# Patient Record
Sex: MALE | Race: WHITE | NOT HISPANIC OR LATINO | Employment: OTHER | URBAN - METROPOLITAN AREA
[De-identification: names, ages, dates, MRNs, and addresses within clinical notes are randomized per-mention and may not be internally consistent; named-entity substitution may affect disease eponyms.]

---

## 2022-09-23 ENCOUNTER — TRANSCRIBE ORDERS (OUTPATIENT)
Dept: ADMINISTRATIVE | Facility: HOSPITAL | Age: 77
End: 2022-09-23

## 2022-09-23 ENCOUNTER — HOSPITAL ENCOUNTER (OUTPATIENT)
Dept: GENERAL RADIOLOGY | Facility: HOSPITAL | Age: 77
Discharge: HOME | End: 2022-09-23
Admitting: INTERNAL MEDICINE

## 2022-09-23 DIAGNOSIS — M05.79 RHEUMATOID ARTHRITIS WITH RHEUMATOID FACTOR OF MULTIPLE SITES WITHOUT ORGAN OR SYSTEMS INVOLVEMENT: ICD-10-CM

## 2022-09-23 DIAGNOSIS — M05.79 RHEUMATOID ARTHRITIS WITH RHEUMATOID FACTOR OF MULTIPLE SITES WITHOUT ORGAN OR SYSTEMS INVOLVEMENT: Primary | ICD-10-CM

## 2022-09-23 DIAGNOSIS — M54.2 CERVICALGIA: ICD-10-CM

## 2022-09-23 PROCEDURE — 72050 X-RAY EXAM NECK SPINE 4/5VWS: CPT

## 2023-05-15 ENCOUNTER — HOSPITAL ENCOUNTER (EMERGENCY)
Facility: HOSPITAL | Age: 78
Discharge: HOME | End: 2023-05-15
Attending: EMERGENCY MEDICINE | Admitting: EMERGENCY MEDICINE
Payer: MEDICARE

## 2023-05-15 ENCOUNTER — APPOINTMENT (OUTPATIENT)
Dept: GENERAL RADIOLOGY | Facility: HOSPITAL | Age: 78
End: 2023-05-15
Payer: MEDICARE

## 2023-05-15 VITALS
BODY MASS INDEX: 26.9 KG/M2 | HEIGHT: 73 IN | SYSTOLIC BLOOD PRESSURE: 159 MMHG | RESPIRATION RATE: 16 BRPM | HEART RATE: 52 BPM | WEIGHT: 203 LBS | TEMPERATURE: 97.8 F | OXYGEN SATURATION: 99 % | DIASTOLIC BLOOD PRESSURE: 64 MMHG

## 2023-05-15 DIAGNOSIS — R06.02 SHORTNESS OF BREATH: Primary | ICD-10-CM

## 2023-05-15 LAB
ALBUMIN SERPL-MCNC: 4.2 G/DL (ref 3.5–5.2)
ALBUMIN/GLOB SERPL: 1.1 G/DL
ALP SERPL-CCNC: 71 U/L (ref 39–117)
ALT SERPL W P-5'-P-CCNC: 18 U/L (ref 1–41)
ANION GAP SERPL CALCULATED.3IONS-SCNC: 9.1 MMOL/L (ref 5–15)
AST SERPL-CCNC: 17 U/L (ref 1–40)
BASOPHILS # BLD AUTO: 0.02 10*3/MM3 (ref 0–0.2)
BASOPHILS NFR BLD AUTO: 0.4 % (ref 0–1.5)
BILIRUB SERPL-MCNC: 0.3 MG/DL (ref 0–1.2)
BUN SERPL-MCNC: 20 MG/DL (ref 8–23)
BUN/CREAT SERPL: 20 (ref 7–25)
CALCIUM SPEC-SCNC: 9.3 MG/DL (ref 8.6–10.5)
CHLORIDE SERPL-SCNC: 100 MMOL/L (ref 98–107)
CO2 SERPL-SCNC: 26.9 MMOL/L (ref 22–29)
CREAT SERPL-MCNC: 1 MG/DL (ref 0.76–1.27)
D DIMER PPP FEU-MCNC: 0.67 MCGFEU/ML (ref 0–0.76)
DEPRECATED RDW RBC AUTO: 44 FL (ref 37–54)
EGFRCR SERPLBLD CKD-EPI 2021: 78 ML/MIN/1.73
EOSINOPHIL # BLD AUTO: 0.12 10*3/MM3 (ref 0–0.4)
EOSINOPHIL NFR BLD AUTO: 2.2 % (ref 0.3–6.2)
ERYTHROCYTE [DISTWIDTH] IN BLOOD BY AUTOMATED COUNT: 13.2 % (ref 12.3–15.4)
GLOBULIN UR ELPH-MCNC: 3.7 GM/DL
GLUCOSE SERPL-MCNC: 148 MG/DL (ref 65–99)
HCT VFR BLD AUTO: 44.8 % (ref 37.5–51)
HGB BLD-MCNC: 14.6 G/DL (ref 13–17.7)
IMM GRANULOCYTES # BLD AUTO: 0 10*3/MM3 (ref 0–0.05)
IMM GRANULOCYTES NFR BLD AUTO: 0 % (ref 0–0.5)
LYMPHOCYTES # BLD AUTO: 1.31 10*3/MM3 (ref 0.7–3.1)
LYMPHOCYTES NFR BLD AUTO: 23.9 % (ref 19.6–45.3)
MCH RBC QN AUTO: 29.4 PG (ref 26.6–33)
MCHC RBC AUTO-ENTMCNC: 32.6 G/DL (ref 31.5–35.7)
MCV RBC AUTO: 90.1 FL (ref 79–97)
MONOCYTES # BLD AUTO: 0.32 10*3/MM3 (ref 0.1–0.9)
MONOCYTES NFR BLD AUTO: 5.8 % (ref 5–12)
NEUTROPHILS NFR BLD AUTO: 3.71 10*3/MM3 (ref 1.7–7)
NEUTROPHILS NFR BLD AUTO: 67.7 % (ref 42.7–76)
NT-PROBNP SERPL-MCNC: 444.9 PG/ML (ref 0–1800)
PLATELET # BLD AUTO: 155 10*3/MM3 (ref 140–450)
PMV BLD AUTO: 9.9 FL (ref 6–12)
POTASSIUM SERPL-SCNC: 4.7 MMOL/L (ref 3.5–5.2)
PROT SERPL-MCNC: 7.9 G/DL (ref 6–8.5)
QT INTERVAL: 360 MS
QT INTERVAL: 429 MS
RBC # BLD AUTO: 4.97 10*6/MM3 (ref 4.14–5.8)
SODIUM SERPL-SCNC: 136 MMOL/L (ref 136–145)
TROPONIN T SERPL HS-MCNC: 7 NG/L
TROPONIN T SERPL HS-MCNC: 8 NG/L
WBC NRBC COR # BLD: 5.48 10*3/MM3 (ref 3.4–10.8)

## 2023-05-15 PROCEDURE — 71046 X-RAY EXAM CHEST 2 VIEWS: CPT

## 2023-05-15 PROCEDURE — 84484 ASSAY OF TROPONIN QUANT: CPT | Performed by: EMERGENCY MEDICINE

## 2023-05-15 PROCEDURE — 80053 COMPREHEN METABOLIC PANEL: CPT | Performed by: EMERGENCY MEDICINE

## 2023-05-15 PROCEDURE — 93005 ELECTROCARDIOGRAM TRACING: CPT | Performed by: EMERGENCY MEDICINE

## 2023-05-15 PROCEDURE — 85025 COMPLETE CBC W/AUTO DIFF WBC: CPT | Performed by: EMERGENCY MEDICINE

## 2023-05-15 PROCEDURE — 83880 ASSAY OF NATRIURETIC PEPTIDE: CPT | Performed by: EMERGENCY MEDICINE

## 2023-05-15 PROCEDURE — 36415 COLL VENOUS BLD VENIPUNCTURE: CPT

## 2023-05-15 PROCEDURE — 85379 FIBRIN DEGRADATION QUANT: CPT | Performed by: EMERGENCY MEDICINE

## 2023-05-15 PROCEDURE — 99284 EMERGENCY DEPT VISIT MOD MDM: CPT

## 2023-05-15 RX ORDER — METFORMIN HYDROCHLORIDE 500 MG/1
2 TABLET, EXTENDED RELEASE ORAL DAILY
COMMUNITY
Start: 2023-01-27

## 2023-05-15 RX ORDER — PREDNISONE 20 MG/1
TABLET ORAL
Qty: 12 TABLET | Refills: 0 | Status: SHIPPED | OUTPATIENT
Start: 2023-05-15

## 2023-05-15 RX ORDER — ALBUTEROL SULFATE 90 UG/1
2 AEROSOL, METERED RESPIRATORY (INHALATION) EVERY 4 HOURS PRN
Qty: 6.7 G | Refills: 0 | Status: SHIPPED | OUTPATIENT
Start: 2023-05-15

## 2023-05-15 RX ORDER — MELOXICAM 15 MG/1
1 TABLET ORAL DAILY
COMMUNITY
Start: 2023-05-10

## 2023-05-15 RX ORDER — TRAMADOL HYDROCHLORIDE 50 MG/1
100 TABLET ORAL EVERY 6 HOURS PRN
COMMUNITY
Start: 2023-05-09

## 2023-05-15 RX ORDER — ASPIRIN 81 MG/1
81 TABLET, CHEWABLE ORAL DAILY
COMMUNITY

## 2023-05-15 RX ORDER — SODIUM CHLORIDE 0.9 % (FLUSH) 0.9 %
10 SYRINGE (ML) INJECTION AS NEEDED
Status: DISCONTINUED | OUTPATIENT
Start: 2023-05-15 | End: 2023-05-15 | Stop reason: HOSPADM

## 2023-05-15 RX ORDER — LISINOPRIL 10 MG/1
10 TABLET ORAL DAILY
COMMUNITY

## 2023-05-15 RX ORDER — ATENOLOL 25 MG/1
1 TABLET ORAL DAILY
COMMUNITY
Start: 2023-02-18

## 2023-05-15 RX ORDER — OMEPRAZOLE 40 MG/1
1 CAPSULE, DELAYED RELEASE ORAL DAILY
COMMUNITY
Start: 2023-04-05

## 2023-05-15 RX ORDER — DOXAZOSIN 2 MG/1
2 TABLET ORAL
COMMUNITY
Start: 2023-05-10

## 2023-05-15 RX ORDER — PRAVASTATIN SODIUM 80 MG/1
1 TABLET ORAL DAILY
COMMUNITY
Start: 2023-02-16

## 2023-05-15 RX ORDER — GABAPENTIN 300 MG/1
600 CAPSULE ORAL
COMMUNITY
Start: 2023-05-01

## 2023-05-15 NOTE — FSED PROVIDER NOTE
Subjective   History of Present Illness  Patient is a 77-year-old white male.  He presents with several episodes of shortness of breath that began last night.  He states they last just for maybe a minute.  He denies any associated chest pain.  No nausea vomiting.  No fever no chills.  No productive cough with sputum.  He does have some allergies and has had a cough intermittently the last few weeks.  No personal history of DVT or PE.  No personal history of coronary artery disease.  He states he had a stress test this last fall that was normal.  No calf pain or swelling.        Review of Systems   Constitutional: No fevers, chills, sweats unless otherwise documented in HPI  Eyes: No recent visual problems, eye discharge, eye pain, redness unless otherwise documented in HPI  HEENT: No ear pain, nasal congestion, sore throat, voice changes unless otherwise documented in HPI  Respiratory: No shortness of breath, cough, pain on breathing, sputum production unless otherwise documented in HPI  Cardiovascular: No chest pain, palpitations, syncope, orthopnea unless otherwise documented in HPI  Gastrointestinal: No nausea, vomiting, diarrhea, constipation unless otherwise documented in HPI  Genitourinary: No hematuria, dysuria, incontinence unless otherwise documented in HPI  Endocrine: Negative for excessive thirst, excessive hunger, excessive urination, heat or cold intolerance unless otherwise documented in HPI  Musculoskeletal: No back pain, neck pain, joint pain, muscle pain, decreased range of motion unless otherwise documented in HPI  Integumentary: No rash, pruritus, abrasion, lesions unless otherwise documented in HPI  Neurologic: No weakness, numbness, frequent headaches, tremors unless otherwise documented in HPI  Psychiatric: No anxiety, depression, mood changes, hallucinations unless otherwise documented in HPI        Past Medical History:   Diagnosis Date   • Diabetes mellitus    • Hyperlipidemia    •  Hypertension    • RA (rheumatoid arthritis)        No Known Allergies    Past Surgical History:   Procedure Laterality Date   • ABDOMINAL SURGERY     • CHOLECYSTECTOMY     • PERICARDIUM SURGERY     • REPLACEMENT TOTAL KNEE BILATERAL         History reviewed. No pertinent family history.    Social History     Socioeconomic History   • Marital status:    Tobacco Use   • Smoking status: Every Day     Packs/day: 1.00     Types: Cigarettes   Substance and Sexual Activity   • Alcohol use: Not Currently   • Drug use: Never           Objective   Physical Exam   Vital signs: Reviewed in nurses notes    General: Awake alert no acute distress    HEENT: Pupils equal round responsive to light extraocular's are intact bilaterally nasopharynx clear pharynx clear lungs    Neck:   Supple without lymphadenopathy    Respiratory:   Decreased breath sounds but equal bilaterally.    Cardiovascular: Regular rate and rhythm no murmurs.  Equal pulses in bilateral lower extremities.  No pretibial or pedal edema    Abdomen: Soft nondistended nontender to palpation x4 quadrants    Skin:   Warm and dry.  There is a very small scab on the epigastrium where he bumped the skin at home    Musculoskeletal: Patient does have changes consistent with rheumatoid arthritis of his hands    Neurological examination: Patient is awake alert oriented x4.  Speech is normal.  No facial palsy.  No focal motor or sensory deficits.    ECG 12 Lead ED Triage Standing Order; SOA      Date/Time: 5/15/2023 10:09 AM  Performed by: Roby Medina MD  Authorized by: Roby Medina MD   Interpreted by physician  Rhythm: sinus rhythm  Comments: Normal sinus rhythm rate is 70.  Right axis deviation noted.  Nonspecific lateral and anterior lateral T wave inversions noted      ECG 12 Lead      Date/Time: 5/15/2023 12:43 PM  Performed by: Roby Medina MD  Authorized by: Roby Medina MD   Interpreted by physician  Comparison: compared with previous  ECG from 5/15/2023  Similar to previous ECG  Rhythm: sinus bradycardia  Comments: Sinus bradycardia rate of 50.  Nonspecific T wave changes inferiorly and anterior laterally.                 ED Course  ED Course as of 05/15/23 1407   Mon May 15, 2023   1149 Initial blood work has all been reviewed.  Age-adjusted D-dimer is normal.  We will obtain a 2-hour troponin T with EKG.  Currently resting comfortably [TC]      ED Course User Index  [TC] Roby Medina MD                                           [facility]   Differential Diagnosis: Pulmonary embolism, pneumonia, non-Q wave myocardial infarction, COPD    ED Course: Appropriate physical exam was performed.  Laboratory evaluation was undertaken including D-dimer, troponin with 2-hour repeat.  Chest x-ray was obtained and reviewed.  No evidence of infiltrate or CHF   results of patient's evaluation were discussed with the patient.  We also did discuss the treatment plan as well as appropriate return precautions.    Repeat Exam and discussion with patient, including plan of care,  prior to discharge: Patient feels very well to discharge.  We discussed plan of care which includes short prednisone taper as well as albuterol MDI    My EKG Interpretation: Please see body of note for EKG details    My CT Interpretation: N/A    My Xray interpreation:   The x-rays were independently reviewed as well as review of the radiologist interpretation  Decision rules/scores evaluated: N/A    Discussed with : N/A    Tests considered but not order: N/A    Shared decision making:   Shared decision making was undertaken with the patient.  The patient understands and concurs with current treatment plan.    Code Status: Full Code    Social Determinants of Health that impacts treatment: Very strong social support system    Final diagnoses:   Shortness of breath       ED Disposition  ED Disposition     ED Disposition   Discharge    Condition   Stable    Comment   --             Ladan  Tavo SAMANIEGO MD  [address] [place], [place] [id number]  [phone]    In 1 week           Medication List      New Prescriptions    albuterol sulfate  (90 Base) MCG/ACT inhaler  Commonly known as: PROVENTIL HFA;VENTOLIN HFA;PROAIR HFA  Inhale 2 puffs Every 4 (Four) Hours As Needed for Shortness of Air (and / or cough).     predniSONE 20 MG tablet  Commonly known as: DELTASONE  3 po daily x 2d, then 2 po daily x 2d, then 1 po daily x 2d.           Where to Get Your Medications      These medications were sent to [facility] [address] [place] [address] [facility] - [phone]  - [phone]   [address] [facility] [address]    Phone: [phone]   · albuterol sulfate  (90 Base) MCG/ACT inhaler  · predniSONE 20 MG tablet

## 2023-05-15 NOTE — DISCHARGE INSTRUCTIONS
Today we do not see any evidence of heart attack, pneumonia, or CHF.  I am sending in a 6-day taper of prednisone as well as an albuterol inhaler.  This will reduce any inflammation in your lungs and should make you breathe in a much easier fashion.    Return anytime for repeat assessment should you have increased shortness of breath pain or other difficulties    Please read all of the instructions in this handout.  If you receive prescriptions please fill them and take them as directed.  Please call your primary care physician for follow-up appointment in the next 5 to 7 days.  If you do not have a physician you may call the [facility] referral line at [phone].    You may return to the emergency department at any time for any concerns such as worsening symptoms.  If you received a work or school note it will be printed at the back of this packet.

## 2023-06-02 ENCOUNTER — HOSPITAL ENCOUNTER (OUTPATIENT)
Facility: HOSPITAL | Age: 78
Discharge: HOME | End: 2023-06-02
Attending: EMERGENCY MEDICINE | Admitting: EMERGENCY MEDICINE
Payer: MEDICARE

## 2023-06-02 VITALS
TEMPERATURE: 97.4 F | BODY MASS INDEX: 27.22 KG/M2 | OXYGEN SATURATION: 95 % | WEIGHT: 201 LBS | HEIGHT: 72 IN | SYSTOLIC BLOOD PRESSURE: 158 MMHG | DIASTOLIC BLOOD PRESSURE: 92 MMHG | RESPIRATION RATE: 18 BRPM | HEART RATE: 83 BPM

## 2023-06-02 DIAGNOSIS — L03.311 CELLULITIS OF ABDOMINAL WALL: Primary | ICD-10-CM

## 2023-06-02 PROCEDURE — 90715 TDAP VACCINE 7 YRS/> IM: CPT | Performed by: PHYSICIAN ASSISTANT

## 2023-06-02 PROCEDURE — G0463 HOSPITAL OUTPT CLINIC VISIT: HCPCS | Performed by: PHYSICIAN ASSISTANT

## 2023-06-02 PROCEDURE — 25010000002 TETANUS-DIPHTH-ACELL PERTUSSIS 5-2.5-18.5 LF-MCG/0.5 SUSPENSION PREFILLED SYRINGE: Performed by: PHYSICIAN ASSISTANT

## 2023-06-02 PROCEDURE — 90471 IMMUNIZATION ADMIN: CPT | Performed by: PHYSICIAN ASSISTANT

## 2023-06-02 RX ORDER — CEFUROXIME AXETIL 500 MG/1
500 TABLET ORAL 2 TIMES DAILY
Qty: 20 TABLET | Refills: 0 | Status: SHIPPED | OUTPATIENT
Start: 2023-06-02 | End: 2023-06-12

## 2023-06-02 RX ADMIN — TETANUS TOXOID, REDUCED DIPHTHERIA TOXOID AND ACELLULAR PERTUSSIS VACCINE, ADSORBED 0.5 ML: 5; 2.5; 8; 8; 2.5 SUSPENSION INTRAMUSCULAR at 10:28

## 2023-06-02 NOTE — FSED PROVIDER NOTE
Subjective   History of Present Illness  Patient [place] and had an abrasion on the epigastric region of his abdomen.  He said nothing penetrated deep and it was more of a scratch.  However it started to get red and he is concerned for cellulitis.  He says it has not been draining and he has been using antibiotic ointment.  He denies any fever.  He is not having abdominal pain.        Review of Systems   Skin: Positive for color change and wound.   All other systems reviewed and are negative.      Past Medical History:   Diagnosis Date   • Diabetes mellitus    • Hyperlipidemia    • Hypertension    • RA (rheumatoid arthritis)        No Known Allergies    Past Surgical History:   Procedure Laterality Date   • ABDOMINAL SURGERY     • CHOLECYSTECTOMY     • PERICARDIUM SURGERY     • REPLACEMENT TOTAL KNEE BILATERAL         History reviewed. No pertinent family history.    Social History     Socioeconomic History   • Marital status:    Tobacco Use   • Smoking status: Every Day     Packs/day: 1.00     Types: Cigarettes   Substance and Sexual Activity   • Alcohol use: Not Currently   • Drug use: Never           Objective   Physical Exam  Vitals reviewed.   Constitutional:       Appearance: Normal appearance.   HENT:      Head: Normocephalic.   Eyes:      Conjunctiva/sclera: Conjunctivae normal.   Cardiovascular:      Rate and Rhythm: Normal rate.   Pulmonary:      Effort: Pulmonary effort is normal.      Breath sounds: Normal breath sounds.   Abdominal:      General: There is distension.      Tenderness: There is no abdominal tenderness.   Musculoskeletal:         General: Normal range of motion.   Skin:     General: Skin is warm and dry.      Comments: Approximately 5 cm area of redness and scar tissue/surgical incision at epigastric area with a couple centrals gaps.  No drainage or smell odor.  Not tender.  No palpable abscess   Neurological:      General: No  focal deficit present.      Mental Status: He is alert.   Psychiatric:         Mood and Affect: Mood normal.         Behavior: Behavior normal.         Thought Content: Thought content normal.         Judgment: Judgment normal.         Procedures           ED Course                                           Medical Decision Making  Patient is a well-appearing 77-year-old gentleman.  The wound is right at the surgical scar on the epigastric region.  There is a scab.  There is surrounding erythema about 5 cm without any drainage, malodor.  He says it was a scratch when it began and has no suspicion for foreign body retainment.  He has been using an antibiotic ointment and I feel p.o. antibiotics are indicated at this time.  He is a diabetic and I will give him Ceftin 500 twice a day for 10 days.  He can continue the antibiotic ointment.  Tetanus will be updated today.  I gave him very strict return precautions for worsening infection, fever, drainage, pain.  He is medically cleared and can follow-up with family doctor.    Risk  Prescription drug management.          Final diagnoses:   Cellulitis of abdominal wall       ED Disposition  ED Disposition     ED Disposition   Discharge    Condition   Stable    Comment   --             Tavo Pickering MD  [address] [place] [address]  [phone]    In 1 week           Medication List      New Prescriptions    cefuroxime 500 MG tablet  Commonly known as: CEFTIN  Take 1 tablet by mouth 2 (Two) Times a Day for 10 days.           Where to Get Your Medications      These medications were sent to [facility] [address] [place] [address] [facility] - [phone]  - [phone]   [address] [facility] [address]    Phone: [phone]   · cefuroxime 500 MG tablet

## 2023-06-08 ENCOUNTER — OFFICE VISIT (OUTPATIENT)
Dept: FAMILY MEDICINE CLINIC | Facility: CLINIC | Age: 78
End: 2023-06-08
Payer: MEDICARE

## 2023-06-08 ENCOUNTER — PATIENT ROUNDING (BHMG ONLY) (OUTPATIENT)
Dept: FAMILY MEDICINE CLINIC | Facility: CLINIC | Age: 78
End: 2023-06-08

## 2023-06-08 VITALS
HEART RATE: 63 BPM | OXYGEN SATURATION: 97 % | WEIGHT: 201.9 LBS | TEMPERATURE: 96 F | SYSTOLIC BLOOD PRESSURE: 124 MMHG | BODY MASS INDEX: 27.35 KG/M2 | HEIGHT: 72 IN | DIASTOLIC BLOOD PRESSURE: 78 MMHG

## 2023-06-08 DIAGNOSIS — E78.2 MIXED HYPERLIPIDEMIA: ICD-10-CM

## 2023-06-08 DIAGNOSIS — E11.9 DIABETES MELLITUS WITHOUT COMPLICATION: ICD-10-CM

## 2023-06-08 DIAGNOSIS — E66.3 OVERWEIGHT (BMI 25.0-29.9): Primary | ICD-10-CM

## 2023-06-08 DIAGNOSIS — I10 PRIMARY HYPERTENSION: ICD-10-CM

## 2023-06-08 DIAGNOSIS — L03.311 CELLULITIS OF ABDOMINAL WALL: ICD-10-CM

## 2023-06-08 DIAGNOSIS — Z11.59 ENCOUNTER FOR HCV SCREENING TEST FOR LOW RISK PATIENT: ICD-10-CM

## 2023-06-08 PROBLEM — L03.90 CELLULITIS: Status: ACTIVE | Noted: 2023-06-08

## 2023-06-08 PROBLEM — M06.9 RHEUMATOID ARTHRITIS: Status: ACTIVE | Noted: 2023-06-08

## 2023-06-08 RX ORDER — LOSARTAN POTASSIUM 25 MG/1
TABLET ORAL
COMMUNITY
Start: 2023-03-31

## 2023-06-08 RX ORDER — CEPHALEXIN 500 MG/1
CAPSULE ORAL
COMMUNITY
Start: 2023-05-12 | End: 2023-06-08

## 2023-06-08 NOTE — ASSESSMENT & PLAN NOTE
Lipid abnormalities are newly identified.  Pharmacotherapy as ordered.  Lipids will be reassessed  today with labs .

## 2023-06-08 NOTE — ASSESSMENT & PLAN NOTE
Hypertension is newly identified.  Weight loss.  Regular aerobic exercise.  Medication changes per orders.  Blood pressure will be reassessed at the next regular appointment.    Discontinue atenolol. Follow-up in one month.

## 2023-06-08 NOTE — PROGRESS NOTES
Casey Enciso MD  Internal Medicine  [phone] (office)             06/08/2023    Patient Information  Gay Vazquez                                                                                          [address] [facility] [address]  1945        Chief Complaint   Patient presents with    Establish Care     Pt has a scar on his chest that would like for the doctor to take a look at it.   Got injured about 1 week ago on his chest by carrying wood.           History of Present Illness:    Gay Vazquez is a 77 y.o. male who presents to the office to establish care with new PCP. He has RA, HTN, HLD, DM, BPH, GERD.     Aside from pain from RA, patient is without complaint today. He was seen at urgent care about one week ago with abdominal wound that occurred while carrying wound. Patient received Tdap and was given course of cefuroxime. He denies pain or drainage from site.     He has pending diabetic eye examination. Patient reports DMII is well controlled on metformin and he denies known complications.     He is taking aspirin for primary prevention. Patient also taking meloxicam daily. He denies bleeding history.     He is not exercising regularly.       No Known Allergies        Current Outpatient Medications:     cefuroxime (CEFTIN) 500 MG tablet, Take 1 tablet by mouth 2 (Two) Times a Day for 10 days., Disp: 20 tablet, Rfl: 0    doxazosin (CARDURA) 2 MG tablet, Take 1 tablet by mouth every night at bedtime., Disp: , Rfl:     gabapentin (NEURONTIN) 300 MG capsule, Take 2 capsules by mouth every night at bedtime., Disp: , Rfl:     losartan (COZAAR) 25 MG tablet, TAKE 1 TABLET BY MOUTH ONCE DAILY; STOP TAKING LISINOPRIL 10MG, Disp: , Rfl:     meloxicam (MOBIC) 15 MG tablet, Take 1 tablet by mouth Daily., Disp: , Rfl:     metFORMIN ER (GLUCOPHAGE-XR) 500 MG 24 hr tablet, Take 2 tablets by mouth Daily., Disp: , Rfl:     omeprazole (priLOSEC) 40 MG capsule, Take 1 capsule by mouth Daily., Disp:  ", Rfl:     pravastatin (PRAVACHOL) 80 MG tablet, Take 1 tablet by mouth Daily., Disp: , Rfl:     traMADol (ULTRAM) 50 MG tablet, Take 2 tablets by mouth Every 6 (Six) Hours As Needed., Disp: , Rfl:       Social History     Socioeconomic History    Marital status:    Tobacco Use    Smoking status: Every Day     Packs/day: 1.00     Types: Cigarettes   Substance and Sexual Activity    Alcohol use: Not Currently    Drug use: Never         Vitals:    06/08/23 1356   BP: 124/78   BP Location: Right arm   Patient Position: Sitting   Pulse: 63   Temp: 96 °F (35.6 °C)   SpO2: 97%   Weight: 91.6 kg (201 lb 14.4 oz)   Height: 182.9 cm (72.01\")      Wt Readings from Last 3 Encounters:   06/08/23 91.6 kg (201 lb 14.4 oz)   06/02/23 91.2 kg (201 lb)   05/15/23 92.1 kg (203 lb)     Body mass index is 27.38 kg/m².      Physical Exam  Vitals reviewed.   Constitutional:       Appearance: Normal appearance. He is not ill-appearing.   HENT:      Head: Normocephalic and atraumatic.   Pulmonary:      Effort: Pulmonary effort is normal.   Musculoskeletal:      Comments: Hands disfigured but don't appear to be actively inflammed   Skin:     Comments: Abdominal wound (superior to umbilicus, ovoid approximately 3in x 1in) with granulation tissue and no purulence noted. Wound with surrounding erythema, no warmth, TTP, or induration.    Neurological:      Mental Status: He is alert and oriented to person, place, and time.   Psychiatric:         Mood and Affect: Mood normal.         Behavior: Behavior normal.          Lab/other results:  Common labs          5/15/2023    10:43   Common Labs   Glucose 148    BUN 20    Creatinine 1.00    Sodium 136    Potassium 4.7    Chloride 100    Calcium 9.3    Albumin 4.2    Total Bilirubin 0.3    Alkaline Phosphatase 71    AST (SGOT) 17    ALT (SGPT) 18    WBC 5.48    Hemoglobin 14.6    Hematocrit 44.8    Platelets 155        Assessment/Plan:    Diagnoses and all orders for this visit:    1. " Overweight (BMI 25.0-29.9) (Primary)  Assessment & Plan:  Patient's (Body mass index is 27.38 kg/m².) indicates that they are overweight with health conditions that include hypertension, diabetes mellitus, dyslipidemias, and osteoarthritis . Weight is newly identified. BMI is is above average; BMI management plan is completed. We discussed portion control, increasing exercise, and joining a fitness center or start home based exercise program.       2. Mixed hyperlipidemia  Assessment & Plan:  Lipid abnormalities are newly identified.  Pharmacotherapy as ordered.  Lipids will be reassessed  today with labs .    Orders:  -     Lipid Panel  -     Apolipoprotein B    3. Primary hypertension  Assessment & Plan:  Hypertension is newly identified.  Weight loss.  Regular aerobic exercise.  Medication changes per orders.  Blood pressure will be reassessed at the next regular appointment.    Discontinue atenolol. Follow-up in one month.       4. Diabetes mellitus without complication  Assessment & Plan:  Diabetes is newly identified.   Continue current treatment regimen.  Regular aerobic exercise.  Discussed foot care.  Reminded to get yearly retinal exam.  Diabetes will be reassessed  today with labs .    Orders:  -     Microalbumin / Creatinine Urine Ratio - Urine, Clean Catch  -     Hemoglobin A1c    5. Cellulitis of abdominal wall  Assessment & Plan:  Improving per patient, will complete course of cefuroxime.       6. Encounter for HCV screening test for low risk patient  -     Hepatitis C Virus Ab Cascade       Discussed aspirin for primary prevention and decided to discontinue.     Encouraged patient to get Shingrix immunization series.

## 2023-06-08 NOTE — ASSESSMENT & PLAN NOTE
Diabetes is newly identified.   Continue current treatment regimen.  Regular aerobic exercise.  Discussed foot care.  Reminded to get yearly retinal exam.  Diabetes will be reassessed  today with labs .

## 2023-06-08 NOTE — ASSESSMENT & PLAN NOTE
Patient's (Body mass index is 27.38 kg/m².) indicates that they are overweight with health conditions that include hypertension, diabetes mellitus, dyslipidemias, and osteoarthritis . Weight is newly identified. BMI is is above average; BMI management plan is completed. We discussed portion control, increasing exercise, and joining a fitness center or start home based exercise program.

## 2023-06-08 NOTE — PROGRESS NOTES
June 8, 2023      Jessica Vazquez,     I want to officially welcome you to our practice and ask about your recent visit.     Overall were you satisfied with your visit? yes      Would you recommend our office to friends and family? yes      Your experience is very important to us.  You may receive an email regarding a survey.  Please take a moment to complete.  This will help us to improve.      I appreciate you taking the time to answer these questions.       Thank you and have a great day.

## 2023-06-11 LAB
ALBUMIN/CREAT UR: 72 MG/G CREAT (ref 0–29)
APO B SERPL-MCNC: 84 MG/DL
CHOLEST SERPL-MCNC: 142 MG/DL (ref 100–199)
CREAT UR-MCNC: 195.3 MG/DL
HBA1C MFR BLD: 7.2 % (ref 4.8–5.6)
HCV AB SERPL QL IA: NORMAL
HCV IGG SERPL QL IA: NON REACTIVE
HDLC SERPL-MCNC: 29 MG/DL
LDLC SERPL CALC-MCNC: 75 MG/DL (ref 0–99)
MICROALBUMIN UR-MCNC: 140 UG/ML
TRIGL SERPL-MCNC: 230 MG/DL (ref 0–149)
VLDLC SERPL CALC-MCNC: 38 MG/DL (ref 5–40)

## 2023-06-16 ENCOUNTER — TELEPHONE (OUTPATIENT)
Dept: FAMILY MEDICINE CLINIC | Facility: CLINIC | Age: 78
End: 2023-06-16
Payer: MEDICARE

## 2023-06-16 NOTE — TELEPHONE ENCOUNTER
----- Message from Philip Enciso MD sent at 6/12/2023  9:20 AM EDT -----  Please call the patient regarding his abnormal result. His diabetes is reasonably well controlled (A1c 7.2%) on metformin and cholesterol is reasonably well controlled on pravastatin. No changes to medication regimen needed at this time. I still recommend increased physical activity as we discussed during last visit. Thanks!

## 2023-06-19 ENCOUNTER — TELEPHONE (OUTPATIENT)
Dept: FAMILY MEDICINE CLINIC | Facility: CLINIC | Age: 78
End: 2023-06-19

## 2023-06-19 NOTE — TELEPHONE ENCOUNTER
Caller: Gay Vazquez    Relationship to patient: Self    Best call back number: [phone]    Patient is needing: PLEASE CALL PATIENT BACK TO GO OVER LAB RESULTS. HUB COULD NOT WARM TRANSFER.

## 2023-07-10 PROBLEM — L03.90 CELLULITIS: Status: RESOLVED | Noted: 2023-06-08 | Resolved: 2023-07-10

## 2023-07-10 PROBLEM — F17.210 NICOTINE DEPENDENCE, CIGARETTES, UNCOMPLICATED: Status: ACTIVE | Noted: 2023-07-10

## 2023-07-24 RX ORDER — OMEPRAZOLE 40 MG/1
40 CAPSULE, DELAYED RELEASE ORAL DAILY
Qty: 30 CAPSULE | Refills: 0 | Status: SHIPPED | OUTPATIENT
Start: 2023-07-24

## 2023-08-07 ENCOUNTER — OFFICE VISIT (OUTPATIENT)
Dept: FAMILY MEDICINE CLINIC | Facility: CLINIC | Age: 78
End: 2023-08-07
Payer: MEDICARE

## 2023-08-07 VITALS
HEART RATE: 81 BPM | BODY MASS INDEX: 27.01 KG/M2 | OXYGEN SATURATION: 96 % | SYSTOLIC BLOOD PRESSURE: 118 MMHG | TEMPERATURE: 98.1 F | DIASTOLIC BLOOD PRESSURE: 74 MMHG | RESPIRATION RATE: 16 BRPM | WEIGHT: 203.8 LBS | HEIGHT: 73 IN

## 2023-08-07 DIAGNOSIS — T81.30XA ABDOMINAL WOUND DEHISCENCE, INITIAL ENCOUNTER: Primary | ICD-10-CM

## 2023-08-07 DIAGNOSIS — F17.210 NICOTINE DEPENDENCE, CIGARETTES, UNCOMPLICATED: ICD-10-CM

## 2023-08-07 PROCEDURE — 99213 OFFICE O/P EST LOW 20 MIN: CPT | Performed by: INTERNAL MEDICINE

## 2023-08-07 PROCEDURE — 3074F SYST BP LT 130 MM HG: CPT | Performed by: INTERNAL MEDICINE

## 2023-08-07 PROCEDURE — 3078F DIAST BP <80 MM HG: CPT | Performed by: INTERNAL MEDICINE

## 2023-08-07 RX ORDER — ATENOLOL 25 MG/1
25 TABLET ORAL DAILY
COMMUNITY

## 2023-08-07 RX ORDER — GOLIMUMAB 50 MG/4ML
SOLUTION INTRAVENOUS
COMMUNITY

## 2023-08-07 NOTE — ASSESSMENT & PLAN NOTE
Tobacco use is unchanged.  Smoking cessation counseling was provided.  Tobacco use will be reassessed  2 months. Patient says he will discuss treatment during next visit. Lung cancer screening CT scheduled for this week.  .

## 2023-08-07 NOTE — PROGRESS NOTES
Casey Enciso MD  Internal Medicine  [phone] (office)             08/07/2023    Patient Information  Gay Vazquez                                                                                          [address] [facility] [address]  1945        Chief Complaint   Patient presents with    Hyperlipidemia    Hypertension          History of Present Illness:    Gay Vazquez is a 78 y.o. male who presents to the office for routine follow-up.     Abdominal wound still present. Patient denies pain, fever, drainage. He continues to smoke and isn't interested in quitting at this time.       No Known Allergies        Current Outpatient Medications:     atenolol (TENORMIN) 25 MG tablet, Take 1 tablet by mouth Daily., Disp: , Rfl:     doxazosin (CARDURA) 2 MG tablet, Take 1 tablet by mouth every night at bedtime., Disp: , Rfl:     gabapentin (NEURONTIN) 300 MG capsule, Take 2 capsules by mouth every night at bedtime., Disp: , Rfl:     golimumab (Simponi Aria) 50 MG/4ML solution injection, Infuse  into a venous catheter., Disp: , Rfl:     losartan (COZAAR) 25 MG tablet, Take 1 tablet by mouth Daily., Disp: 90 tablet, Rfl: 3    meloxicam (MOBIC) 15 MG tablet, Take 1 tablet by mouth Daily., Disp: , Rfl:     metFORMIN ER (GLUCOPHAGE-XR) 500 MG 24 hr tablet, Take 2 tablets by mouth Daily., Disp: , Rfl:     omeprazole (priLOSEC) 40 MG capsule, Take 1 capsule by mouth once daily, Disp: 30 capsule, Rfl: 0    pravastatin (PRAVACHOL) 80 MG tablet, Take 1 tablet by mouth Daily., Disp: , Rfl:     traMADol (ULTRAM) 50 MG tablet, Take 2 tablets by mouth Every 6 (Six) Hours As Needed., Disp: , Rfl:       Social History     Socioeconomic History    Marital status:    Tobacco Use    Smoking status: Every Day     Packs/day: 1.00     Types: Cigarettes     Start date: 1970     Passive exposure: Current   Substance and Sexual Activity    Alcohol use: Not Currently    Drug use: Never         Vitals:    08/07/23  "1357   BP: 118/74   Pulse: 81   Resp: 16   Temp: 98.1 øF (36.7 øC)   TempSrc: Oral   SpO2: 96%   Weight: 92.4 kg (203 lb 12.8 oz)   Height: 185.4 cm (73\")      Wt Readings from Last 3 Encounters:   08/07/23 92.4 kg (203 lb 12.8 oz)   07/10/23 91.4 kg (201 lb 8 oz)   06/08/23 91.6 kg (201 lb 14.4 oz)     Body mass index is 26.89 kg/mý.      Physical Exam  Vitals reviewed.   Constitutional:       Appearance: Normal appearance. He is not ill-appearing.   Skin:     Comments: Abdominal ulceration with granulation tissue, no purulence noted, surrounding erythematous patch.    Neurological:      Mental Status: He is alert and oriented to person, place, and time.   Psychiatric:         Mood and Affect: Mood normal.         Behavior: Behavior normal.          Assessment/Plan:    Diagnoses and all orders for this visit:    1. Abdominal wound dehiscence, initial encounter (Primary)  Comments:  Wound is not healing. It doesn't appear infected, however. I suspected surrounding erythematous patches represents tinea corporis. wound clinic consult placed.  Orders:  -     Ambulatory Referral to Wound Clinic    2. Nicotine dependence, cigarettes, uncomplicated  Assessment & Plan:  Tobacco use is unchanged.  Smoking cessation counseling was provided.  Tobacco use will be reassessed  2 months. Patient says he will discuss treatment during next visit. Lung cancer screening CT scheduled for this week.  .                 "

## 2023-08-14 ENCOUNTER — HOSPITAL ENCOUNTER (OUTPATIENT)
Dept: CT IMAGING | Facility: HOSPITAL | Age: 78
Discharge: HOME | End: 2023-08-14
Admitting: INTERNAL MEDICINE
Payer: MEDICARE

## 2023-08-14 DIAGNOSIS — F17.210 NICOTINE DEPENDENCE, CIGARETTES, UNCOMPLICATED: ICD-10-CM

## 2023-08-14 PROBLEM — I71.40 ABDOMINAL AORTIC ANEURYSM (AAA): Status: ACTIVE | Noted: 2023-08-14

## 2023-08-14 PROCEDURE — 71271 CT THORAX LUNG CANCER SCR C-: CPT

## 2023-08-15 ENCOUNTER — OFFICE VISIT (OUTPATIENT)
Dept: WOUND CARE | Facility: HOSPITAL | Age: 78
End: 2023-08-15
Payer: MEDICARE

## 2023-08-15 ENCOUNTER — LAB REQUISITION (OUTPATIENT)
Dept: LAB | Facility: HOSPITAL | Age: 78
End: 2023-08-15
Payer: MEDICARE

## 2023-08-15 DIAGNOSIS — E11.622 TYPE 2 DIABETES MELLITUS WITH OTHER SKIN ULCER (CODE): ICD-10-CM

## 2023-08-15 PROCEDURE — 87075 CULTR BACTERIA EXCEPT BLOOD: CPT

## 2023-08-15 PROCEDURE — 97602 WOUND(S) CARE NON-SELECTIVE: CPT

## 2023-08-15 PROCEDURE — G0463 HOSPITAL OUTPT CLINIC VISIT: HCPCS

## 2023-08-15 PROCEDURE — 87205 SMEAR GRAM STAIN: CPT

## 2023-08-15 PROCEDURE — 87070 CULTURE OTHR SPECIMN AEROBIC: CPT

## 2023-08-16 ENCOUNTER — TELEPHONE (OUTPATIENT)
Dept: FAMILY MEDICINE CLINIC | Facility: CLINIC | Age: 78
End: 2023-08-16
Payer: MEDICARE

## 2023-08-16 NOTE — TELEPHONE ENCOUNTER
----- Message from Philip Enciso MD sent at 8/14/2023 10:19 PM EDT -----  Please call the patient regarding his abnormal result. He has no evidence of lung cancer on the chest CT. However, he has an aneurysm of the aorta. This is very dangerous and we need to discuss a plan in person. Please assist patient in scheduling an appointment to discuss this within the next month. Thanks.

## 2023-08-16 NOTE — TELEPHONE ENCOUNTER
Called pt and informed him He has no evidence of lung cancer on the chest CT. However, he has an aneurysm of the aorta. This is very dangerous and we need to discuss a plan in person.     Pt felt comfortable in coming to the office sooner and not wait. Pt will be in the office 08/23/2023 at 4pm.

## 2023-08-18 LAB
BACTERIA SPEC AEROBE CULT: NORMAL
GRAM STN SPEC: NORMAL

## 2023-08-20 LAB — BACTERIA SPEC ANAEROBE CULT: NORMAL

## 2023-08-21 RX ORDER — OMEPRAZOLE 40 MG/1
40 CAPSULE, DELAYED RELEASE ORAL DAILY
Qty: 30 CAPSULE | Refills: 0 | Status: SHIPPED | OUTPATIENT
Start: 2023-08-21

## 2023-08-23 ENCOUNTER — OFFICE VISIT (OUTPATIENT)
Dept: FAMILY MEDICINE CLINIC | Facility: CLINIC | Age: 78
End: 2023-08-23
Payer: MEDICARE

## 2023-08-23 VITALS
WEIGHT: 199.4 LBS | DIASTOLIC BLOOD PRESSURE: 68 MMHG | OXYGEN SATURATION: 98 % | SYSTOLIC BLOOD PRESSURE: 108 MMHG | HEIGHT: 73 IN | HEART RATE: 68 BPM | BODY MASS INDEX: 26.43 KG/M2

## 2023-08-23 DIAGNOSIS — I71.21 ANEURYSM OF ASCENDING AORTA WITHOUT RUPTURE: ICD-10-CM

## 2023-08-23 DIAGNOSIS — F17.210 NICOTINE DEPENDENCE, CIGARETTES, UNCOMPLICATED: Primary | ICD-10-CM

## 2023-08-23 PROBLEM — I71.40 ABDOMINAL AORTIC ANEURYSM (AAA): Status: RESOLVED | Noted: 2023-08-14 | Resolved: 2023-08-23

## 2023-08-23 PROCEDURE — 99214 OFFICE O/P EST MOD 30 MIN: CPT | Performed by: INTERNAL MEDICINE

## 2023-08-23 PROCEDURE — 3078F DIAST BP <80 MM HG: CPT | Performed by: INTERNAL MEDICINE

## 2023-08-23 PROCEDURE — 3074F SYST BP LT 130 MM HG: CPT | Performed by: INTERNAL MEDICINE

## 2023-08-23 RX ORDER — BUPROPION HYDROCHLORIDE 150 MG/1
150 TABLET ORAL DAILY
Qty: 30 TABLET | Refills: 1 | Status: SHIPPED | OUTPATIENT
Start: 2023-08-23 | End: 2023-10-22

## 2023-08-24 NOTE — ASSESSMENT & PLAN NOTE
4.4cm noted on lung cancer screening CT. Discussed smoking cessation, as above. Get AAA US in 8/2024.

## 2023-08-24 NOTE — ASSESSMENT & PLAN NOTE
Praised patient for willingness to quit. Discuss Chantix, but this is cost prohibitive. Start bupropion (patient denies history of seizures) and dual NRT. Follow-up in one month.

## 2023-08-24 NOTE — ASSESSMENT & PLAN NOTE
4.4cm noted on lung cancer screening CT. Discussed smoking cessation, as above. Repeat CTA in 2/2024. If stable, will go to annual surveillance imaging.

## 2023-08-24 NOTE — PROGRESS NOTES
Caesy Enciso MD  Internal Medicine  [phone] (office)             08/23/2023    Patient Information  Gay Vazquez                                                                                          [address] [facility] [address]  1945        Chief Complaint   Patient presents with    Hypertension    Hyperlipidemia     Reviewing CT scan           History of Present Illness:    Gay Vazquez is a 78 y.o. male who presents to the office to discuss ascending aorta aneurysm incidentally found on lung cancer screening CT. He is interested in smoking cessation. He previously quit cold turkey and has never tried medications to assist in cessation efforts.       No Known Allergies        Current Outpatient Medications:     atenolol (TENORMIN) 25 MG tablet, Take 1 tablet by mouth Daily., Disp: , Rfl:     doxazosin (CARDURA) 2 MG tablet, Take 1 tablet by mouth every night at bedtime., Disp: , Rfl:     gabapentin (NEURONTIN) 300 MG capsule, Take 2 capsules by mouth every night at bedtime., Disp: , Rfl:     golimumab (Simponi Aria) 50 MG/4ML solution injection, Infuse  into a venous catheter., Disp: , Rfl:     losartan (COZAAR) 25 MG tablet, Take 1 tablet by mouth Daily., Disp: 90 tablet, Rfl: 3    meloxicam (MOBIC) 15 MG tablet, Take 1 tablet by mouth Daily., Disp: , Rfl:     metFORMIN ER (GLUCOPHAGE-XR) 500 MG 24 hr tablet, Take 2 tablets by mouth Daily., Disp: , Rfl:     omeprazole (priLOSEC) 40 MG capsule, Take 1 capsule by mouth once daily, Disp: 30 capsule, Rfl: 0    pravastatin (PRAVACHOL) 80 MG tablet, Take 1 tablet by mouth Daily., Disp: , Rfl:     traMADol (ULTRAM) 50 MG tablet, Take 2 tablets by mouth Every 6 (Six) Hours As Needed., Disp: , Rfl:     buPROPion XL (WELLBUTRIN XL) 150 MG 24 hr tablet, Take 1 tablet by mouth Daily for 60 days., Disp: 30 tablet, Rfl: 1      Social History     Socioeconomic History    Marital status:    Tobacco Use    Smoking status: Every Day      "Packs/day: 1.00     Types: Cigarettes     Start date: 1970     Passive exposure: Current   Substance and Sexual Activity    Alcohol use: Not Currently    Drug use: Never         Vitals:    08/23/23 1558   BP: 108/68   BP Location: Right arm   Patient Position: Sitting   Cuff Size: Adult   Pulse: 68   SpO2: 98%   Weight: 90.4 kg (199 lb 6.4 oz)   Height: 185.4 cm (72.99\")      Wt Readings from Last 3 Encounters:   08/23/23 90.4 kg (199 lb 6.4 oz)   08/07/23 92.4 kg (203 lb 12.8 oz)   07/10/23 91.4 kg (201 lb 8 oz)     Body mass index is 26.31 kg/mý.      Physical Exam  Vitals reviewed.   Constitutional:       Appearance: Normal appearance. He is not ill-appearing.   Pulmonary:      Effort: Pulmonary effort is normal.   Neurological:      Mental Status: He is alert and oriented to person, place, and time.   Psychiatric:         Mood and Affect: Mood normal.         Behavior: Behavior normal.          Lab/other results:  Common labs          5/15/2023    10:43 6/8/2023    14:42   Common Labs   Glucose 148     BUN 20     Creatinine 1.00     Sodium 136     Potassium 4.7     Chloride 100     Calcium 9.3     Albumin 4.2     Total Bilirubin 0.3     Alkaline Phosphatase 71     AST (SGOT) 17     ALT (SGPT) 18     WBC 5.48     Hemoglobin 14.6     Hematocrit 44.8     Platelets 155     Total Cholesterol  142    Triglycerides  230    HDL Cholesterol  29    LDL Cholesterol   75    Hemoglobin A1C  7.2    Microalbumin, Urine  140.0      CT, chest without contrast (8/14/23) - ACR lung RADS 2S. Chronic appearing complicated small right pleural effusion with associated pleural-based calcifications and masslike consolidation in the right lower lobe similar to prior chest x-rays. Anuerysmal dilation of ascending aorta (4.4cm) and dilation of the main pulmonary artery.     Assessment/Plan:    Diagnoses and all orders for this visit:    1. Nicotine dependence, cigarettes, uncomplicated (Primary)  Assessment & Plan:  Praised patient for " willingness to quit. Discuss Chantix, but this is cost prohibitive. Start bupropion (patient denies history of seizures) and dual NRT. Follow-up in one month.     Orders:  -     buPROPion XL (WELLBUTRIN XL) 150 MG 24 hr tablet; Take 1 tablet by mouth Daily for 60 days.  Dispense: 30 tablet; Refill: 1    2. Aneurysm of ascending aorta without rupture  Assessment & Plan:  4.4cm noted on lung cancer screening CT. Discussed smoking cessation, as above. Repeat CTA in 2/2024. If stable, will go to annual surveillance imaging.    Orders:  -     CT Angiogram Chest; Future

## 2023-08-29 ENCOUNTER — OFFICE VISIT (OUTPATIENT)
Dept: WOUND CARE | Facility: HOSPITAL | Age: 78
End: 2023-08-29
Payer: MEDICARE

## 2023-08-29 PROCEDURE — G0463 HOSPITAL OUTPT CLINIC VISIT: HCPCS

## 2023-08-29 RX ORDER — ATENOLOL 25 MG/1
25 TABLET ORAL DAILY
Qty: 90 TABLET | Refills: 1 | Status: SHIPPED | OUTPATIENT
Start: 2023-08-29

## 2023-09-01 ENCOUNTER — OFFICE VISIT (OUTPATIENT)
Dept: FAMILY MEDICINE CLINIC | Facility: CLINIC | Age: 78
End: 2023-09-01
Payer: MEDICARE

## 2023-09-01 VITALS
SYSTOLIC BLOOD PRESSURE: 118 MMHG | OXYGEN SATURATION: 98 % | HEIGHT: 73 IN | BODY MASS INDEX: 26.32 KG/M2 | HEART RATE: 61 BPM | WEIGHT: 198.6 LBS | TEMPERATURE: 98.1 F | DIASTOLIC BLOOD PRESSURE: 74 MMHG

## 2023-09-01 DIAGNOSIS — R35.0 FREQUENT URINATION: ICD-10-CM

## 2023-09-01 DIAGNOSIS — N30.90 CYSTITIS: Primary | ICD-10-CM

## 2023-09-01 LAB
BILIRUB BLD-MCNC: ABNORMAL MG/DL
CLARITY, POC: CLEAR
COLOR UR: ABNORMAL
GLUCOSE BLDC GLUCOMTR-MCNC: 148 MG/DL (ref 70–130)
GLUCOSE UR STRIP-MCNC: NEGATIVE MG/DL
KETONES UR QL: NEGATIVE
LEUKOCYTE EST, POC: NEGATIVE
NITRITE UR-MCNC: NEGATIVE MG/ML
PH UR: 6 [PH] (ref 5–8)
PROT UR STRIP-MCNC: ABNORMAL MG/DL
RBC # UR STRIP: NEGATIVE /UL
SP GR UR: 1.03 (ref 1–1.03)
UROBILINOGEN UR QL: ABNORMAL

## 2023-09-01 PROCEDURE — 99213 OFFICE O/P EST LOW 20 MIN: CPT | Performed by: INTERNAL MEDICINE

## 2023-09-01 PROCEDURE — 82948 REAGENT STRIP/BLOOD GLUCOSE: CPT | Performed by: INTERNAL MEDICINE

## 2023-09-01 PROCEDURE — 81002 URINALYSIS NONAUTO W/O SCOPE: CPT | Performed by: INTERNAL MEDICINE

## 2023-09-01 PROCEDURE — 3078F DIAST BP <80 MM HG: CPT | Performed by: INTERNAL MEDICINE

## 2023-09-01 PROCEDURE — 3074F SYST BP LT 130 MM HG: CPT | Performed by: INTERNAL MEDICINE

## 2023-09-01 RX ORDER — NITROFURANTOIN 25; 75 MG/1; MG/1
100 CAPSULE ORAL 2 TIMES DAILY
Qty: 14 CAPSULE | Refills: 0 | Status: SHIPPED | OUTPATIENT
Start: 2023-09-01 | End: 2023-09-08

## 2023-09-01 NOTE — PROGRESS NOTES
Casey Enciso MD  Internal Medicine  [phone] (office)             09/01/2023    Patient Information  Gay Vazquez                                                                                          [address] [facility] [address]  1945        Chief Complaint   Patient presents with    Urinary Frequency     It been going on for this entire week.           History of Present Illness:    Gay Vazquez is a 78 y.o. male who presents to the office for increased urinary frequency. He denies fever, nausea, vomiting, flank pain, dysuria, hematuria. He thinks he has another UTI. Patient can't recall what antibiotics have worked for him previously.         No Known Allergies        Current Outpatient Medications:     atenolol (TENORMIN) 25 MG tablet, Take 1 tablet by mouth Daily., Disp: 90 tablet, Rfl: 1    buPROPion XL (WELLBUTRIN XL) 150 MG 24 hr tablet, Take 1 tablet by mouth Daily for 60 days., Disp: 30 tablet, Rfl: 1    doxazosin (CARDURA) 2 MG tablet, Take 1 tablet by mouth every night at bedtime., Disp: , Rfl:     gabapentin (NEURONTIN) 300 MG capsule, Take 2 capsules by mouth every night at bedtime., Disp: , Rfl:     golimumab (Simponi Aria) 50 MG/4ML solution injection, Infuse  into a venous catheter., Disp: , Rfl:     losartan (COZAAR) 25 MG tablet, Take 1 tablet by mouth Daily., Disp: 90 tablet, Rfl: 3    meloxicam (MOBIC) 15 MG tablet, Take 1 tablet by mouth Daily., Disp: , Rfl:     metFORMIN ER (GLUCOPHAGE-XR) 500 MG 24 hr tablet, Take 2 tablets by mouth Daily., Disp: , Rfl:     omeprazole (priLOSEC) 40 MG capsule, Take 1 capsule by mouth once daily, Disp: 30 capsule, Rfl: 0    pravastatin (PRAVACHOL) 80 MG tablet, Take 1 tablet by mouth Daily., Disp: , Rfl:     traMADol (ULTRAM) 50 MG tablet, Take 2 tablets by mouth Every 6 (Six) Hours As Needed., Disp: , Rfl:     nitrofurantoin, macrocrystal-monohydrate, (Macrobid) 100 MG capsule, Take 1 capsule by mouth 2 (Two) Times a Day for 7  "days., Disp: 14 capsule, Rfl: 0      Social History     Socioeconomic History    Marital status:    Tobacco Use    Smoking status: Every Day     Packs/day: 1.00     Types: Cigarettes     Start date: 1970     Passive exposure: Current   Substance and Sexual Activity    Alcohol use: Not Currently    Drug use: Never         Vitals:    09/01/23 0929   BP: 118/74   BP Location: Right arm   Patient Position: Sitting   Cuff Size: Adult   Pulse: 61   Temp: 98.1 øF (36.7 øC)   TempSrc: Oral   SpO2: 98%   Weight: 90.1 kg (198 lb 9.6 oz)   Height: 185.4 cm (72.99\")      Wt Readings from Last 3 Encounters:   09/01/23 90.1 kg (198 lb 9.6 oz)   08/23/23 90.4 kg (199 lb 6.4 oz)   08/07/23 92.4 kg (203 lb 12.8 oz)     Body mass index is 26.21 kg/mý.      Physical Exam  Vitals reviewed.   Constitutional:       Appearance: Normal appearance. He is normal weight. He is not ill-appearing.   Pulmonary:      Effort: Pulmonary effort is normal.   Neurological:      Mental Status: He is alert and oriented to person, place, and time.   Psychiatric:         Mood and Affect: Mood normal.         Behavior: Behavior normal.          Lab/other results:  Common labs          5/15/2023    10:43 6/8/2023    14:42   Common Labs   Glucose 148     BUN 20     Creatinine 1.00     Sodium 136     Potassium 4.7     Chloride 100     Calcium 9.3     Albumin 4.2     Total Bilirubin 0.3     Alkaline Phosphatase 71     AST (SGOT) 17     ALT (SGPT) 18     WBC 5.48     Hemoglobin 14.6     Hematocrit 44.8     Platelets 155     Total Cholesterol  142    Triglycerides  230    HDL Cholesterol  29    LDL Cholesterol   75    Hemoglobin A1C  7.2    Microalbumin, Urine  140.0      Urine dipstick - negative ketones, leuk esterase, nitrite  Fingerstick blood glucose 148    Assessment/Plan:    Diagnoses and all orders for this visit:    1. Cystitis (Primary)  -     nitrofurantoin, macrocrystal-monohydrate, (Macrobid) 100 MG capsule; Take 1 capsule by mouth 2 (Two) " Times a Day for 7 days.  Dispense: 14 capsule; Refill: 0    Unable to send sample for cultures as patient wasn't able to provide enough urine. Interestingly, leukocyte esterase and nitrite negative. Will still treat for cystitis and have patient follow-up in a few days. Unlikely to be secondary to diabetes given fingerstick blood glucose of 148 and no glucosuria or ketonuria.

## 2023-09-05 ENCOUNTER — OFFICE VISIT (OUTPATIENT)
Dept: FAMILY MEDICINE CLINIC | Facility: CLINIC | Age: 78
End: 2023-09-05
Payer: MEDICARE

## 2023-09-05 VITALS
OXYGEN SATURATION: 96 % | HEIGHT: 73 IN | WEIGHT: 198.8 LBS | HEART RATE: 62 BPM | DIASTOLIC BLOOD PRESSURE: 72 MMHG | SYSTOLIC BLOOD PRESSURE: 112 MMHG | BODY MASS INDEX: 26.35 KG/M2

## 2023-09-05 DIAGNOSIS — F17.210 NICOTINE DEPENDENCE, CIGARETTES, UNCOMPLICATED: Primary | ICD-10-CM

## 2023-09-05 PROCEDURE — 3074F SYST BP LT 130 MM HG: CPT | Performed by: INTERNAL MEDICINE

## 2023-09-05 PROCEDURE — 99213 OFFICE O/P EST LOW 20 MIN: CPT | Performed by: INTERNAL MEDICINE

## 2023-09-05 PROCEDURE — 3078F DIAST BP <80 MM HG: CPT | Performed by: INTERNAL MEDICINE

## 2023-09-06 NOTE — ASSESSMENT & PLAN NOTE
Tobacco use is improving with treatment.  Smoking cessation counseling was provided.  Tobacco use will be reassessed in 4 weeks.    Patient not tolerating bupropion so will discontinue. Chantix absurdly expensive so this isn't an option. Will continue dual NRT and follow-up in one month.

## 2023-09-06 NOTE — PROGRESS NOTES
Casey Enciso MD  Internal Medicine  [phone] (office)             09/05/2023    Patient Information  Gay Vazquez                                                                                          [address] [facility] [address]  1945        Chief Complaint   Patient presents with    Fatigue    Dizziness          History of Present Illness:    Gay Vazquez is a 78 y.o. male who presents to the office with fatigue and dizziness since starting bupropion. Patient still wishes to continue smoking cessation efforts and is now down to only 1-2 cigarettes per day. He is using dual NRT and tolerating without problem.       No Known Allergies        Current Outpatient Medications:     atenolol (TENORMIN) 25 MG tablet, Take 1 tablet by mouth Daily., Disp: 90 tablet, Rfl: 1    doxazosin (CARDURA) 2 MG tablet, Take 1 tablet by mouth every night at bedtime., Disp: , Rfl:     gabapentin (NEURONTIN) 300 MG capsule, Take 2 capsules by mouth every night at bedtime., Disp: , Rfl:     golimumab (Simponi Aria) 50 MG/4ML solution injection, Infuse  into a venous catheter., Disp: , Rfl:     losartan (COZAAR) 25 MG tablet, Take 1 tablet by mouth Daily., Disp: 90 tablet, Rfl: 3    meloxicam (MOBIC) 15 MG tablet, Take 1 tablet by mouth Daily., Disp: , Rfl:     metFORMIN ER (GLUCOPHAGE-XR) 500 MG 24 hr tablet, Take 2 tablets by mouth Daily., Disp: , Rfl:     omeprazole (priLOSEC) 40 MG capsule, Take 1 capsule by mouth once daily, Disp: 30 capsule, Rfl: 0    pravastatin (PRAVACHOL) 80 MG tablet, Take 1 tablet by mouth Daily., Disp: , Rfl:     traMADol (ULTRAM) 50 MG tablet, Take 2 tablets by mouth Every 6 (Six) Hours As Needed., Disp: , Rfl:       Social History     Socioeconomic History    Marital status:    Tobacco Use    Smoking status: Every Day     Packs/day: 1.00     Types: Cigarettes     Start date: 1970     Passive exposure: Current   Substance and Sexual Activity    Alcohol use: Not Currently     "Drug use: Never         Vitals:    09/05/23 1032   BP: 112/72   BP Location: Right arm   Patient Position: Sitting   Cuff Size: Adult   Pulse: 62   SpO2: 96%   Weight: 90.2 kg (198 lb 12.8 oz)   Height: 185.4 cm (72.99\")      Wt Readings from Last 3 Encounters:   09/05/23 90.2 kg (198 lb 12.8 oz)   09/01/23 90.1 kg (198 lb 9.6 oz)   08/23/23 90.4 kg (199 lb 6.4 oz)     Body mass index is 26.23 kg/m².      Physical Exam  Vitals reviewed.   Constitutional:       Appearance: Normal appearance. He is not ill-appearing.   Neurological:      Mental Status: He is alert and oriented to person, place, and time.   Psychiatric:         Mood and Affect: Mood normal.         Behavior: Behavior normal.          Lab/other results:  Common labs          5/15/2023    10:43 6/8/2023    14:42   Common Labs   Glucose 148     BUN 20     Creatinine 1.00     Sodium 136     Potassium 4.7     Chloride 100     Calcium 9.3     Albumin 4.2     Total Bilirubin 0.3     Alkaline Phosphatase 71     AST (SGOT) 17     ALT (SGPT) 18     WBC 5.48     Hemoglobin 14.6     Hematocrit 44.8     Platelets 155     Total Cholesterol  142    Triglycerides  230    HDL Cholesterol  29    LDL Cholesterol   75    Hemoglobin A1C  7.2    Microalbumin, Urine  140.0        Assessment/Plan:    Diagnoses and all orders for this visit:    1. Nicotine dependence, cigarettes, uncomplicated (Primary)  Assessment & Plan:  Tobacco use is improving with treatment.  Smoking cessation counseling was provided.  Tobacco use will be reassessed in 4 weeks.    Patient not tolerating bupropion so will discontinue. Chantix absurdly expensive so this isn't an option. Will continue dual NRT and follow-up in one month.                  "

## 2023-09-08 DIAGNOSIS — F17.210 NICOTINE DEPENDENCE, CIGARETTES, UNCOMPLICATED: Primary | ICD-10-CM

## 2023-09-08 RX ORDER — VARENICLINE TARTRATE 25 MG
KIT ORAL
Qty: 53 TABLET | Refills: 0 | Status: SHIPPED | OUTPATIENT
Start: 2023-09-08 | End: 2023-10-06

## 2023-09-26 ENCOUNTER — OFFICE VISIT (OUTPATIENT)
Dept: WOUND CARE | Facility: HOSPITAL | Age: 78
End: 2023-09-26
Payer: MEDICARE

## 2023-09-26 PROCEDURE — G0463 HOSPITAL OUTPT CLINIC VISIT: HCPCS

## 2023-09-29 RX ORDER — OMEPRAZOLE 40 MG/1
40 CAPSULE, DELAYED RELEASE ORAL DAILY
Qty: 30 CAPSULE | Refills: 0 | Status: SHIPPED | OUTPATIENT
Start: 2023-09-29

## 2023-10-02 ENCOUNTER — OFFICE VISIT (OUTPATIENT)
Dept: FAMILY MEDICINE CLINIC | Facility: CLINIC | Age: 78
End: 2023-10-02
Payer: MEDICARE

## 2023-10-02 VITALS
BODY MASS INDEX: 27.14 KG/M2 | SYSTOLIC BLOOD PRESSURE: 118 MMHG | HEART RATE: 61 BPM | DIASTOLIC BLOOD PRESSURE: 68 MMHG | HEIGHT: 73 IN | WEIGHT: 204.8 LBS | OXYGEN SATURATION: 96 %

## 2023-10-02 DIAGNOSIS — L98.491 CALLOUS ULCER, LIMITED TO BREAKDOWN OF SKIN: ICD-10-CM

## 2023-10-02 DIAGNOSIS — I10 PRIMARY HYPERTENSION: ICD-10-CM

## 2023-10-02 DIAGNOSIS — E78.2 MIXED HYPERLIPIDEMIA: ICD-10-CM

## 2023-10-02 DIAGNOSIS — F17.210 NICOTINE DEPENDENCE, CIGARETTES, UNCOMPLICATED: Primary | ICD-10-CM

## 2023-10-02 DIAGNOSIS — I71.21 ANEURYSM OF ASCENDING AORTA WITHOUT RUPTURE: ICD-10-CM

## 2023-10-02 DIAGNOSIS — E11.9 DIABETES MELLITUS WITHOUT COMPLICATION: ICD-10-CM

## 2023-10-02 DIAGNOSIS — Z23 IMMUNIZATION DUE: ICD-10-CM

## 2023-10-02 RX ORDER — VARENICLINE TARTRATE 1 MG/1
1 TABLET, FILM COATED ORAL 2 TIMES DAILY
Qty: 60 TABLET | Refills: 3 | Status: SHIPPED | OUTPATIENT
Start: 2023-10-02

## 2023-10-02 NOTE — PROGRESS NOTES
Casey Enciso MD  Internal Medicine  [phone] (office)             10/02/2023    Patient Information  Gay Vazquez                                                                                          [address] [facility] [address]  1945        Chief Complaint   Patient presents with    Hyperlipidemia    Hypertension     2 months follow up           History of Present Illness:    Gay Vazquez is a 78 y.o. male who presents to the office for follow-up.     He is using NRT and Chantix and hasn't smoked in four days.     Patient has R foot callous that is painful.     Due for flu and RSV immunizations.       No Known Allergies        Current Outpatient Medications:     atenolol (TENORMIN) 25 MG tablet, Take 1 tablet by mouth Daily., Disp: 90 tablet, Rfl: 1    doxazosin (CARDURA) 2 MG tablet, Take 1 tablet by mouth every night at bedtime., Disp: , Rfl:     gabapentin (NEURONTIN) 300 MG capsule, Take 2 capsules by mouth every night at bedtime., Disp: , Rfl:     golimumab (Simponi Aria) 50 MG/4ML solution injection, Infuse  into a venous catheter., Disp: , Rfl:     losartan (COZAAR) 25 MG tablet, Take 1 tablet by mouth Daily., Disp: 90 tablet, Rfl: 3    meloxicam (MOBIC) 15 MG tablet, Take 1 tablet by mouth Daily., Disp: , Rfl:     metFORMIN ER (GLUCOPHAGE-XR) 500 MG 24 hr tablet, Take 2 tablets by mouth Daily., Disp: , Rfl:     omeprazole (priLOSEC) 40 MG capsule, Take 1 capsule by mouth once daily, Disp: 30 capsule, Rfl: 0    pravastatin (PRAVACHOL) 80 MG tablet, Take 1 tablet by mouth Daily., Disp: , Rfl:     traMADol (ULTRAM) 50 MG tablet, Take 2 tablets by mouth Every 6 (Six) Hours As Needed., Disp: , Rfl:     Varenicline Tartrate 0.5 MG X 11 & 1 MG X 42 tablet, Take 0.5 mg by mouth Daily for 3 days, THEN 0.5 mg Every 12 (Twelve) Hours for 4 days, THEN 1 mg Every 12 (Twelve) Hours for 21 days., Disp: 53 tablet, Rfl: 0      Social History     Socioeconomic History    Marital status:  "   Tobacco Use    Smoking status: Former     Packs/day: 0.00     Types: Cigarettes     Start date:      Quit date: 2023     Years since quittin.0     Passive exposure: Current   Substance and Sexual Activity    Alcohol use: Not Currently    Drug use: Never         Vitals:    10/02/23 0756   BP: 118/68   BP Location: Right arm   Patient Position: Sitting   Cuff Size: Adult   Pulse: 61   SpO2: 96%   Weight: 92.9 kg (204 lb 12.8 oz)   Height: 185.4 cm (72.99\")      Wt Readings from Last 3 Encounters:   10/02/23 92.9 kg (204 lb 12.8 oz)   23 90.2 kg (198 lb 12.8 oz)   23 90.1 kg (198 lb 9.6 oz)     Body mass index is 27.03 kg/m².      Physical Exam  Vitals reviewed.   Constitutional:       Appearance: Normal appearance. He is not ill-appearing.   Musculoskeletal:        Feet:    Feet:      Comments: R foot - callous at 5th MTP  Neurological:      Mental Status: He is alert and oriented to person, place, and time.   Psychiatric:         Mood and Affect: Mood normal.         Behavior: Behavior normal.          Lab/other results:  Common labs          5/15/2023    10:43 2023    14:42   Common Labs   Glucose 148     BUN 20     Creatinine 1.00     Sodium 136     Potassium 4.7     Chloride 100     Calcium 9.3     Albumin 4.2     Total Bilirubin 0.3     Alkaline Phosphatase 71     AST (SGOT) 17     ALT (SGPT) 18     WBC 5.48     Hemoglobin 14.6     Hematocrit 44.8     Platelets 155     Total Cholesterol  142    Triglycerides  230    HDL Cholesterol  29    LDL Cholesterol   75    Hemoglobin A1C  7.2    Microalbumin, Urine  140.0        Assessment/Plan:    Diagnoses and all orders for this visit:    1. Nicotine dependence, cigarettes, uncomplicated (Primary)  Assessment & Plan:  Tobacco use is improving with treatment.  Smoking cessation counseling was provided.  Pharmacotherapy was prescribed as ordered.  Tobacco use will be reassessed at the next regular appointment.    Praised efforts to " date and encouraged patient to continue to avoid cigarettes. Continue NRT and Chantix.       2. Immunization due  -     Fluzone High-Dose 65+yrs    3. Callous ulcer, limited to breakdown of skin  -     Ambulatory Referral to Podiatry    4. Aneurysm of ascending aorta without rupture  Assessment & Plan:  Repeat CTA upon return from [place] (4/2024) to ensure stability. BP at goal (<130/80mmHg) and patient has stopped smoking.     Orders:  -     CT Angiogram Chest; Future    5. Primary hypertension  Assessment & Plan:  Hypertension is improving with treatment.  Continue current treatment regimen.  Regular aerobic exercise.  Stop smoking.  Blood pressure will be reassessed at the next regular appointment.    Orders:  -     Basic Metabolic Panel; Future    6. Mixed hyperlipidemia  -     Lipid Panel; Future  -     Apolipoprotein B; Future    7. Diabetes mellitus without complication  -     Hemoglobin A1c; Future  -     Microalbumin / Creatinine Urine Ratio - Urine, Clean Catch; Future       Labs ordered for 4/2024 and patient will follow-up afterwards. No changes to medication regimen at this time.

## 2023-10-02 NOTE — ASSESSMENT & PLAN NOTE
Tobacco use is improving with treatment.  Smoking cessation counseling was provided.  Pharmacotherapy was prescribed as ordered.  Tobacco use will be reassessed at the next regular appointment.    Praised efforts to date and encouraged patient to continue to avoid cigarettes. Continue NRT and Chantix.

## 2023-10-02 NOTE — ASSESSMENT & PLAN NOTE
Repeat CTA upon return from [place] (4/2024) to ensure stability. BP at goal (<130/80mmHg) and patient has stopped smoking.

## 2023-10-17 ENCOUNTER — OFFICE VISIT (OUTPATIENT)
Dept: WOUND CARE | Facility: HOSPITAL | Age: 78
End: 2023-10-17
Payer: MEDICARE

## 2023-10-17 PROCEDURE — G0463 HOSPITAL OUTPT CLINIC VISIT: HCPCS

## 2023-10-27 DIAGNOSIS — M06.9 RHEUMATOID ARTHRITIS INVOLVING BOTH HANDS, UNSPECIFIED WHETHER RHEUMATOID FACTOR PRESENT: Primary | ICD-10-CM

## 2023-10-30 RX ORDER — GABAPENTIN 300 MG/1
600 CAPSULE ORAL
Qty: 60 CAPSULE | Refills: 3 | Status: SHIPPED | OUTPATIENT
Start: 2023-10-30

## 2023-10-30 NOTE — TELEPHONE ENCOUNTER
Rx Refill Note  Requested Prescriptions     Pending Prescriptions Disp Refills    gabapentin (NEURONTIN) 300 MG capsule       Sig: Take 2 capsules by mouth every night at bedtime.      Last office visit with prescribing clinician: 10/2/2023   Last telemedicine visit with prescribing clinician: Visit date not found   Next office visit with prescribing clinician: 4/15/2024                         Would you like a call back once the refill request has been completed: [] Yes [] No    If the office needs to give you a call back, can they leave a voicemail: [] Yes [] No    Ester Eden MA  10/30/23, 07:54 EDT

## 2023-11-03 RX ORDER — MELOXICAM 15 MG/1
15 TABLET ORAL DAILY
Qty: 90 TABLET | Refills: 3 | Status: SHIPPED | OUTPATIENT
Start: 2023-11-03

## 2023-11-03 NOTE — TELEPHONE ENCOUNTER
Rx Refill Note  Requested Prescriptions     Pending Prescriptions Disp Refills    meloxicam (MOBIC) 15 MG tablet       Sig: Take 1 tablet by mouth Daily.      Last office visit with prescribing clinician: 10/2/2023   Last telemedicine visit with prescribing clinician: Visit date not found   Next office visit with prescribing clinician: 4/15/2024                         Would you like a call back once the refill request has been completed: [] Yes [] No    If the office needs to give you a call back, can they leave a voicemail: [] Yes [] No    Ester Eden MA  11/03/23, 15:53 EDT

## 2023-11-09 RX ORDER — OMEPRAZOLE 40 MG/1
40 CAPSULE, DELAYED RELEASE ORAL DAILY
Qty: 30 CAPSULE | Refills: 0 | Status: SHIPPED | OUTPATIENT
Start: 2023-11-09

## 2023-11-14 ENCOUNTER — OFFICE VISIT (OUTPATIENT)
Dept: WOUND CARE | Facility: HOSPITAL | Age: 78
End: 2023-11-14
Payer: MEDICARE

## 2023-11-14 PROCEDURE — G0463 HOSPITAL OUTPT CLINIC VISIT: HCPCS

## 2023-12-01 DIAGNOSIS — M06.9 RHEUMATOID ARTHRITIS INVOLVING BOTH HANDS, UNSPECIFIED WHETHER RHEUMATOID FACTOR PRESENT: ICD-10-CM

## 2023-12-01 NOTE — TELEPHONE ENCOUNTER
Caller: Gay Vazquez    Relationship: Self    Best call back number: [phone]    Requested Prescriptions:   Requested Prescriptions     Pending Prescriptions Disp Refills    gabapentin (NEURONTIN) 300 MG capsule 60 capsule 3     Sig: Take 2 capsules by mouth every night at bedtime.        Pharmacy where request should be sent: [facility] [address] [place], [place] [id number] - [phone]  - [phone] FX     Last office visit with prescribing clinician: 10/2/2023   Last telemedicine visit with prescribing clinician: Visit date not found   Next office visit with prescribing clinician: 4/15/2024     Additional details provided by patient: THE PATIENT NEEDS A NEW PRESCRIPTION FOR THIS MEDICATION SENT TO HIS PHARMACY IN [place]. THE PATIENT IS CURRENTLY OUT OF THIS MEDICATION.     Does the patient have less than a 3 day supply:  [x] Yes  [] No    Would you like a call back once the refill request has been completed: [x] Yes [] No    If the office needs to give you a call back, can they leave a voicemail: [x] Yes [] No    Deepak Shelton Rep   12/01/23 16:13 EST

## 2023-12-04 RX ORDER — GABAPENTIN 300 MG/1
600 CAPSULE ORAL
Qty: 60 CAPSULE | Refills: 3 | Status: SHIPPED | OUTPATIENT
Start: 2023-12-04

## 2023-12-04 NOTE — TELEPHONE ENCOUNTER
Rx Refill Note  Requested Prescriptions     Pending Prescriptions Disp Refills    gabapentin (NEURONTIN) 300 MG capsule 60 capsule 3     Sig: Take 2 capsules by mouth every night at bedtime.      Last office visit with prescribing clinician: 10/2/2023   Last telemedicine visit with prescribing clinician: Visit date not found   Next office visit with prescribing clinician: 12/2/2023                         Would you like a call back once the refill request has been completed: [] Yes [] No    If the office needs to give you a call back, can they leave a voicemail: [] Yes [] No    Ester Eden MA  12/04/23, 08:20 EST

## 2023-12-15 RX ORDER — OMEPRAZOLE 40 MG/1
40 CAPSULE, DELAYED RELEASE ORAL DAILY
Qty: 30 CAPSULE | Refills: 0 | Status: SHIPPED | OUTPATIENT
Start: 2023-12-15

## 2023-12-26 DIAGNOSIS — I10 PRIMARY HYPERTENSION: ICD-10-CM

## 2023-12-26 RX ORDER — LOSARTAN POTASSIUM 25 MG/1
25 TABLET ORAL DAILY
Qty: 90 TABLET | Refills: 3 | Status: SHIPPED | OUTPATIENT
Start: 2023-12-26 | End: 2024-12-25

## 2024-01-09 RX ORDER — OMEPRAZOLE 40 MG/1
40 CAPSULE, DELAYED RELEASE ORAL DAILY
Qty: 30 CAPSULE | Refills: 0 | Status: SHIPPED | OUTPATIENT
Start: 2024-01-09 | End: 2024-01-12 | Stop reason: SDUPTHER

## 2024-01-12 RX ORDER — OMEPRAZOLE 40 MG/1
40 CAPSULE, DELAYED RELEASE ORAL DAILY
Qty: 30 CAPSULE | Refills: 0 | Status: SHIPPED | OUTPATIENT
Start: 2024-01-12

## 2024-02-12 RX ORDER — OMEPRAZOLE 40 MG/1
40 CAPSULE, DELAYED RELEASE ORAL DAILY
Qty: 30 CAPSULE | Refills: 0 | Status: SHIPPED | OUTPATIENT
Start: 2024-02-12

## 2024-02-22 RX ORDER — ATENOLOL 25 MG/1
25 TABLET ORAL DAILY
Qty: 90 TABLET | Refills: 1 | Status: SHIPPED | OUTPATIENT
Start: 2024-02-22

## 2024-02-28 RX ORDER — PRAVASTATIN SODIUM 80 MG/1
80 TABLET ORAL DAILY
Qty: 90 TABLET | Refills: 1 | Status: SHIPPED | OUTPATIENT
Start: 2024-02-28

## 2024-03-04 RX ORDER — METFORMIN HYDROCHLORIDE 500 MG/1
1000 TABLET, EXTENDED RELEASE ORAL DAILY
Qty: 90 TABLET | Refills: 1 | Status: SHIPPED | OUTPATIENT
Start: 2024-03-04

## 2024-03-14 RX ORDER — OMEPRAZOLE 40 MG/1
40 CAPSULE, DELAYED RELEASE ORAL DAILY
Qty: 30 CAPSULE | Refills: 0 | OUTPATIENT
Start: 2024-03-14

## 2024-03-18 RX ORDER — OMEPRAZOLE 40 MG/1
40 CAPSULE, DELAYED RELEASE ORAL DAILY
Qty: 30 CAPSULE | Refills: 0 | Status: SHIPPED | OUTPATIENT
Start: 2024-03-18 | End: 2024-03-19 | Stop reason: SDUPTHER

## 2024-03-18 RX ORDER — OMEPRAZOLE 40 MG/1
40 CAPSULE, DELAYED RELEASE ORAL DAILY
Qty: 30 CAPSULE | Refills: 0 | OUTPATIENT
Start: 2024-03-18

## 2024-03-19 RX ORDER — OMEPRAZOLE 40 MG/1
40 CAPSULE, DELAYED RELEASE ORAL DAILY
Qty: 30 CAPSULE | Refills: 0 | Status: SHIPPED | OUTPATIENT
Start: 2024-03-19

## 2024-03-30 DIAGNOSIS — M06.9 RHEUMATOID ARTHRITIS INVOLVING BOTH HANDS, UNSPECIFIED WHETHER RHEUMATOID FACTOR PRESENT: ICD-10-CM

## 2024-04-01 ENCOUNTER — HOSPITAL ENCOUNTER (OUTPATIENT)
Facility: HOSPITAL | Age: 79
Discharge: HOME | End: 2024-04-01
Admitting: INTERNAL MEDICINE
Payer: MEDICARE

## 2024-04-01 DIAGNOSIS — M06.9 RHEUMATOID ARTHRITIS INVOLVING BOTH HANDS, UNSPECIFIED WHETHER RHEUMATOID FACTOR PRESENT: ICD-10-CM

## 2024-04-01 DIAGNOSIS — I71.21 ANEURYSM OF ASCENDING AORTA WITHOUT RUPTURE: ICD-10-CM

## 2024-04-01 PROCEDURE — 71275 CT ANGIOGRAPHY CHEST: CPT

## 2024-04-01 PROCEDURE — 25510000001 IOPAMIDOL PER 1 ML: Performed by: INTERNAL MEDICINE

## 2024-04-01 RX ORDER — GABAPENTIN 300 MG/1
600 CAPSULE ORAL
Qty: 60 CAPSULE | Refills: 3 | OUTPATIENT
Start: 2024-04-01

## 2024-04-01 RX ADMIN — IOPAMIDOL 85 ML: 755 INJECTION, SOLUTION INTRAVENOUS at 14:43

## 2024-04-01 NOTE — TELEPHONE ENCOUNTER
Caller: TUCKER PULLIAM    Relationship: WIFE, ON  [employer]    Best call back number: [phone]     Requested Prescriptions:   Requested Prescriptions     Pending Prescriptions Disp Refills    gabapentin (NEURONTIN) 300 MG capsule 60 capsule 3     Sig: Take 2 capsules by mouth every night at bedtime.        Pharmacy where request should be sent: [facility] [address] [place] [id number] [facility] [phone] [facility] [phone]      Last office visit with prescribing clinician: Visit date not found   Last telemedicine visit with prescribing clinician: Visit date not found   Next office visit with prescribing clinician: 4/16/2024     Additional details provided by patient: PATIENT ONLY HAS ENOUGH FOR 1-2 DAYS AND IS ASKING FOR THIS TO BE REFILLED ASAP    Does the patient have less than a 3 day supply:  [x] Yes  [] No    Deepak Brady Rep   04/01/24 10:11 EDT

## 2024-04-03 ENCOUNTER — TELEPHONE (OUTPATIENT)
Dept: FAMILY MEDICINE CLINIC | Facility: CLINIC | Age: 79
End: 2024-04-03

## 2024-04-03 NOTE — TELEPHONE ENCOUNTER
Hub staff attempted to follow warm transfer process and was unsuccessful     Caller: TUCKER PULLIAM    Relationship to patient: Other    Best call back number:      Patient is needing: PATIENT'S WIFE IS WANTING A CALL BACK WITH THE STATUS ON THE REFILL OF GABAPENTIN THAT SHE REQUESTED YESTERDAY.  PATIENT IS OUT.    PLEASE CONTACT PATIENT WITH STATUS.

## 2024-04-03 NOTE — TELEPHONE ENCOUNTER
I spoke with the patients wife and she said he was told not to suddenly stop taking his gabapentin & he is currently completely out. They both had appointments scheduled for the 16th, I changed them both to Monday. Is it okay for patient to be off until then? I refused the refill request because they both need appts, UDS, and controlled agreements on file.

## 2024-04-08 ENCOUNTER — OFFICE VISIT (OUTPATIENT)
Dept: FAMILY MEDICINE CLINIC | Facility: CLINIC | Age: 79
End: 2024-04-08
Payer: MEDICARE

## 2024-04-08 VITALS
BODY MASS INDEX: 27.49 KG/M2 | WEIGHT: 207.4 LBS | RESPIRATION RATE: 18 BRPM | HEART RATE: 84 BPM | HEIGHT: 73 IN | DIASTOLIC BLOOD PRESSURE: 84 MMHG | TEMPERATURE: 98.1 F | SYSTOLIC BLOOD PRESSURE: 126 MMHG | OXYGEN SATURATION: 96 %

## 2024-04-08 DIAGNOSIS — R93.2 ABNORMAL LIVER CT: ICD-10-CM

## 2024-04-08 DIAGNOSIS — E11.65 POORLY CONTROLLED TYPE 2 DIABETES MELLITUS WITH RENAL COMPLICATION: Primary | ICD-10-CM

## 2024-04-08 DIAGNOSIS — M06.9 RHEUMATOID ARTHRITIS INVOLVING BOTH HANDS, UNSPECIFIED WHETHER RHEUMATOID FACTOR PRESENT: ICD-10-CM

## 2024-04-08 DIAGNOSIS — E78.2 MIXED HYPERLIPIDEMIA: ICD-10-CM

## 2024-04-08 DIAGNOSIS — E11.29 POORLY CONTROLLED TYPE 2 DIABETES MELLITUS WITH RENAL COMPLICATION: Primary | ICD-10-CM

## 2024-04-08 DIAGNOSIS — I10 PRIMARY HYPERTENSION: ICD-10-CM

## 2024-04-08 PROCEDURE — 1159F MED LIST DOCD IN RCRD: CPT | Performed by: INTERNAL MEDICINE

## 2024-04-08 PROCEDURE — 3074F SYST BP LT 130 MM HG: CPT | Performed by: INTERNAL MEDICINE

## 2024-04-08 PROCEDURE — 3079F DIAST BP 80-89 MM HG: CPT | Performed by: INTERNAL MEDICINE

## 2024-04-08 PROCEDURE — 1160F RVW MEDS BY RX/DR IN RCRD: CPT | Performed by: INTERNAL MEDICINE

## 2024-04-08 PROCEDURE — 99214 OFFICE O/P EST MOD 30 MIN: CPT | Performed by: INTERNAL MEDICINE

## 2024-04-08 RX ORDER — GABAPENTIN 300 MG/1
600 CAPSULE ORAL
Qty: 60 CAPSULE | Refills: 3 | Status: SHIPPED | OUTPATIENT
Start: 2024-04-08

## 2024-04-08 RX ORDER — CYANOCOBALAMIN 1000 UG/ML
INJECTION, SOLUTION INTRAMUSCULAR; SUBCUTANEOUS
COMMUNITY
Start: 2024-01-19

## 2024-04-08 NOTE — PROGRESS NOTES
Chief Complaint   Patient presents with    [facility]    Hyperlipidemia    Diabetes    Hypertension   History of Present Illness:  Patient is here for follow-up of HTN, HLD, type II DM.  He reports having concern about an abnormal finding on CT angio chest that was done to evaluate aortic aneurysm which found a large ill-defined area of enhancement in the right lobe of the liver.  Requesting for gabapentin refill which he takes for his chronic back and neck pain due to RA and neuropathy.    PMH:   Outpatient Medications Prior to Visit   Medication Sig Dispense Refill    atenolol (TENORMIN) 25 MG tablet Take 1 tablet by mouth Daily. 90 tablet 1    cyanocobalamin 1000 MCG/ML injection INJECT 1 VIAL (1ML) SUBCUTANEOUSLY ONCE EVERY MONTH      doxazosin (CARDURA) 2 MG tablet Take 1 tablet by mouth every night at bedtime.      golimumab (Simponi Aria) 50 MG/4ML solution injection Infuse  into a venous catheter.      losartan (COZAAR) 25 MG tablet Take 1 tablet by mouth Daily. 90 tablet 3    meloxicam (MOBIC) 15 MG tablet Take 1 tablet by mouth Daily. 90 tablet 3    metFORMIN ER (GLUCOPHAGE-XR) 500 MG 24 hr tablet Take 2 tablets by mouth Daily. 90 tablet 1    omeprazole (priLOSEC) 40 MG capsule Take 1 capsule by mouth Daily. 30 capsule 0    pravastatin (PRAVACHOL) 80 MG tablet Take 1 tablet by mouth Daily. 90 tablet 1    traMADol (ULTRAM) 50 MG tablet Take 2 tablets by mouth Every 6 (Six) Hours As Needed.      gabapentin (NEURONTIN) 300 MG capsule Take 2 capsules by mouth every night at bedtime. 60 capsule 3    varenicline (CHANTIX) 1 MG tablet Take 1 tablet by mouth 2 (Two) Times a Day. 60 tablet 3     No facility-administered medications prior to visit.      No Known Allergies  Past Surgical History:   Procedure Laterality Date    ABDOMINAL SURGERY      CHOLECYSTECTOMY      PERICARDIUM SURGERY      REPLACEMENT TOTAL KNEE BILATERAL       family history is not on file.   reports that he quit smoking about 7 months  "ago. His smoking use included cigarettes. He started smoking about 54 years ago. He has a 53.7 pack-year smoking history. He has been exposed to tobacco smoke. He has never used smokeless tobacco. He reports that he does not currently use alcohol. He reports that he does not use drugs.     /84 (BP Location: Right arm, Patient Position: Sitting, Cuff Size: Adult)   Pulse 84   Temp 98.1 °F (36.7 °C) (Oral)   Resp 18   Ht 185.4 cm (72.99\")   Wt 94.1 kg (207 lb 6.4 oz)   SpO2 96%   BMI 27.37 kg/m²   Physical Exam  Constitutional:       Appearance: Normal appearance.   HENT:      Head: Normocephalic and atraumatic.   Cardiovascular:      Heart sounds: Normal heart sounds.   Pulmonary:      Breath sounds: Normal breath sounds.   Abdominal:      General: Bowel sounds are normal. There is no distension.      Palpations: Abdomen is soft.      Tenderness: There is no abdominal tenderness.   Neurological:      Mental Status: He is alert and oriented to person, place, and time.   Psychiatric:         Mood and Affect: Mood normal.          The following data was reviewed by: Gloria Post MD on 04/08/2024:  Common labs          6/8/2023    14:42 4/8/2024    14:37 4/10/2024    11:08   Common Labs   Glucose   252    BUN   18    Creatinine   1.27    Sodium   136    Potassium   4.6    Chloride   100    Calcium   9.3    Total Protein  8.0     Albumin  4.3     Total Bilirubin  0.4     Alkaline Phosphatase  94     AST (SGOT)  56     ALT (SGPT)  52     Total Cholesterol 142   139    Triglycerides 230   178    HDL Cholesterol 29   32    LDL Cholesterol  75   77    Hemoglobin A1C 7.2   8.20    Microalbumin, Urine 140.0   114.7           Diagnoses and all orders for this visit:    1. 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 A1c increased to 8.2. Elevated urine microalbumin  Will add farxiga 10 mg daily. To continue metformin 1000 mg " daily  Medication changes per orders.  Counseled on diabetic diet, regular exercise and weight loss  Diabetes will be reassessed in 6 months    Orders:  -     dapagliflozin Propanediol (Farxiga) 10 MG tablet; Take 10 mg by mouth Daily.  Dispense: 30 tablet; Refill: 2    2. Rheumatoid arthritis involving both hands, unspecified whether rheumatoid factor present  -     gabapentin (NEURONTIN) 300 MG capsule; Take 2 capsules by mouth every night at bedtime.  Dispense: 60 capsule; Refill: 3    3. Primary hypertension  Assessment & Plan:  Hypertension is stable and controlled  Continue current treatment regimen.  Dietary sodium restriction.  Weight loss.  Regular aerobic exercise.  Ambulatory blood pressure monitoring.  Blood pressure will be reassessed in 6 months.      4. Mixed hyperlipidemia  Assessment & Plan:   Lipid abnormalities are improving with treatment    Plan:  Continue same medication/s without change.  Pravastatin 80 mg daily  Counseled patient on lifestyle modifications to help control hyperlipidemia.   Cholesterol lowering dietary information shared with patient.  Advised patient to exercise for 150 minutes weekly. (30 minute brisk walk, 5 days a week for example)  Weight Loss encouraged    Patient Treatment Goals:   LDL goal to be less than 70    Followup in 6 months.      5. Abnormal liver CT  -     US Liver; Future  -     Hepatic Function Panel             Return in about 6 months (around 10/8/2024) for Recheck.

## 2024-04-09 LAB
ALBUMIN SERPL-MCNC: 4.3 G/DL (ref 3.8–4.8)
ALP SERPL-CCNC: 94 IU/L (ref 44–121)
ALT SERPL-CCNC: 52 IU/L (ref 0–44)
AST SERPL-CCNC: 56 IU/L (ref 0–40)
BILIRUB DIRECT SERPL-MCNC: 0.14 MG/DL (ref 0–0.4)
BILIRUB SERPL-MCNC: 0.4 MG/DL (ref 0–1.2)
PROT SERPL-MCNC: 8 G/DL (ref 6–8.5)

## 2024-04-10 DIAGNOSIS — E78.2 MIXED HYPERLIPIDEMIA: ICD-10-CM

## 2024-04-10 DIAGNOSIS — I10 PRIMARY HYPERTENSION: ICD-10-CM

## 2024-04-10 DIAGNOSIS — E11.9 DIABETES MELLITUS WITHOUT COMPLICATION: Primary | ICD-10-CM

## 2024-04-12 ENCOUNTER — PATIENT MESSAGE (OUTPATIENT)
Dept: FAMILY MEDICINE CLINIC | Facility: CLINIC | Age: 79
End: 2024-04-12
Payer: MEDICARE

## 2024-04-12 ENCOUNTER — HOSPITAL ENCOUNTER (OUTPATIENT)
Dept: ULTRASOUND IMAGING | Facility: HOSPITAL | Age: 79
Discharge: HOME | End: 2024-04-12
Payer: MEDICARE

## 2024-04-12 DIAGNOSIS — R93.2 ABNORMAL LIVER CT: ICD-10-CM

## 2024-04-12 DIAGNOSIS — E11.9 DIABETES MELLITUS WITHOUT COMPLICATION: Primary | ICD-10-CM

## 2024-04-12 LAB
ALBUMIN/CREAT UR: 91 MG/G CREAT (ref 0–29)
APO A-I SERPL-MCNC: 121 MG/DL (ref 101–178)
BUN SERPL-MCNC: 18 MG/DL (ref 8–23)
BUN/CREAT SERPL: 14.2 (ref 7–25)
CALCIUM SERPL-MCNC: 9.3 MG/DL (ref 8.6–10.5)
CHLORIDE SERPL-SCNC: 100 MMOL/L (ref 98–107)
CHOLEST SERPL-MCNC: 139 MG/DL (ref 0–200)
CO2 SERPL-SCNC: 25.7 MMOL/L (ref 22–29)
CREAT SERPL-MCNC: 1.27 MG/DL (ref 0.76–1.27)
CREAT UR-MCNC: 126.2 MG/DL
EGFRCR SERPLBLD CKD-EPI 2021: 58.2 ML/MIN/1.73
GLUCOSE SERPL-MCNC: 252 MG/DL (ref 65–99)
HBA1C MFR BLD: 8.2 % (ref 4.8–5.6)
HDLC SERPL-MCNC: 32 MG/DL (ref 40–60)
LDLC SERPL CALC-MCNC: 77 MG/DL (ref 0–100)
LDLC/HDLC SERPL: 2.23 {RATIO}
MICROALBUMIN UR-MCNC: 114.7 UG/ML
POTASSIUM SERPL-SCNC: 4.6 MMOL/L (ref 3.5–5.2)
SODIUM SERPL-SCNC: 136 MMOL/L (ref 136–145)
TRIGL SERPL-MCNC: 178 MG/DL (ref 0–150)
VLDLC SERPL CALC-MCNC: 30 MG/DL (ref 5–40)

## 2024-04-12 PROCEDURE — 76705 ECHO EXAM OF ABDOMEN: CPT

## 2024-04-12 RX ORDER — DAPAGLIFLOZIN 10 MG/1
10 TABLET, FILM COATED ORAL DAILY
Qty: 30 TABLET | Refills: 2 | Status: SHIPPED | OUTPATIENT
Start: 2024-04-12

## 2024-04-12 NOTE — ASSESSMENT & PLAN NOTE
Diabetes is worsening. A1c increased to 8.2. Elevated urine microalbumin  Will add farxiga 10 mg daily. To continue metformin 1000 mg daily  Medication changes per orders.  Counseled on diabetic diet, regular exercise and weight loss  Diabetes will be reassessed in 6 months

## 2024-04-12 NOTE — ASSESSMENT & PLAN NOTE
Lipid abnormalities are improving with treatment    Plan:  Continue same medication/s without change.  Pravastatin 80 mg daily  Counseled patient on lifestyle modifications to help control hyperlipidemia.   Cholesterol lowering dietary information shared with patient.  Advised patient to exercise for 150 minutes weekly. (30 minute brisk walk, 5 days a week for example)  Weight Loss encouraged    Patient Treatment Goals:   LDL goal to be less than 70    Followup in 6 months.

## 2024-04-16 DIAGNOSIS — R93.2 ABNORMAL LIVER ULTRASOUND: Primary | ICD-10-CM

## 2024-04-16 DIAGNOSIS — R16.0 LIVER MASS: Primary | ICD-10-CM

## 2024-04-16 RX ORDER — OMEPRAZOLE 40 MG/1
40 CAPSULE, DELAYED RELEASE ORAL DAILY
Qty: 30 CAPSULE | Refills: 0 | Status: SHIPPED | OUTPATIENT
Start: 2024-04-16

## 2024-04-19 DIAGNOSIS — E11.9 DIABETES MELLITUS WITHOUT COMPLICATION: ICD-10-CM

## 2024-05-07 DIAGNOSIS — E11.9 DIABETES MELLITUS WITHOUT COMPLICATION: ICD-10-CM

## 2024-05-10 ENCOUNTER — HOSPITAL ENCOUNTER (OUTPATIENT)
Dept: CT IMAGING | Facility: HOSPITAL | Age: 79
Discharge: HOME | End: 2024-05-10
Payer: MEDICARE

## 2024-05-10 PROCEDURE — 25510000001 IOPAMIDOL 61 % SOLUTION: Performed by: INTERNAL MEDICINE

## 2024-05-10 PROCEDURE — 74178 CT ABD&PLV WO CNTR FLWD CNTR: CPT

## 2024-05-10 RX ADMIN — IOPAMIDOL 85 ML: 612 INJECTION, SOLUTION INTRAVENOUS at 13:17

## 2024-05-16 ENCOUNTER — OFFICE VISIT (OUTPATIENT)
Dept: FAMILY MEDICINE CLINIC | Facility: CLINIC | Age: 79
End: 2024-05-16
Payer: MEDICARE

## 2024-05-16 VITALS
HEART RATE: 83 BPM | OXYGEN SATURATION: 95 % | TEMPERATURE: 97.3 F | WEIGHT: 202.9 LBS | SYSTOLIC BLOOD PRESSURE: 136 MMHG | DIASTOLIC BLOOD PRESSURE: 90 MMHG | BODY MASS INDEX: 26.89 KG/M2 | RESPIRATION RATE: 16 BRPM | HEIGHT: 73 IN

## 2024-05-16 DIAGNOSIS — K63.89 COLONIC MASS: Primary | ICD-10-CM

## 2024-05-16 PROCEDURE — 3080F DIAST BP >= 90 MM HG: CPT | Performed by: INTERNAL MEDICINE

## 2024-05-16 PROCEDURE — 99213 OFFICE O/P EST LOW 20 MIN: CPT | Performed by: INTERNAL MEDICINE

## 2024-05-16 PROCEDURE — 3075F SYST BP GE 130 - 139MM HG: CPT | Performed by: INTERNAL MEDICINE

## 2024-05-16 NOTE — PROGRESS NOTES
Chief Complaint   Patient presents with    Follow-up     Discuss CT results     Results   History of Present Illness:  Patient is here today for follow up of CT result. CT abdomen/pelvis revealed a non obstructing annual mass at the hepatic flexure of the colon with suspicious liver metastasis. Labs showed elevated liver enzymes. Patient denies any fever, fatigue, night sweat, weight loss, blood in stool, change in color of stool or urine, nausea or vomiting, diarrhea or constipation, chest pain, SOB or chronic cough.Former smoker, quit a year ago.    PMH:   Outpatient Medications Prior to Visit   Medication Sig Dispense Refill    atenolol (TENORMIN) 25 MG tablet Take 1 tablet by mouth Daily. 90 tablet 1    cyanocobalamin 1000 MCG/ML injection INJECT 1 VIAL (1ML) SUBCUTANEOUSLY ONCE EVERY MONTH      dapagliflozin Propanediol (Farxiga) 10 MG tablet Take 10 mg by mouth Daily. 30 tablet 2    doxazosin (CARDURA) 2 MG tablet Take 1 tablet by mouth every night at bedtime.      gabapentin (NEURONTIN) 300 MG capsule Take 2 capsules by mouth every night at bedtime. 60 capsule 3    glucose blood test strip Once a day 100 each 12    golimumab (Simponi Aria) 50 MG/4ML solution injection Infuse  into a venous catheter.      losartan (COZAAR) 25 MG tablet Take 1 tablet by mouth Daily. 90 tablet 3    meloxicam (MOBIC) 15 MG tablet Take 1 tablet by mouth Daily. 90 tablet 3    metFORMIN ER (GLUCOPHAGE-XR) 500 MG 24 hr tablet Take 2 tablets by mouth Daily. 90 tablet 1    omeprazole (priLOSEC) 40 MG capsule Take 1 capsule by mouth Daily. 30 capsule 0    pravastatin (PRAVACHOL) 80 MG tablet Take 1 tablet by mouth Daily. 90 tablet 1    traMADol (ULTRAM) 50 MG tablet Take 2 tablets by mouth Every 6 (Six) Hours As Needed.       No facility-administered medications prior to visit.      No Known Allergies  Past Surgical History:   Procedure Laterality Date    ABDOMINAL SURGERY      CHOLECYSTECTOMY      PERICARDIUM SURGERY      REPLACEMENT  "TOTAL KNEE BILATERAL       family history is not on file.   reports that he quit smoking about 8 months ago. His smoking use included cigarettes. He started smoking about 54 years ago. He has a 53.7 pack-year smoking history. He has been exposed to tobacco smoke. He has never used smokeless tobacco. He reports that he does not currently use alcohol. He reports that he does not use drugs.     /90 (BP Location: Right arm, Patient Position: Sitting, Cuff Size: Adult)   Pulse 83   Temp 97.3 °F (36.3 °C) (Oral)   Resp 16   Ht 185.4 cm (72.99\")   Wt 92 kg (202 lb 14.4 oz)   SpO2 95%   BMI 26.78 kg/m²   Physical Exam  Constitutional:       Appearance: Normal appearance.   HENT:      Head: Normocephalic and atraumatic.   Neurological:      Mental Status: He is alert and oriented to person, place, and time.   Psychiatric:         Mood and Affect: Mood normal.          The following data was reviewed by: Gloria Post MD on 05/16/2024:  Common labs          6/8/2023    14:42 4/8/2024    14:37 4/10/2024    11:08   Common Labs   Glucose   252    BUN   18    Creatinine   1.27    Sodium   136    Potassium   4.6    Chloride   100    Calcium   9.3    Total Protein  8.0     Albumin  4.3     Total Bilirubin  0.4     Alkaline Phosphatase  94     AST (SGOT)  56     ALT (SGPT)  52     Total Cholesterol 142   139    Triglycerides 230   178    HDL Cholesterol 29   32    LDL Cholesterol  75   77    Hemoglobin A1C 7.2   8.20    Microalbumin, Urine 140.0   114.7      Data reviewed : Radiologic studies CT Abdomen/pelvis revealed       Diagnoses and all orders for this visit:    1. Colonic mass (Primary)  -     Ambulatory Referral to General Surgery    Discussed CT findings with patient and the need for further investigation with colonoscopy and biopsy. Will refer to general surgery for further eval and treatment.         No follow-ups on file.     "

## 2024-05-20 RX ORDER — OMEPRAZOLE 40 MG/1
40 CAPSULE, DELAYED RELEASE ORAL DAILY
Qty: 30 CAPSULE | Refills: 0 | Status: SHIPPED | OUTPATIENT
Start: 2024-05-20

## 2024-05-30 ENCOUNTER — OFFICE VISIT (OUTPATIENT)
Dept: SURGERY | Facility: CLINIC | Age: 79
End: 2024-05-30
Payer: MEDICARE

## 2024-05-30 VITALS
HEIGHT: 72 IN | SYSTOLIC BLOOD PRESSURE: 134 MMHG | BODY MASS INDEX: 27.5 KG/M2 | DIASTOLIC BLOOD PRESSURE: 82 MMHG | WEIGHT: 203 LBS

## 2024-05-30 DIAGNOSIS — K63.89 COLONIC MASS: ICD-10-CM

## 2024-05-30 DIAGNOSIS — R16.0 LIVER MASS: Primary | ICD-10-CM

## 2024-05-30 NOTE — H&P (VIEW-ONLY)
Colorectal & General Surgery  Consultation    Patient: Gay Vazquez  YOB: 1945  MRN: [id number]      Assessment  Gay Vazquez is a 78 y.o. male with newly diagnosed colonic imaging abnormality at the hepatic flexure in the setting of a newly diagnosed liver mass.  He has no evidence of intestinal obstruction or bleeding.  This constellation of clinical findings is concerning for metastatic colon cancer.  In order to gain tissue biopsy and evaluate colonic mucosa, I have recommended that we proceed with diagnostic colonoscopy as well as an EGD given that he has some reflux symptoms that are sometimes poorly controlled and to ensure that there is no mucosal abnormality of his esophagus, stomach, or duodenum.  Informed consent was obtained for EGD and colonoscopy, and we will proceed with this next week.  Pending the results of these procedures, we may or may not need to proceed with percutaneous biopsy of the liver lesion.  I will also obtain labs the day of his colonoscopy, including CBC, CMP, CEA, alpha-fetoprotein.     Further workup will depend on the findings of these procedures.    Referring Provider: Gloria Post MD     Reason for Consultation: New liver mass with colon imaging abnormality    History of Present Illness   Gay Vazquez is a 78 y.o. male who presents the office to discuss any liver mass identified on CT scan as well as an abnormal imaging finding of his hepatic flexure.  He is asymptomatic.  He says he feels completely fine.  He was undergoing a CT scan for other reasons when he was found to have a liver mass and this abnormality of his colon.  He denies hematochezia, melena, tenesmus, change in his bowel habits.  Has no personal or family history of colorectal cancer.  He last had a colonoscopy more than 20 years ago.    Most recent colonoscopy: More than 20 years ago    Past Medical History   Past Medical History:   Diagnosis Date    Diabetes mellitus     Hyperlipidemia      Hypertension     RA (rheumatoid arthritis)         Past Surgical History   Past Surgical History:   Procedure Laterality Date    ABDOMINAL SURGERY      CHOLECYSTECTOMY      COLONOSCOPY  2017    [facility]    PERICARDIUM SURGERY      REPLACEMENT TOTAL KNEE BILATERAL         Social History  Social History     Socioeconomic History    Marital status:    Tobacco Use    Smoking status: Former     Current packs/day: 0.00     Average packs/day: 1 pack/day for 53.7 years (53.7 ttl pk-yrs)     Types: Cigarettes     Start date:      Quit date: 2023     Years since quittin.7     Passive exposure: Past    Smokeless tobacco: Never   Vaping Use    Vaping status: Never Used   Substance and Sexual Activity    Alcohol use: Not Currently    Drug use: Never    Sexual activity: Defer       Family History  History reviewed. No pertinent family history.    Colorectal cancer family history: None    Review of Systems  Negative except as documented in the HPI.     Allergies  No Known Allergies    Medications    Current Outpatient Medications:     atenolol (TENORMIN) 25 MG tablet, Take 1 tablet by mouth Daily., Disp: 90 tablet, Rfl: 1    cyanocobalamin 1000 MCG/ML injection, INJECT 1 VIAL (1ML) SUBCUTANEOUSLY ONCE EVERY MONTH, Disp: , Rfl:     dapagliflozin Propanediol (Farxiga) 10 MG tablet, Take 10 mg by mouth Daily., Disp: 30 tablet, Rfl: 2    doxazosin (CARDURA) 2 MG tablet, Take 1 tablet by mouth every night at bedtime., Disp: , Rfl:     gabapentin (NEURONTIN) 300 MG capsule, Take 2 capsules by mouth every night at bedtime., Disp: 60 capsule, Rfl: 3    glucose blood test strip, Once a day, Disp: 100 each, Rfl: 12    golimumab (Simponi Aria) 50 MG/4ML solution injection, Infuse  into a venous catheter., Disp: , Rfl:     losartan (COZAAR) 25 MG tablet, Take 1 tablet by mouth Daily., Disp: 90 tablet, Rfl: 3    meloxicam (MOBIC) 15 MG tablet, Take 1 tablet by mouth Daily., Disp: 90 tablet, Rfl: 3    metFORMIN ER  (GLUCOPHAGE-XR) 500 MG 24 hr tablet, Take 2 tablets by mouth Daily., Disp: 90 tablet, Rfl: 1    omeprazole (priLOSEC) 40 MG capsule, Take 1 capsule by mouth Daily., Disp: 30 capsule, Rfl: 0    pravastatin (PRAVACHOL) 80 MG tablet, Take 1 tablet by mouth Daily., Disp: 90 tablet, Rfl: 1    traMADol (ULTRAM) 50 MG tablet, Take 2 tablets by mouth Every 6 (Six) Hours As Needed., Disp: , Rfl:     Vital Signs  Vitals:    05/30/24 0858   BP: 134/82        Physical Exam  Constitutional: Resting comfortably, no acute distress  Neck: Supple, trachea midline  Respiratory: No increased work of breathing, Symmetric excursion  Cardiovascular: Well pefursed, no jugular venous distention evident   Abdominal: Soft, non-tender, non-distended  Lymphatics: No cervical or suprascapular adenopathy  Skin: Warm, dry, no rash on visualized skin surfaces  Musculoskeletal: Symmetric strength, no obvious gross abnormalities  Psychiatric: Alert and oriented ×3, normal affect      Laboratory Results  I have personally reviewed CMP with creatinine 1.27, bicarb 25, glucose 252, AST 56, ALT 52, total bilirubin 0.4.  Hemoglobin A1c 8.2.  CBC from May 15, 2023 with WC 5, hemoglobin 14, platelets 155.    Radiology  I have personally reviewed CT scan of the abdomen pelvis demonstrates large hepatic mass likely consistent with metastatic disease.  There is thickening of the colon at the hepatic flexure with no proximal dilation.    Endoscopy  None to review         Ace Ness MD  Colorectal & General Surgery  [facility]    [address], [address]  [place], [place], [address]  P: [phone]  F: [phone]

## 2024-05-30 NOTE — H&P (VIEW-ONLY)
Colorectal & General Surgery  Consultation    Patient: Gay Vazquez  YOB: 1945  MRN: [id number]      Assessment  Gay Vazquez is a 78 y.o. male with newly diagnosed colonic imaging abnormality at the hepatic flexure in the setting of a newly diagnosed liver mass.  He has no evidence of intestinal obstruction or bleeding.  This constellation of clinical findings is concerning for metastatic colon cancer.  In order to gain tissue biopsy and evaluate colonic mucosa, I have recommended that we proceed with diagnostic colonoscopy as well as an EGD given that he has some reflux symptoms that are sometimes poorly controlled and to ensure that there is no mucosal abnormality of his esophagus, stomach, or duodenum.  Informed consent was obtained for EGD and colonoscopy, and we will proceed with this next week.  Pending the results of these procedures, we may or may not need to proceed with percutaneous biopsy of the liver lesion.  I will also obtain labs the day of his colonoscopy, including CBC, CMP, CEA, alpha-fetoprotein.     Further workup will depend on the findings of these procedures.    Referring Provider: Gloria Post MD     Reason for Consultation: New liver mass with colon imaging abnormality    History of Present Illness   Gay Vazquez is a 78 y.o. male who presents the office to discuss any liver mass identified on CT scan as well as an abnormal imaging finding of his hepatic flexure.  He is asymptomatic.  He says he feels completely fine.  He was undergoing a CT scan for other reasons when he was found to have a liver mass and this abnormality of his colon.  He denies hematochezia, melena, tenesmus, change in his bowel habits.  Has no personal or family history of colorectal cancer.  He last had a colonoscopy more than 20 years ago.    Most recent colonoscopy: More than 20 years ago    Past Medical History   Past Medical History:   Diagnosis Date    Diabetes mellitus     Hyperlipidemia      Hypertension     RA (rheumatoid arthritis)         Past Surgical History   Past Surgical History:   Procedure Laterality Date    ABDOMINAL SURGERY      CHOLECYSTECTOMY      COLONOSCOPY  2017    [facility]    PERICARDIUM SURGERY      REPLACEMENT TOTAL KNEE BILATERAL         Social History  Social History     Socioeconomic History    Marital status:    Tobacco Use    Smoking status: Former     Current packs/day: 0.00     Average packs/day: 1 pack/day for 53.7 years (53.7 ttl pk-yrs)     Types: Cigarettes     Start date:      Quit date: 2023     Years since quittin.7     Passive exposure: Past    Smokeless tobacco: Never   Vaping Use    Vaping status: Never Used   Substance and Sexual Activity    Alcohol use: Not Currently    Drug use: Never    Sexual activity: Defer       Family History  History reviewed. No pertinent family history.    Colorectal cancer family history: None    Review of Systems  Negative except as documented in the HPI.     Allergies  No Known Allergies    Medications    Current Outpatient Medications:     atenolol (TENORMIN) 25 MG tablet, Take 1 tablet by mouth Daily., Disp: 90 tablet, Rfl: 1    cyanocobalamin 1000 MCG/ML injection, INJECT 1 VIAL (1ML) SUBCUTANEOUSLY ONCE EVERY MONTH, Disp: , Rfl:     dapagliflozin Propanediol (Farxiga) 10 MG tablet, Take 10 mg by mouth Daily., Disp: 30 tablet, Rfl: 2    doxazosin (CARDURA) 2 MG tablet, Take 1 tablet by mouth every night at bedtime., Disp: , Rfl:     gabapentin (NEURONTIN) 300 MG capsule, Take 2 capsules by mouth every night at bedtime., Disp: 60 capsule, Rfl: 3    glucose blood test strip, Once a day, Disp: 100 each, Rfl: 12    golimumab (Simponi Aria) 50 MG/4ML solution injection, Infuse  into a venous catheter., Disp: , Rfl:     losartan (COZAAR) 25 MG tablet, Take 1 tablet by mouth Daily., Disp: 90 tablet, Rfl: 3    meloxicam (MOBIC) 15 MG tablet, Take 1 tablet by mouth Daily., Disp: 90 tablet, Rfl: 3    metFORMIN ER  (GLUCOPHAGE-XR) 500 MG 24 hr tablet, Take 2 tablets by mouth Daily., Disp: 90 tablet, Rfl: 1    omeprazole (priLOSEC) 40 MG capsule, Take 1 capsule by mouth Daily., Disp: 30 capsule, Rfl: 0    pravastatin (PRAVACHOL) 80 MG tablet, Take 1 tablet by mouth Daily., Disp: 90 tablet, Rfl: 1    traMADol (ULTRAM) 50 MG tablet, Take 2 tablets by mouth Every 6 (Six) Hours As Needed., Disp: , Rfl:     Vital Signs  Vitals:    05/30/24 0858   BP: 134/82        Physical Exam  Constitutional: Resting comfortably, no acute distress  Neck: Supple, trachea midline  Respiratory: No increased work of breathing, Symmetric excursion  Cardiovascular: Well pefursed, no jugular venous distention evident   Abdominal: Soft, non-tender, non-distended  Lymphatics: No cervical or suprascapular adenopathy  Skin: Warm, dry, no rash on visualized skin surfaces  Musculoskeletal: Symmetric strength, no obvious gross abnormalities  Psychiatric: Alert and oriented ×3, normal affect      Laboratory Results  I have personally reviewed CMP with creatinine 1.27, bicarb 25, glucose 252, AST 56, ALT 52, total bilirubin 0.4.  Hemoglobin A1c 8.2.  CBC from May 15, 2023 with WC 5, hemoglobin 14, platelets 155.    Radiology  I have personally reviewed CT scan of the abdomen pelvis demonstrates large hepatic mass likely consistent with metastatic disease.  There is thickening of the colon at the hepatic flexure with no proximal dilation.    Endoscopy  None to review         Ace Ness MD  Colorectal & General Surgery  [facility]    [address], [address]  [place], [place], [address]  P: [phone]  F: [phone]

## 2024-05-31 ENCOUNTER — PATIENT ROUNDING (BHMG ONLY) (OUTPATIENT)
Dept: SURGERY | Facility: CLINIC | Age: 79
End: 2024-05-31
Payer: MEDICARE

## 2024-05-31 NOTE — PROGRESS NOTES
May 31, 2024    I am calling to officially welcome you to our practice and ask about your recent visit. Is this a good time to talk? No. Left voicemail to call back.

## 2024-06-04 ENCOUNTER — ANESTHESIA (OUTPATIENT)
Dept: GASTROENTEROLOGY | Facility: HOSPITAL | Age: 79
End: 2024-06-04
Payer: MEDICARE

## 2024-06-04 ENCOUNTER — ANESTHESIA EVENT (OUTPATIENT)
Dept: GASTROENTEROLOGY | Facility: HOSPITAL | Age: 79
End: 2024-06-04
Payer: MEDICARE

## 2024-06-04 ENCOUNTER — HOSPITAL ENCOUNTER (OUTPATIENT)
Facility: HOSPITAL | Age: 79
Setting detail: HOSPITAL OUTPATIENT SURGERY
Discharge: HOME | End: 2024-06-04
Attending: SURGERY | Admitting: SURGERY
Payer: MEDICARE

## 2024-06-04 VITALS
BODY MASS INDEX: 27.6 KG/M2 | HEART RATE: 61 BPM | HEIGHT: 72 IN | SYSTOLIC BLOOD PRESSURE: 146 MMHG | TEMPERATURE: 97.5 F | WEIGHT: 203.8 LBS | OXYGEN SATURATION: 95 % | RESPIRATION RATE: 20 BRPM | DIASTOLIC BLOOD PRESSURE: 106 MMHG

## 2024-06-04 DIAGNOSIS — K63.89 COLONIC MASS: ICD-10-CM

## 2024-06-04 DIAGNOSIS — R16.0 LIVER MASS: ICD-10-CM

## 2024-06-04 LAB — GLUCOSE BLDC GLUCOMTR-MCNC: 206 MG/DL (ref 70–130)

## 2024-06-04 PROCEDURE — 43235 EGD DIAGNOSTIC BRUSH WASH: CPT | Performed by: SURGERY

## 2024-06-04 PROCEDURE — 82948 REAGENT STRIP/BLOOD GLUCOSE: CPT

## 2024-06-04 PROCEDURE — 88305 TISSUE EXAM BY PATHOLOGIST: CPT | Performed by: SURGERY

## 2024-06-04 PROCEDURE — 45385 COLONOSCOPY W/LESION REMOVAL: CPT | Performed by: SURGERY

## 2024-06-04 PROCEDURE — 25010000002 PROPOFOL 200 MG/20ML EMULSION: Performed by: NURSE ANESTHETIST, CERTIFIED REGISTERED

## 2024-06-04 PROCEDURE — 25810000003 LACTATED RINGERS PER 1000 ML: Performed by: SURGERY

## 2024-06-04 RX ORDER — PROPOFOL 10 MG/ML
INJECTION, EMULSION INTRAVENOUS CONTINUOUS PRN
Status: DISCONTINUED | OUTPATIENT
Start: 2024-06-04 | End: 2024-06-04 | Stop reason: SURG

## 2024-06-04 RX ORDER — SODIUM CHLORIDE, SODIUM LACTATE, POTASSIUM CHLORIDE, CALCIUM CHLORIDE 600; 310; 30; 20 MG/100ML; MG/100ML; MG/100ML; MG/100ML
1000 INJECTION, SOLUTION INTRAVENOUS CONTINUOUS
Status: DISCONTINUED | OUTPATIENT
Start: 2024-06-04 | End: 2024-06-04 | Stop reason: HOSPADM

## 2024-06-04 RX ORDER — LIDOCAINE HYDROCHLORIDE 20 MG/ML
INJECTION, SOLUTION INFILTRATION; PERINEURAL AS NEEDED
Status: DISCONTINUED | OUTPATIENT
Start: 2024-06-04 | End: 2024-06-04 | Stop reason: SURG

## 2024-06-04 RX ADMIN — PROPOFOL 160 MCG/KG/MIN: 10 INJECTION, EMULSION INTRAVENOUS at 11:11

## 2024-06-04 RX ADMIN — SODIUM CHLORIDE, POTASSIUM CHLORIDE, SODIUM LACTATE AND CALCIUM CHLORIDE 1000 ML: 600; 310; 30; 20 INJECTION, SOLUTION INTRAVENOUS at 10:50

## 2024-06-04 RX ADMIN — PROPOFOL 60 MG: 10 INJECTION, EMULSION INTRAVENOUS at 11:10

## 2024-06-04 RX ADMIN — LIDOCAINE HYDROCHLORIDE 100 MG: 20 INJECTION, SOLUTION INFILTRATION; PERINEURAL at 11:09

## 2024-06-04 NOTE — DISCHARGE INSTRUCTIONS
For the next 24 hours patient needs to be with a responsible adult.    For 24 hours DO NOT drive, operate machinery, appliances, drink alcohol, make important decisions or sign legal documents.    Start with a light or bland diet if you are feeling sick to your stomach otherwise advance to regular diet as tolerated.    Follow recommendations on procedure report if provided by your doctor.    Call Dr Ness for problems .    Problems may include but not limited to: large amounts of bleeding, trouble breathing, repeated vomiting, severe unrelieved pain, fever or chills.

## 2024-06-04 NOTE — ANESTHESIA POSTPROCEDURE EVALUATION
Patient: Gay Vazquez    Procedure Summary       Date: 06/04/24 Room / Location:  [facility]    Anesthesia Start: 1107 Anesthesia Stop: 1206    Procedures:       COLONOSCOPY INTO CECUM WITH POLYPECTOMIES (COLD SNARE)      ESOPHAGOGASTRODUODENOSCOPY (Esophagus) Diagnosis:       Liver mass      Colonic mass      (Liver mass [R16.0])      (Colonic mass [K63.89])    Surgeons: Angel Ness MD Provider: Ciarra Barton MD    Anesthesia Type: MAC ASA Status: 3            Anesthesia Type: MAC    Vitals  Vitals Value Taken Time   /98 06/04/24 1204   Temp     Pulse 85 06/04/24 1204   Resp 20 06/04/24 1204   SpO2 94 % 06/04/24 1204           Post Anesthesia Care and Evaluation    Anesthetic complications: No anesthetic complications

## 2024-06-04 NOTE — ANESTHESIA PREPROCEDURE EVALUATION
Anesthesia Evaluation     no history of anesthetic complications:                Airway   Mallampati: III  TM distance: >3 FB  Neck ROM: limited  Small opening  Dental    (+) edentulous    Pulmonary    (+) a smoker Former,  (-) shortness of breath, recent URI, sleep apnea  Cardiovascular     (+) hypertension, hyperlipidemia  (-) dysrhythmias, angina    ROS comment: Aortic aneurysm    Neuro/Psych  (-) dizziness/light headedness, syncope  GI/Hepatic/Renal/Endo    (+) obesity, diabetes mellitus    Musculoskeletal     Abdominal    Substance History      OB/GYN          Other   autoimmune disease (invves most joints including neck and jaw) ,                 Anesthesia Plan    ASA 3     MAC     intravenous induction     Anesthetic plan, risks, benefits, and alternatives have been provided, discussed and informed consent has been obtained with: patient.    CODE STATUS:

## 2024-06-04 NOTE — OP NOTE
Colorectal & General Surgery  Operative Report    Patient: Gay Vazquez  YOB: 1945  MRN: [id number]  DATE OF PROCEDURE: 06/04/24     PREOPERATIVE DIAGNOSIS:  Colonic mass at hepatic flexure  Liver mass  Gastroesophageal reflux disease    POSTOPERATIVE DIAGNOSIS:  Normal EGD  Poor bowel prep, inadequate for true colorectal cancer screening purposes  Ascending colon polyp  Rectosigmoid colon polyp  Diverticulosis    PROCEDURE:  Esophagogastroduodenoscopy  Colonoscopy to cecum with cold snare polypectomy of ascending colon polyp and rectosigmoid colon polyp    FINDINGS:  Normal esophagogastroduodenoscopy  Poor bowel prep inadequate for true colorectal cancer screening purposes  Ascending colon polyp, pedunculated, 4 mm, somewhat erythematous, resected completely and retrieved.  Hemostatic.  Rectosigmoid colon polyp, sessile, 2 mm, resected completely and retrieved.  Hemostatic.  Diverticulosis within the sigmoid colon    RECOMMENDATIONS:   Await pathology results    SURGEON:  Ace Ness MD    ANESTHESIA:  Monitored anesthesia care    EBL:  None    SPECIMEN:  Ascending colon polyp  Rectosigmoid colon polyp    OPERATIVE DESCRIPTION:  The patient was brought to the endoscopy suite under the care of the nursing staff.  A preoperative timeout was performed.  Monitored anesthesia care was initiated.      The endoscope was inserted into the oropharynx and advanced to the esophagus and stomach into the first portion of the duodenum.  The scope was then withdrawn while carefully inspecting the entire mucosal surface of the duodenum, the stomach, and the esophagus.  Retroflexion was accomplished in the stomach. The endoscope was then carefully withdrawn.    A digital rectal examination was performed.  The endoscope was inserted into the anus and advanced carefully to the cecum.  The endoscope was then withdrawn and the entire mucosal surface of the colon and rectum were visualized.  There was poor prep  throughout the colon.  Copious irrigation was utilized but mucosal evaluation was significantly limited secondary to poor prep.  There was an ascending colon polyp that was resected completely with cold snare polypectomy.  Retrieved.  Hemostatic.  There was a rectosigmoid polyp that was resected completely with cold snare polypectomy and retrieved.  Hemostatic.  Retroflexion was performed in the rectum.  The scope was then withdrawn.    The patient tolerated the procedure well and was transferred to the postanesthesia care unit in stable condition.    Ace Ness M.D.  Colorectal & General Surgery  [facility]    [address] [place] [address], [address]  [place], [place], [address]  P: [phone]  F: [phone]

## 2024-06-05 LAB
LAB AP CASE REPORT: NORMAL
PATH REPORT.FINAL DX SPEC: NORMAL
PATH REPORT.GROSS SPEC: NORMAL

## 2024-06-07 DIAGNOSIS — R16.0 LIVER MASS: Primary | ICD-10-CM

## 2024-06-10 ENCOUNTER — DOCUMENTATION (OUTPATIENT)
Dept: FAMILY MEDICINE CLINIC | Facility: CLINIC | Age: 79
End: 2024-06-10

## 2024-06-10 ENCOUNTER — OFFICE VISIT (OUTPATIENT)
Dept: FAMILY MEDICINE CLINIC | Facility: CLINIC | Age: 79
End: 2024-06-10
Payer: MEDICARE

## 2024-06-10 VITALS
TEMPERATURE: 96.6 F | HEART RATE: 68 BPM | SYSTOLIC BLOOD PRESSURE: 120 MMHG | OXYGEN SATURATION: 95 % | HEIGHT: 72 IN | DIASTOLIC BLOOD PRESSURE: 64 MMHG | RESPIRATION RATE: 14 BRPM | WEIGHT: 204 LBS | BODY MASS INDEX: 27.63 KG/M2

## 2024-06-10 DIAGNOSIS — N48.1 BALANITIS: Primary | ICD-10-CM

## 2024-06-10 PROCEDURE — 1159F MED LIST DOCD IN RCRD: CPT | Performed by: INTERNAL MEDICINE

## 2024-06-10 PROCEDURE — 3078F DIAST BP <80 MM HG: CPT | Performed by: INTERNAL MEDICINE

## 2024-06-10 PROCEDURE — 3074F SYST BP LT 130 MM HG: CPT | Performed by: INTERNAL MEDICINE

## 2024-06-10 PROCEDURE — 1126F AMNT PAIN NOTED NONE PRSNT: CPT | Performed by: INTERNAL MEDICINE

## 2024-06-10 PROCEDURE — 1160F RVW MEDS BY RX/DR IN RCRD: CPT | Performed by: INTERNAL MEDICINE

## 2024-06-10 PROCEDURE — 99213 OFFICE O/P EST LOW 20 MIN: CPT | Performed by: INTERNAL MEDICINE

## 2024-06-10 RX ORDER — CLOTRIMAZOLE 1 %
1 CREAM (GRAM) TOPICAL 2 TIMES DAILY
Qty: 28 G | Refills: 0 | Status: SHIPPED | OUTPATIENT
Start: 2024-06-10 | End: 2024-06-24

## 2024-06-10 NOTE — PROGRESS NOTES
Chief Complaint   Patient presents with    Groin Swelling     SWELLING RED SPOT ON PENIS X10 DAYS    Abdominal Pain    Results     FUP CT SCAN   History of Present Illness:  Patient presented to the clinic today with c/o redness of the glans penis associated with swelling for the past 10 days. No pain or ulcer. No urethral discharge or pain with urination. Reports history of similar problem 2 years and was treated with antibiotic cream. No other associated symptoms.       PMH:  Outpatient Medications Prior to Visit   Medication Sig Dispense Refill    atenolol (TENORMIN) 25 MG tablet Take 1 tablet by mouth Daily. 90 tablet 1    cyanocobalamin 1000 MCG/ML injection INJECT 1 VIAL (1ML) SUBCUTANEOUSLY ONCE EVERY MONTH      dapagliflozin Propanediol (Farxiga) 10 MG tablet Take 10 mg by mouth Daily. 30 tablet 2    doxazosin (CARDURA) 2 MG tablet Take 1 tablet by mouth every night at bedtime.      gabapentin (NEURONTIN) 300 MG capsule Take 2 capsules by mouth every night at bedtime. 60 capsule 3    glucose blood test strip Once a day 100 each 12    golimumab (Simponi Aria) 50 MG/4ML solution injection Infuse  into a venous catheter.      losartan (COZAAR) 25 MG tablet Take 1 tablet by mouth Daily. 90 tablet 3    meloxicam (MOBIC) 15 MG tablet Take 1 tablet by mouth Daily. 90 tablet 3    metFORMIN ER (GLUCOPHAGE-XR) 500 MG 24 hr tablet Take 2 tablets by mouth Daily. 90 tablet 1    omeprazole (priLOSEC) 40 MG capsule Take 1 capsule by mouth Daily. 30 capsule 0    pravastatin (PRAVACHOL) 80 MG tablet Take 1 tablet by mouth Daily. 90 tablet 1    traMADol (ULTRAM) 50 MG tablet Take 2 tablets by mouth Every 6 (Six) Hours As Needed.       No facility-administered medications prior to visit.      No Known Allergies  Past Surgical History:   Procedure Laterality Date    ABDOMINAL SURGERY      CHOLECYSTECTOMY      COLONOSCOPY  2017    [facility]    COLONOSCOPY N/A 6/4/2024    Procedure: COLONOSCOPY INTO CECUM WITH  "POLYPECTOMIES (COLD SNARE);  Surgeon: Angel Ness MD;  Location: [facility] ENDOSCOPY;  Service: General;  Laterality: N/A;  PRE: COLON MASS, ABNORMAL CT  POST: DIVERTICULOSIS, POLYPS, POOR PREP    ENDOSCOPY N/A 6/4/2024    Procedure: ESOPHAGOGASTRODUODENOSCOPY;  Surgeon: Angel Ness MD;  Location: [facility] ENDOSCOPY;  Service: General;  Laterality: N/A;  PRE: LIVER MASS  POST: NORMAL EGD    PERICARDIUM SURGERY      REPLACEMENT TOTAL KNEE BILATERAL       family history is not on file.   reports that he quit smoking about 9 months ago. His smoking use included cigarettes. He started smoking about 54 years ago. He has a 53.7 pack-year smoking history. He has been exposed to tobacco smoke. He has never used smokeless tobacco. He reports that he does not currently use alcohol. He reports that he does not use drugs.     /64   Pulse 68   Temp 96.6 °F (35.9 °C) (Temporal)   Resp 14   Ht 182.9 cm (72.01\")   Wt 92.5 kg (204 lb)   SpO2 95%   BMI 27.66 kg/m²   Physical Exam  Genitourinary:     Penis: Circumcised. Erythema and swelling present. No discharge or lesions.                    Diagnoses and all orders for this visit:    1. Balanitis (Primary)  -     clotrimazole (LOTRIMIN) 1 % cream; Apply 1 Application topically to the appropriate area as directed 2 (Two) Times a Day for 14 days.  Dispense: 28 g; Refill: 0     Will treat empirically with antifungal cream for 14 days. RTC if symptoms persist after treatment        Return for as needed.     "

## 2024-06-17 ENCOUNTER — HOSPITAL ENCOUNTER (OUTPATIENT)
Dept: CT IMAGING | Facility: HOSPITAL | Age: 79
Discharge: HOME | End: 2024-06-17
Payer: MEDICARE

## 2024-06-17 VITALS
BODY MASS INDEX: 27.63 KG/M2 | WEIGHT: 204 LBS | HEART RATE: 69 BPM | RESPIRATION RATE: 16 BRPM | OXYGEN SATURATION: 97 % | HEIGHT: 72 IN | SYSTOLIC BLOOD PRESSURE: 130 MMHG | TEMPERATURE: 97.8 F | DIASTOLIC BLOOD PRESSURE: 85 MMHG

## 2024-06-17 DIAGNOSIS — R16.0 LIVER MASS: ICD-10-CM

## 2024-06-17 LAB
INR PPP: 1 (ref 0.8–1.2)
PLATELET # BLD AUTO: 126 10*3/MM3 (ref 140–450)
PROTHROMBIN TIME: 11.9 SECONDS (ref 12.8–15.2)

## 2024-06-17 PROCEDURE — 77012 CT SCAN FOR NEEDLE BIOPSY: CPT

## 2024-06-17 PROCEDURE — 25010000002 LIDOCAINE 1 % SOLUTION: Performed by: SURGERY

## 2024-06-17 PROCEDURE — 85610 PROTHROMBIN TIME: CPT

## 2024-06-17 PROCEDURE — 85049 AUTOMATED PLATELET COUNT: CPT | Performed by: RADIOLOGY

## 2024-06-17 PROCEDURE — 25010000002 FENTANYL CITRATE (PF) 50 MCG/ML SOLUTION: Performed by: RADIOLOGY

## 2024-06-17 PROCEDURE — 25010000002 MIDAZOLAM PER 1 MG: Performed by: RADIOLOGY

## 2024-06-17 RX ORDER — LIDOCAINE HYDROCHLORIDE 10 MG/ML
20 INJECTION, SOLUTION INFILTRATION; PERINEURAL ONCE
Status: COMPLETED | OUTPATIENT
Start: 2024-06-17 | End: 2024-06-17

## 2024-06-17 RX ORDER — MIDAZOLAM HYDROCHLORIDE 1 MG/ML
0.5 INJECTION INTRAMUSCULAR; INTRAVENOUS ONCE AS NEEDED
Status: DISCONTINUED | OUTPATIENT
Start: 2024-06-17 | End: 2024-06-18 | Stop reason: HOSPADM

## 2024-06-17 RX ORDER — MIDAZOLAM HYDROCHLORIDE 1 MG/ML
INJECTION INTRAMUSCULAR; INTRAVENOUS AS NEEDED
Status: COMPLETED | OUTPATIENT
Start: 2024-06-17 | End: 2024-06-17

## 2024-06-17 RX ORDER — FENTANYL CITRATE 50 UG/ML
INJECTION, SOLUTION INTRAMUSCULAR; INTRAVENOUS AS NEEDED
Status: COMPLETED | OUTPATIENT
Start: 2024-06-17 | End: 2024-06-17

## 2024-06-17 RX ORDER — SODIUM CHLORIDE 9 MG/ML
25 INJECTION, SOLUTION INTRAVENOUS ONCE
Status: COMPLETED | OUTPATIENT
Start: 2024-06-17 | End: 2024-06-17

## 2024-06-17 RX ORDER — SODIUM CHLORIDE 0.9 % (FLUSH) 0.9 %
10 SYRINGE (ML) INJECTION AS NEEDED
Status: DISCONTINUED | OUTPATIENT
Start: 2024-06-17 | End: 2024-06-18 | Stop reason: HOSPADM

## 2024-06-17 RX ADMIN — LIDOCAINE HYDROCHLORIDE 20 ML: 10 INJECTION, SOLUTION INFILTRATION; PERINEURAL at 10:50

## 2024-06-17 RX ADMIN — SODIUM CHLORIDE 25 ML/HR: 9 INJECTION, SOLUTION INTRAVENOUS at 10:43

## 2024-06-17 RX ADMIN — FENTANYL CITRATE 50 MCG: 50 INJECTION, SOLUTION INTRAMUSCULAR; INTRAVENOUS at 10:49

## 2024-06-17 RX ADMIN — MIDAZOLAM 1 MG: 1 INJECTION INTRAMUSCULAR; INTRAVENOUS at 10:49

## 2024-06-17 NOTE — DISCHARGE INSTRUCTIONS
EDUCATION /DISCHARGE INSTRUCTIONS  CT/US guided biopsy:  A biopsy is a procedure done to remove tissue for further analysis.  Before images are taken to locate the target area.  Images can be obtained using ultrasound, CT or MRI.  A physician will clean your skin with antiseptic soap, place a sterile towel around the site and administer a local anesthetic to numb the area.  The physician will then insert a special needle.  Sometimes images are taken of the needle after it is inserted to ensure the needle is in the correct area to be biopsied.   A sample is obtained and sent to the laboratory for study.  Occasionally the laboratory is unable to make a diagnosis from the sample and the procedure may need to be repeated.  Within a week the radiologist will send a report to your physician.  A pathologist will also examine the tissue and send a report.    Risks of the procedure include but are not limited to:   *  Bleeding    *  Infection   *  Puncture of surrounding organs *  Death     *  Lung collapse if the biopsy is near the chest which may require insertion of a      chest tube to re-inflate the lung if severe.    Benefits of the procedure:  Using x-ray helps to locate the area that requires a biopsy. The procedure is less invasive than a surgical procedure, there are no large incisions and it does not require anesthesia.    Alternatives to the procedure:  A biopsy can be performed surgically.  Risks of a surgical biopsy include exposure to anesthesia, infection, excessive bleeding and injury to abdominal organs.  A benefit of surgical biopsy is the ability to see the area to be biopsied and remove of a larger piece of tissue.    THIS EDUCATION INFORMATION WAS REVIEWED PRIOR TO PROCEDURE AND CONSENT. Patient initials__________________Time___________________    Post Procedure:    *  Expect the biopsy site may be tender up to one week.    *  Rest today (no pushing pulling or straining).   *  Slowly increase activity  tomorrow.    *  If you received sedation do not drive for 24 hours.   *  Keep dressing clean and dry.   *  Leave dressing on puncture site for 24 hours.    *  You may shower when dressing removed.  Call your doctor if experiencing:   *  Signs of infection such as redness, swelling, excessive pain and / or foul        smelling drainage from the puncture site.   *  Chills or fever over 101 degrees (by mouth).   *  Unrelieved pain.   *  Any new or severe symptoms.   *  If experiencing sudden / severe shortness of breath or chest pain go to the       nearest emergency room.   Following the procedure:     Follow-up with the ordering physician as directed.    Continue to take other medications as directed by your physician unless    otherwise instructed.   If applicable, resume taking your blood thinners or Aspirin on ______06/18/2024_____.    If you have any concerns please call the Radiology Nurses Desk at [phone].  You are the most important factor in your recovery.  Follow the above instructions carefully.

## 2024-06-17 NOTE — POST-PROCEDURE NOTE
POST PROCEDURE NOTE    Procedure: ct liver biopsy    Pre-Procedure Diagnosis: liver mass    Post-procedure Diagnosis: same    Findings: technically successful ct guided liver mass biopsy    Complications: no immediate    Blood loss: none    Specimen Removed: two 18 gauge cores    Disposition:   Discharge home

## 2024-06-18 ENCOUNTER — TELEPHONE (OUTPATIENT)
Dept: INTERVENTIONAL RADIOLOGY/VASCULAR | Facility: HOSPITAL | Age: 79
End: 2024-06-18
Payer: MEDICARE

## 2024-06-18 LAB
DX PRELIMINARY: NORMAL
LAB AP CASE REPORT: NORMAL
LAB AP DIAGNOSIS COMMENT: NORMAL
PATH REPORT.GROSS SPEC: NORMAL

## 2024-06-24 RX ORDER — OMEPRAZOLE 40 MG/1
40 CAPSULE, DELAYED RELEASE ORAL DAILY
Qty: 30 CAPSULE | Refills: 0 | Status: SHIPPED | OUTPATIENT
Start: 2024-06-24

## 2024-06-26 DIAGNOSIS — R16.0 LIVER MASS: Primary | ICD-10-CM

## 2024-06-26 RX ORDER — ALPRAZOLAM 1 MG/1
1 TABLET ORAL ONCE
Qty: 1 TABLET | Refills: 0 | Status: SHIPPED | OUTPATIENT
Start: 2024-06-26 | End: 2024-06-26

## 2024-06-28 LAB
DX PRELIMINARY: NORMAL
LAB AP CASE REPORT: NORMAL
LAB AP DIAGNOSIS COMMENT: NORMAL
PATH REPORT.FINAL DX SPEC: NORMAL
PATH REPORT.GROSS SPEC: NORMAL

## 2024-07-05 ENCOUNTER — HOSPITAL ENCOUNTER (OUTPATIENT)
Dept: MRI IMAGING | Facility: HOSPITAL | Age: 79
Discharge: HOME | End: 2024-07-05
Payer: MEDICARE

## 2024-07-05 DIAGNOSIS — R16.0 LIVER MASS: ICD-10-CM

## 2024-07-05 PROCEDURE — A9577 INJ MULTIHANCE: HCPCS | Performed by: SURGERY

## 2024-07-05 PROCEDURE — 0 GADOBENATE DIMEGLUMINE 529 MG/ML SOLUTION: Performed by: SURGERY

## 2024-07-05 PROCEDURE — 74183 MRI ABD W/O CNTR FLWD CNTR: CPT

## 2024-07-05 RX ADMIN — GADOBENATE DIMEGLUMINE 19 ML: 529 INJECTION, SOLUTION INTRAVENOUS at 08:28

## 2024-07-08 LAB — CREAT BLDA-MCNC: 1.2 MG/DL (ref 0.6–1.3)

## 2024-07-09 RX ORDER — METFORMIN HYDROCHLORIDE 500 MG/1
1000 TABLET, EXTENDED RELEASE ORAL DAILY
Qty: 90 TABLET | Refills: 1 | Status: SHIPPED | OUTPATIENT
Start: 2024-07-09

## 2024-07-09 NOTE — TELEPHONE ENCOUNTER
Incoming Refill Request      Medication requested (name and dose):     metFORMIN ER (GLUCOPHAGE-XR) 500 MG 24 hr tablet  1,000 mg, Daily       Pharmacy where request should be sent: [facility] [address] [place] [id number] [facility] - [phone]  - [phone]  [phone]     Additional details provided by patient: NONE    Best call back number: [phone]    Does the patient have less than a 3 day supply:  [x] Yes  [] No    Deepak Nicholson Rep  07/09/24, 10:23 EDT

## 2024-07-12 ENCOUNTER — TELEPHONE (OUTPATIENT)
Dept: SURGERY | Facility: CLINIC | Age: 79
End: 2024-07-12
Payer: MEDICARE

## 2024-07-13 DIAGNOSIS — R16.0 LIVER MASS: Primary | ICD-10-CM

## 2024-07-15 ENCOUNTER — TELEPHONE (OUTPATIENT)
Dept: SURGERY | Facility: CLINIC | Age: 79
End: 2024-07-15
Payer: MEDICARE

## 2024-07-15 NOTE — PROGRESS NOTES
07/18/24 0001   Pre-Procedure Phone Call   Procedure Time Verified Yes   Arrival Time 1100   Procedure Location Verified Yes   Medical History Reviewed No   NPO Status Reinforced Yes   Ride and Caregiver Arranged Yes   Patient Knows to Bring Current Medications No   Bring Outside Films Requested No

## 2024-07-15 NOTE — TELEPHONE ENCOUNTER
Called and left voice message to call back about the CT abdomen biopsy. It is scheduled for Thursday 07/18 arrive @ 11:00 main hosp. He needs a  due to light sedation. He needs to be NPO after midnight.

## 2024-07-18 ENCOUNTER — HOSPITAL ENCOUNTER (OUTPATIENT)
Dept: CT IMAGING | Facility: HOSPITAL | Age: 79
Discharge: HOME | End: 2024-07-18
Payer: MEDICARE

## 2024-07-18 VITALS
DIASTOLIC BLOOD PRESSURE: 82 MMHG | SYSTOLIC BLOOD PRESSURE: 121 MMHG | HEART RATE: 81 BPM | OXYGEN SATURATION: 93 % | RESPIRATION RATE: 17 BRPM | WEIGHT: 203 LBS | TEMPERATURE: 97.1 F | HEIGHT: 72 IN | BODY MASS INDEX: 27.5 KG/M2

## 2024-07-18 DIAGNOSIS — R16.0 LIVER MASS: ICD-10-CM

## 2024-07-18 LAB
INR PPP: 1 (ref 0.8–1.2)
PLATELET # BLD AUTO: 140 10*3/MM3 (ref 140–450)
PROTHROMBIN TIME: 9.9 SECONDS (ref 12.8–15.2)

## 2024-07-18 PROCEDURE — 99152 MOD SED SAME PHYS/QHP 5/>YRS: CPT

## 2024-07-18 PROCEDURE — 85049 AUTOMATED PLATELET COUNT: CPT | Performed by: RADIOLOGY

## 2024-07-18 PROCEDURE — 25010000002 MIDAZOLAM PER 1 MG: Performed by: RADIOLOGY

## 2024-07-18 PROCEDURE — 85610 PROTHROMBIN TIME: CPT

## 2024-07-18 PROCEDURE — 88305 TISSUE EXAM BY PATHOLOGIST: CPT | Performed by: SURGERY

## 2024-07-18 PROCEDURE — 88342 IMHCHEM/IMCYTCHM 1ST ANTB: CPT | Performed by: SURGERY

## 2024-07-18 PROCEDURE — 77012 CT SCAN FOR NEEDLE BIOPSY: CPT

## 2024-07-18 PROCEDURE — 25010000002 FENTANYL CITRATE (PF) 50 MCG/ML SOLUTION: Performed by: RADIOLOGY

## 2024-07-18 PROCEDURE — 88341 IMHCHEM/IMCYTCHM EA ADD ANTB: CPT | Performed by: SURGERY

## 2024-07-18 PROCEDURE — 25010000002 LIDOCAINE 1 % SOLUTION: Performed by: RADIOLOGY

## 2024-07-18 RX ORDER — SODIUM CHLORIDE 0.9 % (FLUSH) 0.9 %
10 SYRINGE (ML) INJECTION AS NEEDED
Status: DISCONTINUED | OUTPATIENT
Start: 2024-07-18 | End: 2024-07-19 | Stop reason: HOSPADM

## 2024-07-18 RX ORDER — MIDAZOLAM HYDROCHLORIDE 1 MG/ML
INJECTION INTRAMUSCULAR; INTRAVENOUS AS NEEDED
Status: COMPLETED | OUTPATIENT
Start: 2024-07-18 | End: 2024-07-18

## 2024-07-18 RX ORDER — LIDOCAINE HYDROCHLORIDE 10 MG/ML
20 INJECTION, SOLUTION INFILTRATION; PERINEURAL ONCE
Status: COMPLETED | OUTPATIENT
Start: 2024-07-18 | End: 2024-07-18

## 2024-07-18 RX ORDER — MIDAZOLAM HYDROCHLORIDE 1 MG/ML
0.5 INJECTION INTRAMUSCULAR; INTRAVENOUS ONCE AS NEEDED
Status: DISCONTINUED | OUTPATIENT
Start: 2024-07-18 | End: 2024-07-19 | Stop reason: HOSPADM

## 2024-07-18 RX ORDER — FENTANYL CITRATE 50 UG/ML
INJECTION, SOLUTION INTRAMUSCULAR; INTRAVENOUS AS NEEDED
Status: COMPLETED | OUTPATIENT
Start: 2024-07-18 | End: 2024-07-18

## 2024-07-18 RX ORDER — OMEPRAZOLE 40 MG/1
40 CAPSULE, DELAYED RELEASE ORAL DAILY
Qty: 30 CAPSULE | Refills: 0 | Status: SHIPPED | OUTPATIENT
Start: 2024-07-18

## 2024-07-18 RX ORDER — SODIUM CHLORIDE 9 MG/ML
25 INJECTION, SOLUTION INTRAVENOUS ONCE
Status: COMPLETED | OUTPATIENT
Start: 2024-07-18 | End: 2024-07-18

## 2024-07-18 RX ADMIN — FENTANYL CITRATE 50 MCG: 50 INJECTION, SOLUTION INTRAMUSCULAR; INTRAVENOUS at 12:33

## 2024-07-18 RX ADMIN — LIDOCAINE HYDROCHLORIDE 20 ML: 10 INJECTION, SOLUTION INFILTRATION; PERINEURAL at 12:35

## 2024-07-18 RX ADMIN — MIDAZOLAM 1 MG: 1 INJECTION INTRAMUSCULAR; INTRAVENOUS at 12:33

## 2024-07-18 RX ADMIN — SODIUM CHLORIDE 25 ML/HR: 9 INJECTION, SOLUTION INTRAVENOUS at 12:23

## 2024-07-18 NOTE — DISCHARGE INSTRUCTIONS
EDUCATION /DISCHARGE INSTRUCTIONS  CT/US guided biopsy:  A biopsy is a procedure done to remove tissue for further analysis.  Before images are taken to locate the target area.  Images can be obtained using ultrasound, CT or MRI.  A physician will clean your skin with antiseptic soap, place a sterile towel around the site and administer a local anesthetic to numb the area.  The physician will then insert a special needle.  Sometimes images are taken of the needle after it is inserted to ensure the needle is in the correct area to be biopsied.   A sample is obtained and sent to the laboratory for study.  Occasionally the laboratory is unable to make a diagnosis from the sample and the procedure may need to be repeated.  Within a week the radiologist will send a report to your physician.  A pathologist will also examine the tissue and send a report.    Risks of the procedure include but are not limited to:   *  Bleeding    *  Infection   *  Puncture of surrounding organs *  Death     *  Lung collapse if the biopsy is near the chest which may require insertion of a      chest tube to re-inflate the lung if severe.    Benefits of the procedure:  Using x-ray helps to locate the area that requires a biopsy. The procedure is less invasive than a surgical procedure, there are no large incisions and it does not require anesthesia.    Alternatives to the procedure:  A biopsy can be performed surgically.  Risks of a surgical biopsy include exposure to anesthesia, infection, excessive bleeding and injury to abdominal organs.  A benefit of surgical biopsy is the ability to see the area to be biopsied and remove of a larger piece of tissue.    THIS EDUCATION INFORMATION WAS REVIEWED PRIOR TO PROCEDURE AND CONSENT. Patient initials__________________Time___________________    Post Procedure:    *  Expect the biopsy site may be tender up to one week.    *  Rest today (no pushing pulling or straining).   *  Slowly increase activity  tomorrow.    *  If you received sedation do not drive for 24 hours.   *  Keep dressing clean and dry.   *  Leave dressing on puncture site for 24 hours.    *  You may shower when dressing removed.  Call your doctor if experiencing:   *  Signs of infection such as redness, swelling, excessive pain and / or foul        smelling drainage from the puncture site.   *  Chills or fever over 101 degrees (by mouth).   *  Unrelieved pain.   *  Any new or severe symptoms.   *  If experiencing sudden / severe shortness of breath or chest pain go to the       nearest emergency room.   Following the procedure:     Follow-up with the ordering physician as directed.    Continue to take other medications as directed by your physician unless    otherwise instructed.   If applicable, resume taking your blood thinners or Aspirin on ___________.    If you have any concerns please call the Radiology Nurses Desk at [phone]. 7am-10pm   You are the most important factor in your recovery.  Follow the above instructions carefully.

## 2024-07-18 NOTE — H&P
Name: Gay Vazquez ADMIT: 2024   : 1945  PCP: Gloria Post MD    MRN: [id number] LOS: 0 days   AGE/SEX: 79 y.o. male  ROOM: Room/bed info not found     Chief complaint   Patient is a 79 y.o. male presents with liver mass.     History of Present Illness: liver mass    Past Surgical History:  Past Surgical History:   Procedure Laterality Date    ABDOMINAL SURGERY      CHOLECYSTECTOMY      COLONOSCOPY  2017    [facility]    COLONOSCOPY N/A 2024    Procedure: COLONOSCOPY INTO CECUM WITH POLYPECTOMIES (COLD SNARE);  Surgeon: Angel Ness MD;  Location:  [facility];  Service: General;  Laterality: N/A;  PRE: COLON MASS, ABNORMAL CT  POST: DIVERTICULOSIS, POLYPS, POOR PREP    ENDOSCOPY N/A 2024    Procedure: ESOPHAGOGASTRODUODENOSCOPY;  Surgeon: Angel Ness MD;  Location:  [facility];  Service: General;  Laterality: N/A;  PRE: LIVER MASS  POST: NORMAL EGD    PERICARDIUM SURGERY      REPLACEMENT TOTAL KNEE BILATERAL         Past Medical History:  Past Medical History:   Diagnosis Date    Diabetes mellitus     Hyperlipidemia     Hypertension     RA (rheumatoid arthritis)        Home Medications:  (Not in a hospital admission)      Allergies:  Patient has no known allergies.    Family History:  No family history on file.    Social History:  Social History     Tobacco Use    Smoking status: Former     Current packs/day: 0.00     Average packs/day: 1 pack/day for 53.7 years (53.7 ttl pk-yrs)     Types: Cigarettes     Start date:      Quit date: 2023     Years since quittin.8     Passive exposure: Past    Smokeless tobacco: Never   Vaping Use    Vaping status: Never Used   Substance Use Topics    Alcohol use: Not Currently    Drug use: Never        Objective     Physical Exam:   Mental Status: alert/oriented   Heart: regular   Lungs: clear     Vital Signs  Temp:  [97.1 °F (36.2 °C)] 97.1 °F (36.2 °C)  Heart Rate:  [89] 89  Resp:  [16]  16  BP: (121)/(86) 121/86    Anticipated Surgical Procedure:  Liver mass biopsy    The risks, benefits and alternatives of this procedure have been discussed with the patient or responsible party: Yes      Hosea Barton MD  07/18/24  12:05 EDT

## 2024-07-18 NOTE — POST-PROCEDURE NOTE
POST PROCEDURE NOTE    Procedure: ct liver biopsy    Pre-Procedure Diagnosis: liver mass    Post-procedure Diagnosis: same    Findings: technically successful ct guided liver mass biopsy    Complications: no immediate    Blood loss: none    Specimen Removed: three 18 gauge cores    Disposition:   Discharge home

## 2024-07-20 ENCOUNTER — TELEPHONE (OUTPATIENT)
Dept: INTERVENTIONAL RADIOLOGY/VASCULAR | Facility: HOSPITAL | Age: 79
End: 2024-07-20
Payer: MEDICARE

## 2024-07-20 LAB
LAB AP CASE REPORT: NORMAL
LAB AP CLINICAL INFORMATION: NORMAL
LAB AP DIAGNOSIS COMMENT: NORMAL
LAB AP SPECIAL STAINS: NORMAL
PATH REPORT.FINAL DX SPEC: NORMAL
PATH REPORT.GROSS SPEC: NORMAL

## 2024-07-22 ENCOUNTER — TELEPHONE (OUTPATIENT)
Dept: SURGERY | Facility: CLINIC | Age: 79
End: 2024-07-22
Payer: MEDICARE

## 2024-07-22 NOTE — TELEPHONE ENCOUNTER
I called and discussed his pathology results with him.  I will see him in the office on Thursday for further discussion and workup.

## 2024-07-24 RX ORDER — DOXAZOSIN 2 MG/1
2 TABLET ORAL
Qty: 30 TABLET | Refills: 0 | Status: SHIPPED | OUTPATIENT
Start: 2024-07-24

## 2024-07-25 ENCOUNTER — OFFICE VISIT (OUTPATIENT)
Dept: SURGERY | Facility: CLINIC | Age: 79
End: 2024-07-25
Payer: MEDICARE

## 2024-07-25 VITALS
DIASTOLIC BLOOD PRESSURE: 86 MMHG | SYSTOLIC BLOOD PRESSURE: 114 MMHG | BODY MASS INDEX: 27.63 KG/M2 | WEIGHT: 204 LBS | HEIGHT: 72 IN

## 2024-07-25 DIAGNOSIS — C22.1 CHOLANGIOCARCINOMA: Primary | ICD-10-CM

## 2024-07-25 RX ORDER — PILOCARPINE HYDROCHLORIDE 5 MG/1
1 TABLET, FILM COATED ORAL 3 TIMES DAILY
COMMUNITY
Start: 2024-07-04

## 2024-07-25 NOTE — H&P (VIEW-ONLY)
Colorectal & General Surgery  Follow - Up    Patient: Gay Vazquez  YOB: 1945  MRN: [id number]      Assessment  Gay Vazquez is a 79 y.o. male who presents in follow-up regarding his liver mass.  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  I have discussed this diagnosis with him as well as an initial treatment plan including systemic chemotherapy as this is not surgically resectable.  We discussed the basic pathophysiology of cholangiocarcinoma as well as the basic treatment algorithm and briefly touched on his overall prognosis.    I have referred him urgently to medical oncology as we are somewhat behind in starting treatment given the lengthy amount of time it took to establish this diagnosis.  I will place a port on Tuesday.  We discussed the risk, benefits, alternatives the procedure.  Informed consent was obtained.  We will also obtain labs today.    Plan  Port placement on Tuesday  Referral to medical oncology  CBC, CMP, AFP, CA 19-9, CEA    Chief Complaint: Follow-up regarding liver mass    History of Present Illness   Gay Vazquez is a 79 y.o. male who presents in follow-up.  He remains asymptomatic.  He is tolerated his biopsies well.    Most recent colonoscopy: 2024    Past Medical History   Past Medical History:   Diagnosis Date    Cancer     Liver    Colon polyp     Diabetes mellitus     Hyperlipidemia     Hypertension     Liver cancer 07-    Liver disease     Postoperative wound infection     RA (rheumatoid arthritis)         Past Surgical History   Past Surgical History:   Procedure Laterality Date    ABDOMINAL SURGERY      CHOLECYSTECTOMY      COLONOSCOPY  2017    [facility]    COLONOSCOPY N/A 06/04/2024    Procedure: COLONOSCOPY INTO CECUM WITH POLYPECTOMIES (COLD SNARE);  Surgeon: Angel Ness MD;  Location: [facility] ENDOSCOPY;  Service: General;  Laterality: N/A;  PRE: COLON MASS, ABNORMAL CT  POST:  DIVERTICULOSIS, POLYPS, POOR PREP    ENDOSCOPY N/A 2024    Procedure: ESOPHAGOGASTRODUODENOSCOPY;  Surgeon: Angel Ness MD;  Location: [facility] ENDOSCOPY;  Service: General;  Laterality: N/A;  PRE: LIVER MASS  POST: NORMAL EGD    EYE SURGERY      PERICARDIUM SURGERY      REPLACEMENT TOTAL KNEE BILATERAL         Social History  Social History     Socioeconomic History    Marital status:    Tobacco Use    Smoking status: Former     Current packs/day: 0.00     Average packs/day: 1 pack/day for 53.7 years (53.7 ttl pk-yrs)     Types: Cigarettes     Start date: 1970     Quit date: 10/1/2023     Years since quittin.8     Passive exposure: Past    Smokeless tobacco: Never   Vaping Use    Vaping status: Never Used   Substance and Sexual Activity    Alcohol use: Not Currently    Drug use: Never    Sexual activity: Not Currently     Partners: Female     Birth control/protection: Vasectomy       Family History  Family History   Problem Relation Age of Onset    Arthritis Father     Asthma Brother     Diabetes Sister     Diabetes Brother        Colorectal cancer family history: None    Review of Systems  Negative except as documented in the HPI.     Allergies  No Known Allergies    Medications    Current Outpatient Medications:     atenolol (TENORMIN) 25 MG tablet, Take 1 tablet by mouth Daily., Disp: 90 tablet, Rfl: 1    doxazosin (CARDURA) 2 MG tablet, Take 1 tablet by mouth every night at bedtime., Disp: 30 tablet, Rfl: 0    gabapentin (NEURONTIN) 300 MG capsule, Take 2 capsules by mouth every night at bedtime., Disp: 60 capsule, Rfl: 3    glucose blood test strip, Once a day, Disp: 100 each, Rfl: 12    losartan (COZAAR) 25 MG tablet, Take 1 tablet by mouth Daily., Disp: 90 tablet, Rfl: 3    meloxicam (MOBIC) 15 MG tablet, Take 1 tablet by mouth Daily., Disp: 90 tablet, Rfl: 3    metFORMIN ER (GLUCOPHAGE-XR) 500 MG 24 hr tablet, Take 2 tablets by mouth Daily., Disp: 90 tablet, Rfl: 1     omeprazole (priLOSEC) 40 MG capsule, Take 1 capsule by mouth once daily, Disp: 30 capsule, Rfl: 0    pilocarpine (SALAGEN) 5 MG tablet, Take 1 tablet by mouth 3 times a day., Disp: , Rfl:     pravastatin (PRAVACHOL) 80 MG tablet, Take 1 tablet by mouth Daily., Disp: 90 tablet, Rfl: 1    traMADol (ULTRAM) 50 MG tablet, Take 2 tablets by mouth Every 6 (Six) Hours As Needed., Disp: , Rfl:     golimumab (Simponi Aria) 50 MG/4ML solution injection, Infuse  into a venous catheter. (Patient not taking: Reported on 7/25/2024), Disp: , Rfl:     Vital Signs  Vitals:    07/25/24 1311   BP: 114/86        Physical Exam  Constitutional: Resting comfortably, no acute distress  Neck: Supple, trachea midline  Respiratory: No increased work of breathing, Symmetric excursion  Cardiovascular: Well pefursed, no jugular venous distention evident   Abdominal: Soft, non-tender, non-distended  Lymphatics: No cervical or suprascapular adenopathy  Skin: Warm, dry, no rash on visualized skin surfaces  Musculoskeletal: Symmetric strength, no obvious gross abnormalities  Psychiatric: Alert and oriented ×3, normal affect      Laboratory Results  I have personally reviewed pathology from percutaneous biopsy demonstrates adenocarcinoma consistent with cholangiocarcinoma.    Radiology  I have personally reviewed percutaneous biopsy imaging which demonstrates adequate biopsy of hepatic mass.    Endoscopy  None to review         Ace Ness MD  Colorectal & General Surgery  [facility]    [address], [address]  [place], [place], [address]  P: [phone]  F: [phone]

## 2024-07-25 NOTE — PROGRESS NOTES
Colorectal & General Surgery  Follow - Up    Patient: Gay Vazquez  YOB: 1945  MRN: [id number]      Assessment  Gay Vazquez is a 79 y.o. male who presents in follow-up regarding his liver mass.  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  I have discussed this diagnosis with him as well as an initial treatment plan including systemic chemotherapy as this is not surgically resectable.  We discussed the basic pathophysiology of cholangiocarcinoma as well as the basic treatment algorithm and briefly touched on his overall prognosis.    I have referred him urgently to medical oncology as we are somewhat behind in starting treatment given the lengthy amount of time it took to establish this diagnosis.  I will place a port on Tuesday.  We discussed the risk, benefits, alternatives the procedure.  Informed consent was obtained.  We will also obtain labs today.    Plan  Port placement on Tuesday  Referral to medical oncology  CBC, CMP, AFP, CA 19-9, CEA    Chief Complaint: Follow-up regarding liver mass    History of Present Illness   Gay Vazquez is a 79 y.o. male who presents in follow-up.  He remains asymptomatic.  He is tolerated his biopsies well.    Most recent colonoscopy: 2024    Past Medical History   Past Medical History:   Diagnosis Date    Cancer     Liver    Colon polyp     Diabetes mellitus     Hyperlipidemia     Hypertension     Liver cancer 07-    Liver disease     Postoperative wound infection     RA (rheumatoid arthritis)         Past Surgical History   Past Surgical History:   Procedure Laterality Date    ABDOMINAL SURGERY      CHOLECYSTECTOMY      COLONOSCOPY  2017    [facility]    COLONOSCOPY N/A 06/04/2024    Procedure: COLONOSCOPY INTO CECUM WITH POLYPECTOMIES (COLD SNARE);  Surgeon: Angel Ness MD;  Location: [facility] ENDOSCOPY;  Service: General;  Laterality: N/A;  PRE: COLON MASS, ABNORMAL CT  POST:  DIVERTICULOSIS, POLYPS, POOR PREP    ENDOSCOPY N/A 2024    Procedure: ESOPHAGOGASTRODUODENOSCOPY;  Surgeon: Angel Ness MD;  Location: [facility] ENDOSCOPY;  Service: General;  Laterality: N/A;  PRE: LIVER MASS  POST: NORMAL EGD    EYE SURGERY      PERICARDIUM SURGERY      REPLACEMENT TOTAL KNEE BILATERAL         Social History  Social History     Socioeconomic History    Marital status:    Tobacco Use    Smoking status: Former     Current packs/day: 0.00     Average packs/day: 1 pack/day for 53.7 years (53.7 ttl pk-yrs)     Types: Cigarettes     Start date: 1970     Quit date: 10/1/2023     Years since quittin.8     Passive exposure: Past    Smokeless tobacco: Never   Vaping Use    Vaping status: Never Used   Substance and Sexual Activity    Alcohol use: Not Currently    Drug use: Never    Sexual activity: Not Currently     Partners: Female     Birth control/protection: Vasectomy       Family History  Family History   Problem Relation Age of Onset    Arthritis Father     Asthma Brother     Diabetes Sister     Diabetes Brother        Colorectal cancer family history: None    Review of Systems  Negative except as documented in the HPI.     Allergies  No Known Allergies    Medications    Current Outpatient Medications:     atenolol (TENORMIN) 25 MG tablet, Take 1 tablet by mouth Daily., Disp: 90 tablet, Rfl: 1    doxazosin (CARDURA) 2 MG tablet, Take 1 tablet by mouth every night at bedtime., Disp: 30 tablet, Rfl: 0    gabapentin (NEURONTIN) 300 MG capsule, Take 2 capsules by mouth every night at bedtime., Disp: 60 capsule, Rfl: 3    glucose blood test strip, Once a day, Disp: 100 each, Rfl: 12    losartan (COZAAR) 25 MG tablet, Take 1 tablet by mouth Daily., Disp: 90 tablet, Rfl: 3    meloxicam (MOBIC) 15 MG tablet, Take 1 tablet by mouth Daily., Disp: 90 tablet, Rfl: 3    metFORMIN ER (GLUCOPHAGE-XR) 500 MG 24 hr tablet, Take 2 tablets by mouth Daily., Disp: 90 tablet, Rfl: 1     omeprazole (priLOSEC) 40 MG capsule, Take 1 capsule by mouth once daily, Disp: 30 capsule, Rfl: 0    pilocarpine (SALAGEN) 5 MG tablet, Take 1 tablet by mouth 3 times a day., Disp: , Rfl:     pravastatin (PRAVACHOL) 80 MG tablet, Take 1 tablet by mouth Daily., Disp: 90 tablet, Rfl: 1    traMADol (ULTRAM) 50 MG tablet, Take 2 tablets by mouth Every 6 (Six) Hours As Needed., Disp: , Rfl:     golimumab (Simponi Aria) 50 MG/4ML solution injection, Infuse  into a venous catheter. (Patient not taking: Reported on 7/25/2024), Disp: , Rfl:     Vital Signs  Vitals:    07/25/24 1311   BP: 114/86        Physical Exam  Constitutional: Resting comfortably, no acute distress  Neck: Supple, trachea midline  Respiratory: No increased work of breathing, Symmetric excursion  Cardiovascular: Well pefursed, no jugular venous distention evident   Abdominal: Soft, non-tender, non-distended  Lymphatics: No cervical or suprascapular adenopathy  Skin: Warm, dry, no rash on visualized skin surfaces  Musculoskeletal: Symmetric strength, no obvious gross abnormalities  Psychiatric: Alert and oriented ×3, normal affect      Laboratory Results  I have personally reviewed pathology from percutaneous biopsy demonstrates adenocarcinoma consistent with cholangiocarcinoma.    Radiology  I have personally reviewed percutaneous biopsy imaging which demonstrates adequate biopsy of hepatic mass.    Endoscopy  None to review         Ace Ness MD  Colorectal & General Surgery  [facility]    [address], [address]  [place], [place], [address]  P: [phone]  F: [phone]

## 2024-07-26 ENCOUNTER — ANESTHESIA EVENT (OUTPATIENT)
Age: 79
End: 2024-07-26
Payer: MEDICARE

## 2024-07-26 ENCOUNTER — LAB (OUTPATIENT)
Dept: LAB | Facility: HOSPITAL | Age: 79
End: 2024-07-26
Payer: MEDICARE

## 2024-07-26 DIAGNOSIS — C22.1 CHOLANGIOCARCINOMA: ICD-10-CM

## 2024-07-26 LAB
ALBUMIN SERPL-MCNC: 3.7 G/DL (ref 3.5–5.2)
ALBUMIN/GLOB SERPL: 0.9 G/DL
ALP SERPL-CCNC: 107 U/L (ref 39–117)
ALPHA-FETOPROTEIN: 3.81 NG/ML (ref 0–8.3)
ALT SERPL W P-5'-P-CCNC: 42 U/L (ref 1–41)
ANION GAP SERPL CALCULATED.3IONS-SCNC: 8.2 MMOL/L (ref 5–15)
AST SERPL-CCNC: 57 U/L (ref 1–40)
BASOPHILS # BLD AUTO: 0.05 10*3/MM3 (ref 0–0.2)
BASOPHILS NFR BLD AUTO: 0.7 % (ref 0–1.5)
BILIRUB SERPL-MCNC: 0.5 MG/DL (ref 0–1.2)
BUN SERPL-MCNC: 16 MG/DL (ref 8–23)
BUN/CREAT SERPL: 13.7 (ref 7–25)
CALCIUM SPEC-SCNC: 9.2 MG/DL (ref 8.6–10.5)
CANCER AG19-9 SERPL-ACNC: 133 U/ML
CHLORIDE SERPL-SCNC: 98 MMOL/L (ref 98–107)
CO2 SERPL-SCNC: 27.8 MMOL/L (ref 22–29)
CREAT SERPL-MCNC: 1.17 MG/DL (ref 0.76–1.27)
DEPRECATED RDW RBC AUTO: 38.5 FL (ref 37–54)
EGFRCR SERPLBLD CKD-EPI 2021: 63.8 ML/MIN/1.73
EOSINOPHIL # BLD AUTO: 0.11 10*3/MM3 (ref 0–0.4)
EOSINOPHIL NFR BLD AUTO: 1.6 % (ref 0.3–6.2)
ERYTHROCYTE [DISTWIDTH] IN BLOOD BY AUTOMATED COUNT: 12.1 % (ref 12.3–15.4)
GLOBULIN UR ELPH-MCNC: 4.3 GM/DL
GLUCOSE SERPL-MCNC: 273 MG/DL (ref 65–99)
HCT VFR BLD AUTO: 43.5 % (ref 37.5–51)
HGB BLD-MCNC: 14 G/DL (ref 13–17.7)
IMM GRANULOCYTES # BLD AUTO: 0.02 10*3/MM3 (ref 0–0.05)
IMM GRANULOCYTES NFR BLD AUTO: 0.3 % (ref 0–0.5)
LYMPHOCYTES # BLD AUTO: 1.16 10*3/MM3 (ref 0.7–3.1)
LYMPHOCYTES NFR BLD AUTO: 17 % (ref 19.6–45.3)
MCH RBC QN AUTO: 28.2 PG (ref 26.6–33)
MCHC RBC AUTO-ENTMCNC: 32.2 G/DL (ref 31.5–35.7)
MCV RBC AUTO: 87.7 FL (ref 79–97)
MONOCYTES # BLD AUTO: 0.53 10*3/MM3 (ref 0.1–0.9)
MONOCYTES NFR BLD AUTO: 7.8 % (ref 5–12)
NEUTROPHILS NFR BLD AUTO: 4.95 10*3/MM3 (ref 1.7–7)
NEUTROPHILS NFR BLD AUTO: 72.6 % (ref 42.7–76)
PLATELET # BLD AUTO: 146 10*3/MM3 (ref 140–450)
PMV BLD AUTO: 10.8 FL (ref 6–12)
POTASSIUM SERPL-SCNC: 4.8 MMOL/L (ref 3.5–5.2)
PROT SERPL-MCNC: 8 G/DL (ref 6–8.5)
RBC # BLD AUTO: 4.96 10*6/MM3 (ref 4.14–5.8)
SODIUM SERPL-SCNC: 134 MMOL/L (ref 136–145)
WBC NRBC COR # BLD AUTO: 6.82 10*3/MM3 (ref 3.4–10.8)

## 2024-07-26 PROCEDURE — 36415 COLL VENOUS BLD VENIPUNCTURE: CPT

## 2024-07-26 PROCEDURE — 80053 COMPREHEN METABOLIC PANEL: CPT

## 2024-07-26 PROCEDURE — 82105 ALPHA-FETOPROTEIN SERUM: CPT

## 2024-07-26 PROCEDURE — 86301 IMMUNOASSAY TUMOR CA 19-9: CPT

## 2024-07-26 PROCEDURE — 85025 COMPLETE CBC W/AUTO DIFF WBC: CPT

## 2024-07-26 NOTE — DISCHARGE INSTRUCTIONS
POST OP RECOMMENDATIONS  Dr. Ace Ness  [phone]  Discharge Instructions for Port Placement    Go home, rest and take it easy today; however, you should get up and move about several times today to reduce the risk of developing a blood clot in your legs.   You may experience some dizziness or memory loss from the anesthesia. This may last for the next 24 hours. Someone should plan on staying with you for the first 24 hours for your safety.   Do not make any important legal decisions or sign any legal papers for the next 24 hours.   Eat and drink lightly today. Start off with liquids, jello, soup, crackers or other bland foods at first. You may advance your diet tomorrow as tolerated as long as you do not experience any nausea or vomiting.   If present, you may remove your outer dressings in 3 days. The white tapes called steri-strips should stay in place. They will fall off on their own in 1-2 weeks. Do not worry if they come off sooner. Otherwise, glue covering your incisions will fall off in the next 1-2 weeks.  You may notice some bleeding/drainage on your outer dressings. A little bloody drainage is normal. If the bleeding/drainage is such that the bandage cannot absorb it, remove the dressing, apply clean gauze and apply firm pressure for a full 15 minutes. If the bleeding continues, please call me.   You may shower tomorrow. No tub baths until your incisions are completely healed.   You will have some pain and discomfort after surgery.  You will not be totally pain free, but tylenol and ibuprofen should make the pain more tolerable.  Unless you have been previously instructed to not take tylenol or ibuprofen, you can alternate these medications every 4 hours and take as instructed on each bottle. Please take any ibuprofen with food to avoid nausea and GI upset. If you are having severe pain that cannot be controlled by OTC pain medicine, please contact me.   You have also received a prescription for an  anti-nausea medicine. Please take this as prescribed for any nausea or vomiting. Nausea could be a result of the anesthesia. If you experience severe nausea and vomiting that cannot be controlled by the nausea medicine, please call me.   No driving for 24 hours and until you fell comfortable making sudden movements with your neck and arms.   If the port is to be used within 10-14 days of placement, no surgical follow-up is needed. Otherwise, you should call the office at [phone] to schedule a follow-up in about 1 week.   Remember to contact me for any of the following:   Fever > 101 degrees  Severe pain that cannot be controlled by taking your pain pills  Severe nausea or vomiting that cannot be controlled by taking your nausea pills  Significant bleeding of your incisions  Drainage that has a bad smell or is yellow or green in appearance  Any other questions or concerns

## 2024-07-26 NOTE — PAT
PAT call complete. Education provided to the patient on the following:    - Nothing to eat after midnight the night before your procedure, water and black coffee okay up to 2 hours before arrival time.  - If diabetic and procedure is after noon: No food 8 hours prior to arrival time, and only then only clear liquids 2 hours before arrival time.   - You will need to have someone drive you home after your procedure and remain with you for 24 hours after. The  will need to remain on site during your visit.  - Please remove all jewelry, including body piercing's, and leave any valuables at home. Only bring your drivers license and insurance card on day of procedure.  - Please arrive with a full bladder to provide a pregnancy test.   - Do not wear contact lenses; wear glasses and bring your case.  - Do not use any tobacco products on morning of procedure.  - Wash with antibacterial soap (such as Dial) the night before and morning of procedure.  - Be prepared to provide your last dose of all home medications.  - Coffee and vending available on the 1st and 5th floors; no cafeteria on site.  - You will need to arrive at 0730 on 7/30/24 at [facility] located at [address]. We are located on the 5th floor.  -Please be aware that arrival times may be subject to change up until the day of surgery.   - Feel free to contact us at: [phone] with any additional questions/concerns.     Patient will be taking beta blocker morning of procedure  Not on any oxygen  Anesthesia approved procedure

## 2024-07-28 NOTE — PROGRESS NOTES
Subjective     REASON FOR CONSULTATION:  cholangiocarcinoma  Provide an opinion on any further workup or treatment                             REQUESTING PHYSICIAN:  Thi    RECORDS OBTAINED:  Records of the patients history including those obtained from the referring provider were reviewed and summarized in detail.    HISTORY OF PRESENT ILLNESS:  The patient is a 79 y.o. year old male who is here for an opinion about the above issue.    History of Present Illness   This is a 79-year-old man with diabetes, hyperlipidemia, rheumatoid arthritis, tobacco abuse who underwent a CT angiogram of the chest to evaluate an aortic aneurysm and incidentally noted a mass in the right lobe of the liver.  A CT of the abdomen and pelvis was performed on 5/10/2024 showed a heterogeneously peripherally enhancing right hepatic lobe mass 7 x 5.7 cm, subtle 1.5 cm low-attenuation focus adjacently and a nonobstructing annular mass at the hepatic flexure.  There were several calcifications in the pancreas consistent with chronic pancreatitis, several small renal cysts and an enlarged prostate 6.5 cm.  There were extensive abdominal aortic atherosclerotic changes without aneurysm and extensive right pleural calcifications surrounding a small loculated right pleural effusion stable since 4/1/2024.    EGD and colonoscopy were performed on 6/4/2024 by Dr. Ness-normal EGD, poor bowel prep inadequate for colorectal screening, ascending colon polyp resected, rectosigmoid polyp resected.    A CT-guided liver biopsy was performed on 6/17/2024 which showed stent hepatitis with periportal bile duct reaction and associated fibrosis/inflammation no evidence of malignancy.  A repeat liver biopsy was performed on 7/18/2024 showing adenocarcinoma consistent with cholangiocarcinoma.  CA 19-9 was elevated 133 and AFP normal 3.81.    The patient has not had significant symptoms of abdominal pain, weight loss, nausea, vomiting or jaundice.    He has  significant rheumatoid arthritis history with joint deviation, history of iritis, previous pericarditis requiring pericardial window.    Past Medical History:   Diagnosis Date    Cancer     Liver    Colon polyp     Diabetes mellitus     Hyperlipidemia     Hypertension     Liver cancer 07-    Liver disease     Postoperative wound infection     RA (rheumatoid arthritis)         Past Surgical History:   Procedure Laterality Date    ABDOMINAL SURGERY      BACK SURGERY      CHOLECYSTECTOMY      [place]    [facility]    COLONOSCOPY N/A 06/04/2024    Procedure: COLONOSCOPY INTO CECUM WITH POLYPECTOMIES (COLD SNARE);  Surgeon: Angel Ness MD;  Location: [facility] ENDOSCOPY;  Service: General;  Laterality: N/A;  PRE: COLON MASS, ABNORMAL CT  POST: DIVERTICULOSIS, POLYPS, POOR PREP    ENDOSCOPY N/A 06/04/2024    Procedure: ESOPHAGOGASTRODUODENOSCOPY;  Surgeon: Angel Ness MD;  Location: [facility] ENDOSCOPY;  Service: General;  Laterality: N/A;  PRE: LIVER MASS  POST: NORMAL EGD    EYE SURGERY      PERICARDIUM SURGERY      REPLACEMENT TOTAL KNEE BILATERAL      THORACOTOMY Bilateral 1997        Current Outpatient Medications on File Prior to Visit   Medication Sig Dispense Refill    atenolol (TENORMIN) 25 MG tablet Take 1 tablet by mouth Daily. 90 tablet 1    doxazosin (CARDURA) 2 MG tablet Take 1 tablet by mouth every night at bedtime. 30 tablet 0    gabapentin (NEURONTIN) 300 MG capsule Take 2 capsules by mouth every night at bedtime. 60 capsule 3    glucose blood test strip Once a day 100 each 12    losartan (COZAAR) 25 MG tablet Take 1 tablet by mouth Daily. 90 tablet 3    meloxicam (MOBIC) 15 MG tablet Take 1 tablet by mouth Daily. 90 tablet 3    metFORMIN ER (GLUCOPHAGE-XR) 500 MG 24 hr tablet Take 2 tablets by mouth Daily. 90 tablet 1    omeprazole (priLOSEC) 40 MG capsule Take 1 capsule by mouth once daily 30 capsule 0    pilocarpine (SALAGEN) 5 MG  tablet Take 1 tablet by mouth 3 times a day.      pravastatin (PRAVACHOL) 80 MG tablet Take 1 tablet by mouth Daily. 90 tablet 1    traMADol (ULTRAM) 50 MG tablet Take 2 tablets by mouth Every 6 (Six) Hours As Needed.      golimumab (Simponi Aria) 50 MG/4ML solution injection Infuse  into a venous catheter. (Patient not taking: Reported on 2024)       No current facility-administered medications on file prior to visit.        ALLERGIES:  No Known Allergies     Social History     Socioeconomic History    Marital status:    Tobacco Use    Smoking status: Former     Current packs/day: 0.00     Average packs/day: 1 pack/day for 53.7 years (53.7 ttl pk-yrs)     Types: Cigarettes     Start date: 1970     Quit date: 10/1/2023     Years since quittin.8     Passive exposure: Past    Smokeless tobacco: Never   Vaping Use    Vaping status: Never Used   Substance and Sexual Activity    Alcohol use: Not Currently    Drug use: Never    Sexual activity: Not Currently     Partners: Female     Birth control/protection: Vasectomy        Family History   Problem Relation Age of Onset    Arthritis Father     Diabetes Sister     Asthma Brother     Diabetes Brother     Malig Hyperthermia Neg Hx         Review of Systems   Constitutional:  Positive for fatigue. Negative for activity change, appetite change and unexpected weight change.   HENT: Negative.     Respiratory: Negative.     Cardiovascular: Negative.    Gastrointestinal: Negative.    Genitourinary: Negative.    Musculoskeletal:  Positive for arthralgias and joint swelling.   Skin: Negative.    Neurological: Negative.    Hematological: Negative.    Psychiatric/Behavioral: Negative.            Objective     Vitals:    24 0918   BP: 138/81   Pulse: 74   Temp: 97.8 °F (36.6 °C)   TempSrc: Oral   SpO2: 96%   Weight: 93.4 kg (205 lb 12.8 oz)   PainSc:   4   PainLoc: Comment: joints         2024     9:16 AM   Current Status   ECOG score 0       Physical  Exam    CONSTITUTIONAL: pleasant well-developed adult man  HEENT: no icterus, no thrush, moist membranes  LYMPH: no cervical or supraclavicular lad  CV: RRR, S1S2, no murmur, sternotomy scar present  RESP: cta bilat, no wheezing, no rales  GI: soft, non-tender, no splenomegaly, +bs, multiple surgical scars on the abdomen  MUSC: no edema, multiple deviated hand joints, slight limp from arthritis  NEURO: alert and oriented x3, normal strength  PSYCH: normal mood and affect      RECENT LABS:  Hematology WBC   Date Value Ref Range Status   07/29/2024 6.93 3.40 - 10.80 10*3/mm3 Final     RBC   Date Value Ref Range Status   07/29/2024 5.02 4.14 - 5.80 10*6/mm3 Final     Hemoglobin   Date Value Ref Range Status   07/29/2024 14.5 13.0 - 17.7 g/dL Final     Hematocrit   Date Value Ref Range Status   07/29/2024 43.1 37.5 - 51.0 % Final     Platelets   Date Value Ref Range Status   07/29/2024 131 (L) 140 - 450 10*3/mm3 Final        Lab Results   Component Value Date    GLUCOSE 273 (H) 07/26/2024    BUN 16 07/26/2024    CREATININE 1.17 07/26/2024    EGFRRESULT 58.2 (L) 04/10/2024    EGFR 63.8 07/26/2024    BCR 13.7 07/26/2024    K 4.8 07/26/2024    CO2 27.8 07/26/2024    CALCIUM 9.2 07/26/2024    PROTENTOTREF 8.0 04/08/2024    ALBUMIN 3.7 07/26/2024    BILITOT 0.5 07/26/2024    AST 57 (H) 07/26/2024    ALT 42 (H) 07/26/2024     MRI ABD 7/5/24:  FINDINGS:  Asymmetric small right pleural effusion with associated pleural  thickening is present. Of note, a small right pleural effusion was  present on chest radiograph 6/27/2015 and 5/15/2023 and pleural  thickening and calcification was seen in this area on 8/14/2023. No  intra or extrahepatic bile duct dilation is present. A 0.6 cm T2  hyperintense lesion is present in the inferior aspect of the pancreatic  head.     There is significant hepatic steatosis. There is a large heterogenous  mass within the right hepatic dome. It demonstrates restricted  diffusion; however less than  that of the spleen. It measures 7.3 x 6.1 x  7.5 cm (previously 7.3 x 5.8 x 6 cm on 5/10/2024). It demonstrates  largely peripheral enhancement there is a few scattered smaller  peripherally enhancing and solid enhancing lesions within the  surrounding right hepatic lobe measuring up to approximate 0.9 cm.     Subcentimeter renal lesions are too small to characterize. Larger T2  hyperintense lesions within the left kidney do not demonstrate  significant enhancement and favored be benign. The main portal vein is  patent.     IMPRESSION  1.  There is a heterogenous, large peripherally enhancing mass within  the posterior aspect of the right hepatic dome which is grossly  unchanged to minimally increased in size since 5/10/2024. Overall  findings are indeterminate and findings remain most concerning for  malignancy until proven otherwise. Other differential consideration  includes an atypical abscess although this considered less likely. A few  small peripheral enhancing and solid enhancing lesions are located  within the surrounding right hepatic lobe and spread of malignancy  cannot be excluded.  2.  Chronic right pleural effusion and pleural thickening, as before.  3.  Significant hepatic steatosis.  4.  T2 hyperintense lesions within the pancreas. Follow-up with  abdominal MRI in 2 years is recommended per ACR criteria.  5.  Other findings as above    Assessment & Plan     *intrahepatic cholangiocarcinoma, multifocal:  7/5/24 MRI abd-heterogenous, large peripherally enhancing mass withinthe posterior aspect of the right hepatic dome which is grossly unchanged to minimally increased in size since 5/10/2024. small peripheral enhancing and solid enhancing lesions are located within the surrounding right hepatic lobe and spread of malignancy is likely  7/18/24 CT liver biopsy-adenocarcinoma consistent with cholangiocarcinoma by IHC  CA 19-9 133    *RA  significant rheumatoid arthritis history with joint deviation,  history of iritis, previous pericarditis requiring pericardial window  Patient has been treated with Simponi currently on hold per rheumatology    *Additional comorbidities-diabetes mellitus, hyperlipidemia, hypertension, tobacco abuse, BPH    Oncology plan/recommendations:  Imaging and pathology reviewed with the patient and his family today-he has multifocal intrahepatic cholangiocarcinoma  I recommended systemic therapy with combination cisplatin/Gemzar days 1, 8 q. 21 days.  I discussed pros and cons of adding immunotherapy pros being likely better response rate in tumor and pros being significant possibility of exacerbating symptoms of rheumatoid arthritis including systemic symptoms such as iritis etc.  The patient would like to proceed with combination chemotherapy plus immunotherapy with durvalumab given day 1 q. 21 days and except risk of RA flare.  We will plan to repeat his tumor marker and imaging after 2 cycles of systemic therapy.  If he is having a good response and tolerance would plan to give 4-6 cycles of chemotherapy and reassess for resectability or local therapy to the dominant liver mass with chemoembolization etc. via IR  Chemotherapy education requested.  The patient has port placement scheduled tomorrow.  I will see the patient back C1 day 8 of treatment to assess his tolerance.    Thank you for allowing me to participate in the care of this pleasant patient.  I spent 75 minutes of time on the case today reviewing his medical records, imaging studies, pathology reports, face-to-face time discussing disease status, treatment recommendations, writing chemotherapy orders, documenting care etc.

## 2024-07-29 ENCOUNTER — LAB (OUTPATIENT)
Dept: LAB | Facility: HOSPITAL | Age: 79
End: 2024-07-29
Payer: MEDICARE

## 2024-07-29 ENCOUNTER — CONSULT (OUTPATIENT)
Dept: ONCOLOGY | Facility: CLINIC | Age: 79
End: 2024-07-29
Payer: MEDICARE

## 2024-07-29 VITALS
SYSTOLIC BLOOD PRESSURE: 138 MMHG | TEMPERATURE: 97.8 F | DIASTOLIC BLOOD PRESSURE: 81 MMHG | OXYGEN SATURATION: 96 % | WEIGHT: 205.8 LBS | HEART RATE: 74 BPM | BODY MASS INDEX: 27.91 KG/M2

## 2024-07-29 DIAGNOSIS — M06.9 RHEUMATOID ARTHRITIS INVOLVING MULTIPLE SITES, UNSPECIFIED WHETHER RHEUMATOID FACTOR PRESENT: ICD-10-CM

## 2024-07-29 DIAGNOSIS — Z79.899 ENCOUNTER FOR LONG-TERM (CURRENT) USE OF OTHER MEDICATIONS: ICD-10-CM

## 2024-07-29 DIAGNOSIS — C22.1 CHOLANGIOCARCINOMA: Primary | ICD-10-CM

## 2024-07-29 DIAGNOSIS — C22.1 CHOLANGIOCARCINOMA: ICD-10-CM

## 2024-07-29 DIAGNOSIS — C22.1 CHOLANGIOCELLULAR CARCINOMA: Primary | ICD-10-CM

## 2024-07-29 LAB
BASOPHILS # BLD AUTO: 0.05 10*3/MM3 (ref 0–0.2)
BASOPHILS NFR BLD AUTO: 0.7 % (ref 0–1.5)
DEPRECATED RDW RBC AUTO: 39.4 FL (ref 37–54)
EOSINOPHIL # BLD AUTO: 0.14 10*3/MM3 (ref 0–0.4)
EOSINOPHIL NFR BLD AUTO: 2 % (ref 0.3–6.2)
ERYTHROCYTE [DISTWIDTH] IN BLOOD BY AUTOMATED COUNT: 12.7 % (ref 12.3–15.4)
HCT VFR BLD AUTO: 43.1 % (ref 37.5–51)
HGB BLD-MCNC: 14.5 G/DL (ref 13–17.7)
IMM GRANULOCYTES # BLD AUTO: 0.03 10*3/MM3 (ref 0–0.05)
IMM GRANULOCYTES NFR BLD AUTO: 0.4 % (ref 0–0.5)
LYMPHOCYTES # BLD AUTO: 1.08 10*3/MM3 (ref 0.7–3.1)
LYMPHOCYTES NFR BLD AUTO: 15.6 % (ref 19.6–45.3)
MCH RBC QN AUTO: 28.9 PG (ref 26.6–33)
MCHC RBC AUTO-ENTMCNC: 33.6 G/DL (ref 31.5–35.7)
MCV RBC AUTO: 85.9 FL (ref 79–97)
MONOCYTES # BLD AUTO: 0.57 10*3/MM3 (ref 0.1–0.9)
MONOCYTES NFR BLD AUTO: 8.2 % (ref 5–12)
NEUTROPHILS NFR BLD AUTO: 5.06 10*3/MM3 (ref 1.7–7)
NEUTROPHILS NFR BLD AUTO: 73.1 % (ref 42.7–76)
NRBC BLD AUTO-RTO: 0 /100 WBC (ref 0–0.2)
PLATELET # BLD AUTO: 131 10*3/MM3 (ref 140–450)
PMV BLD AUTO: 10.6 FL (ref 6–12)
RBC # BLD AUTO: 5.02 10*6/MM3 (ref 4.14–5.8)
WBC NRBC COR # BLD AUTO: 6.93 10*3/MM3 (ref 3.4–10.8)

## 2024-07-29 PROCEDURE — 3079F DIAST BP 80-89 MM HG: CPT | Performed by: INTERNAL MEDICINE

## 2024-07-29 PROCEDURE — 3075F SYST BP GE 130 - 139MM HG: CPT | Performed by: INTERNAL MEDICINE

## 2024-07-29 PROCEDURE — 85025 COMPLETE CBC W/AUTO DIFF WBC: CPT

## 2024-07-29 PROCEDURE — 99205 OFFICE O/P NEW HI 60 MIN: CPT | Performed by: INTERNAL MEDICINE

## 2024-07-29 PROCEDURE — 1125F AMNT PAIN NOTED PAIN PRSNT: CPT | Performed by: INTERNAL MEDICINE

## 2024-07-29 PROCEDURE — 36415 COLL VENOUS BLD VENIPUNCTURE: CPT

## 2024-07-29 RX ORDER — PALONOSETRON 0.05 MG/ML
0.25 INJECTION, SOLUTION INTRAVENOUS ONCE
OUTPATIENT
Start: 2024-08-09

## 2024-07-29 RX ORDER — DIPHENHYDRAMINE HYDROCHLORIDE 50 MG/ML
50 INJECTION INTRAMUSCULAR; INTRAVENOUS AS NEEDED
Status: CANCELLED | OUTPATIENT
Start: 2024-08-02

## 2024-07-29 RX ORDER — DIPHENHYDRAMINE HYDROCHLORIDE 50 MG/ML
50 INJECTION INTRAMUSCULAR; INTRAVENOUS AS NEEDED
OUTPATIENT
Start: 2024-08-09

## 2024-07-29 RX ORDER — SODIUM CHLORIDE 9 MG/ML
20 INJECTION, SOLUTION INTRAVENOUS ONCE
Status: CANCELLED | OUTPATIENT
Start: 2024-08-02

## 2024-07-29 RX ORDER — SODIUM CHLORIDE 9 MG/ML
20 INJECTION, SOLUTION INTRAVENOUS ONCE
OUTPATIENT
Start: 2024-08-09

## 2024-07-29 RX ORDER — PALONOSETRON 0.05 MG/ML
0.25 INJECTION, SOLUTION INTRAVENOUS ONCE
Status: CANCELLED | OUTPATIENT
Start: 2024-08-02

## 2024-07-29 RX ORDER — FAMOTIDINE 10 MG/ML
20 INJECTION, SOLUTION INTRAVENOUS AS NEEDED
Status: CANCELLED | OUTPATIENT
Start: 2024-08-02

## 2024-07-29 RX ORDER — FAMOTIDINE 10 MG/ML
20 INJECTION, SOLUTION INTRAVENOUS AS NEEDED
OUTPATIENT
Start: 2024-08-09

## 2024-07-30 ENCOUNTER — PATIENT ROUNDING (BHMG ONLY) (OUTPATIENT)
Dept: ONCOLOGY | Facility: CLINIC | Age: 79
End: 2024-07-30
Payer: MEDICARE

## 2024-07-30 ENCOUNTER — ANESTHESIA (OUTPATIENT)
Age: 79
End: 2024-07-30
Payer: MEDICARE

## 2024-07-30 ENCOUNTER — HOSPITAL ENCOUNTER (OUTPATIENT)
Age: 79
Setting detail: HOSPITAL OUTPATIENT SURGERY
Discharge: HOME | End: 2024-07-30
Attending: SURGERY | Admitting: SURGERY
Payer: MEDICARE

## 2024-07-30 ENCOUNTER — TELEPHONE (OUTPATIENT)
Dept: SURGERY | Facility: CLINIC | Age: 79
End: 2024-07-30

## 2024-07-30 ENCOUNTER — APPOINTMENT (OUTPATIENT)
Age: 79
End: 2024-07-30
Payer: MEDICARE

## 2024-07-30 VITALS
WEIGHT: 205.4 LBS | OXYGEN SATURATION: 94 % | HEART RATE: 74 BPM | SYSTOLIC BLOOD PRESSURE: 116 MMHG | DIASTOLIC BLOOD PRESSURE: 75 MMHG | TEMPERATURE: 97.9 F | HEIGHT: 72 IN | RESPIRATION RATE: 16 BRPM | BODY MASS INDEX: 27.82 KG/M2

## 2024-07-30 DIAGNOSIS — C22.1 CHOLANGIOCARCINOMA: ICD-10-CM

## 2024-07-30 LAB — GLUCOSE BLDC GLUCOMTR-MCNC: 191 MG/DL (ref 70–130)

## 2024-07-30 PROCEDURE — 76937 US GUIDE VASCULAR ACCESS: CPT | Performed by: SURGERY

## 2024-07-30 PROCEDURE — 25810000003 LACTATED RINGERS PER 1000 ML: Performed by: STUDENT IN AN ORGANIZED HEALTH CARE EDUCATION/TRAINING PROGRAM

## 2024-07-30 PROCEDURE — 63710000001 PROMETHAZINE PER 12.5 MG: Performed by: NURSE ANESTHETIST, CERTIFIED REGISTERED

## 2024-07-30 PROCEDURE — C1788 PORT, INDWELLING, IMP: HCPCS | Performed by: SURGERY

## 2024-07-30 PROCEDURE — 25010000002 PROPOFOL 10 MG/ML EMULSION: Performed by: NURSE ANESTHETIST, CERTIFIED REGISTERED

## 2024-07-30 PROCEDURE — 76000 FLUOROSCOPY <1 HR PHYS/QHP: CPT

## 2024-07-30 PROCEDURE — 36561 INSERT TUNNELED CV CATH: CPT | Performed by: SURGERY

## 2024-07-30 PROCEDURE — 77001 FLUOROGUIDE FOR VEIN DEVICE: CPT | Performed by: SURGERY

## 2024-07-30 PROCEDURE — 25010000002 CEFOXITIN PER 1 G: Performed by: SURGERY

## 2024-07-30 PROCEDURE — 25010000002 HEPARIN (PORCINE) PER 1000 UNITS: Performed by: SURGERY

## 2024-07-30 DEVICE — PRT INTRO VASC/INTERV VORTEX FILL/HL DETACH/POLYURET/CATH 8F: Type: IMPLANTABLE DEVICE | Site: CHEST | Status: FUNCTIONAL

## 2024-07-30 RX ORDER — SODIUM CHLORIDE 0.9 % (FLUSH) 0.9 %
3-10 SYRINGE (ML) INJECTION AS NEEDED
Status: DISCONTINUED | OUTPATIENT
Start: 2024-07-30 | End: 2024-07-30 | Stop reason: HOSPADM

## 2024-07-30 RX ORDER — LIDOCAINE HYDROCHLORIDE 20 MG/ML
INJECTION, SOLUTION INFILTRATION; PERINEURAL AS NEEDED
Status: DISCONTINUED | OUTPATIENT
Start: 2024-07-30 | End: 2024-07-30 | Stop reason: SURG

## 2024-07-30 RX ORDER — DROPERIDOL 2.5 MG/ML
0.62 INJECTION, SOLUTION INTRAMUSCULAR; INTRAVENOUS
Status: DISCONTINUED | OUTPATIENT
Start: 2024-07-30 | End: 2024-07-30 | Stop reason: HOSPADM

## 2024-07-30 RX ORDER — SODIUM CHLORIDE 0.9 % (FLUSH) 0.9 %
3 SYRINGE (ML) INJECTION EVERY 12 HOURS SCHEDULED
Status: DISCONTINUED | OUTPATIENT
Start: 2024-07-30 | End: 2024-07-30 | Stop reason: HOSPADM

## 2024-07-30 RX ORDER — SODIUM CHLORIDE, SODIUM LACTATE, POTASSIUM CHLORIDE, CALCIUM CHLORIDE 600; 310; 30; 20 MG/100ML; MG/100ML; MG/100ML; MG/100ML
9 INJECTION, SOLUTION INTRAVENOUS CONTINUOUS
Status: DISCONTINUED | OUTPATIENT
Start: 2024-07-30 | End: 2024-07-30 | Stop reason: HOSPADM

## 2024-07-30 RX ORDER — PROMETHAZINE HYDROCHLORIDE 12.5 MG/1
25 TABLET ORAL ONCE AS NEEDED
Status: COMPLETED | OUTPATIENT
Start: 2024-07-30 | End: 2024-07-30

## 2024-07-30 RX ORDER — PROPOFOL 10 MG/ML
VIAL (ML) INTRAVENOUS CONTINUOUS PRN
Status: DISCONTINUED | OUTPATIENT
Start: 2024-07-30 | End: 2024-07-30 | Stop reason: SURG

## 2024-07-30 RX ORDER — LABETALOL HYDROCHLORIDE 5 MG/ML
5 INJECTION, SOLUTION INTRAVENOUS
Status: DISCONTINUED | OUTPATIENT
Start: 2024-07-30 | End: 2024-07-30 | Stop reason: HOSPADM

## 2024-07-30 RX ORDER — FENTANYL CITRATE 50 UG/ML
25 INJECTION, SOLUTION INTRAMUSCULAR; INTRAVENOUS
Status: DISCONTINUED | OUTPATIENT
Start: 2024-07-30 | End: 2024-07-30 | Stop reason: HOSPADM

## 2024-07-30 RX ORDER — HYDROCODONE BITARTRATE AND ACETAMINOPHEN 7.5; 325 MG/1; MG/1
1 TABLET ORAL EVERY 4 HOURS PRN
Status: DISCONTINUED | OUTPATIENT
Start: 2024-07-30 | End: 2024-07-30 | Stop reason: HOSPADM

## 2024-07-30 RX ORDER — NALOXONE HCL 0.4 MG/ML
0.2 VIAL (ML) INJECTION AS NEEDED
Status: DISCONTINUED | OUTPATIENT
Start: 2024-07-30 | End: 2024-07-30 | Stop reason: HOSPADM

## 2024-07-30 RX ORDER — DIPHENHYDRAMINE HYDROCHLORIDE 50 MG/ML
12.5 INJECTION INTRAMUSCULAR; INTRAVENOUS
Status: DISCONTINUED | OUTPATIENT
Start: 2024-07-30 | End: 2024-07-30 | Stop reason: HOSPADM

## 2024-07-30 RX ORDER — HYDROCODONE BITARTRATE AND ACETAMINOPHEN 5; 325 MG/1; MG/1
1 TABLET ORAL ONCE AS NEEDED
Status: DISCONTINUED | OUTPATIENT
Start: 2024-07-30 | End: 2024-07-30 | Stop reason: HOSPADM

## 2024-07-30 RX ORDER — HYDRALAZINE HYDROCHLORIDE 20 MG/ML
5 INJECTION INTRAMUSCULAR; INTRAVENOUS
Status: DISCONTINUED | OUTPATIENT
Start: 2024-07-30 | End: 2024-07-30 | Stop reason: HOSPADM

## 2024-07-30 RX ORDER — HYDROMORPHONE HYDROCHLORIDE 1 MG/ML
0.25 INJECTION, SOLUTION INTRAMUSCULAR; INTRAVENOUS; SUBCUTANEOUS
Status: DISCONTINUED | OUTPATIENT
Start: 2024-07-30 | End: 2024-07-30 | Stop reason: HOSPADM

## 2024-07-30 RX ORDER — ONDANSETRON 2 MG/ML
4 INJECTION INTRAMUSCULAR; INTRAVENOUS ONCE AS NEEDED
Status: DISCONTINUED | OUTPATIENT
Start: 2024-07-30 | End: 2024-07-30 | Stop reason: HOSPADM

## 2024-07-30 RX ORDER — FLUMAZENIL 0.1 MG/ML
0.2 INJECTION INTRAVENOUS AS NEEDED
Status: DISCONTINUED | OUTPATIENT
Start: 2024-07-30 | End: 2024-07-30 | Stop reason: HOSPADM

## 2024-07-30 RX ORDER — PROMETHAZINE HYDROCHLORIDE 25 MG/1
25 SUPPOSITORY RECTAL ONCE AS NEEDED
Status: COMPLETED | OUTPATIENT
Start: 2024-07-30 | End: 2024-07-30

## 2024-07-30 RX ORDER — ONDANSETRON 4 MG/1
4 TABLET, ORALLY DISINTEGRATING ORAL EVERY 8 HOURS PRN
Qty: 10 TABLET | Refills: 0 | Status: SHIPPED | OUTPATIENT
Start: 2024-07-30

## 2024-07-30 RX ORDER — BUPIVACAINE HYDROCHLORIDE AND EPINEPHRINE 5; 5 MG/ML; UG/ML
INJECTION, SOLUTION EPIDURAL; INTRACAUDAL; PERINEURAL AS NEEDED
Status: DISCONTINUED | OUTPATIENT
Start: 2024-07-30 | End: 2024-07-30 | Stop reason: HOSPADM

## 2024-07-30 RX ADMIN — PROMETHAZINE HYDROCHLORIDE 25 MG: 12.5 TABLET ORAL at 10:20

## 2024-07-30 RX ADMIN — Medication 20 MG: at 08:35

## 2024-07-30 RX ADMIN — PROPOFOL 100 MCG/KG/MIN: 10 INJECTION, EMULSION INTRAVENOUS at 08:33

## 2024-07-30 RX ADMIN — SODIUM CHLORIDE, POTASSIUM CHLORIDE, SODIUM LACTATE AND CALCIUM CHLORIDE 9 ML/HR: 600; 310; 30; 20 INJECTION, SOLUTION INTRAVENOUS at 07:49

## 2024-07-30 RX ADMIN — PROPOFOL 10 MG: 10 INJECTION, EMULSION INTRAVENOUS at 08:45

## 2024-07-30 RX ADMIN — LIDOCAINE HYDROCHLORIDE 80 MG: 20 INJECTION, SOLUTION INFILTRATION; PERINEURAL at 08:33

## 2024-07-30 RX ADMIN — PROPOFOL 30 MG: 10 INJECTION, EMULSION INTRAVENOUS at 08:35

## 2024-07-30 RX ADMIN — CEFOXITIN SODIUM 2 G: 2 POWDER, FOR SOLUTION INTRAVENOUS at 07:49

## 2024-07-30 NOTE — ANESTHESIA POSTPROCEDURE EVALUATION
Patient: Gay Vazquez    Procedure Summary       Date: 07/30/24 Room / Location: [facility] OR  / [facility] MAIN OR    Anesthesia Start: 0830 Anesthesia Stop: 0917    Procedure: INSERTION VENOUS ACCESS DEVICE (Right) Diagnosis:       Cholangiocarcinoma      (Cholangiocarcinoma [C22.1])    Surgeons: Angel Ness MD Provider: Anthony Baker MD    Anesthesia Type: MAC ASA Status: 3            Anesthesia Type: MAC    Vitals  Vitals Value Taken Time   /85 07/30/24 0924   Temp 36.6 °C (97.9 °F) 07/30/24 0918   Pulse 72 07/30/24 0924   Resp 18 07/30/24 0923   SpO2 99 % 07/30/24 0924   Vitals shown include unfiled device data.        Post Anesthesia Care and Evaluation    Level of consciousness: awake and alert  Pain management: adequate    Airway patency: patent  Anesthetic complications: No anesthetic complications  PONV Status: controlled  Cardiovascular status: blood pressure returned to baseline and acceptable  Respiratory status: acceptable  Hydration status: acceptable

## 2024-07-30 NOTE — OP NOTE
Colorectal & General Surgery  Operative Report    PREOPERATIVE DIAGNOSIS:  Cholangiocarcinoma     POSTOPERATIVE DIAGNOSIS:  Same    PROCEDURE:  Powerport placement with fluoroscopic guidance  Ultrasound guided access of the right internal jugular vein    DATE OF PROCEDURE:   07/30/24     SURGEON:  Ace Ness MD    ASSISTANT:  None    ANESTHESIA:  Monitored anesthesia care    EBL:  Minimal    SPECIMEN:  None    DESCRIPTION:  All risks, benefits, and alternatives were explained and informed consent was obtained.  The patient was taken to the operating room under the care of the nursing staff.  The patient was placed on the operating room table in the supine position where anesthesia was induced.  The patient was then prepped and draped in the usual sterile fashion.  Local anesthesic was administered.  The right internal jugular vein was accessed with an 18-gauge needle under ultrasound guiddance, and a guidewire was inserted under fluoroscopic guidance into the superior vena cava.  A subcutaneous pocket was then created with electrocautery on the right chest wall. The port was placed in the pocket and secured in two points with 2-0 PDS. The tubing was then tunneled from the subcutaneous pocket to the location of the wire. The vein dilator and sheath were introduced, and the dilator and guidewire were removed.  The port tubing was inserted to the cavoatrial junction, and position of tip was confirmed with fluoroscopic guidance.  Venous blood was easily aspirated from the port, and the port was flushed with heparinized saline. There was good hemostasis noted. The skin was then closed with 3-0 Vicryl deep dermal and 4-0 monocryl subcuticular sutures. Dermabond was placed over the wound. The patient tolerated the procedure well and was transferred to the recovery area in stable condition. Recovery room CXR was ordered to confirm placement.    Ace Ness M.D.  Colorectal & General Surgery  [facility], [place], operated by [facility]    [address], [address]  [place], [place], [address]  P: [phone]  F: [phone]

## 2024-07-30 NOTE — TELEPHONE ENCOUNTER
Patient called stating the pharmacy didn't receive the antibiotic and nausea medication.Could you resend the medication to the pharmacy in the chart. Please Advise, Thank You.

## 2024-07-30 NOTE — ANESTHESIA PREPROCEDURE EVALUATION
Anesthesia Evaluation     Patient summary reviewed and Nursing notes reviewed   NPO Solid Status: > 8 hours  NPO Liquid Status: > 2 hours           Airway   Mallampati: III  TM distance: >3 FB  Neck ROM: limited  Possible difficult intubation  Dental    (+) upper dentures and lower dentures    Pulmonary    (+) a smoker Former,  Cardiovascular     ECG reviewed    (+) hypertension, hyperlipidemia  (-) valvular problems/murmurs, past MI, CAD, dysrhythmias, angina, CHF, cardiac stents, CABG      Neuro/Psych- negative ROS  GI/Hepatic/Renal/Endo    (+) liver disease, diabetes mellitus type 2    Musculoskeletal     Abdominal    Substance History      OB/GYN          Other   arthritis (Rheumatoid),   history of cancer (Cholangiocarcinoma)                  Anesthesia Plan    ASA 3     MAC     intravenous induction     Anesthetic plan, risks, benefits, and alternatives have been provided, discussed and informed consent has been obtained with: patient.      CODE STATUS:

## 2024-07-30 NOTE — PROGRESS NOTES
A My-Chart message has been sent to the patient for PATIENT ROUNDING with [facility].

## 2024-07-31 ENCOUNTER — OFFICE VISIT (OUTPATIENT)
Dept: ONCOLOGY | Facility: CLINIC | Age: 79
End: 2024-07-31
Payer: MEDICARE

## 2024-07-31 VITALS
HEART RATE: 79 BPM | TEMPERATURE: 97.8 F | RESPIRATION RATE: 16 BRPM | OXYGEN SATURATION: 97 % | WEIGHT: 204.1 LBS | DIASTOLIC BLOOD PRESSURE: 87 MMHG | HEIGHT: 72 IN | BODY MASS INDEX: 27.64 KG/M2 | SYSTOLIC BLOOD PRESSURE: 144 MMHG

## 2024-07-31 DIAGNOSIS — C22.1 CHOLANGIOCARCINOMA: Primary | ICD-10-CM

## 2024-07-31 DIAGNOSIS — Z79.899 ENCOUNTER FOR LONG-TERM (CURRENT) USE OF OTHER MEDICATIONS: ICD-10-CM

## 2024-07-31 RX ORDER — ONDANSETRON HYDROCHLORIDE 8 MG/1
8 TABLET, FILM COATED ORAL 3 TIMES DAILY PRN
Qty: 30 TABLET | Refills: 5 | Status: SHIPPED | OUTPATIENT
Start: 2024-07-31

## 2024-07-31 RX ORDER — OLANZAPINE 5 MG/1
5 TABLET ORAL NIGHTLY
Qty: 8 TABLET | Refills: 7 | Status: SHIPPED | OUTPATIENT
Start: 2024-07-31

## 2024-07-31 NOTE — PROGRESS NOTES
TREATMENT  PREPARATION    Gay Vazquez  [id number]  1945    Chief Complaint: Treatment preparation and needs assessment    History of present illness:  Gay Vazquez is a 79 y.o. year old male who is here today for treatment preparation and needs assessment.  The patient has been diagnosed with   Encounter Diagnoses   Name Primary?    Cholangiocarcinoma Yes    Encounter for long-term (current) use of other medications     and is scheduled to begin treatment with:     Oncology History:    Oncology/Hematology History   Cholangiocarcinoma   7/25/2024 Initial Diagnosis    Cholangiocarcinoma     8/2/2024 -  Chemotherapy    OP HEPATOBILIARY CISplatin + Gemcitabine Days 1,8 / Durvalumab Q21D         The current medication list and allergy list were reviewed and reconciled.     Past Medical History, Past Surgical History, Social History, Family History have been reviewed and are without significant changes except as mentioned.    Physical Exam:    Vitals:    07/31/24 1508   BP: 144/87   Pulse: 79   Resp: 16   Temp: 97.8 °F (36.6 °C)   SpO2: 97%     Vitals:    07/31/24 1508   PainSc:   4   PainLoc: Generalized  Comment: RA pain        ECOG score: 0             Physical Exam  HENT:      Head: Normocephalic and atraumatic.   Eyes:      Extraocular Movements: Extraocular movements intact.      Conjunctiva/sclera: Conjunctivae normal.   Pulmonary:      Effort: Pulmonary effort is normal. No respiratory distress.   Neurological:      General: No focal deficit present.      Mental Status: He is alert and oriented to person, place, and time.   Psychiatric:         Mood and Affect: Mood normal.         Behavior: Behavior normal.           NEEDS ASSESSMENTS    Genetics  The patient's new diagnosis and family history have been reviewed for genetic counseling needs. The patient will not be referred..     Psychosocial and Barriers to care  The patient has completed a PHQ-9 Depression Screening and the Distress Thermometer (DT)  today.  PHQ-9 results show PHQ-2 Total Score: 1 PHQ-9 Total Score: PHQ-9 Total Score: 1     The patient scored their distress today as Distress Level: 9 on a scale of 0-10 with 0 being no distress and 10 being extreme distress. Problems marked by the patient as being an issue for them within the last week include Physical concerns  Fatigue: Yes  Pain: Yes  Sleep: Yes .      Results were reviewed along with psychosocial resources offered by our cancer center.  Our Supportive Oncology team will be flagged for a score of 4 or above, and a same day call will be made for a score of 9 or 10.  A mental health referral is offered at that time. Patients who score less than 4 have been educated on our support services and can be referred to our  upon request.  The patient will be referred to our .       Nutrition  The patient has completed the malnutrition screening today. They scored Malnutrition Screening Tool  Have you recently lost weight without trying?  If yes, how much weight have you lost?: 0--> No  Have you been eating poorly because of a decreased appetite?: 0--> No  MST score: 0   with a score of 0-1 meaning not at risk in a score of 2 or greater meaning at risk.  Patients with a score of 3 or higher will be referred to our oncology dietitian for support. Patients beginning at risk treatment regimens or who have dietary concerns will also be referred to our oncology dietitian. The patient will not be referred.    Functional Assessment  Persons who are age 70 or greater will be screened for qualification of a comprehensive geriatric assessment by our survivorship nurse practitioner.  Older adults with cancer face unique challenges. These may include an increased risk of drug reactions, financial burdens, and caregiver stress. The patient scored G8 Questionnaire  Has food intake declined over the past 3 months due to loss of appetite, digestive problems, chewing or swallowing difficulties?:  No decrease in food intake  Weight loss during the last 3 months: No weight loss  Mobility: Goes out  Neuropsychological Problems: No psychological problems  Body Mass Index (BMI (weight in kg) / (height in m2)): BMI 23 and > 23  Takes more than 3 medications a day: Yes, takes more than 3 prescription drugs per day  In comparison with other people of the same age, how does the patient consider his/her health status?: Does not know  Age: < 80  Total Score: 14.5 . Patients scoring 14 or lower will referred for an older adult functional assessment with the survivorship advanced practice registered nurse to ensure all needed support is provided as patients plan for their treatments. The patient will not be referred.    Intravenous Access Assessment  The patient and I discussed planned intravenous chemo/biotherapy as well as other IV treatments that are often needed throughout the course of treatment. These may include, but are not limited to blood transfusions, antibiotics, and IV hydration. Discussed that depending on selected treatment and vein assessment, patient may require venous access device (VAD) which could include but not limited to a Mediport or PICC line. Risks and benefits of VADs reviewed. The patient will be treated via Port.    Reproductive/Sexual Activity   People should avoid becoming pregnant and should not get a partner pregnant while undergoing chemo/biotherapy.  People of childbearing age should use effective contraception during active therapy. The best recommendation for all people is to use a barrier method for a minimum of 1 week after the last infusion of chemo/biotherapy to prevent your partner being exposed to byproducts from treatment medications in bodily fluids. Effective contraception should be discussed with your oncology team to make sure it is safe to take based on your diagnosis. Possible options include oral contraceptives, barrier methods. Chemo/biotherapy can change your ability  "to reproduce children in the future.  There are options for fertility preservation. NOT APPLICABLE    Advanced Care Planning  Advance Care Planning   The patient and I discussed advanced care planning, \"Conversations that Matter\".   This service is offered for development of advance directives with a certified ACP facilitator.  The patient does not have an up-to-date advanced directive. This document is not on file with our office. The patient is not interested in an appointment with one of our facilitators to create or update their advanced directives.    Have you reviewed your Advance Directive and is it valid for this stay?: Not applicable          Smoking cessation  Tobacco Use: Medium Risk (7/31/2024)    Patient History     Smoking Tobacco Use: Former     Smokeless Tobacco Use: Never     Passive Exposure: Past       Patient and I discussed their tobacco use history. Referral will not be made for smoking cessation.      Palliative Care  When appropriate, the patient and I discussed the availability palliative care services and when appropriate Hospice care. Palliative care is not the same as Hospice care which was explained to the patient.NOT APPLICABLE.    Survivorship   When appropriate, we discussed that we will refer the patient to survivorship clinic to discuss next steps following completion of planned treatment.  Reviewed this visit will include assessment of your physical, psychological, functional, and spiritual needs as a survivor and the need at attend this visit when scheduled.    TREATMENT EDUCATION    Today I met with the patient to discuss the chemo/biotherapy regimen recommended for treatment of Cholangiocarcinoma  - OLANZapine (zyPREXA) 5 MG tablet  - ondansetron (ZOFRAN) 8 MG tablet  - Ambulatory Referral to ONC Social Work    Encounter for long-term (current) use of other medications  - OLANZapine (zyPREXA) 5 MG tablet  - ondansetron (ZOFRAN) 8 MG tablet  .  The patient was given explanation " of treatment premed side effects including office policy that prohibits patients to drive if sedating medications are administered, MD explanation given regarding benefits, side effects, toxicities and goals of treatment.  The patient received a Chemotherapy/Biotherapy Plan Summary including diagnosis and explanation of specific treatment plan.    SIDE EFFECTS:  Common side effects were discussed with the patient and/or significant other.  Discussion included where applicable hair loss/discoloration, anemia/fatigue, infection/chills/fever, appetite, bleeding risk/precautions, constipation, diarrhea, mouth sores, taste alteration, loss of appetite, nausea/vomiting, peripheral neuropathy, skin/nail changes, rash, muscle aches/weakness, photosensitivity, weight gain/loss, hearing loss, dizziness, menopausal symptoms, menstrual irregularity, sterility, high blood pressure, heart damage, liver damage, lung damage, kidney damage, DVT/PE risk, fluid retention, pleural/pericardial effusion, somnolence, electrolyte/LFT imbalance, vein exercises and/or the possible need for vascular access/port placement.  The patient was advised that although uncommon, leakage of an infused medication from the vein or venous access device may lead to skin breakdown and/or other tissue damage.  The patient was advised that he/she may have pain, bleeding, and/or bruising from the insertion of a needle in their vein or venous access device (port).  The patient was further advised that, in spite of proper technique, infection with redness and irritation may rarely occur at the site where the needle was inserted.  The patient was advised that if complications occur, additional medical treatment is available.  Finally, where applicable we have reviewed rare but potential immune mediated side effects including shortness of breath, cough, chest pain (pneumonitis), abdominal pain, diarrhea (colitis), thyroiditis (hypothyroid or hyperthyroid),  hepatitis and liver dysfunction, nephritis and renal dysfunction.    Discussion also included side effects specific to drugs in the treatment plan, specifically:    Treatment Plans       Name Type Plan Dates Plan Provider         Active    OP HEPATOBILIARY CISplatin + Gemcitabine Days 1,8 / Durvalumab Q21D ONCOLOGY TREATMENT  8/1/2024 - Present Anthony Tejada MD                      Questions answered and additional information discussed on topics including:  Anemia, Thrombocytopenia, Neutropenia, Nutrition and appetite changes, Constipation, Diarrhea, Nausea & vomiting, Nervous system changes, Pain, Skin & nail changes, and Organ toxicities       Assessment and Plan:    Diagnoses and all orders for this visit:    1. Cholangiocarcinoma (Primary)  -     OLANZapine (zyPREXA) 5 MG tablet; Take 1 tablet by mouth Every Night. Take on days 1, 2, 3, and 4 and Days 8, 9, 10, 11 after chemotherapy.  Dispense: 8 tablet; Refill: 7  -     ondansetron (ZOFRAN) 8 MG tablet; Take 1 tablet by mouth 3 (Three) Times a Day As Needed for Nausea or Vomiting.  Dispense: 30 tablet; Refill: 5  -     Ambulatory Referral to ONC Social Work    2. Encounter for long-term (current) use of other medications  -     OLANZapine (zyPREXA) 5 MG tablet; Take 1 tablet by mouth Every Night. Take on days 1, 2, 3, and 4 and Days 8, 9, 10, 11 after chemotherapy.  Dispense: 8 tablet; Refill: 7  -     ondansetron (ZOFRAN) 8 MG tablet; Take 1 tablet by mouth 3 (Three) Times a Day As Needed for Nausea or Vomiting.  Dispense: 30 tablet; Refill: 5      Orders Placed This Encounter   Procedures    Ambulatory Referral to ONC Social Work     Referral Priority:   Routine     Referral Type:   Clinical Pathway     Referral Reason:   Specialty Services Required     Requested Specialty:        Number of Visits Requested:   1         The patient and I have reviewed their diagnosis and scheduled treatment plan. Needs assessment was completed where  applicable including genetics, psychosocial needs, barriers to care, VAD evaluation, advanced care planning, survivorship, and palliative care services where indicated. Referrals have been ordered as appropriate based upon evaluation today and patient desires.   Chemo/biotherapy teaching was completed today and consent obtained. See separate documentation for further details.  Adequate time was given to answer questions.  Patient made aware of their care team members and contact information if they have questions or problems throughout the treatment course.  Discussion held and written information provided describing frequency of office visits and ongoing monitoring throughout the treatment plan.     Reviewed with patient any prescribed medication sent to pharmacy.  Education provided regarding proper storage, safe handling, and proper disposal of unused medication.  Proper handling of body fluids and waste discussed and written information provided.  If appropriate, patient had pretreatment labs drawn today.    Learning assessment completed at initial patient encounter. See separate flowsheet. Chemo/biotherapy education comprehension assessed at today's visit.    I spent 60 minutes caring for Gay on this date of service. This time includes time spent by me in the following activities: preparing for the visit, reviewing tests, obtaining and/or reviewing a separately obtained history, performing a medically appropriate examination and/or evaluation, counseling and educating the patient/family/caregiver, ordering medications, tests, or procedures, referring and communicating with other health care professionals, and documenting information in the medical record.     Monserrat Perrin, APRN   07/31/24

## 2024-07-31 NOTE — PROGRESS NOTES
[facility] Hematology/Oncology Treatment Plan Summary    Name: Gay Vazquez  [facility]# [id number]  MD: Dr. Tejada    Diagnosis:     ICD-10-CM ICD-9-CM   1. Cholangiocarcinoma  C22.1 155.1     Goal of treatment: disease control    Treatment Medication(s):   Cisplatin- days 1 and 8  Gemzar- days 1 and 8  Durvalumab- day 1    Frequency: days 1 and 8 of a 21 day cycle    Number of cycles: 4-6 (to be determined)    Starting on: 8/2/2024    Repeat after 2 cycles: CT Scan    Items for home use: Thermometer    Rx written for: [x] Nausea    [] Pre-Treatment   olanzapine 5mg nightly on days as directed and ondansetron 8 mg by mouth every 8 hours as needed for nausea    Notes:     Next Steps:     Completing Provider: LAWRENCE Shipley           Date/time: 07/31/2024      Please note: You will be seen by a provider frequently with your treatment plan. This plan may change depending on many factors, if so, this will be discussed with you by your physician.  Last update 03/2022.

## 2024-08-02 ENCOUNTER — NUTRITION (OUTPATIENT)
Dept: OTHER | Facility: HOSPITAL | Age: 79
End: 2024-08-02
Payer: MEDICARE

## 2024-08-02 ENCOUNTER — INFUSION (OUTPATIENT)
Dept: ONCOLOGY | Facility: HOSPITAL | Age: 79
End: 2024-08-02
Payer: MEDICARE

## 2024-08-02 VITALS
SYSTOLIC BLOOD PRESSURE: 147 MMHG | BODY MASS INDEX: 28.84 KG/M2 | HEART RATE: 92 BPM | RESPIRATION RATE: 16 BRPM | OXYGEN SATURATION: 96 % | WEIGHT: 210.6 LBS | TEMPERATURE: 98 F | DIASTOLIC BLOOD PRESSURE: 78 MMHG

## 2024-08-02 DIAGNOSIS — Z79.899 ENCOUNTER FOR LONG-TERM (CURRENT) USE OF OTHER MEDICATIONS: ICD-10-CM

## 2024-08-02 DIAGNOSIS — C22.1 CHOLANGIOCARCINOMA: Primary | ICD-10-CM

## 2024-08-02 DIAGNOSIS — Z45.2 FITTING AND ADJUSTMENT OF VASCULAR CATHETER: ICD-10-CM

## 2024-08-02 LAB
ALBUMIN SERPL-MCNC: 3.5 G/DL (ref 3.5–5.2)
ALBUMIN/GLOB SERPL: 0.9 G/DL
ALP SERPL-CCNC: 93 U/L (ref 39–117)
ALT SERPL W P-5'-P-CCNC: 32 U/L (ref 1–41)
ANION GAP SERPL CALCULATED.3IONS-SCNC: 9.1 MMOL/L (ref 5–15)
AST SERPL-CCNC: 54 U/L (ref 1–40)
BASOPHILS # BLD AUTO: 0.03 10*3/MM3 (ref 0–0.2)
BASOPHILS NFR BLD AUTO: 0.5 % (ref 0–1.5)
BILIRUB SERPL-MCNC: 0.5 MG/DL (ref 0–1.2)
BUN SERPL-MCNC: 15 MG/DL (ref 8–23)
BUN/CREAT SERPL: 15.6 (ref 7–25)
CALCIUM SPEC-SCNC: 8.9 MG/DL (ref 8.6–10.5)
CHLORIDE SERPL-SCNC: 96 MMOL/L (ref 98–107)
CO2 SERPL-SCNC: 27.9 MMOL/L (ref 22–29)
CREAT SERPL-MCNC: 0.96 MG/DL (ref 0.76–1.27)
DEPRECATED RDW RBC AUTO: 39.8 FL (ref 37–54)
EGFRCR SERPLBLD CKD-EPI 2021: 80.9 ML/MIN/1.73
EOSINOPHIL # BLD AUTO: 0.12 10*3/MM3 (ref 0–0.4)
EOSINOPHIL NFR BLD AUTO: 2.1 % (ref 0.3–6.2)
ERYTHROCYTE [DISTWIDTH] IN BLOOD BY AUTOMATED COUNT: 12.4 % (ref 12.3–15.4)
GLOBULIN UR ELPH-MCNC: 3.9 GM/DL
GLUCOSE SERPL-MCNC: 306 MG/DL (ref 65–99)
HCT VFR BLD AUTO: 39.2 % (ref 37.5–51)
HGB BLD-MCNC: 12.9 G/DL (ref 13–17.7)
IMM GRANULOCYTES # BLD AUTO: 0.02 10*3/MM3 (ref 0–0.05)
IMM GRANULOCYTES NFR BLD AUTO: 0.4 % (ref 0–0.5)
LYMPHOCYTES # BLD AUTO: 0.92 10*3/MM3 (ref 0.7–3.1)
LYMPHOCYTES NFR BLD AUTO: 16.4 % (ref 19.6–45.3)
MAGNESIUM SERPL-MCNC: 1.5 MG/DL (ref 1.6–2.4)
MCH RBC QN AUTO: 28.7 PG (ref 26.6–33)
MCHC RBC AUTO-ENTMCNC: 32.9 G/DL (ref 31.5–35.7)
MCV RBC AUTO: 87.3 FL (ref 79–97)
MONOCYTES # BLD AUTO: 0.44 10*3/MM3 (ref 0.1–0.9)
MONOCYTES NFR BLD AUTO: 7.9 % (ref 5–12)
NEUTROPHILS NFR BLD AUTO: 4.07 10*3/MM3 (ref 1.7–7)
NEUTROPHILS NFR BLD AUTO: 72.7 % (ref 42.7–76)
NRBC BLD AUTO-RTO: 0 /100 WBC (ref 0–0.2)
PLATELET # BLD AUTO: 123 10*3/MM3 (ref 140–450)
PMV BLD AUTO: 9.9 FL (ref 6–12)
POTASSIUM SERPL-SCNC: 4.2 MMOL/L (ref 3.5–5.2)
PROT SERPL-MCNC: 7.4 G/DL (ref 6–8.5)
RBC # BLD AUTO: 4.49 10*6/MM3 (ref 4.14–5.8)
SODIUM SERPL-SCNC: 133 MMOL/L (ref 136–145)
T4 FREE SERPL-MCNC: 1.68 NG/DL (ref 0.92–1.68)
TSH SERPL DL<=0.05 MIU/L-ACNC: 3.99 UIU/ML (ref 0.27–4.2)
WBC NRBC COR # BLD AUTO: 5.6 10*3/MM3 (ref 3.4–10.8)

## 2024-08-02 PROCEDURE — 25010000002 CISPLATIN PER 50 MG: Performed by: INTERNAL MEDICINE

## 2024-08-02 PROCEDURE — A9270 NON-COVERED ITEM OR SERVICE: HCPCS | Performed by: INTERNAL MEDICINE

## 2024-08-02 PROCEDURE — 25810000003 SODIUM CHLORIDE 0.9 % SOLUTION: Performed by: INTERNAL MEDICINE

## 2024-08-02 PROCEDURE — 25010000002 HEPARIN LOCK FLUSH PER 10 UNITS: Performed by: INTERNAL MEDICINE

## 2024-08-02 PROCEDURE — 83735 ASSAY OF MAGNESIUM: CPT

## 2024-08-02 PROCEDURE — 80053 COMPREHEN METABOLIC PANEL: CPT

## 2024-08-02 PROCEDURE — 96372 THER/PROPH/DIAG INJ SC/IM: CPT

## 2024-08-02 PROCEDURE — 96413 CHEMO IV INFUSION 1 HR: CPT

## 2024-08-02 PROCEDURE — 25810000003 SODIUM CHLORIDE 0.9 % SOLUTION 250 ML FLEX CONT: Performed by: INTERNAL MEDICINE

## 2024-08-02 PROCEDURE — 63710000001 INSULIN REGULAR HUMAN PER 5 UNITS: Performed by: INTERNAL MEDICINE

## 2024-08-02 PROCEDURE — 25010000002 FOSAPREPITANT PER 1 MG: Performed by: INTERNAL MEDICINE

## 2024-08-02 PROCEDURE — 63710000001 POTASSIUM CHLORIDE 20 MEQ TABLET CONTROLLED-RELEASE: Performed by: INTERNAL MEDICINE

## 2024-08-02 PROCEDURE — 25010000002 MAGNESIUM SULFATE IN D5W 1G/100ML (PREMIX) 1-5 GM/100ML-% SOLUTION: Performed by: INTERNAL MEDICINE

## 2024-08-02 PROCEDURE — 25010000002 DURVALUMAB 50 MG/ML SOLUTION 10 ML VIAL: Performed by: INTERNAL MEDICINE

## 2024-08-02 PROCEDURE — 25010000002 MAGNESIUM SULFATE PER 500 MG OF MAGNESIUM: Performed by: INTERNAL MEDICINE

## 2024-08-02 PROCEDURE — 84443 ASSAY THYROID STIM HORMONE: CPT | Performed by: INTERNAL MEDICINE

## 2024-08-02 PROCEDURE — 96417 CHEMO IV INFUS EACH ADDL SEQ: CPT

## 2024-08-02 PROCEDURE — 84439 ASSAY OF FREE THYROXINE: CPT | Performed by: INTERNAL MEDICINE

## 2024-08-02 PROCEDURE — 25010000002 GEMCITABINE HCL 2 GM/20ML SOLUTION 20 ML VIAL: Performed by: INTERNAL MEDICINE

## 2024-08-02 PROCEDURE — 96375 TX/PRO/DX INJ NEW DRUG ADDON: CPT

## 2024-08-02 PROCEDURE — 85025 COMPLETE CBC W/AUTO DIFF WBC: CPT

## 2024-08-02 PROCEDURE — 25010000002 PALONOSETRON PER 25 MCG: Performed by: INTERNAL MEDICINE

## 2024-08-02 PROCEDURE — 25010000002 DEXAMETHASONE SODIUM PHOSPHATE 100 MG/10ML SOLUTION: Performed by: INTERNAL MEDICINE

## 2024-08-02 PROCEDURE — 25810000003 SODIUM CHLORIDE 0.9 % SOLUTION 500 ML FLEX CONT: Performed by: INTERNAL MEDICINE

## 2024-08-02 RX ORDER — SODIUM CHLORIDE 9 MG/ML
20 INJECTION, SOLUTION INTRAVENOUS ONCE
Status: COMPLETED | OUTPATIENT
Start: 2024-08-02 | End: 2024-08-02

## 2024-08-02 RX ORDER — HEPARIN SODIUM (PORCINE) LOCK FLUSH IV SOLN 100 UNIT/ML 100 UNIT/ML
500 SOLUTION INTRAVENOUS AS NEEDED
Status: DISCONTINUED | OUTPATIENT
Start: 2024-08-02 | End: 2024-08-02 | Stop reason: HOSPADM

## 2024-08-02 RX ORDER — SODIUM CHLORIDE 0.9 % (FLUSH) 0.9 %
10 SYRINGE (ML) INJECTION AS NEEDED
Status: DISCONTINUED | OUTPATIENT
Start: 2024-08-02 | End: 2024-08-02 | Stop reason: HOSPADM

## 2024-08-02 RX ORDER — PALONOSETRON 0.05 MG/ML
0.25 INJECTION, SOLUTION INTRAVENOUS ONCE
Status: COMPLETED | OUTPATIENT
Start: 2024-08-02 | End: 2024-08-02

## 2024-08-02 RX ORDER — MAGNESIUM SULFATE 1 G/100ML
1 INJECTION INTRAVENOUS ONCE
Status: COMPLETED | OUTPATIENT
Start: 2024-08-02 | End: 2024-08-02

## 2024-08-02 RX ORDER — FAMOTIDINE 10 MG/ML
20 INJECTION, SOLUTION INTRAVENOUS AS NEEDED
Status: DISCONTINUED | OUTPATIENT
Start: 2024-08-02 | End: 2024-08-02 | Stop reason: HOSPADM

## 2024-08-02 RX ORDER — POTASSIUM CHLORIDE 20 MEQ/1
20 TABLET, EXTENDED RELEASE ORAL DAILY
Status: DISCONTINUED | OUTPATIENT
Start: 2024-08-02 | End: 2024-08-02 | Stop reason: HOSPADM

## 2024-08-02 RX ORDER — SODIUM CHLORIDE 0.9 % (FLUSH) 0.9 %
10 SYRINGE (ML) INJECTION AS NEEDED
OUTPATIENT
Start: 2024-08-02

## 2024-08-02 RX ORDER — DIPHENHYDRAMINE HYDROCHLORIDE 50 MG/ML
50 INJECTION INTRAMUSCULAR; INTRAVENOUS AS NEEDED
Status: DISCONTINUED | OUTPATIENT
Start: 2024-08-02 | End: 2024-08-02 | Stop reason: HOSPADM

## 2024-08-02 RX ORDER — HEPARIN SODIUM (PORCINE) LOCK FLUSH IV SOLN 100 UNIT/ML 100 UNIT/ML
500 SOLUTION INTRAVENOUS AS NEEDED
OUTPATIENT
Start: 2024-08-02

## 2024-08-02 RX ADMIN — Medication 10 ML: at 15:12

## 2024-08-02 RX ADMIN — GEMCITABINE 2150 MG: 100 INJECTION, SOLUTION INTRAVENOUS at 12:33

## 2024-08-02 RX ADMIN — MAGNESIUM SULFATE HEPTAHYDRATE 500 ML/HR: 500 INJECTION, SOLUTION INTRAMUSCULAR; INTRAVENOUS at 09:21

## 2024-08-02 RX ADMIN — SODIUM CHLORIDE 20 ML/HR: 9 INJECTION, SOLUTION INTRAVENOUS at 09:21

## 2024-08-02 RX ADMIN — DEXAMETHASONE SODIUM PHOSPHATE 12 MG: 10 INJECTION, SOLUTION INTRAMUSCULAR; INTRAVENOUS at 12:12

## 2024-08-02 RX ADMIN — MAGNESIUM SULFATE HEPTAHYDRATE 500 ML/HR: 500 INJECTION, SOLUTION INTRAMUSCULAR; INTRAVENOUS at 14:13

## 2024-08-02 RX ADMIN — HUMAN INSULIN 10 UNITS: 100 INJECTION, SOLUTION SUBCUTANEOUS at 10:00

## 2024-08-02 RX ADMIN — POTASSIUM CHLORIDE 20 MEQ: 1500 TABLET, EXTENDED RELEASE ORAL at 12:17

## 2024-08-02 RX ADMIN — CISPLATIN 54 MG: 1 INJECTION INTRAVENOUS at 13:07

## 2024-08-02 RX ADMIN — MAGNESIUM SULFATE HEPTAHYDRATE 1 G: 1 INJECTION, SOLUTION INTRAVENOUS at 10:00

## 2024-08-02 RX ADMIN — FOSAPREPITANT 100 ML: 150 INJECTION, POWDER, LYOPHILIZED, FOR SOLUTION INTRAVENOUS at 11:41

## 2024-08-02 RX ADMIN — SODIUM CHLORIDE 1500 MG: 900 INJECTION, SOLUTION INTRAVENOUS at 10:38

## 2024-08-02 RX ADMIN — PALONOSETRON HYDROCHLORIDE 0.25 MG: 0.25 INJECTION INTRAVENOUS at 12:17

## 2024-08-02 RX ADMIN — Medication 500 UNITS: at 15:12

## 2024-08-02 NOTE — NURSING NOTE
Lab Results   Component Value Date    GLUCOSE 306 (C) 08/02/2024    BUN 15 08/02/2024    CREATININE 0.96 08/02/2024    EGFRRESULT 58.2 (L) 04/10/2024    EGFR 80.9 08/02/2024    BCR 15.6 08/02/2024    K 4.2 08/02/2024    CO2 27.9 08/02/2024    CALCIUM 8.9 08/02/2024    PROTENTOTREF 8.0 04/08/2024    ALBUMIN 3.5 08/02/2024    BILITOT 0.5 08/02/2024    AST 54 (H) 08/02/2024    ALT 32 08/02/2024   Mag 1.5.  Discussed with Dr. Tejada.  An additional gram of IV Mag ordered, for a total of 2 grams of IV Mag with hydration fluids.  Glucose 306.  10 units regular insulin given subq as ordered per Dr. Tejada.  Per Dr Tejada OK to treat.    Pt instructed to follow up with his PCP for help managing his blood sugars. Pt and wife v/u    Chemo completed along with pre and post-hydration.  Weight gain of 5.5 lbs.  Denies SOA.  Lung sounds clear bilaterally.  Reports some tightness in chest area.  Denies pain.  Discussed with ROGERIO BRAVO.  OK to discharge.  Instructed patient if he develops SOA, chest pain, or has any questions or concerns to call the office or report to ER if needed.  Pt to monitor urine output and weight and report any problems

## 2024-08-02 NOTE — PROGRESS NOTES
"OUTPATIENT ONCOLOGY NUTRITION ASSESSMENT    Patient Name: Gay Vazquez  YOB: 1945  MRN: [id number]  Assessment Date: 8/2/2024    COMMENTS: Met patient and his wife in the infusion area today. Begins Cisplatin, Gemzar and Durvalumab. Denies any weight loss.  Appetite has been slightly decreased since diagnosis. He takes salagen for dry mouth from RA meds. Blood sugars have also been a little elevated lately but he has not been as strict with diet.   Explained RD role and the importance of adequate nutrition and hydration during treatment.  Encouraged patient to focus on getting adequate calories, protein and nutrients in order to support immune function and nutrition needs. Reviewed examples of calorically dense high protein foods to include at meals and snacks. Discussed use of oral nutrition supplements if needed to supplement intake. Suggested small more frequent meals.   Reviewed nutrition management of possible side effects during treatment including nausea/vomiting, diarrhea, constipation, fatigue, poor appetite and taste changes. Stressed importance of good oral hygiene and provided recipe for baking soda and salt water mouth rinse to use several times a day.   Provided the [employer] booklet \"Nutrition during Cancer Treatment\" as a resource as well as RD contact info for any questions. Will follow throughout treatment course and be available as needed.         Reason for Assessment New assessment, Education      Diagnosis/Problem   Cholangiocarcinoma   Treatment Plan Chemotherapy Cisplatin, gemzar and Durvalumab   Frequency Days 1, 8, 21 days cycle, darvalumab day 1   Goal of cancer treatment Disease control   Comments:        Encounter Information        Nutrition/Diet History:  Slightly decreased appetite   Oral Nutrition Supplements:    Factors/Symptoms Affecting Intake: No factors at this time   Comments:      Medical/Surgical History Past Medical History:   Diagnosis Date    " "Cancer     Liver    Colon polyp     Diabetes mellitus     Hyperlipidemia     Hypertension     Liver cancer 07-    Liver disease     Postoperative wound infection     RA (rheumatoid arthritis)        Past Surgical History:   Procedure Laterality Date    ABDOMINAL SURGERY      BACK SURGERY      CHOLECYSTECTOMY      COLONOSCOPY  2017    [facility]    COLONOSCOPY N/A 06/04/2024    Procedure: COLONOSCOPY INTO CECUM WITH POLYPECTOMIES (COLD SNARE);  Surgeon: Angel Ness MD;  Location:  [facility];  Service: General;  Laterality: N/A;  PRE: COLON MASS, ABNORMAL CT  POST: DIVERTICULOSIS, POLYPS, POOR PREP    ENDOSCOPY N/A 06/04/2024    Procedure: ESOPHAGOGASTRODUODENOSCOPY;  Surgeon: Angel Ness MD;  [facility];  Service: General;  Laterality: N/A;  PRE: LIVER MASS  POST: NORMAL EGD    EYE SURGERY      PERICARDIUM SURGERY      REPLACEMENT TOTAL KNEE BILATERAL      THORACOTOMY Bilateral 1997            Anthropometrics        Current Height Ht Readings from Last 1 Encounters:   07/31/24 182 cm (71.65\")      Current Weight Wt Readings from Last 1 Encounters:   08/02/24 93 kg (205 lb)      BMI  28.1   Ideal Body Weight (IBW) 172   Usual Body Weight (UBW) 205   Weight Change/Trend Stable   Weight History Wt Readings from Last 30 Encounters:   08/02/24 0827 93 kg (205 lb)   07/31/24 1508 92.6 kg (204 lb 1.6 oz)   07/30/24 0739 93.2 kg (205 lb 6.4 oz)   07/26/24 1705 92.5 kg (204 lb)   07/29/24 0918 93.4 kg (205 lb 12.8 oz)   07/25/24 1311 92.5 kg (204 lb)   07/18/24 1120 92.1 kg (203 lb)   07/05/24 0741 92.5 kg (204 lb)   06/17/24 0947 92.5 kg (204 lb)   06/10/24 1511 92.5 kg (204 lb)   06/04/24 1026 92.4 kg (203 lb 12.8 oz)   05/30/24 0858 92.1 kg (203 lb)   05/16/24 0949 92 kg (202 lb 14.4 oz)   04/08/24 1332 94.1 kg (207 lb 6.4 oz)   10/02/23 0756 92.9 kg (204 lb 12.8 oz)   09/05/23 1032 90.2 kg (198 lb 12.8 oz)   09/01/23 0929 90.1 kg (198 " "lb 9.6 oz)   08/23/23 1558 90.4 kg (199 lb 6.4 oz)   08/07/23 1357 92.4 kg (203 lb 12.8 oz)   07/10/23 1335 91.4 kg (201 lb 8 oz)   06/08/23 1356 91.6 kg (201 lb 14.4 oz)   06/02/23 0957 91.2 kg (201 lb)   05/15/23 1011 92.1 kg (203 lb)          Medications           Current medications: OLANZapine, atenolol, doxazosin, gabapentin, glucose blood, losartan, meloxicam, metFORMIN ER, ondansetron, ondansetron ODT, pilocarpine, pravastatin, and traMADol                 Tests/Procedures        Tests/Procedures Chemotherapy, Colonoscopy, CT, MRI     Labs       Pertinent Labs    Results from last 7 days   Lab Units 08/02/24  0833 07/26/24  1348   SODIUM mmol/L 133* 134*   POTASSIUM mmol/L 4.2 4.8   CHLORIDE mmol/L 96* 98   CO2 mmol/L 27.9 27.8   BUN mg/dL 15 16   CREATININE mg/dL 0.96 1.17   CALCIUM mg/dL 8.9 9.2   BILIRUBIN mg/dL 0.5 0.5   ALK PHOS U/L 93 107   ALT (SGPT) U/L 32 42*   AST (SGOT) U/L 54* 57*   GLUCOSE mg/dL 306* 273*     Results from last 7 days   Lab Units 08/02/24  0833   MAGNESIUM mg/dL 1.5*   HEMOGLOBIN g/dL 12.9*   HEMATOCRIT % 39.2   WBC 10*3/mm3 5.60   ALBUMIN g/dL 3.5     Results from last 7 days   Lab Units 08/02/24  0833 07/29/24  0913 07/26/24  1348   PLATELETS 10*3/mm3 123* 131* 146     No results found for: \"COVID19\"  Lab Results   Component Value Date    HGBA1C 8.20 (H) 04/10/2024          Physical Findings        Physical Appearance alert     Edema  no edema   Gastrointestinal None   Tubes/Drains implantable port   Oral/Mouth Cavity WNL   Skin        Estimated/Assessed Needs        Energy Requirements    Height for Calculation      Weight for Calculation 93 kg   Method for Estimation  25 kcal/kg   EST Needs (kcal/day) 2325 kcals/d       Protein Requirements    Weight for Calculation 93 kg   EST Protein Needs (g/kg) 1.2 gm/kg   EST Daily Needs (g/day) 112 g/d       Fluid Requirements     Method for Estimation 1 mL/kcal    Estimated Needs (mL/day) 2325 ml/d           PES STATEMENT / NUTRITION " DIAGNOSIS      Nutrition Dx Problem Problem:    NutritionDiagnosisProblem: Knowledge Deficit    Etiology:  Medical diagnosis: Hepatic cancer  Factors affecting nutrition: Reported/Observed By, Appetite    Signs/Symptoms:  Information Deficit    Comment:      NUTRITION INTERVENTION / PLAN OF CARE      Intervention Goal(s) Maintain nutrition status, Provide information, Meet estimated needs, Disease management/therapy, Tolerate PO , Maintain intake, Maintain weight, and No significant weight loss         RD Intervention/Action Follow Tx progress         Recommendations:       PO Diet Continue diet as tolerated      Supplements       Snacks       Other    --      Monitor/Evaluation PO intake, Supplement intake, Pertinent labs, Weight, Symptoms   Education Education provided   --    Electronically signed by:  Tiffany Lam RD, LD  08/02/24 12:40 EDT

## 2024-08-03 ENCOUNTER — DOCUMENTATION (OUTPATIENT)
Dept: ONCOLOGY | Facility: CLINIC | Age: 79
End: 2024-08-03
Payer: MEDICARE

## 2024-08-03 RX ORDER — FUROSEMIDE 40 MG/1
40 TABLET ORAL DAILY
Qty: 2 TABLET | Refills: 0 | Status: SHIPPED | OUTPATIENT
Start: 2024-08-03 | End: 2024-08-05

## 2024-08-03 NOTE — PROGRESS NOTES
Patient called reporting he had gained about 2 pounds.  1 day; about 7 pounds since initiation of chemotherapy.  Endorses to minimal shortness of breath with exertion.  No shortness of breath at rest.  Denies any other symptoms.    Will send in Lasix 40 mg daily x 2 to his pharmacy.  Recommended going to ER should he experience worsening shortness of breath or increasing swelling.

## 2024-08-04 DIAGNOSIS — M06.9 RHEUMATOID ARTHRITIS INVOLVING BOTH HANDS, UNSPECIFIED WHETHER RHEUMATOID FACTOR PRESENT: ICD-10-CM

## 2024-08-05 RX ORDER — GABAPENTIN 300 MG/1
600 CAPSULE ORAL
Qty: 60 CAPSULE | Refills: 0 | Status: SHIPPED | OUTPATIENT
Start: 2024-08-05

## 2024-08-05 NOTE — TELEPHONE ENCOUNTER
Rx Refill Note  Requested Prescriptions     Pending Prescriptions Disp Refills    gabapentin (NEURONTIN) 300 MG capsule [Pharmacy Med Name: Gabapentin 300 MG Oral Capsule] 60 capsule 0     Sig: TAKE 2 CAPSULES BY MOUTH EVERY NIGHT AT BEDTIME      Last office visit with prescribing clinician: 6/10/2024   Last telemedicine visit with prescribing clinician: Visit date not found   Next office visit with prescribing clinician: 10/8/2024                         Would you like a call back once the refill request has been completed: [] Yes [] No    If the office needs to give you a call back, can they leave a voicemail: [] Yes [] No    Eliana Pena MA  08/05/24, 09:27 EDT

## 2024-08-05 NOTE — PROGRESS NOTES
Subjective     REASON FOR CONSULTATION:  cholangiocarcinoma  Provide an opinion on any further workup or treatment                             REQUESTING PHYSICIAN:  Thi    RECORDS OBTAINED:  Records of the patients history including those obtained from the referring provider were reviewed and summarized in detail.    HISTORY OF PRESENT ILLNESS:  The patient is a 79 y.o. year old male who is here for an opinion about the above issue.    History of Present Illness   The patient initiated palliative treatment with cisplatin/Gemzar and nivolumab on 8/2/2024.  He returns today for day 8 treatment.  The patient had pretty significant fatigue and weakness after the first week of chemotherapy.  He did not have significant nausea vomiting diarrhea.  He did not have worsening joint pain.  He is emotional and depressed.      ONCOLOGY HISTORY:  This is a 79-year-old man with diabetes, hyperlipidemia, rheumatoid arthritis, tobacco abuse who underwent a CT angiogram of the chest to evaluate an aortic aneurysm and incidentally noted a mass in the right lobe of the liver.  A CT of the abdomen and pelvis was performed on 5/10/2024 showed a heterogeneously peripherally enhancing right hepatic lobe mass 7 x 5.7 cm, subtle 1.5 cm low-attenuation focus adjacently and a nonobstructing annular mass at the hepatic flexure.  There were several calcifications in the pancreas consistent with chronic pancreatitis, several small renal cysts and an enlarged prostate 6.5 cm.  There were extensive abdominal aortic atherosclerotic changes without aneurysm and extensive right pleural calcifications surrounding a small loculated right pleural effusion stable since 4/1/2024.    EGD and colonoscopy were performed on 6/4/2024 by Dr. Ness-normal EGD, poor bowel prep inadequate for colorectal screening, ascending colon polyp resected, rectosigmoid polyp resected.    A CT-guided liver biopsy was performed on 6/17/2024 which showed stent hepatitis  with periportal bile duct reaction and associated fibrosis/inflammation no evidence of malignancy.  A repeat liver biopsy was performed on 7/18/2024 showing adenocarcinoma consistent with cholangiocarcinoma.  CA 19-9 was elevated 133 and AFP normal 3.81.    The patient has not had significant symptoms of abdominal pain, weight loss, nausea, vomiting or jaundice.    He has significant rheumatoid arthritis history with joint deviation, history of iritis, previous pericarditis requiring pericardial window.    Past Medical History:   Diagnosis Date    Cancer     Liver    Colon polyp     Diabetes mellitus     Hyperlipidemia     Hypertension     Liver cancer 07-    Liver disease     Postoperative wound infection     RA (rheumatoid arthritis)         Past Surgical History:   Procedure Laterality Date    ABDOMINAL SURGERY      BACK SURGERY      CHOLECYSTECTOMY      COLONOSCOPY  2017    [facility]    COLONOSCOPY N/A 06/04/2024    Procedure: COLONOSCOPY INTO CECUM WITH POLYPECTOMIES (COLD SNARE);  Surgeon: Angel Ness MD;  Location: [facility];  Service: General;  Laterality: N/A;  PRE: COLON MASS, ABNORMAL CT  POST: DIVERTICULOSIS, POLYPS, POOR PREP    ENDOSCOPY N/A 06/04/2024    Procedure: ESOPHAGOGASTRODUODENOSCOPY;  Surgeon: Angel Ness MD;  Location: [facility];  Service: General;  Laterality: N/A;  PRE: LIVER MASS  POST: NORMAL EGD    EYE SURGERY      PERICARDIUM SURGERY      REPLACEMENT TOTAL KNEE BILATERAL      THORACOTOMY Bilateral 1997    VENOUS ACCESS DEVICE (PORT) INSERTION Right 7/30/2024    Procedure: INSERTION VENOUS ACCESS DEVICE;  Surgeon: Angel Ness MD;  Location: [facility];  Service: General;  Laterality: Right;        Current Outpatient Medications on File Prior to Visit   Medication Sig Dispense Refill    atenolol (TENORMIN) 25 MG tablet Take 1 tablet by mouth Daily. 90 tablet 1    doxazosin (CARDURA) 2 MG  tablet Take 1 tablet by mouth every night at bedtime. 30 tablet 0    gabapentin (NEURONTIN) 300 MG capsule TAKE 2 CAPSULES BY MOUTH EVERY NIGHT AT BEDTIME 60 capsule 0    glucose blood test strip Once a day 100 each 12    losartan (COZAAR) 25 MG tablet Take 1 tablet by mouth Daily. 90 tablet 3    meloxicam (MOBIC) 15 MG tablet Take 1 tablet by mouth Daily. 90 tablet 3    metFORMIN ER (GLUCOPHAGE-XR) 500 MG 24 hr tablet Take 2 tablets by mouth Daily. 90 tablet 1    OLANZapine (zyPREXA) 5 MG tablet Take 1 tablet by mouth Every Night. Take on days 1, 2, 3, and 4 and Days 8, 9, 10, 11 after chemotherapy. 8 tablet 7    ondansetron (ZOFRAN) 8 MG tablet Take 1 tablet by mouth 3 (Three) Times a Day As Needed for Nausea or Vomiting. 30 tablet 5    ondansetron ODT (ZOFRAN-ODT) 4 MG disintegrating tablet Place 1 tablet on the tongue Every 8 (Eight) Hours As Needed for Nausea or Vomiting. 10 tablet 0    pilocarpine (SALAGEN) 5 MG tablet Take 1 tablet by mouth 3 times a day.      pravastatin (PRAVACHOL) 80 MG tablet Take 1 tablet by mouth Daily. 90 tablet 1    traMADol (ULTRAM) 50 MG tablet Take 2 tablets by mouth Every 6 (Six) Hours As Needed.      furosemide (Lasix) 40 MG tablet Take 1 tablet by mouth Daily for 2 days. 2 tablet 0     No current facility-administered medications on file prior to visit.        ALLERGIES:  No Known Allergies     Social History     Socioeconomic History    Marital status:    Tobacco Use    Smoking status: Former     Current packs/day: 0.00     Average packs/day: 1 pack/day for 53.7 years (53.7 ttl pk-yrs)     Types: Cigarettes     Start date: 1970     Quit date: 10/1/2023     Years since quittin.8     Passive exposure: Past    Smokeless tobacco: Never   Vaping Use    Vaping status: Never Used   Substance and Sexual Activity    Alcohol use: Not Currently    Drug use: Never    Sexual activity: Not Currently     Partners: Female     Birth control/protection: Vasectomy        Family  "History   Problem Relation Age of Onset    Hypertension Mother     Heart disease Mother     Arthritis Father     Diabetes Sister     Asthma Brother     Diabetes Brother     Malig Hyperthermia Neg Hx         Review of Systems   Constitutional:  Positive for fatigue. Negative for activity change, appetite change and unexpected weight change.   HENT: Negative.     Respiratory: Negative.     Cardiovascular: Negative.    Gastrointestinal: Negative.    Genitourinary: Negative.    Musculoskeletal:  Positive for arthralgias and joint swelling.   Skin: Negative.    Neurological:  Positive for weakness.   Hematological: Negative.    Psychiatric/Behavioral:  Positive for dysphoric mood. The patient is nervous/anxious.           Objective     Vitals:    08/09/24 0807   BP: 107/65   Pulse: 74   Resp: 18   Temp: 98.6 °F (37 °C)   TempSrc: Oral   SpO2: 94%   Weight: 93.6 kg (206 lb 6.4 oz)   Height: 182 cm (71.65\")   PainSc: 0-No pain           8/9/2024     8:03 AM   Current Status   ECOG score 0       Physical Exam    CONSTITUTIONAL: pleasant well-developed adult man  HEENT: no icterus, no thrush, moist membranes  LYMPH: no cervical or supraclavicular lad  CV: RRR, S1S2, no murmur, sternotomy scar present  RESP: cta bilat, no wheezing, no rales  GI: soft, non-tender, no splenomegaly, +bs, multiple surgical scars on the abdomen  MUSC: no edema, multiple deviated hand joints, slight limp from arthritis  NEURO: alert and oriented x3, normal strength  PSYCH: Tearful      RECENT LABS:  Hematology WBC   Date Value Ref Range Status   08/09/2024 3.57 3.40 - 10.80 10*3/mm3 Final     RBC   Date Value Ref Range Status   08/09/2024 4.19 4.14 - 5.80 10*6/mm3 Final     Hemoglobin   Date Value Ref Range Status   08/09/2024 11.9 (L) 13.0 - 17.7 g/dL Final     Hematocrit   Date Value Ref Range Status   08/09/2024 36.9 (L) 37.5 - 51.0 % Final     Platelets   Date Value Ref Range Status   08/09/2024 60 (L) 140 - 450 10*3/mm3 Final     Comment:     " Plts verified=55        Lab Results   Component Value Date    GLUCOSE 306 (C) 08/02/2024    BUN 15 08/02/2024    CREATININE 0.96 08/02/2024    EGFRRESULT 58.2 (L) 04/10/2024    EGFR 80.9 08/02/2024    BCR 15.6 08/02/2024    K 4.2 08/02/2024    CO2 27.9 08/02/2024    CALCIUM 8.9 08/02/2024    PROTENTOTREF 8.0 04/08/2024    ALBUMIN 3.5 08/02/2024    BILITOT 0.5 08/02/2024    AST 54 (H) 08/02/2024    ALT 32 08/02/2024     MRI ABD 7/5/24:  FINDINGS:  Asymmetric small right pleural effusion with associated pleural  thickening is present. Of note, a small right pleural effusion was  present on chest radiograph 6/27/2015 and 5/15/2023 and pleural  thickening and calcification was seen in this area on 8/14/2023. No  intra or extrahepatic bile duct dilation is present. A 0.6 cm T2  hyperintense lesion is present in the inferior aspect of the pancreatic  head.     There is significant hepatic steatosis. There is a large heterogenous  mass within the right hepatic dome. It demonstrates restricted  diffusion; however less than that of the spleen. It measures 7.3 x 6.1 x  7.5 cm (previously 7.3 x 5.8 x 6 cm on 5/10/2024). It demonstrates  largely peripheral enhancement there is a few scattered smaller  peripherally enhancing and solid enhancing lesions within the  surrounding right hepatic lobe measuring up to approximate 0.9 cm.     Subcentimeter renal lesions are too small to characterize. Larger T2  hyperintense lesions within the left kidney do not demonstrate  significant enhancement and favored be benign. The main portal vein is  patent.     IMPRESSION  1.  There is a heterogenous, large peripherally enhancing mass within  the posterior aspect of the right hepatic dome which is grossly  unchanged to minimally increased in size since 5/10/2024. Overall  findings are indeterminate and findings remain most concerning for  malignancy until proven otherwise. Other differential consideration  includes an atypical abscess  although this considered less likely. A few  small peripheral enhancing and solid enhancing lesions are located  within the surrounding right hepatic lobe and spread of malignancy  cannot be excluded.  2.  Chronic right pleural effusion and pleural thickening, as before.  3.  Significant hepatic steatosis.  4.  T2 hyperintense lesions within the pancreas. Follow-up with  abdominal MRI in 2 years is recommended per ACR criteria.  5.  Other findings as above    Assessment & Plan     *intrahepatic cholangiocarcinoma, multifocal:  7/5/24 MRI abd-heterogenous, large peripherally enhancing mass withinthe posterior aspect of the right hepatic dome which is grossly unchanged to minimally increased in size since 5/10/2024. small peripheral enhancing and solid enhancing lesions are located within the surrounding right hepatic lobe and spread of malignancy is likely  7/18/24 CT liver biopsy-adenocarcinoma consistent with cholangiocarcinoma by IHC  CA 19-9 133  8/2/2024-initiated palliative treatment with cisplatin/Gemzar plus nivolumab    *Thrombocytopenia secondary to chemotherapy-60    *Depression/adjustment disorder    *RA  significant rheumatoid arthritis history with joint deviation, history of iritis, previous pericarditis requiring pericardial window  Patient has been treated with Simponi currently on hold per rheumatology    *Additional comorbidities-diabetes mellitus, hyperlipidemia, hypertension, tobacco abuse, BPH    Oncology plan/recommendations:  Proceed with day 8 chemotherapy today without cisplatin and 50% dose reduction of Gemzar because of thrombocytopenia  CBC 1 week nurse review to check platelet count  Return to clinic 2 weeks lab MD visit and cycle 2 of treatment with dose reductions  We will plan to repeat his tumor marker and imaging after 2 cycles of systemic therapy.  If he is having a good response and tolerance would plan to give 4-6 cycles of chemotherapy and reassess for resectability or local  therapy to the dominant liver mass with chemoembolization etc. via IR  Begin olanzapine 5 mg nightly for depression/appetite stimulation/nausea etc.

## 2024-08-09 ENCOUNTER — INFUSION (OUTPATIENT)
Dept: ONCOLOGY | Facility: HOSPITAL | Age: 79
End: 2024-08-09
Payer: MEDICARE

## 2024-08-09 ENCOUNTER — TELEPHONE (OUTPATIENT)
Dept: ONCOLOGY | Facility: CLINIC | Age: 79
End: 2024-08-09

## 2024-08-09 ENCOUNTER — OFFICE VISIT (OUTPATIENT)
Dept: ONCOLOGY | Facility: CLINIC | Age: 79
End: 2024-08-09
Payer: MEDICARE

## 2024-08-09 VITALS
RESPIRATION RATE: 18 BRPM | SYSTOLIC BLOOD PRESSURE: 107 MMHG | WEIGHT: 206.4 LBS | HEIGHT: 72 IN | TEMPERATURE: 98.6 F | OXYGEN SATURATION: 94 % | BODY MASS INDEX: 27.96 KG/M2 | DIASTOLIC BLOOD PRESSURE: 65 MMHG | HEART RATE: 74 BPM

## 2024-08-09 DIAGNOSIS — Z79.899 ENCOUNTER FOR LONG-TERM (CURRENT) USE OF OTHER MEDICATIONS: ICD-10-CM

## 2024-08-09 DIAGNOSIS — C22.1 CHOLANGIOCARCINOMA: ICD-10-CM

## 2024-08-09 DIAGNOSIS — E83.42 HYPOMAGNESEMIA: ICD-10-CM

## 2024-08-09 DIAGNOSIS — D69.6 THROMBOCYTOPENIA: ICD-10-CM

## 2024-08-09 DIAGNOSIS — Z79.899 ENCOUNTER FOR LONG-TERM (CURRENT) USE OF OTHER MEDICATIONS: Primary | ICD-10-CM

## 2024-08-09 DIAGNOSIS — C22.1 CHOLANGIOCARCINOMA: Primary | ICD-10-CM

## 2024-08-09 LAB
ALBUMIN SERPL-MCNC: 3.3 G/DL (ref 3.5–5.2)
ALBUMIN/GLOB SERPL: 0.9 G/DL
ALP SERPL-CCNC: 92 U/L (ref 39–117)
ALT SERPL W P-5'-P-CCNC: 80 U/L (ref 1–41)
ANION GAP SERPL CALCULATED.3IONS-SCNC: 9 MMOL/L (ref 5–15)
AST SERPL-CCNC: 36 U/L (ref 1–40)
BASOPHILS # BLD AUTO: 0.02 10*3/MM3 (ref 0–0.2)
BASOPHILS NFR BLD AUTO: 0.6 % (ref 0–1.5)
BILIRUB SERPL-MCNC: 0.7 MG/DL (ref 0–1.2)
BUN SERPL-MCNC: 19 MG/DL (ref 8–23)
BUN/CREAT SERPL: 16.1 (ref 7–25)
CALCIUM SPEC-SCNC: 8.8 MG/DL (ref 8.6–10.5)
CHLORIDE SERPL-SCNC: 99 MMOL/L (ref 98–107)
CO2 SERPL-SCNC: 27 MMOL/L (ref 22–29)
CREAT SERPL-MCNC: 1.18 MG/DL (ref 0.76–1.27)
DEPRECATED RDW RBC AUTO: 40.1 FL (ref 37–54)
EGFRCR SERPLBLD CKD-EPI 2021: 63.2 ML/MIN/1.73
EOSINOPHIL # BLD AUTO: 0.04 10*3/MM3 (ref 0–0.4)
EOSINOPHIL NFR BLD AUTO: 1.1 % (ref 0.3–6.2)
ERYTHROCYTE [DISTWIDTH] IN BLOOD BY AUTOMATED COUNT: 12.4 % (ref 12.3–15.4)
GLOBULIN UR ELPH-MCNC: 3.7 GM/DL
GLUCOSE SERPL-MCNC: 202 MG/DL (ref 65–99)
HCT VFR BLD AUTO: 36.9 % (ref 37.5–51)
HGB BLD-MCNC: 11.9 G/DL (ref 13–17.7)
IMM GRANULOCYTES # BLD AUTO: 0.02 10*3/MM3 (ref 0–0.05)
IMM GRANULOCYTES NFR BLD AUTO: 0.6 % (ref 0–0.5)
LYMPHOCYTES # BLD AUTO: 0.84 10*3/MM3 (ref 0.7–3.1)
LYMPHOCYTES NFR BLD AUTO: 23.5 % (ref 19.6–45.3)
MAGNESIUM SERPL-MCNC: 1.5 MG/DL (ref 1.6–2.4)
MCH RBC QN AUTO: 28.4 PG (ref 26.6–33)
MCHC RBC AUTO-ENTMCNC: 32.2 G/DL (ref 31.5–35.7)
MCV RBC AUTO: 88.1 FL (ref 79–97)
MONOCYTES # BLD AUTO: 0.21 10*3/MM3 (ref 0.1–0.9)
MONOCYTES NFR BLD AUTO: 5.9 % (ref 5–12)
NEUTROPHILS NFR BLD AUTO: 2.44 10*3/MM3 (ref 1.7–7)
NEUTROPHILS NFR BLD AUTO: 68.3 % (ref 42.7–76)
NRBC BLD AUTO-RTO: 0 /100 WBC (ref 0–0.2)
PLATELET # BLD AUTO: 60 10*3/MM3 (ref 140–450)
PMV BLD AUTO: 9.9 FL (ref 6–12)
POTASSIUM SERPL-SCNC: 4.4 MMOL/L (ref 3.5–5.2)
PROT SERPL-MCNC: 7 G/DL (ref 6–8.5)
RBC # BLD AUTO: 4.19 10*6/MM3 (ref 4.14–5.8)
SODIUM SERPL-SCNC: 135 MMOL/L (ref 136–145)
WBC NRBC COR # BLD AUTO: 3.57 10*3/MM3 (ref 3.4–10.8)

## 2024-08-09 PROCEDURE — 96375 TX/PRO/DX INJ NEW DRUG ADDON: CPT

## 2024-08-09 PROCEDURE — 83735 ASSAY OF MAGNESIUM: CPT | Performed by: INTERNAL MEDICINE

## 2024-08-09 PROCEDURE — 85025 COMPLETE CBC W/AUTO DIFF WBC: CPT

## 2024-08-09 PROCEDURE — 25010000002 DEXAMETHASONE SODIUM PHOSPHATE 100 MG/10ML SOLUTION 10 ML VIAL: Performed by: INTERNAL MEDICINE

## 2024-08-09 PROCEDURE — 25010000002 GEMCITABINE HCL 2 GM/20ML SOLUTION 20 ML VIAL: Performed by: INTERNAL MEDICINE

## 2024-08-09 PROCEDURE — 96413 CHEMO IV INFUSION 1 HR: CPT

## 2024-08-09 PROCEDURE — 25810000003 SODIUM CHLORIDE 0.9 % SOLUTION 500 ML FLEX CONT: Performed by: INTERNAL MEDICINE

## 2024-08-09 PROCEDURE — 25810000003 SODIUM CHLORIDE 0.9 % SOLUTION 250 ML FLEX CONT: Performed by: INTERNAL MEDICINE

## 2024-08-09 PROCEDURE — 80053 COMPREHEN METABOLIC PANEL: CPT | Performed by: INTERNAL MEDICINE

## 2024-08-09 PROCEDURE — 25810000003 SODIUM CHLORIDE 0.9 % SOLUTION: Performed by: INTERNAL MEDICINE

## 2024-08-09 PROCEDURE — 96367 TX/PROPH/DG ADDL SEQ IV INF: CPT

## 2024-08-09 PROCEDURE — 96366 THER/PROPH/DIAG IV INF ADDON: CPT

## 2024-08-09 PROCEDURE — 25010000002 MAGNESIUM SULFATE PER 500 MG OF MAGNESIUM: Performed by: INTERNAL MEDICINE

## 2024-08-09 RX ORDER — OLANZAPINE 5 MG/1
5 TABLET ORAL NIGHTLY
Qty: 30 TABLET | Refills: 3 | Status: SHIPPED | OUTPATIENT
Start: 2024-08-09

## 2024-08-09 RX ORDER — SODIUM CHLORIDE 9 MG/ML
20 INJECTION, SOLUTION INTRAVENOUS ONCE
Status: COMPLETED | OUTPATIENT
Start: 2024-08-09 | End: 2024-08-09

## 2024-08-09 RX ADMIN — DEXAMETHASONE SODIUM PHOSPHATE 6 MG: 10 INJECTION, SOLUTION INTRAMUSCULAR; INTRAVENOUS at 09:35

## 2024-08-09 RX ADMIN — MAGNESIUM SULFATE HEPTAHYDRATE 500 ML/HR: 500 INJECTION, SOLUTION INTRAMUSCULAR; INTRAVENOUS at 08:49

## 2024-08-09 RX ADMIN — GEMCITABINE 1100 MG: 100 INJECTION, SOLUTION INTRAVENOUS at 09:56

## 2024-08-09 RX ADMIN — MAGNESIUM SULFATE HEPTAHYDRATE: 500 INJECTION, SOLUTION INTRAMUSCULAR; INTRAVENOUS at 10:37

## 2024-08-09 RX ADMIN — SODIUM CHLORIDE 20 ML/HR: 9 INJECTION, SOLUTION INTRAVENOUS at 08:49

## 2024-08-09 NOTE — TELEPHONE ENCOUNTER
Caller: Gay Vazquez    Relationship: Self    Best call back number: [phone]    What is the best time to reach you: ANYTIME    Who are you requesting to speak with (clinical staff, provider,  specific staff member): CLINICAL    What was the call regarding: PATIENT HAD INFUSION THIS MORNING AND IS BROKE OUT IN A RASH. HIS EYES ARE ITCHING AS WELL.    TRIED TO W/T TO OFFICE BUT WAS UNSUCCESSFUL.    PLEASE CALL ASAP TO ADVISE.     PHARMACY: [facility] [address] [place] [id number] [facility] [phone] [phone] [facility] [phone]

## 2024-08-09 NOTE — TELEPHONE ENCOUNTER
Called patient said he broke out in a rash on left and ride side of torso, left and right groin, and on left and right arms. No other symptoms.  Per Dr. Tejada take 25 mg of benadryl to see if this helps.  Relayed this infromation to patient and v/u. Will check on patient in 30 mins to hour to see how he is doing.    Internal Medicine Daily Progress Note    Patient: Dru Sanchez Date: 11/16/2020   YOB: 1969 51 year old male Attending: Miki Lund MD     Chief Complaint: Back pain     Subjective:   Reports that he is doing \"ok\" pain management continues to be an issue    Denies fever, chills, cough or SOB. Tolerating diet.     Medications/Infusions: Reviewed    Problem List:   Patient Active Problem List   Diagnosis   • Chronic bilateral low back pain with bilateral sciatica   • Near syncope   • JADEN (generalized anxiety disorder)   • Pure hypercholesterolemia   • Folliculitis   • DDD (degenerative disc disease), lumbar   • Neurogenic claudication   • Lumbosacral stenosis with neurogenic claudication (CMS/HCC)   • Herniation of intervertebral disc of mid-cervical region   • Radiculopathy, cervical   • Migraine without status migrainosus, not intractable   • Body mass index (BMI) 40.0-44.9, adult   • Major depressive disorder, recurrent episode, moderate (CMS/HCC)   • Foraminal stenosis of cervical region       Allergies:   ALLERGIES:   Allergen Reactions   • Mold   (Environmental) Other (See Comments)     Mold and feathers allergies//rash       Vitals :   Vitals:    11/16/20 0702   BP: 118/54   Pulse: (!) 103   Resp: 18   Temp: 95.6 °F (35.3 °C)       Physical Examination :  Constitutional:   NAD   Eyes:  Pupils equal, conjunctivae normal   HENT: MMM  Respiratory: CTA,  No respiratory distress.  On RA    Cardiovascular: Tachy,  RRR, no murmurs, no gallops, no rubs   GI:  Soft, nondistended, obese, normal bowel sounds, non-tender  Musculoskeletal:  Spinal brace in place.    Skin:  Well hydrated, no rash  Neurologic:  Alert & oriented x 3. .  No focal deficits noted but lower extremity range of motion limited due to pain.   Extremities: No clubbing, cyanosis, nor edema;NLITON stockings on  Psychiatric:  Speech and behavior appropriate    Medications :  Scheduled  • polyethylene glycol  17 g Oral BID   • oxyCODONE SR   10 mg Oral QAM   • docusate sodium-sennosides  2 tablet Oral Nightly   • acetaminophen  1,000 mg Oral 4x Daily   • cyclobenzaprine  10 mg Oral TID   • gabapentin  400 mg Oral TID   • budesonide-formoterol  2 puff Inhalation BID   • FLUoxetine  80 mg Oral Daily   • oxybutynin  5 mg Oral Q Evening   • pravastatin  40 mg Oral QHS   • tamsulosin  0.4 mg Oral Q Evening   • topiramate  50 mg Oral BID   • lidocaine 2% urethral  10 mL Transurethral Once       PRN  • labetalol (NORMODYNE) injection 10 mg  10 mg Intravenous Q6H PRN For total dose see MAR   • HYDROmorphone (DILAUDID) 2 MG tablet 4-6 mg  4-6 mg Oral Q4H PRN For total dose see MAR   • acetaminophen (TYLENOL) tablet 650 mg  650 mg Oral Q6H PRN For total dose see MAR   • albuterol inhaler 2 puff  2 puff Inhalation Q4H PRN For total dose see MAR   • clonazePAM (KlonoPIN) tablet 1.5 mg  1.5 mg Oral Daily PRN For total dose see MAR   • docusate sodium-sennosides (SENOKOT S) 50-8.6 MG 2 tablet  2 tablet Oral Nightly PRN For total dose see MAR   • ondansetron (ZOFRAN ODT) disintegrating tablet 4 mg  4 mg Oral Q8H PRN For total dose see MAR   • pseudoephedrine (SUDAFED 12 Hour) tablet 120 mg  120 mg Oral Q12H PRN For total dose see MAR   • calcium carbonate (TUMS) chewable tablet 500-1,000 mg  500-1,000 mg Oral Q4H PRN For total dose see MAR   • nalbuphine (NUBAIN) injection 2.5 mg  2.5 mg Intravenous Q8H PRN For total dose see MAR   • naLOXone (NARCAN) injection 0.1 mg  0.1 mg Intravenous PRN For total dose see MAR   • diphenhydrAMINE (BENADRYL) 12.5 MG/5ML liquid 12.5 mg  12.5 mg Oral Q6H PRN For total dose see MAR   • bisacodyl (DULCOLAX) suppository 10 mg  10 mg Rectal Daily PRN For total dose see MAR   • magnesium hydroxide (MILK OF MAGNESIA) 400 MG/5ML suspension 30 mL  30 mL Oral Daily PRN For total dose see MAR       Continuous infusion  • sodium chloride 0.9% infusion Stopped (11/12/20 0926)       Laboratory Results:  Recent Labs   Lab 11/16/20 0605  11/15/20  0553 11/14/20  0528  11/11/20  0523   WBC 11.8* 12.0* 13.3*   < > 15.2*   HCT 33.2* 32.7* 32.2*   < > 38.7*   HGB 10.4* 10.7* 10.4*   < > 12.4*    362 322   < > 326   SODIUM 137  --   --   --  138   POTASSIUM 3.9  --   --   --  3.8   CHLORIDE 100  --   --   --  106   CO2 30  --   --   --  27   CALCIUM 9.0  --   --   --  8.3*   GLUCOSE 93  --   --   --  108*   BUN 8  --   --   --  9   CREATININE 0.75  --   --   --  0.84    < > = values in this interval not displayed.     Imaging Results Reviewed Include:     Radiologic data:  XR Lumbar Spine 2 or 3 Views   Final Result   FINDINGS / IMPRESSION:  AP, lateral, and coned lateral views of the lumbar   spine.      Numbering:  The last fully formed disc space is designated as L5-S1      *  Interval L4-5 laminectomy with L4-S1 posterior instrumented fusion   including transpedicular screws and connecting rods with interbody   biomechanical devices at the intervening disc spaces.  No hardware   complication.   *  Normal vertebral body height.  Minimal dextrocurvature.   *  Moderate degenerative disc space narrowing L2-3 and L3-4 with moderate   degenerative facet arthrosis.   *  Mild degenerative sacroiliac osteoarthrosis.         //Location Code: [facility]          DVT/VTE Prophylaxis:  VTE Pharmacologic Prophylaxis: No pharmacologic Venous Thromboembolism prophylaxis due to Active Bleeding / Risk of Bleeding  VTE Mechanical Prophylaxis: Yes    Assessment and Plan:    1. Severe lumbar stenosis s/p lumbar decompression, fusion 11/10/2020  - continues to have moderate back pain  - Pain Management following.   - Currently on OxyContin daily, Tylenol q.i.d., gabapentin, Flexeril and p.r.n. IV Dilaudid for pain management  - PT/OT  - Postop management as per Orthopedic surgery     2. Expected postop blood loss anemia  - Hgb stable, continue to monitor    3. Mild uncomplicated persistent asthma   - stable   - on Symbicort and p.r.n. albuterol    4. Hypertension   - not  on BP meds pta   - BP intermittently elevated likely due to pain, monitor   - prn labetalol added     5. Hyperlipidemia   - on pravastatin    6. Leukocytosis, unspecified cause, chronic- stable   -trending down, afebrile   -Patient worked up by Hematology as an outpatient and was negative  -No evidence of infection    7. Sinus tachycardia, likely due to pain stable  max  - monitor   - prn labetalol     8. Anxiety/Depression   - on fluoxetine and p.r.n. clonazepam    9. Chronic headache   - on topiramate    10. Opioid Induced constipation  - miralax bid, senna daily; bisacodyl sup prn   - encourage ambulation, fluids, fiber rich diet   -PRN lactulose if needed   -more ambulation needed     Emily PETERSON  [facility] [place] [facility]    [phone] then call on call Hospitalist

## 2024-08-09 NOTE — NURSING NOTE
Per MD will hold cisplatin today for plt count 60. Gemzar dose will be reduced. Pt Mag 1.5 today. Per Dr. Tejada pt will receive 2g total in clinic today. Orders placed.

## 2024-08-09 NOTE — TELEPHONE ENCOUNTER
Called patient, let him know per Dr. Tejada he can take the benadryl for the itching/rash. Told patient if the reaction gets worse over the weekend he should go get checked out in the ER. Pt V/U

## 2024-08-12 ENCOUNTER — TELEPHONE (OUTPATIENT)
Dept: ONCOLOGY | Facility: CLINIC | Age: 79
End: 2024-08-12
Payer: MEDICARE

## 2024-08-12 RX ORDER — PREDNISONE 5 MG/1
1 TABLET ORAL TAKE AS DIRECTED
Qty: 21 TABLET | Refills: 0 | Status: SHIPPED | OUTPATIENT
Start: 2024-08-12

## 2024-08-12 NOTE — TELEPHONE ENCOUNTER
Provider: Terrell  Caller: Paula  Relationship to Patient: wife  Call Back Phone Number: [phone]  Reason for Call: he has an angry red rash with itching

## 2024-08-12 NOTE — TELEPHONE ENCOUNTER
Called patient, states he is still having his rash and the benadryl hasn't help. Said that it is really itchy. Per Dr. Tejada, he is going to send patient a prednisone pack to try and to call back if it doesn't help rash.  Pt V/U

## 2024-08-16 ENCOUNTER — LAB (OUTPATIENT)
Dept: LAB | Facility: HOSPITAL | Age: 79
End: 2024-08-16
Payer: MEDICARE

## 2024-08-16 ENCOUNTER — CLINICAL SUPPORT (OUTPATIENT)
Dept: ONCOLOGY | Facility: HOSPITAL | Age: 79
End: 2024-08-16
Payer: MEDICARE

## 2024-08-16 DIAGNOSIS — D69.6 THROMBOCYTOPENIA: ICD-10-CM

## 2024-08-16 LAB
BASOPHILS # BLD AUTO: 0.03 10*3/MM3 (ref 0–0.2)
BASOPHILS NFR BLD AUTO: 0.8 % (ref 0–1.5)
DEPRECATED RDW RBC AUTO: 39.4 FL (ref 37–54)
EOSINOPHIL # BLD AUTO: 0.01 10*3/MM3 (ref 0–0.4)
EOSINOPHIL NFR BLD AUTO: 0.3 % (ref 0.3–6.2)
ERYTHROCYTE [DISTWIDTH] IN BLOOD BY AUTOMATED COUNT: 12.3 % (ref 12.3–15.4)
HCT VFR BLD AUTO: 35.3 % (ref 37.5–51)
HGB BLD-MCNC: 11.6 G/DL (ref 13–17.7)
IMM GRANULOCYTES # BLD AUTO: 0.25 10*3/MM3 (ref 0–0.05)
IMM GRANULOCYTES NFR BLD AUTO: 7.1 % (ref 0–0.5)
LYMPHOCYTES # BLD AUTO: 0.9 10*3/MM3 (ref 0.7–3.1)
LYMPHOCYTES NFR BLD AUTO: 25.5 % (ref 19.6–45.3)
MCH RBC QN AUTO: 28.6 PG (ref 26.6–33)
MCHC RBC AUTO-ENTMCNC: 32.9 G/DL (ref 31.5–35.7)
MCV RBC AUTO: 86.9 FL (ref 79–97)
MONOCYTES # BLD AUTO: 0.29 10*3/MM3 (ref 0.1–0.9)
MONOCYTES NFR BLD AUTO: 8.2 % (ref 5–12)
NEUTROPHILS NFR BLD AUTO: 2.05 10*3/MM3 (ref 1.7–7)
NEUTROPHILS NFR BLD AUTO: 58.1 % (ref 42.7–76)
NRBC BLD AUTO-RTO: 2 /100 WBC (ref 0–0.2)
PLATELET # BLD AUTO: 37 10*3/MM3 (ref 140–450)
PMV BLD AUTO: 11.4 FL (ref 6–12)
RBC # BLD AUTO: 4.06 10*6/MM3 (ref 4.14–5.8)
WBC NRBC COR # BLD AUTO: 3.53 10*3/MM3 (ref 3.4–10.8)

## 2024-08-16 PROCEDURE — 85025 COMPLETE CBC W/AUTO DIFF WBC: CPT

## 2024-08-16 PROCEDURE — 36415 COLL VENOUS BLD VENIPUNCTURE: CPT

## 2024-08-16 NOTE — PROGRESS NOTES
Patient presented for lab results review. CBC results indicate decreased platelet count. Per Dr. Tejada's order, patient instructed to hold all anticoagulant and antiplatelet medications. Patient denies taking aspirin or any other such medications. Bleeding precautions reviewed and explained. Patient reports no symptoms. Lab results provided, and follow-up appointment confirmed. Patient instructed to contact office for any concerns or new symptoms prior to next visit. Patient understands and is discharged in stable condition.      Component      Latest Ref Rng 8/16/2024   WBC      3.40 - 10.80 10*3/mm3 3.53    RBC      4.14 - 5.80 10*6/mm3 4.06 (L)    Hemoglobin      13.0 - 17.7 g/dL 11.6 (L)    Hematocrit      37.5 - 51.0 % 35.3 (L)    MCV      79.0 - 97.0 fL 86.9    MCH      26.6 - 33.0 pg 28.6    MCHC      31.5 - 35.7 g/dL 32.9    RDW      12.3 - 15.4 % 12.3    RDW-SD      37.0 - 54.0 fl 39.4    MPV      6.0 - 12.0 fL 11.4    Platelets      140 - 450 10*3/mm3 37 (L)    Neutrophil Rel %      42.7 - 76.0 % 58.1    Lymphocyte Rel %      19.6 - 45.3 % 25.5    Monocyte Rel %      5.0 - 12.0 % 8.2    Eosinophil Rel %      0.3 - 6.2 % 0.3    Basophil Rel %      0.0 - 1.5 % 0.8    Immature Granulocyte Rel %      0.0 - 0.5 % 7.1 (H)    Neutrophils Absolute      1.70 - 7.00 10*3/mm3 2.05    Lymphocytes Absolute      0.70 - 3.10 10*3/mm3 0.90    Monocytes Absolute      0.10 - 0.90 10*3/mm3 0.29    Eosinophils Absolute      0.00 - 0.40 10*3/mm3 0.01    Basophils Absolute      0.00 - 0.20 10*3/mm3 0.03    Immature Grans, Absolute      0.00 - 0.05 10*3/mm3 0.25 (H)    nRBC      0.0 - 0.2 /100 WBC 2.0 (H)       Legend:  (L) Low  (H) High

## 2024-08-19 NOTE — PROGRESS NOTES
Subjective     REASON FOR CONSULTATION:  cholangiocarcinoma  Provide an opinion on any further workup or treatment                             REQUESTING PHYSICIAN:  Thi    RECORDS OBTAINED:  Records of the patients history including those obtained from the referring provider were reviewed and summarized in detail.    HISTORY OF PRESENT ILLNESS:  The patient is a 79 y.o. year old male who is here for an opinion about the above issue.    History of Present Illness   The patient initiated palliative treatment with cisplatin/Gemzar and nivolumab on 8/2/2024.  He required a dose reduction of day 8 Gemzar because of thrombocytopenia.  The patient returned today for follow-up and cycle 2 of treatment.  He is doing better.  Appetite is decreased but weight is stable.  He denies uncontrolled nausea vomiting or diarrhea.  He has joint pain which is not exacerbated with immunotherapy.  Skin rash stable.      ONCOLOGY HISTORY:  This is a 79-year-old man with diabetes, hyperlipidemia, rheumatoid arthritis, tobacco abuse who underwent a CT angiogram of the chest to evaluate an aortic aneurysm and incidentally noted a mass in the right lobe of the liver.  A CT of the abdomen and pelvis was performed on 5/10/2024 showed a heterogeneously peripherally enhancing right hepatic lobe mass 7 x 5.7 cm, subtle 1.5 cm low-attenuation focus adjacently and a nonobstructing annular mass at the hepatic flexure.  There were several calcifications in the pancreas consistent with chronic pancreatitis, several small renal cysts and an enlarged prostate 6.5 cm.  There were extensive abdominal aortic atherosclerotic changes without aneurysm and extensive right pleural calcifications surrounding a small loculated right pleural effusion stable since 4/1/2024.    EGD and colonoscopy were performed on 6/4/2024 by Dr. Ness-normal EGD, poor bowel prep inadequate for colorectal screening, ascending colon polyp resected, rectosigmoid polyp  resected.    A CT-guided liver biopsy was performed on 6/17/2024 which showed stent hepatitis with periportal bile duct reaction and associated fibrosis/inflammation no evidence of malignancy.  A repeat liver biopsy was performed on 7/18/2024 showing adenocarcinoma consistent with cholangiocarcinoma.  CA 19-9 was elevated 133 and AFP normal 3.81.    The patient has not had significant symptoms of abdominal pain, weight loss, nausea, vomiting or jaundice.    He has significant rheumatoid arthritis history with joint deviation, history of iritis, previous pericarditis requiring pericardial window.    Past Medical History:   Diagnosis Date    Cancer     Liver    Colon polyp     Diabetes mellitus     Hyperlipidemia     Hypertension     Liver cancer 07-    Liver disease     Postoperative wound infection     RA (rheumatoid arthritis)         Past Surgical History:   Procedure Laterality Date    ABDOMINAL SURGERY      BACK SURGERY      CHOLECYSTECTOMY      COLONOSCOPY  2017    [facility]    COLONOSCOPY N/A 06/04/2024    Procedure: COLONOSCOPY INTO CECUM WITH POLYPECTOMIES (COLD SNARE);  Surgeon: Angel Ness MD;  Location: [facility] ENDOSCOPY;  Service: General;  Laterality: N/A;  PRE: COLON MASS, ABNORMAL CT  POST: DIVERTICULOSIS, POLYPS, POOR PREP    ENDOSCOPY N/A 06/04/2024    Procedure: ESOPHAGOGASTRODUODENOSCOPY;  Surgeon: Angel Ness MD;  Location: [facility] ENDOSCOPY;  Service: General;  Laterality: N/A;  PRE: LIVER MASS  POST: NORMAL EGD    EYE SURGERY      PERICARDIUM SURGERY      REPLACEMENT TOTAL KNEE BILATERAL      THORACOTOMY Bilateral 1997    VENOUS ACCESS DEVICE (PORT) INSERTION Right 7/30/2024    Procedure: INSERTION VENOUS ACCESS DEVICE;  Surgeon: Angel Ness MD;  Location: [facility];  Service: General;  Laterality: Right;        Current Outpatient Medications on File Prior to Visit   Medication Sig Dispense Refill    atenolol  (TENORMIN) 25 MG tablet Take 1 tablet by mouth Daily. 90 tablet 1    doxazosin (CARDURA) 2 MG tablet Take 1 tablet by mouth every night at bedtime. 30 tablet 0    gabapentin (NEURONTIN) 300 MG capsule TAKE 2 CAPSULES BY MOUTH EVERY NIGHT AT BEDTIME 60 capsule 0    glucose blood test strip Once a day 100 each 12    losartan (COZAAR) 25 MG tablet Take 1 tablet by mouth Daily. 90 tablet 3    meloxicam (MOBIC) 15 MG tablet Take 1 tablet by mouth Daily. 90 tablet 3    metFORMIN ER (GLUCOPHAGE-XR) 500 MG 24 hr tablet Take 2 tablets by mouth Daily. 90 tablet 1    OLANZapine (zyPREXA) 5 MG tablet Take 1 tablet by mouth Every Night. 30 tablet 3    ondansetron (ZOFRAN) 8 MG tablet Take 1 tablet by mouth 3 (Three) Times a Day As Needed for Nausea or Vomiting. 30 tablet 5    ondansetron ODT (ZOFRAN-ODT) 4 MG disintegrating tablet Place 1 tablet on the tongue Every 8 (Eight) Hours As Needed for Nausea or Vomiting. 10 tablet 0    pilocarpine (SALAGEN) 5 MG tablet Take 1 tablet by mouth 3 times a day.      pravastatin (PRAVACHOL) 80 MG tablet Take 1 tablet by mouth Daily. 90 tablet 1    traMADol (ULTRAM) 50 MG tablet Take 2 tablets by mouth Every 6 (Six) Hours As Needed.      furosemide (Lasix) 40 MG tablet Take 1 tablet by mouth Daily for 2 days. 2 tablet 0    predniSONE 5 MG (21) tablet therapy pack dose pack Take 1 tablet by mouth Take As Directed. Take as directed on package instructions. (Patient not taking: Reported on 8/23/2024) 21 tablet 0     No current facility-administered medications on file prior to visit.        ALLERGIES:    Allergies   Allergen Reactions    Chlorhexidine Dermatitis     Please use alcohol to clean port site        Social History     Socioeconomic History    Marital status:    Tobacco Use    Smoking status: Former     Current packs/day: 0.00     Average packs/day: 1 pack/day for 53.7 years (53.7 ttl pk-yrs)     Types: Cigarettes     Start date: 1[birth year]1970     Quit date: 10/1/2023     Years since  "quittin.8     Passive exposure: Past    Smokeless tobacco: Never   Vaping Use    Vaping status: Never Used   Substance and Sexual Activity    Alcohol use: Not Currently    Drug use: Never    Sexual activity: Not Currently     Partners: Female     Birth control/protection: Vasectomy        Family History   Problem Relation Age of Onset    Hypertension Mother     Heart disease Mother     Arthritis Father     Diabetes Sister     Asthma Brother     Diabetes Brother     Malig Hyperthermia Neg Hx         Review of Systems   Constitutional:  Positive for appetite change and fatigue. Negative for activity change and unexpected weight change.   HENT: Negative.     Respiratory: Negative.     Cardiovascular: Negative.    Gastrointestinal: Negative.    Genitourinary: Negative.    Musculoskeletal:  Positive for arthralgias and joint swelling.   Skin:  Positive for rash.   Neurological:  Negative for weakness.   Hematological: Negative.    Psychiatric/Behavioral:  Negative for dysphoric mood. The patient is nervous/anxious.           Objective     Vitals:    24 1031   BP: 127/75   Pulse: 71   Resp: 18   Temp: 98.4 °F (36.9 °C)   TempSrc: Oral   SpO2: 97%   Weight: 93.3 kg (205 lb 11.2 oz)   Height: 182 cm (71.65\")   PainSc: 6  Comment: joint pain             2024    10:32 AM   Current Status   ECOG score 0       Physical Exam    CONSTITUTIONAL: pleasant well-developed adult man  HEENT: no icterus, no thrush, moist membranes  LYMPH: no cervical or supraclavicular lad  CV: RRR, S1S2, no murmur, sternotomy scar present  RESP: cta bilat, no wheezing, no rales  GI: soft, non-tender, no splenomegaly, +bs, multiple surgical scars on the abdomen  MUSC: no edema, multiple deviated hand joints, slight limp from arthritis  NEURO: alert and oriented x3, normal strength  PSYCH: Improved mood and affect today  Skin: Faint rash present on the arms and legs      RECENT LABS:  Hematology WBC   Date Value Ref Range Status "   08/23/2024 7.41 3.40 - 10.80 10*3/mm3 Final     RBC   Date Value Ref Range Status   08/23/2024 3.86 (L) 4.14 - 5.80 10*6/mm3 Final     Hemoglobin   Date Value Ref Range Status   08/23/2024 11.1 (L) 13.0 - 17.7 g/dL Final     Hematocrit   Date Value Ref Range Status   08/23/2024 35.0 (L) 37.5 - 51.0 % Final     Platelets   Date Value Ref Range Status   08/23/2024 150 140 - 450 10*3/mm3 Final        Lab Results   Component Value Date    GLUCOSE 153 (H) 08/23/2024    BUN 14 08/23/2024    CREATININE 1.07 08/23/2024    EGFRRESULT 58.2 (L) 04/10/2024    EGFR 71.0 08/23/2024    BCR 13.1 08/23/2024    K 4.7 08/23/2024    CO2 24.8 08/23/2024    CALCIUM 8.1 (L) 08/23/2024    PROTENTOTREF 8.0 04/08/2024    ALBUMIN 3.0 (L) 08/23/2024    BILITOT 0.8 08/23/2024    AST 71 (H) 08/23/2024    ALT 47 (H) 08/23/2024     MRI ABD 7/5/24:  FINDINGS:  Asymmetric small right pleural effusion with associated pleural  thickening is present. Of note, a small right pleural effusion was  present on chest radiograph 6/27/2015 and 5/15/2023 and pleural  thickening and calcification was seen in this area on 8/14/2023. No  intra or extrahepatic bile duct dilation is present. A 0.6 cm T2  hyperintense lesion is present in the inferior aspect of the pancreatic  head.     There is significant hepatic steatosis. There is a large heterogenous  mass within the right hepatic dome. It demonstrates restricted  diffusion; however less than that of the spleen. It measures 7.3 x 6.1 x  7.5 cm (previously 7.3 x 5.8 x 6 cm on 5/10/2024). It demonstrates  largely peripheral enhancement there is a few scattered smaller  peripherally enhancing and solid enhancing lesions within the  surrounding right hepatic lobe measuring up to approximate 0.9 cm.     Subcentimeter renal lesions are too small to characterize. Larger T2  hyperintense lesions within the left kidney do not demonstrate  significant enhancement and favored be benign. The main portal vein is  patent.      IMPRESSION  1.  There is a heterogenous, large peripherally enhancing mass within  the posterior aspect of the right hepatic dome which is grossly  unchanged to minimally increased in size since 5/10/2024. Overall  findings are indeterminate and findings remain most concerning for  malignancy until proven otherwise. Other differential consideration  includes an atypical abscess although this considered less likely. A few  small peripheral enhancing and solid enhancing lesions are located  within the surrounding right hepatic lobe and spread of malignancy  cannot be excluded.  2.  Chronic right pleural effusion and pleural thickening, as before.  3.  Significant hepatic steatosis.  4.  T2 hyperintense lesions within the pancreas. Follow-up with  abdominal MRI in 2 years is recommended per ACR criteria.  5.  Other findings as above    Assessment & Plan     *intrahepatic cholangiocarcinoma, multifocal:  7/5/24 MRI abd-heterogenous, large peripherally enhancing mass withinthe posterior aspect of the right hepatic dome which is grossly unchanged to minimally increased in size since 5/10/2024. small peripheral enhancing and solid enhancing lesions are located within the surrounding right hepatic lobe and spread of malignancy is likely  7/18/24 CT liver biopsy-adenocarcinoma consistent with cholangiocarcinoma by IHC  CA 19-9 133  8/2/2024-initiated palliative treatment with cisplatin/Gemzar plus nivolumab; day 8 Gemzar given at 50% because of thrombocytopenia (60)    *Thrombocytopenia secondary to chemotherapy  improved 150    *Depression/adjustment disorder    *RA  significant rheumatoid arthritis history with joint deviation, history of iritis, previous pericarditis requiring pericardial window  Patient has been treated with Simponi currently on hold per rheumatology    *Additional comorbidities-diabetes mellitus, hyperlipidemia, hypertension, tobacco abuse, BPH    *Skin rash/pruritus secondary to chemotherapy versus  immunotherapy  Previously required a steroid Dosepak    Oncology plan/recommendations:  Proceed with day 1 cycle 2 cisplatin/Gemzar and durvalumab; Gemzar dosed at 75% secondary to previous thrombocytopenia  1 week CBC and day 8 Gemzar-dose to be based on platelet count that day  We will plan to repeat his tumor marker and imaging after 2 cycles of systemic therapy-ordered today AFP and CT abdomen with without contrast/liver protocol.  If he is having a good response and tolerance would plan to give 4-6 cycles of chemotherapy and reassess for resectability or local therapy to the dominant liver mass with chemoembolization etc. via IR  Continue olanzapine 5 mg nightly for depression/appetite stimulation/nausea etc.

## 2024-08-23 ENCOUNTER — INFUSION (OUTPATIENT)
Dept: ONCOLOGY | Facility: HOSPITAL | Age: 79
End: 2024-08-23
Payer: MEDICARE

## 2024-08-23 ENCOUNTER — OFFICE VISIT (OUTPATIENT)
Dept: ONCOLOGY | Facility: CLINIC | Age: 79
End: 2024-08-23
Payer: MEDICARE

## 2024-08-23 VITALS
TEMPERATURE: 98.4 F | DIASTOLIC BLOOD PRESSURE: 75 MMHG | RESPIRATION RATE: 18 BRPM | WEIGHT: 205.7 LBS | HEIGHT: 72 IN | OXYGEN SATURATION: 97 % | HEART RATE: 71 BPM | BODY MASS INDEX: 27.86 KG/M2 | SYSTOLIC BLOOD PRESSURE: 127 MMHG

## 2024-08-23 VITALS — BODY MASS INDEX: 28.73 KG/M2 | WEIGHT: 209.8 LBS

## 2024-08-23 DIAGNOSIS — Z79.899 ENCOUNTER FOR LONG-TERM (CURRENT) USE OF OTHER MEDICATIONS: ICD-10-CM

## 2024-08-23 DIAGNOSIS — C22.1 CHOLANGIOCARCINOMA: Primary | ICD-10-CM

## 2024-08-23 DIAGNOSIS — C22.1 CHOLANGIOCARCINOMA: ICD-10-CM

## 2024-08-23 DIAGNOSIS — Z79.899 ENCOUNTER FOR LONG-TERM (CURRENT) USE OF OTHER MEDICATIONS: Primary | ICD-10-CM

## 2024-08-23 DIAGNOSIS — D69.6 THROMBOCYTOPENIA: ICD-10-CM

## 2024-08-23 DIAGNOSIS — Z45.2 FITTING AND ADJUSTMENT OF VASCULAR CATHETER: ICD-10-CM

## 2024-08-23 LAB
ALBUMIN SERPL-MCNC: 3 G/DL (ref 3.5–5.2)
ALBUMIN/GLOB SERPL: 0.8 G/DL
ALP SERPL-CCNC: 95 U/L (ref 39–117)
ALT SERPL W P-5'-P-CCNC: 47 U/L (ref 1–41)
ANION GAP SERPL CALCULATED.3IONS-SCNC: 10.2 MMOL/L (ref 5–15)
AST SERPL-CCNC: 71 U/L (ref 1–40)
BASOPHILS # BLD AUTO: 0.03 10*3/MM3 (ref 0–0.2)
BASOPHILS NFR BLD AUTO: 0.4 % (ref 0–1.5)
BILIRUB SERPL-MCNC: 0.8 MG/DL (ref 0–1.2)
BUN SERPL-MCNC: 14 MG/DL (ref 8–23)
BUN/CREAT SERPL: 13.1 (ref 7–25)
CALCIUM SPEC-SCNC: 8.1 MG/DL (ref 8.6–10.5)
CHLORIDE SERPL-SCNC: 99 MMOL/L (ref 98–107)
CO2 SERPL-SCNC: 24.8 MMOL/L (ref 22–29)
CREAT SERPL-MCNC: 1.07 MG/DL (ref 0.76–1.27)
DEPRECATED RDW RBC AUTO: 42 FL (ref 37–54)
EGFRCR SERPLBLD CKD-EPI 2021: 71 ML/MIN/1.73
EOSINOPHIL # BLD AUTO: 0.08 10*3/MM3 (ref 0–0.4)
EOSINOPHIL NFR BLD AUTO: 1.1 % (ref 0.3–6.2)
ERYTHROCYTE [DISTWIDTH] IN BLOOD BY AUTOMATED COUNT: 14.3 % (ref 12.3–15.4)
GLOBULIN UR ELPH-MCNC: 3.8 GM/DL
GLUCOSE SERPL-MCNC: 153 MG/DL (ref 65–99)
HCT VFR BLD AUTO: 35 % (ref 37.5–51)
HGB BLD-MCNC: 11.1 G/DL (ref 13–17.7)
IMM GRANULOCYTES # BLD AUTO: 0.09 10*3/MM3 (ref 0–0.05)
IMM GRANULOCYTES NFR BLD AUTO: 1.2 % (ref 0–0.5)
LYMPHOCYTES # BLD AUTO: 1.17 10*3/MM3 (ref 0.7–3.1)
LYMPHOCYTES NFR BLD AUTO: 15.8 % (ref 19.6–45.3)
MAGNESIUM SERPL-MCNC: 1.7 MG/DL (ref 1.6–2.4)
MCH RBC QN AUTO: 28.8 PG (ref 26.6–33)
MCHC RBC AUTO-ENTMCNC: 31.7 G/DL (ref 31.5–35.7)
MCV RBC AUTO: 90.7 FL (ref 79–97)
MONOCYTES # BLD AUTO: 0.99 10*3/MM3 (ref 0.1–0.9)
MONOCYTES NFR BLD AUTO: 13.4 % (ref 5–12)
NEUTROPHILS NFR BLD AUTO: 5.05 10*3/MM3 (ref 1.7–7)
NEUTROPHILS NFR BLD AUTO: 68.1 % (ref 42.7–76)
NRBC BLD AUTO-RTO: 0 /100 WBC (ref 0–0.2)
PLATELET # BLD AUTO: 150 10*3/MM3 (ref 140–450)
PMV BLD AUTO: 9.9 FL (ref 6–12)
POTASSIUM SERPL-SCNC: 4.7 MMOL/L (ref 3.5–5.2)
PROT SERPL-MCNC: 6.8 G/DL (ref 6–8.5)
RBC # BLD AUTO: 3.86 10*6/MM3 (ref 4.14–5.8)
SODIUM SERPL-SCNC: 134 MMOL/L (ref 136–145)
WBC NRBC COR # BLD AUTO: 7.41 10*3/MM3 (ref 3.4–10.8)

## 2024-08-23 PROCEDURE — 25010000002 MAGNESIUM SULFATE PER 500 MG OF MAGNESIUM: Performed by: INTERNAL MEDICINE

## 2024-08-23 PROCEDURE — 25010000002 GEMCITABINE HCL 2 GM/20ML SOLUTION 20 ML VIAL: Performed by: INTERNAL MEDICINE

## 2024-08-23 PROCEDURE — 96413 CHEMO IV INFUSION 1 HR: CPT

## 2024-08-23 PROCEDURE — 25010000002 DURVALUMAB 50 MG/ML SOLUTION 10 ML VIAL: Performed by: INTERNAL MEDICINE

## 2024-08-23 PROCEDURE — 25010000002 PALONOSETRON PER 25 MCG: Performed by: INTERNAL MEDICINE

## 2024-08-23 PROCEDURE — 25810000003 SODIUM CHLORIDE 0.9 % SOLUTION: Performed by: INTERNAL MEDICINE

## 2024-08-23 PROCEDURE — 96367 TX/PROPH/DG ADDL SEQ IV INF: CPT

## 2024-08-23 PROCEDURE — 83735 ASSAY OF MAGNESIUM: CPT

## 2024-08-23 PROCEDURE — 25810000003 SODIUM CHLORIDE 0.9 % SOLUTION 500 ML FLEX CONT: Performed by: INTERNAL MEDICINE

## 2024-08-23 PROCEDURE — 85025 COMPLETE CBC W/AUTO DIFF WBC: CPT

## 2024-08-23 PROCEDURE — 25010000002 DEXAMETHASONE SODIUM PHOSPHATE 100 MG/10ML SOLUTION: Performed by: INTERNAL MEDICINE

## 2024-08-23 PROCEDURE — 63710000001 POTASSIUM CHLORIDE 20 MEQ TABLET CONTROLLED-RELEASE: Performed by: INTERNAL MEDICINE

## 2024-08-23 PROCEDURE — 96375 TX/PRO/DX INJ NEW DRUG ADDON: CPT

## 2024-08-23 PROCEDURE — 96417 CHEMO IV INFUS EACH ADDL SEQ: CPT

## 2024-08-23 PROCEDURE — 25010000002 CISPLATIN PER 50 MG: Performed by: INTERNAL MEDICINE

## 2024-08-23 PROCEDURE — 25010000002 FOSAPREPITANT PER 1 MG: Performed by: INTERNAL MEDICINE

## 2024-08-23 PROCEDURE — 25010000002 HEPARIN LOCK FLUSH PER 10 UNITS: Performed by: INTERNAL MEDICINE

## 2024-08-23 PROCEDURE — A9270 NON-COVERED ITEM OR SERVICE: HCPCS | Performed by: INTERNAL MEDICINE

## 2024-08-23 PROCEDURE — 80053 COMPREHEN METABOLIC PANEL: CPT

## 2024-08-23 PROCEDURE — 25810000003 SODIUM CHLORIDE 0.9 % SOLUTION 250 ML FLEX CONT: Performed by: INTERNAL MEDICINE

## 2024-08-23 RX ORDER — HYDROXYZINE HYDROCHLORIDE 25 MG/1
25 TABLET, FILM COATED ORAL 3 TIMES DAILY PRN
Qty: 60 TABLET | Refills: 1 | Status: SHIPPED | OUTPATIENT
Start: 2024-08-23

## 2024-08-23 RX ORDER — SODIUM CHLORIDE 9 MG/ML
20 INJECTION, SOLUTION INTRAVENOUS ONCE
Status: COMPLETED | OUTPATIENT
Start: 2024-08-23 | End: 2024-08-23

## 2024-08-23 RX ORDER — HEPARIN SODIUM (PORCINE) LOCK FLUSH IV SOLN 100 UNIT/ML 100 UNIT/ML
500 SOLUTION INTRAVENOUS AS NEEDED
OUTPATIENT
Start: 2024-08-23

## 2024-08-23 RX ORDER — PALONOSETRON 0.05 MG/ML
0.25 INJECTION, SOLUTION INTRAVENOUS ONCE
OUTPATIENT
Start: 2024-08-30

## 2024-08-23 RX ORDER — SODIUM CHLORIDE 9 MG/ML
20 INJECTION, SOLUTION INTRAVENOUS ONCE
OUTPATIENT
Start: 2024-08-30

## 2024-08-23 RX ORDER — DIPHENHYDRAMINE HYDROCHLORIDE 50 MG/ML
50 INJECTION INTRAMUSCULAR; INTRAVENOUS AS NEEDED
OUTPATIENT
Start: 2024-08-30

## 2024-08-23 RX ORDER — DIPHENHYDRAMINE HYDROCHLORIDE 50 MG/ML
50 INJECTION INTRAMUSCULAR; INTRAVENOUS AS NEEDED
Status: CANCELLED | OUTPATIENT
Start: 2024-08-23

## 2024-08-23 RX ORDER — POTASSIUM CHLORIDE 20 MEQ/1
20 TABLET, EXTENDED RELEASE ORAL DAILY
Status: DISCONTINUED | OUTPATIENT
Start: 2024-08-23 | End: 2024-08-23 | Stop reason: HOSPADM

## 2024-08-23 RX ORDER — PALONOSETRON 0.05 MG/ML
0.25 INJECTION, SOLUTION INTRAVENOUS ONCE
Status: CANCELLED | OUTPATIENT
Start: 2024-08-23

## 2024-08-23 RX ORDER — SODIUM CHLORIDE 9 MG/ML
20 INJECTION, SOLUTION INTRAVENOUS ONCE
Status: CANCELLED | OUTPATIENT
Start: 2024-08-23

## 2024-08-23 RX ORDER — PALONOSETRON 0.05 MG/ML
0.25 INJECTION, SOLUTION INTRAVENOUS ONCE
Status: COMPLETED | OUTPATIENT
Start: 2024-08-23 | End: 2024-08-23

## 2024-08-23 RX ORDER — SODIUM CHLORIDE 0.9 % (FLUSH) 0.9 %
10 SYRINGE (ML) INJECTION AS NEEDED
Status: DISCONTINUED | OUTPATIENT
Start: 2024-08-23 | End: 2024-08-23 | Stop reason: HOSPADM

## 2024-08-23 RX ORDER — FAMOTIDINE 10 MG/ML
20 INJECTION, SOLUTION INTRAVENOUS AS NEEDED
Status: CANCELLED | OUTPATIENT
Start: 2024-08-23

## 2024-08-23 RX ORDER — FAMOTIDINE 10 MG/ML
20 INJECTION, SOLUTION INTRAVENOUS AS NEEDED
OUTPATIENT
Start: 2024-08-30

## 2024-08-23 RX ORDER — POTASSIUM CHLORIDE 20 MEQ/1
20 TABLET, EXTENDED RELEASE ORAL DAILY
Start: 2024-08-30

## 2024-08-23 RX ORDER — SODIUM CHLORIDE 0.9 % (FLUSH) 0.9 %
10 SYRINGE (ML) INJECTION AS NEEDED
OUTPATIENT
Start: 2024-08-23

## 2024-08-23 RX ORDER — HEPARIN SODIUM (PORCINE) LOCK FLUSH IV SOLN 100 UNIT/ML 100 UNIT/ML
500 SOLUTION INTRAVENOUS AS NEEDED
Status: DISCONTINUED | OUTPATIENT
Start: 2024-08-23 | End: 2024-08-23 | Stop reason: HOSPADM

## 2024-08-23 RX ORDER — POTASSIUM CHLORIDE 20 MEQ/1
20 TABLET, EXTENDED RELEASE ORAL DAILY
Status: CANCELLED
Start: 2024-08-23

## 2024-08-23 RX ADMIN — Medication 10 ML: at 16:02

## 2024-08-23 RX ADMIN — SODIUM CHLORIDE 1500 MG: 900 INJECTION, SOLUTION INTRAVENOUS at 12:17

## 2024-08-23 RX ADMIN — MAGNESIUM SULFATE HEPTAHYDRATE 500 ML/HR: 500 INJECTION, SOLUTION INTRAMUSCULAR; INTRAVENOUS at 15:18

## 2024-08-23 RX ADMIN — DEXAMETHASONE SODIUM PHOSPHATE 12 MG: 10 INJECTION, SOLUTION INTRAMUSCULAR; INTRAVENOUS at 13:20

## 2024-08-23 RX ADMIN — SODIUM CHLORIDE 54 MG: 900 INJECTION, SOLUTION INTRAVENOUS at 14:13

## 2024-08-23 RX ADMIN — MAGNESIUM SULFATE HEPTAHYDRATE 943 ML/HR: 500 INJECTION, SOLUTION INTRAMUSCULAR; INTRAVENOUS at 11:08

## 2024-08-23 RX ADMIN — GEMCITABINE 1600 MG: 100 INJECTION, SOLUTION INTRAVENOUS at 13:38

## 2024-08-23 RX ADMIN — FOSAPREPITANT 100 ML: 150 INJECTION, POWDER, LYOPHILIZED, FOR SOLUTION INTRAVENOUS at 11:45

## 2024-08-23 RX ADMIN — PALONOSETRON HYDROCHLORIDE 0.25 MG: 0.25 INJECTION INTRAVENOUS at 13:20

## 2024-08-23 RX ADMIN — SODIUM CHLORIDE 20 ML/HR: 9 INJECTION, SOLUTION INTRAVENOUS at 11:08

## 2024-08-23 RX ADMIN — Medication 500 UNITS: at 16:02

## 2024-08-23 RX ADMIN — POTASSIUM CHLORIDE 20 MEQ: 1500 TABLET, EXTENDED RELEASE ORAL at 15:18

## 2024-08-23 NOTE — NURSING NOTE
Pt gained 4 lbs post treatment. No soa, no swelling, No issues or concerns noted.Discharged in stable condition.

## 2024-08-27 RX ORDER — DOXAZOSIN 2 MG/1
2 TABLET ORAL
Qty: 30 TABLET | Refills: 0 | Status: SHIPPED | OUTPATIENT
Start: 2024-08-27

## 2024-08-29 ENCOUNTER — TELEPHONE (OUTPATIENT)
Dept: ONCOLOGY | Facility: CLINIC | Age: 79
End: 2024-08-29
Payer: MEDICARE

## 2024-08-29 NOTE — TELEPHONE ENCOUNTER
Called patient, let him know that Dr. Tejada wants the patient to see NP Unruly before his infusion tomorrow. Told him that his new appt time will be at 130 for labs to for him to show up 15 mins prior.  Pt v/u

## 2024-08-29 NOTE — TELEPHONE ENCOUNTER
Called patient's wife, said he is not vomiting but he is nausea and cramping. Wife states the patient is not eating much these past couple days. Patient just took Zofran to see if that will help. Wife states that the patient has been really weak this week since his last infusion on 8/23. States he is feeling worse than last cycle but the patient is getting up and moving around.

## 2024-08-29 NOTE — TELEPHONE ENCOUNTER
Provider: Terrell   Caller: patient  Relationship to Patient: self  Call Back Phone Number: [phone]  Reason for Call: Pt has nausea and vomiting.

## 2024-08-30 ENCOUNTER — OFFICE VISIT (OUTPATIENT)
Dept: ONCOLOGY | Facility: CLINIC | Age: 79
End: 2024-08-30
Payer: MEDICARE

## 2024-08-30 ENCOUNTER — INFUSION (OUTPATIENT)
Dept: ONCOLOGY | Facility: HOSPITAL | Age: 79
End: 2024-08-30
Payer: MEDICARE

## 2024-08-30 VITALS
BODY MASS INDEX: 27.1 KG/M2 | DIASTOLIC BLOOD PRESSURE: 57 MMHG | HEART RATE: 75 BPM | HEIGHT: 72 IN | WEIGHT: 200.1 LBS | OXYGEN SATURATION: 97 % | SYSTOLIC BLOOD PRESSURE: 91 MMHG | TEMPERATURE: 98.2 F | RESPIRATION RATE: 16 BRPM

## 2024-08-30 DIAGNOSIS — C22.1 CHOLANGIOCARCINOMA: Primary | ICD-10-CM

## 2024-08-30 DIAGNOSIS — Z79.899 ENCOUNTER FOR LONG-TERM (CURRENT) USE OF OTHER MEDICATIONS: ICD-10-CM

## 2024-08-30 DIAGNOSIS — D69.6 THROMBOCYTOPENIA: ICD-10-CM

## 2024-08-30 DIAGNOSIS — C22.1 CHOLANGIOCARCINOMA: ICD-10-CM

## 2024-08-30 DIAGNOSIS — N17.9 AKI (ACUTE KIDNEY INJURY): ICD-10-CM

## 2024-08-30 LAB
ALBUMIN SERPL-MCNC: 2.8 G/DL (ref 3.5–5.2)
ALBUMIN/GLOB SERPL: 0.8 G/DL
ALP SERPL-CCNC: 91 U/L (ref 39–117)
ALT SERPL W P-5'-P-CCNC: 51 U/L (ref 1–41)
ANION GAP SERPL CALCULATED.3IONS-SCNC: 12 MMOL/L (ref 5–15)
AST SERPL-CCNC: 63 U/L (ref 1–40)
BASOPHILS # BLD AUTO: 0.07 10*3/MM3 (ref 0–0.2)
BASOPHILS NFR BLD AUTO: 2.1 % (ref 0–1.5)
BILIRUB SERPL-MCNC: 1.2 MG/DL (ref 0–1.2)
BUN SERPL-MCNC: 26 MG/DL (ref 8–23)
BUN/CREAT SERPL: 19 (ref 7–25)
CALCIUM SPEC-SCNC: 8.1 MG/DL (ref 8.6–10.5)
CHLORIDE SERPL-SCNC: 97 MMOL/L (ref 98–107)
CO2 SERPL-SCNC: 24 MMOL/L (ref 22–29)
CREAT SERPL-MCNC: 1.37 MG/DL (ref 0.76–1.27)
DEPRECATED RDW RBC AUTO: 43 FL (ref 37–54)
EGFRCR SERPLBLD CKD-EPI 2021: 52.8 ML/MIN/1.73
EOSINOPHIL # BLD AUTO: 0.02 10*3/MM3 (ref 0–0.4)
EOSINOPHIL NFR BLD AUTO: 0.6 % (ref 0.3–6.2)
ERYTHROCYTE [DISTWIDTH] IN BLOOD BY AUTOMATED COUNT: 14.6 % (ref 12.3–15.4)
GLOBULIN UR ELPH-MCNC: 3.7 GM/DL
GLUCOSE SERPL-MCNC: 237 MG/DL (ref 65–99)
HCT VFR BLD AUTO: 32.1 % (ref 37.5–51)
HGB BLD-MCNC: 10.3 G/DL (ref 13–17.7)
IMM GRANULOCYTES # BLD AUTO: 0.03 10*3/MM3 (ref 0–0.05)
IMM GRANULOCYTES NFR BLD AUTO: 0.9 % (ref 0–0.5)
LYMPHOCYTES # BLD AUTO: 0.93 10*3/MM3 (ref 0.7–3.1)
LYMPHOCYTES NFR BLD AUTO: 27.4 % (ref 19.6–45.3)
MAGNESIUM SERPL-MCNC: 1.4 MG/DL (ref 1.6–2.4)
MCH RBC QN AUTO: 29.1 PG (ref 26.6–33)
MCHC RBC AUTO-ENTMCNC: 32.1 G/DL (ref 31.5–35.7)
MCV RBC AUTO: 90.7 FL (ref 79–97)
MONOCYTES # BLD AUTO: 0.12 10*3/MM3 (ref 0.1–0.9)
MONOCYTES NFR BLD AUTO: 3.5 % (ref 5–12)
NEUTROPHILS NFR BLD AUTO: 2.22 10*3/MM3 (ref 1.7–7)
NEUTROPHILS NFR BLD AUTO: 65.5 % (ref 42.7–76)
NRBC BLD AUTO-RTO: 0 /100 WBC (ref 0–0.2)
PLATELET # BLD AUTO: 94 10*3/MM3 (ref 140–450)
PMV BLD AUTO: 10.7 FL (ref 6–12)
POTASSIUM SERPL-SCNC: 5 MMOL/L (ref 3.5–5.2)
PROT SERPL-MCNC: 6.5 G/DL (ref 6–8.5)
RBC # BLD AUTO: 3.54 10*6/MM3 (ref 4.14–5.8)
SODIUM SERPL-SCNC: 133 MMOL/L (ref 136–145)
WBC NRBC COR # BLD AUTO: 3.39 10*3/MM3 (ref 3.4–10.8)

## 2024-08-30 PROCEDURE — 96365 THER/PROPH/DIAG IV INF INIT: CPT

## 2024-08-30 PROCEDURE — 96361 HYDRATE IV INFUSION ADD-ON: CPT

## 2024-08-30 PROCEDURE — 85025 COMPLETE CBC W/AUTO DIFF WBC: CPT

## 2024-08-30 PROCEDURE — 25010000002 MAGNESIUM SULFATE 2 GM/50ML SOLUTION: Performed by: NURSE PRACTITIONER

## 2024-08-30 PROCEDURE — 96375 TX/PRO/DX INJ NEW DRUG ADDON: CPT

## 2024-08-30 PROCEDURE — 25010000002 ONDANSETRON PER 1 MG: Performed by: NURSE PRACTITIONER

## 2024-08-30 PROCEDURE — 83735 ASSAY OF MAGNESIUM: CPT

## 2024-08-30 PROCEDURE — 80053 COMPREHEN METABOLIC PANEL: CPT

## 2024-08-30 PROCEDURE — 25010000002 MAGNESIUM SULFATE IN D5W 1G/100ML (PREMIX) 1-5 GM/100ML-% SOLUTION: Performed by: NURSE PRACTITIONER

## 2024-08-30 PROCEDURE — 25810000003 SODIUM CHLORIDE 0.9 % SOLUTION: Performed by: NURSE PRACTITIONER

## 2024-08-30 RX ORDER — PREDNISONE 5 MG/1
TABLET ORAL
COMMUNITY
Start: 2024-08-12

## 2024-08-30 RX ORDER — MAGNESIUM SULFATE 1 G/100ML
1 INJECTION INTRAVENOUS ONCE
Status: COMPLETED | OUTPATIENT
Start: 2024-08-30 | End: 2024-08-30

## 2024-08-30 RX ORDER — ONDANSETRON 8 MG/1
8 TABLET, FILM COATED ORAL 3 TIMES DAILY PRN
Qty: 30 TABLET | Refills: 5 | Status: SHIPPED | OUTPATIENT
Start: 2024-08-30

## 2024-08-30 RX ORDER — ONDANSETRON 2 MG/ML
8 INJECTION INTRAMUSCULAR; INTRAVENOUS ONCE
Status: COMPLETED | OUTPATIENT
Start: 2024-08-30 | End: 2024-08-30

## 2024-08-30 RX ORDER — MAGNESIUM SULFATE HEPTAHYDRATE 40 MG/ML
2 INJECTION, SOLUTION INTRAVENOUS ONCE
Status: COMPLETED | OUTPATIENT
Start: 2024-08-30 | End: 2024-08-30

## 2024-08-30 RX ADMIN — MAGNESIUM SULFATE HEPTAHYDRATE 2 G: 40 INJECTION, SOLUTION INTRAVENOUS at 15:14

## 2024-08-30 RX ADMIN — ONDANSETRON 8 MG: 2 INJECTION INTRAMUSCULAR; INTRAVENOUS at 14:42

## 2024-08-30 RX ADMIN — MAGNESIUM SULFATE HEPTAHYDRATE 1 G: 1 INJECTION, SOLUTION INTRAVENOUS at 14:42

## 2024-08-30 RX ADMIN — SODIUM CHLORIDE 1000 ML: 9 INJECTION, SOLUTION INTRAVENOUS at 14:42

## 2024-08-30 NOTE — NURSING NOTE
Per Cait BRAVO no treatment today. Patient will not makeup missed today and will return on 9/6 for labs and IVFs. Pt v/u

## 2024-08-30 NOTE — PROGRESS NOTES
Subjective     REASON FOR CONSULTATION:  cholangiocarcinoma  Provide an opinion on any further workup or treatment                             REQUESTING PHYSICIAN:  Thi    RECORDS OBTAINED:  Records of the patients history including those obtained from the referring provider were reviewed and summarized in detail.    HISTORY OF PRESENT ILLNESS:  The patient is a 79 y.o. year old male who is here for an opinion about the above issue.    History of Present Illness   The patient initiated palliative treatment with cisplatin/Gemzar and nivolumab on 8/2/2024.  He initiated cycle two 1 week ago, 8/23/2024 with Gemzar given a dose reduction due to previous thrombocytopenia.  Patient called the office yesterday with reports of intractable nausea and vomiting and therefore was added on to be seen today, due for cycle 2-day 8.  He reports he is struggling with constant nausea.  He has been taking Zofran at night to has not taken anything throughout the day.  His oral intake is decreased due to his nausea.  He denies abdominal pain.  He is struggling with some lightheadedness and dizziness and is noted to be hypotensive in the office today      ONCOLOGY HISTORY:  This is a 79-year-old man with diabetes, hyperlipidemia, rheumatoid arthritis, tobacco abuse who underwent a CT angiogram of the chest to evaluate an aortic aneurysm and incidentally noted a mass in the right lobe of the liver.  A CT of the abdomen and pelvis was performed on 5/10/2024 showed a heterogeneously peripherally enhancing right hepatic lobe mass 7 x 5.7 cm, subtle 1.5 cm low-attenuation focus adjacently and a nonobstructing annular mass at the hepatic flexure.  There were several calcifications in the pancreas consistent with chronic pancreatitis, several small renal cysts and an enlarged prostate 6.5 cm.  There were extensive abdominal aortic atherosclerotic changes without aneurysm and extensive right pleural calcifications surrounding a small  loculated right pleural effusion stable since 4/1/2024.    EGD and colonoscopy were performed on 6/4/2024 by Dr. Ness-normal EGD, poor bowel prep inadequate for colorectal screening, ascending colon polyp resected, rectosigmoid polyp resected.    A CT-guided liver biopsy was performed on 6/17/2024 which showed stent hepatitis with periportal bile duct reaction and associated fibrosis/inflammation no evidence of malignancy.  A repeat liver biopsy was performed on 7/18/2024 showing adenocarcinoma consistent with cholangiocarcinoma.  CA 19-9 was elevated 133 and AFP normal 3.81.    The patient has not had significant symptoms of abdominal pain, weight loss, nausea, vomiting or jaundice.    He has significant rheumatoid arthritis history with joint deviation, history of iritis, previous pericarditis requiring pericardial window.    Past Medical History:   Diagnosis Date    Cancer     Liver    Colon polyp     Diabetes mellitus     Hyperlipidemia     Hypertension     Liver cancer 07-    Liver disease     Postoperative wound infection     RA (rheumatoid arthritis)         Past Surgical History:   Procedure Laterality Date    ABDOMINAL SURGERY      BACK SURGERY      CHOLECYSTECTOMY      COLONOSCOPY  2017    [facility]    COLONOSCOPY N/A 06/04/2024    Procedure: COLONOSCOPY INTO CECUM WITH POLYPECTOMIES (COLD SNARE);  Surgeon: Angel Ness MD;  Location: [facility] ENDOSCOPY;  Service: General;  Laterality: N/A;  PRE: COLON MASS, ABNORMAL CT  POST: DIVERTICULOSIS, POLYPS, POOR PREP    ENDOSCOPY N/A 06/04/2024    Procedure: ESOPHAGOGASTRODUODENOSCOPY;  Surgeon: Angel Ness MD;  Location: [facility] ENDOSCOPY;  Service: General;  Laterality: N/A;  PRE: LIVER MASS  POST: NORMAL EGD    EYE SURGERY      PERICARDIUM SURGERY      REPLACEMENT TOTAL KNEE BILATERAL      THORACOTOMY Bilateral 1997    VENOUS ACCESS DEVICE (PORT) INSERTION Right 7/30/2024    Procedure: INSERTION  VENOUS ACCESS DEVICE;  Surgeon: Angel Ness MD;  Location: [facility] MAIN OR;  Service: General;  Laterality: Right;        Current Outpatient Medications on File Prior to Visit   Medication Sig Dispense Refill    atenolol (TENORMIN) 25 MG tablet Take 1 tablet by mouth Daily. 90 tablet 1    doxazosin (CARDURA) 2 MG tablet TAKE 1 TABLET BY MOUTH EVERY DAY AT BEDTIME 30 tablet 0    doxazosin (CARDURA) 2 MG tablet Take 1 tablet by mouth every night at bedtime. 30 tablet 0    gabapentin (NEURONTIN) 300 MG capsule TAKE 2 CAPSULES BY MOUTH EVERY NIGHT AT BEDTIME 60 capsule 0    glucose blood test strip Once a day 100 each 12    hydrOXYzine (ATARAX) 25 MG tablet Take 1 tablet by mouth 3 (Three) Times a Day As Needed for Itching. 60 tablet 1    losartan (COZAAR) 25 MG tablet Take 1 tablet by mouth Daily. 90 tablet 3    meloxicam (MOBIC) 15 MG tablet Take 1 tablet by mouth Daily. 90 tablet 3    metFORMIN ER (GLUCOPHAGE-XR) 500 MG 24 hr tablet Take 2 tablets by mouth Daily. 90 tablet 1    OLANZapine (zyPREXA) 5 MG tablet Take 1 tablet by mouth Every Night. 30 tablet 3    ondansetron (ZOFRAN) 8 MG tablet Take 1 tablet by mouth 3 (Three) Times a Day As Needed for Nausea or Vomiting. 30 tablet 5    ondansetron ODT (ZOFRAN-ODT) 4 MG disintegrating tablet Place 1 tablet on the tongue Every 8 (Eight) Hours As Needed for Nausea or Vomiting. 10 tablet 0    pilocarpine (SALAGEN) 5 MG tablet Take 1 tablet by mouth 3 times a day.      pravastatin (PRAVACHOL) 80 MG tablet Take 1 tablet by mouth Daily. 90 tablet 1    traMADol (ULTRAM) 50 MG tablet Take 2 tablets by mouth Every 6 (Six) Hours As Needed.      furosemide (Lasix) 40 MG tablet Take 1 tablet by mouth Daily for 2 days. 2 tablet 0    predniSONE (DELTASONE) 5 MG tablet TAKE 6 TABLETS BY MOUTH ON DAY 1, THEN 5 TABLETS ON DAY 2, THEN 4 TABLETS ON DAY 3, THEN 3 TABLETS ON DAY 4, THEN 2 TABLETS ON DAY 5, THEN 1 TABLET ON DAY 6. TAKE ALL DOSES WITH FOOD. (Patient not taking:  "Reported on 2024)       Current Facility-Administered Medications on File Prior to Visit   Medication Dose Route Frequency Provider Last Rate Last Admin    [COMPLETED] magnesium sulfate 2g/50 mL (PREMIX) infusion  2 g Intravenous Once Cait Tolliver APRN   2 g at 24 1514    [COMPLETED] magnesium sulfate in D5W 1g/100mL (PREMIX)  1 g Intravenous Once Cait Tolliver APRN   Stopped at 24 1513    [COMPLETED] ondansetron (ZOFRAN) injection 8 mg  8 mg Intravenous Once Cait Tolliver APRN   8 mg at 24 1442    [COMPLETED] sodium chloride 0.9 % bolus 1,000 mL  1,000 mL Intravenous Once Cait Tolliver APRN 1,000 mL/hr at 24 1442 1,000 mL at 24 1442        ALLERGIES:    Allergies   Allergen Reactions    Chlorhexidine Dermatitis     Please use alcohol to clean port site        Social History     Socioeconomic History    Marital status:    Tobacco Use    Smoking status: Former     Current packs/day: 0.00     Average packs/day: 1 pack/day for 53.7 years (53.7 ttl pk-yrs)     Types: Cigarettes     Start date: 1970     Quit date: 10/1/2023     Years since quittin.9     Passive exposure: Past    Smokeless tobacco: Never   Vaping Use    Vaping status: Never Used   Substance and Sexual Activity    Alcohol use: Not Currently    Drug use: Never    Sexual activity: Not Currently     Partners: Female     Birth control/protection: Vasectomy        Family History   Problem Relation Age of Onset    Hypertension Mother     Heart disease Mother     Arthritis Father     Diabetes Sister     Asthma Brother     Diabetes Brother     Malig Hyperthermia Neg Hx         Review of Systems   ROS as per HPI    Objective     Vitals:    24 1410   BP: 91/57   Pulse: 75   Resp: 16   Temp: 98.2 °F (36.8 °C)   TempSrc: Oral   SpO2: 97%   Weight: 90.8 kg (200 lb 1.6 oz)   Height: 182 cm (71.65\")   PainSc: 0-No pain             2024     2:11 PM   Current Status "   ECOG score 0       Physical Exam    CONSTITUTIONAL: pleasant well-developed adult man  HEENT: no icterus, no thrush, moist membranes  CV: RRR, S1S2, no murmur, sternotomy scar present  RESP: cta bilat, no wheezing, no rales  GI: soft, non-tender, no splenomegaly, +bs, multiple surgical scars on the abdomen  MUSC: no edema, multiple deviated hand joints, slight limp from arthritis  NEURO: alert and oriented x3, normal strength  PSYCH: Improved mood and affect today  Skin: Faint rash present on the arms and legs      RECENT LABS:  Results from last 7 days   Lab Units 08/30/24  1329   WBC 10*3/mm3 3.39*   NEUTROS ABS 10*3/mm3 2.22   HEMOGLOBIN g/dL 10.3*   HEMATOCRIT % 32.1*   PLATELETS 10*3/mm3 94*     Results from last 7 days   Lab Units 08/30/24  1329   SODIUM mmol/L 133*   POTASSIUM mmol/L 5.0   CHLORIDE mmol/L 97*   CO2 mmol/L 24.0   BUN mg/dL 26*   CREATININE mg/dL 1.37*   CALCIUM mg/dL 8.1*   ALBUMIN g/dL 2.8*   BILIRUBIN mg/dL 1.2   ALK PHOS U/L 91   ALT (SGPT) U/L 51*   AST (SGOT) U/L 63*   GLUCOSE mg/dL 237*   MAGNESIUM mg/dL 1.4*           MRI ABD 7/5/24:  FINDINGS:  Asymmetric small right pleural effusion with associated pleural  thickening is present. Of note, a small right pleural effusion was  present on chest radiograph 6/27/2015 and 5/15/2023 and pleural  thickening and calcification was seen in this area on 8/14/2023. No  intra or extrahepatic bile duct dilation is present. A 0.6 cm T2  hyperintense lesion is present in the inferior aspect of the pancreatic  head.     There is significant hepatic steatosis. There is a large heterogenous  mass within the right hepatic dome. It demonstrates restricted  diffusion; however less than that of the spleen. It measures 7.3 x 6.1 x  7.5 cm (previously 7.3 x 5.8 x 6 cm on 5/10/2024). It demonstrates  largely peripheral enhancement there is a few scattered smaller  peripherally enhancing and solid enhancing lesions within the  surrounding right hepatic lobe  measuring up to approximate 0.9 cm.     Subcentimeter renal lesions are too small to characterize. Larger T2  hyperintense lesions within the left kidney do not demonstrate  significant enhancement and favored be benign. The main portal vein is  patent.     IMPRESSION  1.  There is a heterogenous, large peripherally enhancing mass within  the posterior aspect of the right hepatic dome which is grossly  unchanged to minimally increased in size since 5/10/2024. Overall  findings are indeterminate and findings remain most concerning for  malignancy until proven otherwise. Other differential consideration  includes an atypical abscess although this considered less likely. A few  small peripheral enhancing and solid enhancing lesions are located  within the surrounding right hepatic lobe and spread of malignancy  cannot be excluded.  2.  Chronic right pleural effusion and pleural thickening, as before.  3.  Significant hepatic steatosis.  4.  T2 hyperintense lesions within the pancreas. Follow-up with  abdominal MRI in 2 years is recommended per ACR criteria.  5.  Other findings as above    Assessment & Plan     *intrahepatic cholangiocarcinoma, multifocal:  7/5/24 MRI abd-heterogenous, large peripherally enhancing mass withinthe posterior aspect of the right hepatic dome which is grossly unchanged to minimally increased in size since 5/10/2024. small peripheral enhancing and solid enhancing lesions are located within the surrounding right hepatic lobe and spread of malignancy is likely  7/18/24 CT liver biopsy-adenocarcinoma consistent with cholangiocarcinoma by IHC  CA 19-9 133  8/2/2024-initiated palliative treatment with cisplatin/Gemzar plus Durvalumab; day 8 Gemzar given at 50% because of thrombocytopenia (60)  8/23/2024 cycle 2 cisplatin, Gemzar, Durvalumab with additional 25% dose reduction to Gemzar due to thrombocytopenia  Due today, 8/30/2024 for day 8 therapy which will be held due to MALI and intractable  nausea    *Thrombocytopenia secondary to chemotherapy  currently 94,000 without signs or symptoms of bleeding    *Depression/adjustment disorder    *RA  significant rheumatoid arthritis history with joint deviation, history of iritis, previous pericarditis requiring pericardial window  Patient has been treated with Simponi currently on hold per rheumatology    *Additional comorbidities-diabetes mellitus, hyperlipidemia, hypertension, tobacco abuse, BPH    *Skin rash/pruritus secondary to chemotherapy versus immunotherapy  Previously required a steroid Dosepak    *MALI  8/30/2024 creatinine increased at 1.37 with BUN 26.  Proceed with 1 L normal saline    *Hypomagnesemia  IV replacement per protocol    *Intractable nausea  8/30/2024 significant nausea following cycle 2-day 1.  Though the patient is not utilizing Zofran throughout the day, only nightly    Oncology plan/recommendations:  Cycle 2-day 8 cisplatin Gemzar today due to acute kidney injury and nausea  Proceed with 1 L normal saline  IV magnesium replacement per protocol  The patient was instructed to utilize Zofran every 8 hours, particularly throughout the day as he has been taking this only nightly.  Zofran was refilled today  The patient will return in 1 week for CBC, stat CMP nurse practitioner follow-up and possible IV fluid support  In 1 week, we will plan to repeat his tumor marker and imaging after 2 cycles of systemic therapy. AFP and CT abdomen with without contrast/liver protocol.  If he is having a good response and tolerance would plan to give 4-6 cycles of chemotherapy and reassess for resectability or local therapy to the dominant liver mass with chemoembolization etc. via IR  Continue olanzapine 5 mg nightly for depression/appetite stimulation/nausea etc.  Follow-up with Dr. Tejada in 2 weeks with CBC, CMP, review of CT scan and determination of further plan of care    Plan of care hold chemotherapy was discussed and reviewed with Dr. Tejada  who is in agreement.  Patient is on a high risk medication    Cait Tolliver, APRN  08/30/2024

## 2024-09-06 ENCOUNTER — INFUSION (OUTPATIENT)
Dept: ONCOLOGY | Facility: HOSPITAL | Age: 79
End: 2024-09-06
Payer: MEDICARE

## 2024-09-06 ENCOUNTER — HOSPITAL ENCOUNTER (OUTPATIENT)
Dept: PET IMAGING | Facility: HOSPITAL | Age: 79
Discharge: HOME | End: 2024-09-06
Payer: MEDICARE

## 2024-09-06 ENCOUNTER — OFFICE VISIT (OUTPATIENT)
Dept: ONCOLOGY | Facility: CLINIC | Age: 79
End: 2024-09-06
Payer: MEDICARE

## 2024-09-06 VITALS
RESPIRATION RATE: 16 BRPM | HEART RATE: 99 BPM | DIASTOLIC BLOOD PRESSURE: 73 MMHG | OXYGEN SATURATION: 93 % | HEIGHT: 72 IN | WEIGHT: 205.2 LBS | SYSTOLIC BLOOD PRESSURE: 122 MMHG | BODY MASS INDEX: 27.79 KG/M2 | TEMPERATURE: 98 F

## 2024-09-06 DIAGNOSIS — C22.1 CHOLANGIOCARCINOMA: Primary | ICD-10-CM

## 2024-09-06 DIAGNOSIS — D69.6 THROMBOCYTOPENIA: ICD-10-CM

## 2024-09-06 DIAGNOSIS — Z79.899 HIGH RISK MEDICATION USE: ICD-10-CM

## 2024-09-06 DIAGNOSIS — C22.1 CHOLANGIOCARCINOMA: ICD-10-CM

## 2024-09-06 DIAGNOSIS — E83.42 HYPOMAGNESEMIA: ICD-10-CM

## 2024-09-06 DIAGNOSIS — Z45.2 FITTING AND ADJUSTMENT OF VASCULAR CATHETER: Primary | ICD-10-CM

## 2024-09-06 LAB
ALBUMIN SERPL-MCNC: 2.9 G/DL (ref 3.5–5.2)
ALBUMIN/GLOB SERPL: 0.8 G/DL
ALP SERPL-CCNC: 92 U/L (ref 39–117)
ALPHA-FETOPROTEIN: 6.27 NG/ML (ref 0–8.3)
ALT SERPL W P-5'-P-CCNC: 26 U/L (ref 1–41)
ANION GAP SERPL CALCULATED.3IONS-SCNC: 6.1 MMOL/L (ref 5–15)
AST SERPL-CCNC: 54 U/L (ref 1–40)
BASOPHILS # BLD AUTO: 0.01 10*3/MM3 (ref 0–0.2)
BASOPHILS NFR BLD AUTO: 0.2 % (ref 0–1.5)
BILIRUB SERPL-MCNC: 0.7 MG/DL (ref 0–1.2)
BUN SERPL-MCNC: 13 MG/DL (ref 8–23)
BUN/CREAT SERPL: 12.9 (ref 7–25)
CALCIUM SPEC-SCNC: 8 MG/DL (ref 8.6–10.5)
CHLORIDE SERPL-SCNC: 101 MMOL/L (ref 98–107)
CO2 SERPL-SCNC: 26.9 MMOL/L (ref 22–29)
CREAT SERPL-MCNC: 1.01 MG/DL (ref 0.76–1.27)
DEPRECATED RDW RBC AUTO: 45.2 FL (ref 37–54)
EGFRCR SERPLBLD CKD-EPI 2021: 76.1 ML/MIN/1.73
EOSINOPHIL # BLD AUTO: 0.08 10*3/MM3 (ref 0–0.4)
EOSINOPHIL NFR BLD AUTO: 1.5 % (ref 0.3–6.2)
ERYTHROCYTE [DISTWIDTH] IN BLOOD BY AUTOMATED COUNT: 16.8 % (ref 12.3–15.4)
GLOBULIN UR ELPH-MCNC: 3.7 GM/DL
GLUCOSE SERPL-MCNC: 140 MG/DL (ref 65–99)
HCT VFR BLD AUTO: 29.7 % (ref 37.5–51)
HGB BLD-MCNC: 9.5 G/DL (ref 13–17.7)
IMM GRANULOCYTES # BLD AUTO: 0.03 10*3/MM3 (ref 0–0.05)
IMM GRANULOCYTES NFR BLD AUTO: 0.5 % (ref 0–0.5)
LYMPHOCYTES # BLD AUTO: 0.66 10*3/MM3 (ref 0.7–3.1)
LYMPHOCYTES NFR BLD AUTO: 12.1 % (ref 19.6–45.3)
MAGNESIUM SERPL-MCNC: 1.2 MG/DL (ref 1.6–2.4)
MCH RBC QN AUTO: 29.7 PG (ref 26.6–33)
MCHC RBC AUTO-ENTMCNC: 32 G/DL (ref 31.5–35.7)
MCV RBC AUTO: 92.8 FL (ref 79–97)
MONOCYTES # BLD AUTO: 0.53 10*3/MM3 (ref 0.1–0.9)
MONOCYTES NFR BLD AUTO: 9.7 % (ref 5–12)
NEUTROPHILS NFR BLD AUTO: 4.16 10*3/MM3 (ref 1.7–7)
NEUTROPHILS NFR BLD AUTO: 76 % (ref 42.7–76)
NRBC BLD AUTO-RTO: 0 /100 WBC (ref 0–0.2)
PLATELET # BLD AUTO: 115 10*3/MM3 (ref 140–450)
PMV BLD AUTO: 10.1 FL (ref 6–12)
POTASSIUM SERPL-SCNC: 5 MMOL/L (ref 3.5–5.2)
PROT SERPL-MCNC: 6.6 G/DL (ref 6–8.5)
RBC # BLD AUTO: 3.2 10*6/MM3 (ref 4.14–5.8)
SODIUM SERPL-SCNC: 134 MMOL/L (ref 136–145)
WBC NRBC COR # BLD AUTO: 5.47 10*3/MM3 (ref 3.4–10.8)

## 2024-09-06 PROCEDURE — 25810000003 SODIUM CHLORIDE 0.9 % SOLUTION: Performed by: NURSE PRACTITIONER

## 2024-09-06 PROCEDURE — 85025 COMPLETE CBC W/AUTO DIFF WBC: CPT

## 2024-09-06 PROCEDURE — 96365 THER/PROPH/DIAG IV INF INIT: CPT

## 2024-09-06 PROCEDURE — 25010000002 HEPARIN LOCK FLUSH PER 10 UNITS: Performed by: INTERNAL MEDICINE

## 2024-09-06 PROCEDURE — 25510000001 IOPAMIDOL 61 % SOLUTION: Performed by: INTERNAL MEDICINE

## 2024-09-06 PROCEDURE — 74170 CT ABD WO CNTRST FLWD CNTRST: CPT

## 2024-09-06 PROCEDURE — 96361 HYDRATE IV INFUSION ADD-ON: CPT

## 2024-09-06 PROCEDURE — 83735 ASSAY OF MAGNESIUM: CPT

## 2024-09-06 PROCEDURE — 82105 ALPHA-FETOPROTEIN SERUM: CPT | Performed by: INTERNAL MEDICINE

## 2024-09-06 PROCEDURE — 96366 THER/PROPH/DIAG IV INF ADDON: CPT

## 2024-09-06 PROCEDURE — 80053 COMPREHEN METABOLIC PANEL: CPT

## 2024-09-06 PROCEDURE — 25010000002 MAGNESIUM SULFATE 4 GM/100ML SOLUTION: Performed by: NURSE PRACTITIONER

## 2024-09-06 RX ORDER — IOPAMIDOL 612 MG/ML
100 INJECTION, SOLUTION INTRAVASCULAR
Status: COMPLETED | OUTPATIENT
Start: 2024-09-06 | End: 2024-09-06

## 2024-09-06 RX ORDER — SODIUM CHLORIDE 9 MG/ML
1000 INJECTION, SOLUTION INTRAVENOUS ONCE
Status: COMPLETED | OUTPATIENT
Start: 2024-09-06 | End: 2024-09-06

## 2024-09-06 RX ORDER — SODIUM CHLORIDE 0.9 % (FLUSH) 0.9 %
10 SYRINGE (ML) INJECTION AS NEEDED
Status: DISCONTINUED | OUTPATIENT
Start: 2024-09-06 | End: 2024-09-06 | Stop reason: HOSPADM

## 2024-09-06 RX ORDER — SODIUM CHLORIDE 0.9 % (FLUSH) 0.9 %
10 SYRINGE (ML) INJECTION AS NEEDED
OUTPATIENT
Start: 2024-09-06

## 2024-09-06 RX ORDER — MAGNESIUM SULFATE HEPTAHYDRATE 40 MG/ML
4 INJECTION, SOLUTION INTRAVENOUS ONCE
Status: COMPLETED | OUTPATIENT
Start: 2024-09-06 | End: 2024-09-06

## 2024-09-06 RX ORDER — HEPARIN SODIUM (PORCINE) LOCK FLUSH IV SOLN 100 UNIT/ML 100 UNIT/ML
500 SOLUTION INTRAVENOUS AS NEEDED
Status: DISCONTINUED | OUTPATIENT
Start: 2024-09-06 | End: 2024-09-06 | Stop reason: HOSPADM

## 2024-09-06 RX ORDER — MAGNESIUM OXIDE 400 MG/1
400 TABLET ORAL
Qty: 30 TABLET | Refills: 2 | Status: SHIPPED | OUTPATIENT
Start: 2024-09-06

## 2024-09-06 RX ORDER — HEPARIN SODIUM (PORCINE) LOCK FLUSH IV SOLN 100 UNIT/ML 100 UNIT/ML
500 SOLUTION INTRAVENOUS AS NEEDED
OUTPATIENT
Start: 2024-09-06

## 2024-09-06 RX ADMIN — Medication 10 ML: at 12:31

## 2024-09-06 RX ADMIN — Medication 500 UNITS: at 12:31

## 2024-09-06 RX ADMIN — IOPAMIDOL 85 ML: 612 INJECTION, SOLUTION INTRAVENOUS at 09:47

## 2024-09-06 RX ADMIN — SODIUM CHLORIDE 1000 ML: 9 INJECTION, SOLUTION INTRAVENOUS at 11:24

## 2024-09-06 RX ADMIN — MAGNESIUM SULFATE 4 G: 4 INJECTION INTRAVENOUS at 10:27

## 2024-09-06 NOTE — PROGRESS NOTES
Subjective     REASON FOR FOLLOW-UP:  cholangiocarcinoma  Provide an opinion on any further workup or treatment                             REQUESTING PHYSICIAN:  Thi    RECORDS OBTAINED:  Records of the patients history including those obtained from the referring provider were reviewed and summarized in detail.    HISTORY OF PRESENT ILLNESS:  The patient is a 79 y.o. year old male who is here for an opinion about the above issue.    History of Present Illness   The patient initiated palliative treatment with cisplatin/Gemzar and nivolumab on 8/2/2024.  He initiated cycle two 8/23/2024 with Gemzar given a dose reduction due to previous thrombocytopenia.  He was seen on cycle 2-day 8 with constant nausea, diminished oral intake and was hypotensive.  Treatment was held and he was given IV fluids along with magnesium replacement.  He was scheduled for CT imaging which was this morning and was asked to come in following for IV fluids and reassessment.  He reports continued nausea though denies vomiting.  He is had no diarrhea.  He is using his ondansetron however does not think he is using olanzapine nightly as we previously thought.  He will check this at home.  Otherwise he is just feeling fatigued.  His weight is back up from where he was down last week.  He has had no fevers.    ONCOLOGY HISTORY:  This is a 79-year-old man with diabetes, hyperlipidemia, rheumatoid arthritis, tobacco abuse who underwent a CT angiogram of the chest to evaluate an aortic aneurysm and incidentally noted a mass in the right lobe of the liver.  A CT of the abdomen and pelvis was performed on 5/10/2024 showed a heterogeneously peripherally enhancing right hepatic lobe mass 7 x 5.7 cm, subtle 1.5 cm low-attenuation focus adjacently and a nonobstructing annular mass at the hepatic flexure.  There were several calcifications in the pancreas consistent with chronic pancreatitis, several small renal cysts and an enlarged prostate 6.5 cm.   There were extensive abdominal aortic atherosclerotic changes without aneurysm and extensive right pleural calcifications surrounding a small loculated right pleural effusion stable since 4/1/2024.    EGD and colonoscopy were performed on 6/4/2024 by Dr. Ness-normal EGD, poor bowel prep inadequate for colorectal screening, ascending colon polyp resected, rectosigmoid polyp resected.    A CT-guided liver biopsy was performed on 6/17/2024 which showed stent hepatitis with periportal bile duct reaction and associated fibrosis/inflammation no evidence of malignancy.  A repeat liver biopsy was performed on 7/18/2024 showing adenocarcinoma consistent with cholangiocarcinoma.  CA 19-9 was elevated 133 and AFP normal 3.81.    The patient has not had significant symptoms of abdominal pain, weight loss, nausea, vomiting or jaundice.    He has significant rheumatoid arthritis history with joint deviation, history of iritis, previous pericarditis requiring pericardial window.    Past Medical History:   Diagnosis Date    Cancer     Liver    Colon polyp     Diabetes mellitus     Hyperlipidemia     Hypertension     Liver cancer 07-    Liver disease     Postoperative wound infection     RA (rheumatoid arthritis)         Past Surgical History:   Procedure Laterality Date    ABDOMINAL SURGERY      BACK SURGERY      CHOLECYSTECTOMY      COLONOSCOPY  2017    [facility]    COLONOSCOPY N/A 06/04/2024    Procedure: COLONOSCOPY INTO CECUM WITH POLYPECTOMIES (COLD SNARE);  Surgeon: Angel Ness MD;  Location: [facility] ENDOSCOPY;  Service: General;  Laterality: N/A;  PRE: COLON MASS, ABNORMAL CT  POST: DIVERTICULOSIS, POLYPS, POOR PREP    ENDOSCOPY N/A 06/04/2024    Procedure: ESOPHAGOGASTRODUODENOSCOPY;  Surgeon: Angel Ness MD;  Location: [facility] ENDOSCOPY;  Service: General;  Laterality: N/A;  PRE: LIVER MASS  POST: NORMAL EGD    EYE SURGERY      PERICARDIUM SURGERY       REPLACEMENT TOTAL KNEE BILATERAL      THORACOTOMY Bilateral 1997    VENOUS ACCESS DEVICE (PORT) INSERTION Right 7/30/2024    Procedure: INSERTION VENOUS ACCESS DEVICE;  Surgeon: Angel Ness MD;  Location: [facility] MAIN OR;  Service: General;  Laterality: Right;        Current Outpatient Medications on File Prior to Visit   Medication Sig Dispense Refill    atenolol (TENORMIN) 25 MG tablet Take 1 tablet by mouth Daily. 90 tablet 1    doxazosin (CARDURA) 2 MG tablet TAKE 1 TABLET BY MOUTH EVERY DAY AT BEDTIME 30 tablet 0    doxazosin (CARDURA) 2 MG tablet Take 1 tablet by mouth every night at bedtime. 30 tablet 0    furosemide (Lasix) 40 MG tablet Take 1 tablet by mouth Daily for 2 days. 2 tablet 0    gabapentin (NEURONTIN) 300 MG capsule TAKE 2 CAPSULES BY MOUTH EVERY NIGHT AT BEDTIME 60 capsule 0    glucose blood test strip Once a day 100 each 12    hydrOXYzine (ATARAX) 25 MG tablet Take 1 tablet by mouth 3 (Three) Times a Day As Needed for Itching. 60 tablet 1    losartan (COZAAR) 25 MG tablet Take 1 tablet by mouth Daily. 90 tablet 3    meloxicam (MOBIC) 15 MG tablet Take 1 tablet by mouth Daily. 90 tablet 3    metFORMIN ER (GLUCOPHAGE-XR) 500 MG 24 hr tablet Take 2 tablets by mouth Daily. 90 tablet 1    OLANZapine (zyPREXA) 5 MG tablet Take 1 tablet by mouth Every Night. 30 tablet 3    ondansetron (ZOFRAN) 8 MG tablet Take 1 tablet by mouth 3 (Three) Times a Day As Needed for Nausea or Vomiting. 30 tablet 5    pilocarpine (SALAGEN) 5 MG tablet Take 1 tablet by mouth 3 times a day.      pravastatin (PRAVACHOL) 80 MG tablet Take 1 tablet by mouth Daily. 90 tablet 1    predniSONE (DELTASONE) 5 MG tablet TAKE 6 TABLETS BY MOUTH ON DAY 1, THEN 5 TABLETS ON DAY 2, THEN 4 TABLETS ON DAY 3, THEN 3 TABLETS ON DAY 4, THEN 2 TABLETS ON DAY 5, THEN 1 TABLET ON DAY 6. TAKE ALL DOSES WITH FOOD. (Patient not taking: Reported on 8/30/2024)      traMADol (ULTRAM) 50 MG tablet Take 2 tablets by mouth Every 6 (Six) Hours  "As Needed.       Current Facility-Administered Medications on File Prior to Visit   Medication Dose Route Frequency Provider Last Rate Last Admin    [COMPLETED] iopamidol (ISOVUE-300) 61 % injection 100 mL  100 mL Intravenous Once in imaging Anthony Tejada MD   85 mL at 24 0947    magnesium sulfate 4g/100mL (PREMIX)  4 g Intravenous Once Anastasia Fleming APRN 55 mL/hr at 24 1027 4 g at 24 1027    [COMPLETED] sodium chloride 0.9 % infusion 1,000 mL  1,000 mL Intravenous Once Anastasia Fleming APRN 952 mL/hr at 24 1124 1,000 mL at 24 1124        ALLERGIES:    Allergies   Allergen Reactions    Chlorhexidine Dermatitis     Please use alcohol to clean port site        Social History     Socioeconomic History    Marital status:    Tobacco Use    Smoking status: Former     Current packs/day: 0.00     Average packs/day: 1 pack/day for 53.7 years (53.7 ttl pk-yrs)     Types: Cigarettes     Start date: 1970     Quit date: 10/1/2023     Years since quittin.9     Passive exposure: Past    Smokeless tobacco: Never   Vaping Use    Vaping status: Never Used   Substance and Sexual Activity    Alcohol use: Not Currently    Drug use: Never    Sexual activity: Not Currently     Partners: Female     Birth control/protection: Vasectomy        Family History   Problem Relation Age of Onset    Hypertension Mother     Heart disease Mother     Arthritis Father     Diabetes Sister     Asthma Brother     Diabetes Brother     Malig Hyperthermia Neg Hx         Review of Systems   ROS as per HPI    Objective     Vitals:    24 1024   BP: 122/73   Pulse: 99   Resp: 16   Temp: 98 °F (36.7 °C)   SpO2: 93%   Weight: 93.1 kg (205 lb 3.2 oz)   Height: 182 cm (71.65\")   PainSc:   1   PainLoc: Comment: arthiritis             2024    10:26 AM   Current Status   ECOG score 0       Physical Exam    CONSTITUTIONAL: pleasant well-developed adult man  HEENT: no icterus, no thrush, moist membranes  CV: RRR, " S1S2, no murmur, sternotomy scar present  RESP: cta bilat, no wheezing, no rales  GI: soft, non-tender, no splenomegaly, +bs, multiple surgical scars on the abdomen  MUSC: no edema, multiple deviated hand joints, slight limp from arthritis  NEURO: alert and oriented x3, normal strength  PSYCH: Improved mood and affect today  Skin: Faint rash present on the arms and legs      RECENT LABS:  Results from last 7 days   Lab Units 09/06/24  0909 08/30/24  1329   WBC 10*3/mm3 5.47 3.39*   NEUTROS ABS 10*3/mm3 4.16 2.22   HEMOGLOBIN g/dL 9.5* 10.3*   HEMATOCRIT % 29.7* 32.1*   PLATELETS 10*3/mm3 115* 94*     Results from last 7 days   Lab Units 09/06/24  0909 08/30/24  1329   SODIUM mmol/L 134* 133*   POTASSIUM mmol/L 5.0 5.0   CHLORIDE mmol/L 101 97*   CO2 mmol/L 26.9 24.0   BUN mg/dL 13 26*   CREATININE mg/dL 1.01 1.37*   CALCIUM mg/dL 8.0* 8.1*   ALBUMIN g/dL 2.9* 2.8*   BILIRUBIN mg/dL 0.7 1.2   ALK PHOS U/L 92 91   ALT (SGPT) U/L 26 51*   AST (SGOT) U/L 54* 63*   GLUCOSE mg/dL 140* 237*   MAGNESIUM mg/dL 1.2* 1.4*           MRI ABD 7/5/24:  FINDINGS:  Asymmetric small right pleural effusion with associated pleural  thickening is present. Of note, a small right pleural effusion was  present on chest radiograph 6/27/2015 and 5/15/2023 and pleural  thickening and calcification was seen in this area on 8/14/2023. No  intra or extrahepatic bile duct dilation is present. A 0.6 cm T2  hyperintense lesion is present in the inferior aspect of the pancreatic  head.     There is significant hepatic steatosis. There is a large heterogenous  mass within the right hepatic dome. It demonstrates restricted  diffusion; however less than that of the spleen. It measures 7.3 x 6.1 x  7.5 cm (previously 7.3 x 5.8 x 6 cm on 5/10/2024). It demonstrates  largely peripheral enhancement there is a few scattered smaller  peripherally enhancing and solid enhancing lesions within the  surrounding right hepatic lobe measuring up to approximate 0.9  cm.     Subcentimeter renal lesions are too small to characterize. Larger T2  hyperintense lesions within the left kidney do not demonstrate  significant enhancement and favored be benign. The main portal vein is  patent.     IMPRESSION  1.  There is a heterogenous, large peripherally enhancing mass within  the posterior aspect of the right hepatic dome which is grossly  unchanged to minimally increased in size since 5/10/2024. Overall  findings are indeterminate and findings remain most concerning for  malignancy until proven otherwise. Other differential consideration  includes an atypical abscess although this considered less likely. A few  small peripheral enhancing and solid enhancing lesions are located  within the surrounding right hepatic lobe and spread of malignancy  cannot be excluded.  2.  Chronic right pleural effusion and pleural thickening, as before.  3.  Significant hepatic steatosis.  4.  T2 hyperintense lesions within the pancreas. Follow-up with  abdominal MRI in 2 years is recommended per ACR criteria.  5.  Other findings as above    Assessment & Plan     *intrahepatic cholangiocarcinoma, multifocal:  7/5/24 MRI abd-heterogenous, large peripherally enhancing mass withinthe posterior aspect of the right hepatic dome which is grossly unchanged to minimally increased in size since 5/10/2024. small peripheral enhancing and solid enhancing lesions are located within the surrounding right hepatic lobe and spread of malignancy is likely  7/18/24 CT liver biopsy-adenocarcinoma consistent with cholangiocarcinoma by IHC  CA 19-9 133  8/2/2024-initiated palliative treatment with cisplatin/Gemzar plus Durvalumab; day 8 Gemzar given at 50% because of thrombocytopenia (60)  8/23/2024 cycle 2 cisplatin, Gemzar, Durvalumab with additional 25% dose reduction to Gemzar due to thrombocytopenia  Due today, 8/30/2024 for day 8 therapy which will be held due to MALI and intractable nausea  9/6/2024 patient underwent  CT imaging as planned today.    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  Proceed with 1 L normal saline  9/6/2024 creatinine improved to 1.01 and BUN 13.  Given 1 L normal saline as this was following CT imaging    *Hypomagnesemia  IV replacement per protocol and begin magnesium oxide 400 mg nightly as he is not currently having any diarrhea.  He will discontinue if he does have new onset loose stools.    *Intractable nausea  8/30/2024 significant nausea following cycle 2-day 1.  Though the patient is not utilizing Zofran throughout the day, only nightly  9/6/2024 patient thinks he is only taking ondansetron at night did not olanzapine.  He will double check this at home and restart the olanzapine if he is stopped this.    Oncology plan/recommendations:  Proceed with 1 L normal saline and IV replacement of magnesium per protocol today  Begin oral magnesium oxide 400 mg nightly, patient will discontinue starts have any loose stools.  The patient was instructed to utilize Zofran every 8 hours, particularly throughout the day as he has been taking this only nightly.    If he is having a good response and tolerance would plan to give 4-6 cycles of chemotherapy and reassess for resectability or local therapy to the dominant liver mass with chemoembolization etc. via IR  Continue/restart olanzapine 5 mg nightly for depression/appetite stimulation/nausea  etc.  Follow-up with Dr. Tejada in 1 week with CBC, CMP, review of CT scan and determination of further plan of care    Patient is on high risk medication requiring frequent monitoring    LAWRENCE Kinney  09/06/2024

## 2024-09-08 ENCOUNTER — HOSPITAL ENCOUNTER (OUTPATIENT)
Facility: HOSPITAL | Age: 79
Discharge: HOME | End: 2024-09-08
Attending: EMERGENCY MEDICINE | Admitting: EMERGENCY MEDICINE
Payer: MEDICARE

## 2024-09-08 VITALS
OXYGEN SATURATION: 96 % | WEIGHT: 205 LBS | HEIGHT: 72 IN | DIASTOLIC BLOOD PRESSURE: 71 MMHG | SYSTOLIC BLOOD PRESSURE: 104 MMHG | HEART RATE: 98 BPM | TEMPERATURE: 97.4 F | RESPIRATION RATE: 16 BRPM | BODY MASS INDEX: 27.77 KG/M2

## 2024-09-08 DIAGNOSIS — R33.9 URINARY RETENTION WITH INCOMPLETE BLADDER EMPTYING: Primary | ICD-10-CM

## 2024-09-08 LAB
BILIRUB UR QL STRIP: NEGATIVE
CLARITY UR: CLEAR
COLOR UR: YELLOW
GLUCOSE UR STRIP-MCNC: NEGATIVE MG/DL
HGB UR QL STRIP.AUTO: NEGATIVE
KETONES UR QL STRIP: NEGATIVE
LEUKOCYTE ESTERASE UR QL STRIP.AUTO: NEGATIVE
NITRITE UR QL STRIP: NEGATIVE
PH UR STRIP.AUTO: 6 [PH] (ref 5–8)
PROT UR QL STRIP: NEGATIVE
SP GR UR STRIP: 1.01 (ref 1–1.03)
UROBILINOGEN UR QL STRIP: NORMAL

## 2024-09-08 PROCEDURE — G0463 HOSPITAL OUTPT CLINIC VISIT: HCPCS | Performed by: EMERGENCY MEDICINE

## 2024-09-08 PROCEDURE — 99213 OFFICE O/P EST LOW 20 MIN: CPT | Performed by: EMERGENCY MEDICINE

## 2024-09-08 PROCEDURE — 81003 URINALYSIS AUTO W/O SCOPE: CPT | Performed by: EMERGENCY MEDICINE

## 2024-09-08 RX ORDER — TAMSULOSIN HYDROCHLORIDE 0.4 MG/1
0.4 CAPSULE ORAL ONCE
Status: COMPLETED | OUTPATIENT
Start: 2024-09-08 | End: 2024-09-08

## 2024-09-08 RX ADMIN — TAMSULOSIN HYDROCHLORIDE 0.4 MG: 0.4 CAPSULE ORAL at 09:56

## 2024-09-08 NOTE — FSED PROVIDER NOTE
Subjective   History of Present Illness  78 yo male with symptoms of dysuria, difficulty urinating and bladder pressure onset this am. PMH UTIs and admits does not drink enough fluid.  He said he was able to urinate during the night and then this morning he could not urinate when he woke up and he was concerned.  He was able to urinate here without difficulty.  He said he did not have dysuria here today in the ER.  No known fever or crampy pain or back pain.  No blood in his urine overnight or evidence of infection in his urine overnight.      Review of Systems    Past Medical History:   Diagnosis Date    Cancer     Liver    Colon polyp     Diabetes mellitus     Hyperlipidemia     Hypertension     Liver cancer 07-    Liver disease     Postoperative wound infection     RA (rheumatoid arthritis)        Allergies   Allergen Reactions    Chlorhexidine Dermatitis     Please use alcohol to clean port site       Past Surgical History:   Procedure Laterality Date    ABDOMINAL SURGERY      BACK SURGERY      CHOLECYSTECTOMY      COLONOSCOPY  2017    [facility]    COLONOSCOPY N/A 06/04/2024    Procedure: COLONOSCOPY INTO CECUM WITH POLYPECTOMIES (COLD SNARE);  Surgeon: Angel Ness MD;  Location: [facility] ENDOSCOPY;  Service: General;  Laterality: N/A;  PRE: COLON MASS, ABNORMAL CT  POST: DIVERTICULOSIS, POLYPS, POOR PREP    ENDOSCOPY N/A 06/04/2024    Procedure: ESOPHAGOGASTRODUODENOSCOPY;  Surgeon: Angel Ness MD;  Location: [facility] ENDOSCOPY;  Service: General;  Laterality: N/A;  PRE: LIVER MASS  POST: NORMAL EGD    EYE SURGERY      PERICARDIUM SURGERY      REPLACEMENT TOTAL KNEE BILATERAL      THORACOTOMY Bilateral 1997    VENOUS ACCESS DEVICE (PORT) INSERTION Right 7/30/2024    Procedure: INSERTION VENOUS ACCESS DEVICE;  Surgeon: Angel Ness MD;  Location: [facility] OR;  Service: General;  Laterality: Right;       Family History   Problem Relation  Age of Onset    Hypertension Mother     Heart disease Mother     Arthritis Father     Diabetes Sister     Asthma Brother     Diabetes Brother     Malig Hyperthermia Neg Hx        Social History     Socioeconomic History    Marital status:    Tobacco Use    Smoking status: Former     Current packs/day: 0.00     Average packs/day: 1 pack/day for 53.7 years (53.7 ttl pk-yrs)     Types: Cigarettes     Start date: 1970     Quit date: 10/1/2023     Years since quittin.9     Passive exposure: Past    Smokeless tobacco: Never   Vaping Use    Vaping status: Never Used   Substance and Sexual Activity    Alcohol use: Not Currently    Drug use: Never    Sexual activity: Not Currently     Partners: Female     Birth control/protection: Vasectomy           Objective   Physical Exam  Vitals and nursing note reviewed.   Constitutional:       General: He is not in acute distress.     Appearance: Normal appearance. He is normal weight. He is not ill-appearing or toxic-appearing.   Eyes:      Extraocular Movements: Extraocular movements intact.      Conjunctiva/sclera: Conjunctivae normal.   Cardiovascular:      Rate and Rhythm: Normal rate and regular rhythm.      Pulses: Normal pulses.      Heart sounds: Normal heart sounds. No murmur heard.  Pulmonary:      Breath sounds: Normal breath sounds.   Abdominal:      General: There is no distension.      Palpations: Abdomen is soft.      Tenderness: There is no abdominal tenderness. There is no guarding.   Musculoskeletal:         General: Normal range of motion.      Cervical back: Normal range of motion and neck supple.   Skin:     General: Skin is warm and dry.      Capillary Refill: Capillary refill takes less than 2 seconds.   Neurological:      General: No focal deficit present.      Mental Status: He is alert and oriented to person, place, and time. Mental status is at baseline.   Psychiatric:         Mood and Affect: Mood normal.         Behavior: Behavior normal.          Thought Content: Thought content normal.         Judgment: Judgment normal.         Procedures           ED Course                                           Medical Decision Making  Differential diagnosis includes but not limited to UTI, urethra impeded by prostate, bladder spasms.    Patient's urine is normal.  He told me he was not having pain but he just was not able to go.  Do a bladder scan even though he is already ready urinated for us to see if there is a significant residual.  I will put him on Flomax and if I have any here we will give him his first dose here and sent a prescription to his pharmacy.  He and I already talked about this.    Bladder scan showed 240 cc of urine.    Your urine did not show an infection.  You urinated here and had reasonable output.  The bladder scan afterwards showed 240 cc of urine in your bladder.  This suggests that your bladder is not emptying completely.  Review of your medication shows that you are already on medication for this problem. The medication for this that I spoke of affects blood pressure and I suspect that is why they chose the medication that you are taking now.  If you go greater than 5 to 6 hours unable to urinate and start to have fullness and pain please return to the ER we will have to put a catheter and to help you drain.  Otherwise follow-up with your primary care doctor this week or your urologist this week.    He understood and agreed            Final diagnoses:   Urinary retention with incomplete bladder emptying       ED Disposition  ED Disposition       ED Disposition   Discharge    Condition   Stable    Comment   --               Gloria [place]  [id number] [address] [address] [phone]      This week         Medication List      No changes were made to your prescriptions during this visit.

## 2024-09-08 NOTE — DISCHARGE INSTRUCTIONS
Your urine did not show an infection.  You urinated here and had reasonable output.  The bladder scan afterwards showed 240 cc of urine in your bladder.  This suggests that your bladder is not emptying completely.  Review of your medication shows that you are already on medication for this problem. The medication for this that I spoke of affects blood pressure and I suspect that is why they chose the medication that you are taking now.  If you go greater than 5 to 6 hours unable to urinate and start to have fullness and pain please return to the ER we will have to put a catheter and to help you drain.  Otherwise follow-up with your primary care doctor this week or your urologist this week.

## 2024-09-09 DIAGNOSIS — M06.9 RHEUMATOID ARTHRITIS INVOLVING BOTH HANDS, UNSPECIFIED WHETHER RHEUMATOID FACTOR PRESENT: ICD-10-CM

## 2024-09-09 RX ORDER — GABAPENTIN 300 MG/1
CAPSULE ORAL
Qty: 60 CAPSULE | Refills: 0 | OUTPATIENT
Start: 2024-09-09

## 2024-09-09 RX ORDER — GABAPENTIN 300 MG/1
600 CAPSULE ORAL
Qty: 60 CAPSULE | Refills: 0 | Status: SHIPPED | OUTPATIENT
Start: 2024-09-09

## 2024-09-09 NOTE — PROGRESS NOTES
Subjective     REASON FOR CONSULTATION:  cholangiocarcinoma  Provide an opinion on any further workup or treatment                             REQUESTING PHYSICIAN:  Thi    RECORDS OBTAINED:  Records of the patients history including those obtained from the referring provider were reviewed and summarized in detail.    HISTORY OF PRESENT ILLNESS:  The patient is a 79 y.o. year old male who is here for an opinion about the above issue.    History of Present Illness   The patient initiated palliative treatment with cisplatin/Gemzar and nivolumab on 8/2/2024.  He required a dose reduction of day 8 Gemzar because of thrombocytopenia.  Cycle 2 of treatment was given with a dose reduction of gemcitabine 750 mg/m² and cisplatin same dose 25 mg/m² along with durvalumab.  Despite dose reduction he had intractable nausea vomiting acute kidney injury requiring IV fluids.  He did not receive day 8 cycle 2 of treatment because of side effects.    Patient returned today for follow-up and scan review.  He is doing reasonably well but still having some occasional nausea and decreased appetite and fatigue.    ONCOLOGY HISTORY:  This is a 79-year-old man with diabetes, hyperlipidemia, rheumatoid arthritis, tobacco abuse who underwent a CT angiogram of the chest to evaluate an aortic aneurysm and incidentally noted a mass in the right lobe of the liver.  A CT of the abdomen and pelvis was performed on 5/10/2024 showed a heterogeneously peripherally enhancing right hepatic lobe mass 7 x 5.7 cm, subtle 1.5 cm low-attenuation focus adjacently and a nonobstructing annular mass at the hepatic flexure.  There were several calcifications in the pancreas consistent with chronic pancreatitis, several small renal cysts and an enlarged prostate 6.5 cm.  There were extensive abdominal aortic atherosclerotic changes without aneurysm and extensive right pleural calcifications surrounding a small loculated right pleural effusion stable since  4/1/2024.    EGD and colonoscopy were performed on 6/4/2024 by Dr. Ness-normal EGD, poor bowel prep inadequate for colorectal screening, ascending colon polyp resected, rectosigmoid polyp resected.    A CT-guided liver biopsy was performed on 6/17/2024 which showed stent hepatitis with periportal bile duct reaction and associated fibrosis/inflammation no evidence of malignancy.  A repeat liver biopsy was performed on 7/18/2024 showing adenocarcinoma consistent with cholangiocarcinoma.  CA 19-9 was elevated 133 and AFP normal 3.81.    The patient has not had significant symptoms of abdominal pain, weight loss, nausea, vomiting or jaundice.    He has significant rheumatoid arthritis history with joint deviation, history of iritis, previous pericarditis requiring pericardial window.    The patient received 2 cycles of cisplatin/gemcitabine/nivolumab with need of dose reductions and significant side effects.  Follow-up CT abdomen on 9/6/2024 showed a stable mass in the right lobe of the liver 7.6 x 6 cm no new liver lesions seen.    Past Medical History:   Diagnosis Date    Cancer     Liver    Colon polyp     Diabetes mellitus     Hyperlipidemia     Hypertension     Liver cancer 07-    Liver disease     Postoperative wound infection     RA (rheumatoid arthritis)         Past Surgical History:   Procedure Laterality Date    ABDOMINAL SURGERY      BACK SURGERY      CHOLECYSTECTOMY      COLONOSCOPY  2017    [facility]    COLONOSCOPY N/A 06/04/2024    Procedure: COLONOSCOPY INTO CECUM WITH POLYPECTOMIES (COLD SNARE);  Surgeon: Angel Ness MD;  Location: [facility] ENDOSCOPY;  Service: General;  Laterality: N/A;  PRE: COLON MASS, ABNORMAL CT  POST: DIVERTICULOSIS, POLYPS, POOR PREP    ENDOSCOPY N/A 06/04/2024    Procedure: ESOPHAGOGASTRODUODENOSCOPY;  Surgeon: Angel Ness MD;  Location: [facility] ENDOSCOPY;  Service: General;  Laterality: N/A;  PRE: LIVER  MASS  POST: NORMAL EGD    EYE SURGERY      PERICARDIUM SURGERY      REPLACEMENT TOTAL KNEE BILATERAL      THORACOTOMY Bilateral 1997    VENOUS ACCESS DEVICE (PORT) INSERTION Right 7/30/2024    Procedure: INSERTION VENOUS ACCESS DEVICE;  Surgeon: Angel Ness MD;  Location: [facility] MAIN OR;  Service: General;  Laterality: Right;        Current Outpatient Medications on File Prior to Visit   Medication Sig Dispense Refill    atenolol (TENORMIN) 25 MG tablet Take 1 tablet by mouth Daily. 90 tablet 0    doxazosin (CARDURA) 2 MG tablet TAKE 1 TABLET BY MOUTH EVERY DAY AT BEDTIME 30 tablet 0    gabapentin (NEURONTIN) 300 MG capsule Take 2 capsules by mouth every night at bedtime. 60 capsule 0    glucose blood test strip Once a day 100 each 12    hydrOXYzine (ATARAX) 25 MG tablet Take 1 tablet by mouth 3 (Three) Times a Day As Needed for Itching. 60 tablet 1    losartan (COZAAR) 25 MG tablet Take 1 tablet by mouth Daily. 90 tablet 3    magnesium oxide (MAG-OX) 400 MG tablet Take 1 tablet by mouth every night at bedtime. 30 tablet 2    meloxicam (MOBIC) 15 MG tablet Take 1 tablet by mouth Daily. 90 tablet 3    metFORMIN ER (GLUCOPHAGE-XR) 500 MG 24 hr tablet Take 2 tablets by mouth Daily. 90 tablet 1    OLANZapine (zyPREXA) 5 MG tablet Take 1 tablet by mouth Every Night. 30 tablet 3    pilocarpine (SALAGEN) 5 MG tablet Take 1 tablet by mouth 3 times a day.      pravastatin (PRAVACHOL) 80 MG tablet Take 1 tablet by mouth Daily. 90 tablet 1    traMADol (ULTRAM) 50 MG tablet Take 2 tablets by mouth Every 6 (Six) Hours As Needed.      [DISCONTINUED] ondansetron (ZOFRAN) 8 MG tablet Take 1 tablet by mouth 3 (Three) Times a Day As Needed for Nausea or Vomiting. 30 tablet 5    [DISCONTINUED] predniSONE (DELTASONE) 5 MG tablet       [DISCONTINUED] doxazosin (CARDURA) 2 MG tablet Take 1 tablet by mouth every night at bedtime. 30 tablet 0    [DISCONTINUED] furosemide (Lasix) 40 MG tablet Take 1 tablet by mouth Daily for 2  days. 2 tablet 0     No current facility-administered medications on file prior to visit.        ALLERGIES:    Allergies   Allergen Reactions    Chlorhexidine Dermatitis     Please use alcohol to clean port site        Social History     Socioeconomic History    Marital status:    Tobacco Use    Smoking status: Former     Current packs/day: 0.00     Average packs/day: 1 pack/day for 53.7 years (53.7 ttl pk-yrs)     Types: Cigarettes     Start date: 1970     Quit date: 10/1/2023     Years since quittin.9     Passive exposure: Past    Smokeless tobacco: Never   Vaping Use    Vaping status: Never Used   Substance and Sexual Activity    Alcohol use: Not Currently    Drug use: Never    Sexual activity: Not Currently     Partners: Female     Birth control/protection: Vasectomy        Family History   Problem Relation Age of Onset    Hypertension Mother     Heart disease Mother     Arthritis Father     Diabetes Sister     Asthma Brother     Diabetes Brother     Malig Hyperthermia Neg Hx         Review of Systems   Constitutional:  Positive for appetite change and fatigue. Negative for activity change and unexpected weight change.   HENT: Negative.     Respiratory: Negative.     Cardiovascular: Negative.    Gastrointestinal:  Positive for nausea.   Genitourinary: Negative.    Musculoskeletal:  Positive for arthralgias and joint swelling.   Skin:  Negative for rash.   Neurological:  Negative for weakness.   Hematological: Negative.    Psychiatric/Behavioral:  Negative for dysphoric mood. The patient is nervous/anxious.           Objective     Vitals:    24 1011   BP: 112/72   Pulse: 76   Temp: 98.1 °F (36.7 °C)   TempSrc: Oral   SpO2: 96%   Weight: 91.9 kg (202 lb 8 oz)   PainSc: 0-No pain               2024    10:13 AM   Current Status   ECOG score 0       Physical Exam    CONSTITUTIONAL: pleasant well-developed adult man  HEENT: no icterus, no thrush, moist membranes  LYMPH: no cervical or  supraclavicular lad  CV: RRR, S1S2, no murmur, sternotomy scar present  RESP: cta bilat, no wheezing, no rales  GI: soft, non-tender, no splenomegaly, +bs, multiple surgical scars on the abdomen  MUSC: no edema, multiple deviated hand joints, slight limp from arthritis  NEURO: alert and oriented x3, normal strength  PSYCH: Improved mood and affect today  Skin: No current rash      RECENT LABS:  Hematology WBC   Date Value Ref Range Status   09/13/2024 4.26 3.40 - 10.80 10*3/mm3 Final     RBC   Date Value Ref Range Status   09/13/2024 3.48 (L) 4.14 - 5.80 10*6/mm3 Final     Hemoglobin   Date Value Ref Range Status   09/13/2024 10.3 (L) 13.0 - 17.7 g/dL Final     Hematocrit   Date Value Ref Range Status   09/13/2024 33.4 (L) 37.5 - 51.0 % Final     Platelets   Date Value Ref Range Status   09/13/2024 172 140 - 450 10*3/mm3 Final        Lab Results   Component Value Date    GLUCOSE 200 (H) 09/13/2024    BUN 14 09/13/2024    CREATININE 0.96 09/13/2024    EGFRRESULT 58.2 (L) 04/10/2024    EGFR 80.9 09/13/2024    BCR 14.6 09/13/2024    K 4.2 09/13/2024    CO2 25.2 09/13/2024    CALCIUM 8.1 (L) 09/13/2024    PROTENTOTREF 8.0 04/08/2024    ALBUMIN 2.8 (L) 09/13/2024    BILITOT 0.5 09/13/2024    AST 59 (H) 09/13/2024    ALT 21 09/13/2024     MRI ABD 7/5/24:  FINDINGS:  Asymmetric small right pleural effusion with associated pleural  thickening is present. Of note, a small right pleural effusion was  present on chest radiograph 6/27/2015 and 5/15/2023 and pleural  thickening and calcification was seen in this area on 8/14/2023. No  intra or extrahepatic bile duct dilation is present. A 0.6 cm T2  hyperintense lesion is present in the inferior aspect of the pancreatic  head.     There is significant hepatic steatosis. There is a large heterogenous  mass within the right hepatic dome. It demonstrates restricted  diffusion; however less than that of the spleen. It measures 7.3 x 6.1 x  7.5 cm (previously 7.3 x 5.8 x 6 cm on  5/10/2024). It demonstrates  largely peripheral enhancement there is a few scattered smaller  peripherally enhancing and solid enhancing lesions within the  surrounding right hepatic lobe measuring up to approximate 0.9 cm.     Subcentimeter renal lesions are too small to characterize. Larger T2  hyperintense lesions within the left kidney do not demonstrate  significant enhancement and favored be benign. The main portal vein is  patent.     IMPRESSION  1.  There is a heterogenous, large peripherally enhancing mass within  the posterior aspect of the right hepatic dome which is grossly  unchanged to minimally increased in size since 5/10/2024. Overall  findings are indeterminate and findings remain most concerning for  malignancy until proven otherwise. Other differential consideration  includes an atypical abscess although this considered less likely. A few  small peripheral enhancing and solid enhancing lesions are located  within the surrounding right hepatic lobe and spread of malignancy  cannot be excluded.  2.  Chronic right pleural effusion and pleural thickening, as before.  3.  Significant hepatic steatosis.  4.  T2 hyperintense lesions within the pancreas. Follow-up with  abdominal MRI in 2 years is recommended per ACR criteria.  5.  Other findings as above    CT Abdomen With & Without Contrast (09/06/2024 09:55)       Assessment & Plan     *intrahepatic cholangiocarcinoma, multifocal:  7/5/24 MRI abd-heterogenous, large peripherally enhancing mass withinthe posterior aspect of the right hepatic dome which is grossly unchanged to minimally increased in size since 5/10/2024. small peripheral enhancing and solid enhancing lesions are located within the surrounding right hepatic lobe and spread of malignancy is likely  7/18/24 CT liver biopsy-adenocarcinoma consistent with cholangiocarcinoma by IHC  CA 19-9 133  8/2/2024-initiated palliative treatment with cisplatin/Gemzar plus nivolumab; day 8 Gemzar given  at 50% because of thrombocytopenia (60)  Cycle 2 of treatment was given with a dose reduction of gemcitabine 750 mg/m² and cisplatin same dose 25 mg/m² along with durvalumab.  Despite dose reduction he had intractable nausea vomiting acute kidney injury requiring IV fluids.  He did not receive day 8 cycle 2 of treatment because of side effects.  9/6/2024 CT abdomen liver protocol-stable 7.6 x 6 cm mass in the right lobe of the liver    *Thrombocytopenia/anemia secondary to chemotherapy  improved platelets 172/hemoglobin 10.3    *Depression/adjustment disorder    *RA  significant rheumatoid arthritis history with joint deviation, history of iritis, previous pericarditis requiring pericardial window  Patient has been treated with Simponi currently on hold per rheumatology    *Additional comorbidities-diabetes mellitus, hyperlipidemia, hypertension, tobacco abuse, BPH    *Skin rash/pruritus secondary to chemotherapy versus immunotherapy  Previously required a steroid Dosepak-resolved    Oncology plan/recommendations:  Check CA 19-9 today  The patient has not had much change in the size of the intrahepatic cholangiocarcinoma and poor tolerance to systemic therapy.  I am going to get the opinion of Dr. Hopper surgical oncology regarding resection and if not resectable possible local directed therapy to the liver.  I will see him back after consultation with Dr. Hopper.

## 2024-09-10 ENCOUNTER — TELEPHONE (OUTPATIENT)
Dept: FAMILY MEDICINE CLINIC | Facility: CLINIC | Age: 79
End: 2024-09-10

## 2024-09-10 RX ORDER — ATENOLOL 25 MG/1
25 TABLET ORAL DAILY
Qty: 90 TABLET | Refills: 0 | Status: SHIPPED | OUTPATIENT
Start: 2024-09-10

## 2024-09-10 NOTE — TELEPHONE ENCOUNTER
Name: TUCKER PULLIAM    Relationship: Emergency Contact    Best Callback Number: [phone]     HUB PROVIDED THE RELAY MESSAGE FROM THE OFFICE   PATIENT VOICED UNDERSTANDING AND HAS NO FURTHER QUESTIONS AT THIS TIME    ADDITIONAL INFORMATION: RELAYED MESSAGE AND PATIENT WIFE CANCELLED TODAY'S APPT DUE TO THAT WAS ALL THE MEDICATION HE NEEDED

## 2024-09-10 NOTE — TELEPHONE ENCOUNTER
"Relay     \"Lvm for pt to return call. If he is coming in today for gabapentin refill, rx was sent yesterday. He can cancel appt. If he needs a different medication refilled, he will need to keep appt.\"                 "

## 2024-09-13 ENCOUNTER — OFFICE VISIT (OUTPATIENT)
Dept: ONCOLOGY | Facility: CLINIC | Age: 79
End: 2024-09-13
Payer: MEDICARE

## 2024-09-13 ENCOUNTER — INFUSION (OUTPATIENT)
Dept: ONCOLOGY | Facility: HOSPITAL | Age: 79
End: 2024-09-13
Payer: MEDICARE

## 2024-09-13 ENCOUNTER — APPOINTMENT (OUTPATIENT)
Dept: ONCOLOGY | Facility: HOSPITAL | Age: 79
End: 2024-09-13
Payer: MEDICARE

## 2024-09-13 VITALS
OXYGEN SATURATION: 96 % | SYSTOLIC BLOOD PRESSURE: 112 MMHG | HEART RATE: 76 BPM | WEIGHT: 202.5 LBS | TEMPERATURE: 98.1 F | DIASTOLIC BLOOD PRESSURE: 72 MMHG | BODY MASS INDEX: 27.46 KG/M2

## 2024-09-13 DIAGNOSIS — C22.1 CHOLANGIOCARCINOMA: Primary | ICD-10-CM

## 2024-09-13 DIAGNOSIS — C22.1 CHOLANGIOCARCINOMA: ICD-10-CM

## 2024-09-13 DIAGNOSIS — Z45.2 FITTING AND ADJUSTMENT OF VASCULAR CATHETER: Primary | ICD-10-CM

## 2024-09-13 LAB
ALBUMIN SERPL-MCNC: 2.8 G/DL (ref 3.5–5.2)
ALBUMIN/GLOB SERPL: 0.7 G/DL
ALP SERPL-CCNC: 87 U/L (ref 39–117)
ALT SERPL W P-5'-P-CCNC: 21 U/L (ref 1–41)
ANION GAP SERPL CALCULATED.3IONS-SCNC: 10.8 MMOL/L (ref 5–15)
AST SERPL-CCNC: 59 U/L (ref 1–40)
BASOPHILS # BLD AUTO: 0.05 10*3/MM3 (ref 0–0.2)
BASOPHILS NFR BLD AUTO: 1.2 % (ref 0–1.5)
BILIRUB SERPL-MCNC: 0.5 MG/DL (ref 0–1.2)
BUN SERPL-MCNC: 14 MG/DL (ref 8–23)
BUN/CREAT SERPL: 14.6 (ref 7–25)
CALCIUM SPEC-SCNC: 8.1 MG/DL (ref 8.6–10.5)
CANCER AG19-9 SERPL-ACNC: 124 U/ML
CHLORIDE SERPL-SCNC: 99 MMOL/L (ref 98–107)
CO2 SERPL-SCNC: 25.2 MMOL/L (ref 22–29)
CREAT SERPL-MCNC: 0.96 MG/DL (ref 0.76–1.27)
DEPRECATED RDW RBC AUTO: 64.4 FL (ref 37–54)
EGFRCR SERPLBLD CKD-EPI 2021: 80.9 ML/MIN/1.73
EOSINOPHIL # BLD AUTO: 0.13 10*3/MM3 (ref 0–0.4)
EOSINOPHIL NFR BLD AUTO: 3.1 % (ref 0.3–6.2)
ERYTHROCYTE [DISTWIDTH] IN BLOOD BY AUTOMATED COUNT: 19 % (ref 12.3–15.4)
GLOBULIN UR ELPH-MCNC: 4 GM/DL
GLUCOSE SERPL-MCNC: 200 MG/DL (ref 65–99)
HCT VFR BLD AUTO: 33.4 % (ref 37.5–51)
HGB BLD-MCNC: 10.3 G/DL (ref 13–17.7)
IMM GRANULOCYTES # BLD AUTO: 0.03 10*3/MM3 (ref 0–0.05)
IMM GRANULOCYTES NFR BLD AUTO: 0.7 % (ref 0–0.5)
LYMPHOCYTES # BLD AUTO: 0.91 10*3/MM3 (ref 0.7–3.1)
LYMPHOCYTES NFR BLD AUTO: 21.4 % (ref 19.6–45.3)
MCH RBC QN AUTO: 29.6 PG (ref 26.6–33)
MCHC RBC AUTO-ENTMCNC: 30.8 G/DL (ref 31.5–35.7)
MCV RBC AUTO: 96 FL (ref 79–97)
MONOCYTES # BLD AUTO: 0.77 10*3/MM3 (ref 0.1–0.9)
MONOCYTES NFR BLD AUTO: 18.1 % (ref 5–12)
NEUTROPHILS NFR BLD AUTO: 2.37 10*3/MM3 (ref 1.7–7)
NEUTROPHILS NFR BLD AUTO: 55.5 % (ref 42.7–76)
NRBC BLD AUTO-RTO: 0 /100 WBC (ref 0–0.2)
PLATELET # BLD AUTO: 172 10*3/MM3 (ref 140–450)
PMV BLD AUTO: 10 FL (ref 6–12)
POTASSIUM SERPL-SCNC: 4.2 MMOL/L (ref 3.5–5.2)
PROT SERPL-MCNC: 6.8 G/DL (ref 6–8.5)
RBC # BLD AUTO: 3.48 10*6/MM3 (ref 4.14–5.8)
SODIUM SERPL-SCNC: 135 MMOL/L (ref 136–145)
WBC NRBC COR # BLD AUTO: 4.26 10*3/MM3 (ref 3.4–10.8)

## 2024-09-13 PROCEDURE — 85025 COMPLETE CBC W/AUTO DIFF WBC: CPT

## 2024-09-13 PROCEDURE — 25010000002 HEPARIN LOCK FLUSH PER 10 UNITS: Performed by: INTERNAL MEDICINE

## 2024-09-13 PROCEDURE — 36591 DRAW BLOOD OFF VENOUS DEVICE: CPT

## 2024-09-13 PROCEDURE — 80053 COMPREHEN METABOLIC PANEL: CPT

## 2024-09-13 PROCEDURE — 86301 IMMUNOASSAY TUMOR CA 19-9: CPT | Performed by: INTERNAL MEDICINE

## 2024-09-13 RX ORDER — HEPARIN SODIUM (PORCINE) LOCK FLUSH IV SOLN 100 UNIT/ML 100 UNIT/ML
500 SOLUTION INTRAVENOUS AS NEEDED
OUTPATIENT
Start: 2024-09-13

## 2024-09-13 RX ORDER — SODIUM CHLORIDE 0.9 % (FLUSH) 0.9 %
10 SYRINGE (ML) INJECTION AS NEEDED
Status: ACTIVE | OUTPATIENT
Start: 2024-09-13

## 2024-09-13 RX ORDER — HEPARIN SODIUM (PORCINE) LOCK FLUSH IV SOLN 100 UNIT/ML 100 UNIT/ML
500 SOLUTION INTRAVENOUS AS NEEDED
Status: ACTIVE | OUTPATIENT
Start: 2024-09-13

## 2024-09-13 RX ORDER — SODIUM CHLORIDE 0.9 % (FLUSH) 0.9 %
10 SYRINGE (ML) INJECTION AS NEEDED
OUTPATIENT
Start: 2024-09-13

## 2024-09-13 RX ADMIN — Medication 10 ML: at 10:37

## 2024-09-13 RX ADMIN — Medication 500 UNITS: at 10:37

## 2024-09-18 DIAGNOSIS — E78.2 MIXED HYPERLIPIDEMIA: Primary | ICD-10-CM

## 2024-09-18 RX ORDER — PRAVASTATIN SODIUM 80 MG/1
80 TABLET ORAL DAILY
Qty: 90 TABLET | Refills: 1 | Status: SHIPPED | OUTPATIENT
Start: 2024-09-18

## 2024-09-20 ENCOUNTER — APPOINTMENT (OUTPATIENT)
Dept: CT IMAGING | Facility: HOSPITAL | Age: 79
End: 2024-09-20
Payer: MEDICARE

## 2024-09-20 ENCOUNTER — HOSPITAL ENCOUNTER (EMERGENCY)
Facility: HOSPITAL | Age: 79
Discharge: HOME | End: 2024-09-20
Attending: EMERGENCY MEDICINE
Payer: MEDICARE

## 2024-09-20 VITALS
BODY MASS INDEX: 27.17 KG/M2 | RESPIRATION RATE: 16 BRPM | HEIGHT: 73 IN | SYSTOLIC BLOOD PRESSURE: 148 MMHG | HEART RATE: 83 BPM | DIASTOLIC BLOOD PRESSURE: 98 MMHG | OXYGEN SATURATION: 100 % | TEMPERATURE: 97.8 F | WEIGHT: 205 LBS

## 2024-09-20 DIAGNOSIS — C22.1 CHOLANGIOCARCINOMA: ICD-10-CM

## 2024-09-20 DIAGNOSIS — R11.2 NAUSEA AND VOMITING, UNSPECIFIED VOMITING TYPE: Primary | ICD-10-CM

## 2024-09-20 DIAGNOSIS — R10.9 ABDOMINAL PAIN, UNSPECIFIED ABDOMINAL LOCATION: ICD-10-CM

## 2024-09-20 LAB
ALBUMIN SERPL-MCNC: 3 G/DL (ref 3.5–5.2)
ALBUMIN/GLOB SERPL: 0.7 G/DL
ALP SERPL-CCNC: 92 U/L (ref 39–117)
ALT SERPL W P-5'-P-CCNC: 20 U/L (ref 1–41)
AMMONIA BLD-SCNC: 46 UMOL/L (ref 16–60)
ANION GAP SERPL CALCULATED.3IONS-SCNC: 6.8 MMOL/L (ref 5–15)
AST SERPL-CCNC: 49 U/L (ref 1–40)
BASOPHILS # BLD AUTO: 0.02 10*3/MM3 (ref 0–0.2)
BASOPHILS NFR BLD AUTO: 0.3 % (ref 0–1.5)
BILIRUB SERPL-MCNC: 0.8 MG/DL (ref 0–1.2)
BILIRUB UR QL STRIP: NEGATIVE
BUN SERPL-MCNC: 13 MG/DL (ref 8–23)
BUN/CREAT SERPL: 14.3 (ref 7–25)
CALCIUM SPEC-SCNC: 8.9 MG/DL (ref 8.6–10.5)
CHLORIDE SERPL-SCNC: 99 MMOL/L (ref 98–107)
CLARITY UR: CLEAR
CO2 SERPL-SCNC: 28.2 MMOL/L (ref 22–29)
COLOR UR: YELLOW
CREAT SERPL-MCNC: 0.91 MG/DL (ref 0.76–1.27)
D-LACTATE SERPL-SCNC: 2.5 MMOL/L (ref 0.5–2)
DEPRECATED RDW RBC AUTO: 62.3 FL (ref 37–54)
EGFRCR SERPLBLD CKD-EPI 2021: 86.3 ML/MIN/1.73
EOSINOPHIL # BLD AUTO: 0.04 10*3/MM3 (ref 0–0.4)
EOSINOPHIL NFR BLD AUTO: 0.6 % (ref 0.3–6.2)
ERYTHROCYTE [DISTWIDTH] IN BLOOD BY AUTOMATED COUNT: 18.3 % (ref 12.3–15.4)
GLOBULIN UR ELPH-MCNC: 4.2 GM/DL
GLUCOSE SERPL-MCNC: 149 MG/DL (ref 65–99)
GLUCOSE UR STRIP-MCNC: NEGATIVE MG/DL
HCT VFR BLD AUTO: 34.9 % (ref 37.5–51)
HGB BLD-MCNC: 11 G/DL (ref 13–17.7)
HGB UR QL STRIP.AUTO: NEGATIVE
IMM GRANULOCYTES # BLD AUTO: 0.02 10*3/MM3 (ref 0–0.05)
IMM GRANULOCYTES NFR BLD AUTO: 0.3 % (ref 0–0.5)
KETONES UR QL STRIP: NEGATIVE
LEUKOCYTE ESTERASE UR QL STRIP.AUTO: NEGATIVE
LIPASE SERPL-CCNC: 28 U/L (ref 13–60)
LYMPHOCYTES # BLD AUTO: 0.78 10*3/MM3 (ref 0.7–3.1)
LYMPHOCYTES NFR BLD AUTO: 12 % (ref 19.6–45.3)
MCH RBC QN AUTO: 29.8 PG (ref 26.6–33)
MCHC RBC AUTO-ENTMCNC: 31.5 G/DL (ref 31.5–35.7)
MCV RBC AUTO: 94.6 FL (ref 79–97)
MONOCYTES # BLD AUTO: 0.64 10*3/MM3 (ref 0.1–0.9)
MONOCYTES NFR BLD AUTO: 9.8 % (ref 5–12)
NEUTROPHILS NFR BLD AUTO: 5.02 10*3/MM3 (ref 1.7–7)
NEUTROPHILS NFR BLD AUTO: 77 % (ref 42.7–76)
NITRITE UR QL STRIP: NEGATIVE
PH UR STRIP.AUTO: 7 [PH] (ref 5–8)
PLATELET # BLD AUTO: 116 10*3/MM3 (ref 140–450)
PMV BLD AUTO: 10.7 FL (ref 6–12)
POTASSIUM SERPL-SCNC: 4 MMOL/L (ref 3.5–5.2)
PROT SERPL-MCNC: 7.2 G/DL (ref 6–8.5)
PROT UR QL STRIP: NEGATIVE
QT INTERVAL: 376 MS
QTC INTERVAL: 471 MS
RBC # BLD AUTO: 3.69 10*6/MM3 (ref 4.14–5.8)
SODIUM SERPL-SCNC: 134 MMOL/L (ref 136–145)
SP GR UR STRIP: 1.01 (ref 1–1.03)
UROBILINOGEN UR QL STRIP: NORMAL
WBC NRBC COR # BLD AUTO: 6.52 10*3/MM3 (ref 3.4–10.8)

## 2024-09-20 PROCEDURE — 93005 ELECTROCARDIOGRAM TRACING: CPT | Performed by: EMERGENCY MEDICINE

## 2024-09-20 PROCEDURE — 99285 EMERGENCY DEPT VISIT HI MDM: CPT

## 2024-09-20 PROCEDURE — 96376 TX/PRO/DX INJ SAME DRUG ADON: CPT

## 2024-09-20 PROCEDURE — 83690 ASSAY OF LIPASE: CPT | Performed by: EMERGENCY MEDICINE

## 2024-09-20 PROCEDURE — 85025 COMPLETE CBC W/AUTO DIFF WBC: CPT | Performed by: EMERGENCY MEDICINE

## 2024-09-20 PROCEDURE — 93010 ELECTROCARDIOGRAM REPORT: CPT | Performed by: INTERNAL MEDICINE

## 2024-09-20 PROCEDURE — 25810000003 SODIUM CHLORIDE 0.9 % SOLUTION: Performed by: EMERGENCY MEDICINE

## 2024-09-20 PROCEDURE — 25010000002 HEPARIN LOCK FLUSH PER 10 UNITS: Performed by: EMERGENCY MEDICINE

## 2024-09-20 PROCEDURE — 80053 COMPREHEN METABOLIC PANEL: CPT | Performed by: EMERGENCY MEDICINE

## 2024-09-20 PROCEDURE — 96361 HYDRATE IV INFUSION ADD-ON: CPT

## 2024-09-20 PROCEDURE — 83605 ASSAY OF LACTIC ACID: CPT | Performed by: EMERGENCY MEDICINE

## 2024-09-20 PROCEDURE — 36415 COLL VENOUS BLD VENIPUNCTURE: CPT

## 2024-09-20 PROCEDURE — 25510000001 IOPAMIDOL PER 1 ML: Performed by: EMERGENCY MEDICINE

## 2024-09-20 PROCEDURE — 74177 CT ABD & PELVIS W/CONTRAST: CPT

## 2024-09-20 PROCEDURE — 99284 EMERGENCY DEPT VISIT MOD MDM: CPT | Performed by: EMERGENCY MEDICINE

## 2024-09-20 PROCEDURE — 82140 ASSAY OF AMMONIA: CPT | Performed by: EMERGENCY MEDICINE

## 2024-09-20 PROCEDURE — 25010000002 ONDANSETRON PER 1 MG: Performed by: EMERGENCY MEDICINE

## 2024-09-20 PROCEDURE — 96374 THER/PROPH/DIAG INJ IV PUSH: CPT

## 2024-09-20 PROCEDURE — 81003 URINALYSIS AUTO W/O SCOPE: CPT | Performed by: EMERGENCY MEDICINE

## 2024-09-20 RX ORDER — ONDANSETRON 2 MG/ML
4 INJECTION INTRAMUSCULAR; INTRAVENOUS ONCE
Status: COMPLETED | OUTPATIENT
Start: 2024-09-20 | End: 2024-09-20

## 2024-09-20 RX ORDER — IOPAMIDOL 755 MG/ML
100 INJECTION, SOLUTION INTRAVASCULAR
Status: COMPLETED | OUTPATIENT
Start: 2024-09-20 | End: 2024-09-20

## 2024-09-20 RX ORDER — HEPARIN SODIUM (PORCINE) LOCK FLUSH IV SOLN 100 UNIT/ML 100 UNIT/ML
500 SOLUTION INTRAVENOUS ONCE
Status: COMPLETED | OUTPATIENT
Start: 2024-09-20 | End: 2024-09-20

## 2024-09-20 RX ORDER — PROMETHAZINE HYDROCHLORIDE 25 MG/1
25 TABLET ORAL EVERY 6 HOURS PRN
Qty: 12 TABLET | Refills: 0 | Status: SHIPPED | OUTPATIENT
Start: 2024-09-20

## 2024-09-20 RX ORDER — ONDANSETRON 4 MG/1
4 TABLET, ORALLY DISINTEGRATING ORAL EVERY 6 HOURS PRN
Qty: 12 TABLET | Refills: 0 | Status: SHIPPED | OUTPATIENT
Start: 2024-09-20

## 2024-09-20 RX ADMIN — IOPAMIDOL 85 ML: 755 INJECTION, SOLUTION INTRAVENOUS at 10:08

## 2024-09-20 RX ADMIN — SODIUM CHLORIDE 1000 ML: 9 INJECTION, SOLUTION INTRAVENOUS at 09:08

## 2024-09-20 RX ADMIN — ONDANSETRON 4 MG: 2 INJECTION, SOLUTION INTRAMUSCULAR; INTRAVENOUS at 11:16

## 2024-09-20 RX ADMIN — HEPARIN 500 UNITS: 100 SYRINGE at 11:36

## 2024-09-20 RX ADMIN — ONDANSETRON 4 MG: 2 INJECTION, SOLUTION INTRAMUSCULAR; INTRAVENOUS at 09:08

## 2024-09-23 ENCOUNTER — TELEPHONE (OUTPATIENT)
Dept: ONCOLOGY | Facility: CLINIC | Age: 79
End: 2024-09-23
Payer: MEDICARE

## 2024-09-23 RX ORDER — PANTOPRAZOLE SODIUM 40 MG/1
40 TABLET, DELAYED RELEASE ORAL DAILY
Qty: 30 TABLET | Refills: 0 | Status: SHIPPED | OUTPATIENT
Start: 2024-09-23

## 2024-09-23 RX ORDER — PROCHLORPERAZINE MALEATE 10 MG
10 TABLET ORAL EVERY 6 HOURS PRN
Qty: 30 TABLET | Refills: 0 | Status: SHIPPED | OUTPATIENT
Start: 2024-09-23

## 2024-09-26 ENCOUNTER — TELEPHONE (OUTPATIENT)
Dept: ONCOLOGY | Facility: CLINIC | Age: 79
End: 2024-09-26
Payer: MEDICARE

## 2024-09-26 DIAGNOSIS — C22.1 CHOLANGIOCARCINOMA: Primary | ICD-10-CM

## 2024-10-07 DIAGNOSIS — M06.9 RHEUMATOID ARTHRITIS INVOLVING BOTH HANDS, UNSPECIFIED WHETHER RHEUMATOID FACTOR PRESENT: ICD-10-CM

## 2024-10-07 RX ORDER — GABAPENTIN 300 MG/1
600 CAPSULE ORAL
Qty: 60 CAPSULE | Refills: 0 | OUTPATIENT
Start: 2024-10-07

## 2024-10-08 ENCOUNTER — OFFICE VISIT (OUTPATIENT)
Dept: FAMILY MEDICINE CLINIC | Facility: CLINIC | Age: 79
End: 2024-10-08
Payer: MEDICARE

## 2024-10-08 VITALS
HEART RATE: 52 BPM | DIASTOLIC BLOOD PRESSURE: 58 MMHG | WEIGHT: 192.4 LBS | HEIGHT: 73 IN | TEMPERATURE: 98.1 F | BODY MASS INDEX: 25.5 KG/M2 | OXYGEN SATURATION: 97 % | SYSTOLIC BLOOD PRESSURE: 80 MMHG

## 2024-10-08 DIAGNOSIS — I10 PRIMARY HYPERTENSION: Primary | ICD-10-CM

## 2024-10-08 DIAGNOSIS — E11.9 DIABETES MELLITUS WITHOUT COMPLICATION: ICD-10-CM

## 2024-10-08 DIAGNOSIS — M06.9 RHEUMATOID ARTHRITIS INVOLVING BOTH HANDS, UNSPECIFIED WHETHER RHEUMATOID FACTOR PRESENT: ICD-10-CM

## 2024-10-08 DIAGNOSIS — E78.2 MIXED HYPERLIPIDEMIA: ICD-10-CM

## 2024-10-08 PROCEDURE — 99214 OFFICE O/P EST MOD 30 MIN: CPT | Performed by: INTERNAL MEDICINE

## 2024-10-08 PROCEDURE — 1125F AMNT PAIN NOTED PAIN PRSNT: CPT | Performed by: INTERNAL MEDICINE

## 2024-10-08 PROCEDURE — 1159F MED LIST DOCD IN RCRD: CPT | Performed by: INTERNAL MEDICINE

## 2024-10-08 PROCEDURE — 3074F SYST BP LT 130 MM HG: CPT | Performed by: INTERNAL MEDICINE

## 2024-10-08 PROCEDURE — 1160F RVW MEDS BY RX/DR IN RCRD: CPT | Performed by: INTERNAL MEDICINE

## 2024-10-08 PROCEDURE — 3078F DIAST BP <80 MM HG: CPT | Performed by: INTERNAL MEDICINE

## 2024-10-08 RX ORDER — GABAPENTIN 300 MG/1
600 CAPSULE ORAL
Qty: 60 CAPSULE | Refills: 0 | Status: ON HOLD | OUTPATIENT
Start: 2024-10-08

## 2024-10-08 NOTE — PROGRESS NOTES
Chief Complaint   Patient presents with    Follow-up   History of Present Illness:  Patient is here for follow-up of HTN, HLD, type II DM.  He was recently diagnosed with cholangiocarcinoma & follows with liver oncology. He reports not feeling too well in general, have been having intermittent nausea but no vomiting and reduced appetite.  Reported some weight loss as well.   Was started on chemotherapy but discontinued due to side effects.  Surgical option not available given the size of the mass as evaluated by the liver surgeon.  Being scheduled for radiotherapy for next step.     Reports blood pressure well-controlled medication.  Averaging in the 120s over 70s to 80s diastolic.    Requesting gabapentin refill for his chronic back and neck pain due to RA and neuropathy.    ROS: Occasional orthostatic lightheadedness  PMH:   Outpatient Medications Prior to Visit   Medication Sig Dispense Refill    atenolol (TENORMIN) 25 MG tablet Take 1 tablet by mouth Daily. 90 tablet 0    doxazosin (CARDURA) 2 MG tablet TAKE 1 TABLET BY MOUTH EVERY DAY AT BEDTIME 30 tablet 0    glucose blood test strip Once a day 100 each 12    hydrOXYzine (ATARAX) 25 MG tablet Take 1 tablet by mouth 3 (Three) Times a Day As Needed for Itching. 60 tablet 1    losartan (COZAAR) 25 MG tablet Take 1 tablet by mouth Daily. 90 tablet 3    magnesium oxide (MAG-OX) 400 MG tablet Take 1 tablet by mouth every night at bedtime. 30 tablet 2    meloxicam (MOBIC) 15 MG tablet Take 1 tablet by mouth Daily. 90 tablet 3    metFORMIN ER (GLUCOPHAGE-XR) 500 MG 24 hr tablet Take 2 tablets by mouth Daily. 90 tablet 1    OLANZapine (zyPREXA) 5 MG tablet Take 1 tablet by mouth Every Night. 30 tablet 3    ondansetron ODT (ZOFRAN-ODT) 4 MG disintegrating tablet Place 1 tablet on the tongue Every 6 (Six) Hours As Needed for Nausea or Vomiting. 12 tablet 0    pantoprazole (Protonix) 40 MG EC tablet Take 1 tablet by mouth Daily. 30 tablet 0    pilocarpine (SALAGEN) 5 MG  tablet Take 1 tablet by mouth 3 times a day.      pravastatin (PRAVACHOL) 80 MG tablet Take 1 tablet by mouth Daily. 90 tablet 1    traMADol (ULTRAM) 50 MG tablet Take 2 tablets by mouth Every 6 (Six) Hours As Needed.      gabapentin (NEURONTIN) 300 MG capsule Take 2 capsules by mouth every night at bedtime. 60 capsule 0    prochlorperazine (COMPAZINE) 10 MG tablet Take 1 tablet by mouth Every 6 (Six) Hours As Needed for Nausea or Vomiting. (Patient not taking: Reported on 10/8/2024) 30 tablet 0    promethazine (PHENERGAN) 25 MG tablet Take 1 tablet by mouth Every 6 (Six) Hours As Needed for Nausea or Vomiting. (Patient not taking: Reported on 10/8/2024) 12 tablet 0     Facility-Administered Medications Prior to Visit   Medication Dose Route Frequency Provider Last Rate Last Admin    heparin injection 500 Units  500 Units Intravenous PRN Anthony Tejada MD   500 Units at 09/13/24 1037    sodium chloride 0.9 % flush 10 mL  10 mL Intravenous PRN Anthony Tejada MD   10 mL at 09/13/24 1037      Allergies   Allergen Reactions    Chlorhexidine Dermatitis     Please use alcohol to clean port site     Past Surgical History:   Procedure Laterality Date    ABDOMINAL SURGERY      BACK SURGERY      CHOLECYSTECTOMY      COLONOSCOPY  2017    [facility]    COLONOSCOPY N/A 06/04/2024    Procedure: COLONOSCOPY INTO CECUM WITH POLYPECTOMIES (COLD SNARE);  Surgeon: Angel Ness MD;  Location: [facility] ENDOSCOPY;  Service: General;  Laterality: N/A;  PRE: COLON MASS, ABNORMAL CT  POST: DIVERTICULOSIS, POLYPS, POOR PREP    ENDOSCOPY N/A 06/04/2024    Procedure: ESOPHAGOGASTRODUODENOSCOPY;  Surgeon: Angel Ness MD;  Location: [facility] ENDOSCOPY;  Service: General;  Laterality: N/A;  PRE: LIVER MASS  POST: NORMAL EGD    EYE SURGERY      PERICARDIUM SURGERY      REPLACEMENT TOTAL KNEE BILATERAL      THORACOTOMY Bilateral 1997    VENOUS ACCESS DEVICE (PORT) INSERTION Right 7/30/2024    Procedure:  "INSERTION VENOUS ACCESS DEVICE;  Surgeon: Angel Ness MD;  Location: [facility] OR;  Service: General;  Laterality: Right;     family history includes Arthritis in his father; Asthma in his brother; Diabetes in his brother and sister; Heart disease in his mother; Hypertension in his mother.   reports that he has been smoking cigarettes. He started smoking about 54 years ago. He has a 53.7 pack-year smoking history. He has been exposed to tobacco smoke. He has never used smokeless tobacco. He reports that he does not currently use alcohol. He reports that he does not use drugs.     BP (!) 80/58 (BP Location: Right arm, Patient Position: Sitting, Cuff Size: Adult)   Pulse 52   Temp 98.1 °F (36.7 °C) (Oral)   Ht 185.4 cm (72.99\")   Wt 87.3 kg (192 lb 6.4 oz)   SpO2 97%   BMI 25.39 kg/m²   Physical Exam  Constitutional:       Appearance: Normal appearance.   HENT:      Head: Normocephalic and atraumatic.   Cardiovascular:      Heart sounds: Normal heart sounds.   Pulmonary:      Breath sounds: Normal breath sounds.   Neurological:      Mental Status: He is alert and oriented to person, place, and time.   Psychiatric:         Mood and Affect: Mood normal.          The following data was reviewed by: Gloria Post MD on 04/08/2024:  Common labs          9/6/2024    09:09 9/13/2024    09:34 9/20/2024    09:08   Common Labs   Glucose 140  200  149    BUN 13  14  13    Creatinine 1.01  0.96  0.91    Sodium 134  135  134    Potassium 5.0  4.2  4.0    Chloride 101  99  99    Calcium 8.0  8.1  8.9    Albumin 2.9  2.8  3.0    Total Bilirubin 0.7  0.5  0.8    Alkaline Phosphatase 92  87  92    AST (SGOT) 54  59  49    ALT (SGPT) 26  21  20    WBC 5.47  4.26  6.52    Hemoglobin 9.5  10.3  11.0    Hematocrit 29.7  33.4  34.9    Platelets 115  172  116           Diagnoses and all orders for this visit:    1. Primary hypertension (Primary)  Assessment & Plan:  Hypertension is stable and controlled. Repeat BP " today 124/72 mmHg  Continue current treatment regimen.  Continue BP monitoring at home   Advised patient to stay hydrated with increased intake of water daily  Blood pressure will be reassessed  in 4 months .      2. Diabetes mellitus without complication  -     Hemoglobin A1c  -     Microalbumin / Creatinine Urine Ratio - Urine, Clean Catch    3. Mixed hyperlipidemia  -     Lipid Panel With / Chol / HDL Ratio    4. Rheumatoid arthritis involving both hands, unspecified whether rheumatoid factor present  -     gabapentin (NEURONTIN) 300 MG capsule; Take 2 capsules by mouth every night at bedtime.  Dispense: 60 capsule; Refill: 0             Return in about 4 months (around 2/8/2025) for Recheck.

## 2024-10-08 NOTE — ASSESSMENT & PLAN NOTE
Hypertension is stable and controlled. Repeat BP today 124/72 mmHg  Continue current treatment regimen.  Continue BP monitoring at home   Advised patient to stay hydrated with increased intake of water daily  Blood pressure will be reassessed  in 4 months .

## 2024-10-09 LAB
ALBUMIN/CREAT UR: 57 MG/G CREAT (ref 0–29)
CHOLEST SERPL-MCNC: 97 MG/DL (ref 0–200)
CHOLEST/HDLC SERPL: 3.88 {RATIO}
CREAT UR-MCNC: 254.8 MG/DL
HBA1C MFR BLD: 6.8 % (ref 4.8–5.6)
HDLC SERPL-MCNC: 25 MG/DL (ref 40–60)
LDLC SERPL CALC-MCNC: 51 MG/DL (ref 0–100)
MICROALBUMIN UR-MCNC: 144.4 UG/ML
TRIGL SERPL-MCNC: 114 MG/DL (ref 0–150)
VLDLC SERPL CALC-MCNC: 21 MG/DL (ref 5–40)

## 2024-10-10 ENCOUNTER — TELEPHONE (OUTPATIENT)
Dept: FAMILY MEDICINE CLINIC | Facility: CLINIC | Age: 79
End: 2024-10-10

## 2024-10-10 ENCOUNTER — HOSPITAL ENCOUNTER (EMERGENCY)
Facility: HOSPITAL | Age: 79
Discharge: HOME | End: 2024-10-10
Attending: EMERGENCY MEDICINE
Payer: MEDICARE

## 2024-10-10 ENCOUNTER — APPOINTMENT (OUTPATIENT)
Dept: CT IMAGING | Facility: HOSPITAL | Age: 79
End: 2024-10-10
Payer: MEDICARE

## 2024-10-10 VITALS
SYSTOLIC BLOOD PRESSURE: 119 MMHG | TEMPERATURE: 97.7 F | DIASTOLIC BLOOD PRESSURE: 78 MMHG | OXYGEN SATURATION: 99 % | HEIGHT: 72 IN | HEART RATE: 78 BPM | WEIGHT: 201 LBS | BODY MASS INDEX: 27.22 KG/M2 | RESPIRATION RATE: 15 BRPM

## 2024-10-10 DIAGNOSIS — Z85.09 HISTORY OF CHOLANGIOCARCINOMA: ICD-10-CM

## 2024-10-10 DIAGNOSIS — E87.1 HYPONATREMIA: ICD-10-CM

## 2024-10-10 DIAGNOSIS — K59.00 CONSTIPATION, UNSPECIFIED CONSTIPATION TYPE: ICD-10-CM

## 2024-10-10 DIAGNOSIS — R10.13 EPIGASTRIC ABDOMINAL PAIN: Primary | ICD-10-CM

## 2024-10-10 LAB
ALBUMIN SERPL-MCNC: 3.1 G/DL (ref 3.5–5.2)
ALBUMIN/GLOB SERPL: 0.6 G/DL
ALP SERPL-CCNC: 102 U/L (ref 39–117)
ALT SERPL W P-5'-P-CCNC: 32 U/L (ref 1–41)
ANION GAP SERPL CALCULATED.3IONS-SCNC: 9.4 MMOL/L (ref 5–15)
AST SERPL-CCNC: 80 U/L (ref 1–40)
BACTERIA UR QL AUTO: NORMAL /HPF
BASOPHILS # BLD AUTO: 0.02 10*3/MM3 (ref 0–0.2)
BASOPHILS NFR BLD AUTO: 0.3 % (ref 0–1.5)
BILIRUB SERPL-MCNC: 0.7 MG/DL (ref 0–1.2)
BILIRUB UR QL STRIP: ABNORMAL
BUN SERPL-MCNC: 15 MG/DL (ref 8–23)
BUN/CREAT SERPL: 15.8 (ref 7–25)
CALCIUM SPEC-SCNC: 9 MG/DL (ref 8.6–10.5)
CHLORIDE SERPL-SCNC: 95 MMOL/L (ref 98–107)
CLARITY UR: CLEAR
CO2 SERPL-SCNC: 26.6 MMOL/L (ref 22–29)
COLOR UR: ABNORMAL
CREAT SERPL-MCNC: 0.95 MG/DL (ref 0.76–1.27)
DEPRECATED RDW RBC AUTO: 58 FL (ref 37–54)
EGFRCR SERPLBLD CKD-EPI 2021: 81.9 ML/MIN/1.73
EOSINOPHIL # BLD AUTO: 0.12 10*3/MM3 (ref 0–0.4)
EOSINOPHIL NFR BLD AUTO: 1.8 % (ref 0.3–6.2)
ERYTHROCYTE [DISTWIDTH] IN BLOOD BY AUTOMATED COUNT: 15.9 % (ref 12.3–15.4)
GLOBULIN UR ELPH-MCNC: 4.8 GM/DL
GLUCOSE SERPL-MCNC: 99 MG/DL (ref 65–99)
GLUCOSE UR STRIP-MCNC: NEGATIVE MG/DL
HCT VFR BLD AUTO: 42.1 % (ref 37.5–51)
HGB BLD-MCNC: 13.4 G/DL (ref 13–17.7)
HGB UR QL STRIP.AUTO: NEGATIVE
HOLD SPECIMEN: NORMAL
HYALINE CASTS UR QL AUTO: NORMAL /LPF
IMM GRANULOCYTES # BLD AUTO: 0.01 10*3/MM3 (ref 0–0.05)
IMM GRANULOCYTES NFR BLD AUTO: 0.2 % (ref 0–0.5)
KETONES UR QL STRIP: NEGATIVE
LEUKOCYTE ESTERASE UR QL STRIP.AUTO: NEGATIVE
LIPASE SERPL-CCNC: 26 U/L (ref 13–60)
LYMPHOCYTES # BLD AUTO: 1.21 10*3/MM3 (ref 0.7–3.1)
LYMPHOCYTES NFR BLD AUTO: 18.5 % (ref 19.6–45.3)
MCH RBC QN AUTO: 30.7 PG (ref 26.6–33)
MCHC RBC AUTO-ENTMCNC: 31.8 G/DL (ref 31.5–35.7)
MCV RBC AUTO: 96.3 FL (ref 79–97)
MONOCYTES # BLD AUTO: 0.36 10*3/MM3 (ref 0.1–0.9)
MONOCYTES NFR BLD AUTO: 5.5 % (ref 5–12)
NEUTROPHILS NFR BLD AUTO: 4.81 10*3/MM3 (ref 1.7–7)
NEUTROPHILS NFR BLD AUTO: 73.7 % (ref 42.7–76)
NITRITE UR QL STRIP: NEGATIVE
PH UR STRIP.AUTO: 8.5 [PH] (ref 5–8)
PLATELET # BLD AUTO: 103 10*3/MM3 (ref 140–450)
PMV BLD AUTO: 10.8 FL (ref 6–12)
POTASSIUM SERPL-SCNC: 4.8 MMOL/L (ref 3.5–5.2)
PROT SERPL-MCNC: 7.9 G/DL (ref 6–8.5)
PROT UR QL STRIP: ABNORMAL
RBC # BLD AUTO: 4.37 10*6/MM3 (ref 4.14–5.8)
RBC # UR STRIP: NORMAL /HPF
REF LAB TEST METHOD: NORMAL
SODIUM SERPL-SCNC: 131 MMOL/L (ref 136–145)
SP GR UR STRIP: 1.01 (ref 1–1.03)
SQUAMOUS #/AREA URNS HPF: NORMAL /HPF
UROBILINOGEN UR QL STRIP: ABNORMAL
WBC # UR STRIP: NORMAL /HPF
WBC NRBC COR # BLD AUTO: 6.53 10*3/MM3 (ref 3.4–10.8)

## 2024-10-10 PROCEDURE — 96375 TX/PRO/DX INJ NEW DRUG ADDON: CPT

## 2024-10-10 PROCEDURE — 81001 URINALYSIS AUTO W/SCOPE: CPT | Performed by: EMERGENCY MEDICINE

## 2024-10-10 PROCEDURE — 25010000002 ONDANSETRON PER 1 MG: Performed by: EMERGENCY MEDICINE

## 2024-10-10 PROCEDURE — 83690 ASSAY OF LIPASE: CPT | Performed by: EMERGENCY MEDICINE

## 2024-10-10 PROCEDURE — 99285 EMERGENCY DEPT VISIT HI MDM: CPT

## 2024-10-10 PROCEDURE — 80053 COMPREHEN METABOLIC PANEL: CPT | Performed by: EMERGENCY MEDICINE

## 2024-10-10 PROCEDURE — 25510000001 IOPAMIDOL PER 1 ML: Performed by: EMERGENCY MEDICINE

## 2024-10-10 PROCEDURE — 85025 COMPLETE CBC W/AUTO DIFF WBC: CPT | Performed by: EMERGENCY MEDICINE

## 2024-10-10 PROCEDURE — 74177 CT ABD & PELVIS W/CONTRAST: CPT

## 2024-10-10 PROCEDURE — 96374 THER/PROPH/DIAG INJ IV PUSH: CPT

## 2024-10-10 PROCEDURE — 25010000002 MORPHINE PER 10 MG: Performed by: EMERGENCY MEDICINE

## 2024-10-10 PROCEDURE — 99284 EMERGENCY DEPT VISIT MOD MDM: CPT

## 2024-10-10 PROCEDURE — 25010000002 KETOROLAC TROMETHAMINE PER 15 MG

## 2024-10-10 RX ORDER — LACTULOSE 10 G/15ML
20 SOLUTION ORAL ONCE
Status: COMPLETED | OUTPATIENT
Start: 2024-10-10 | End: 2024-10-10

## 2024-10-10 RX ORDER — ONDANSETRON 2 MG/ML
4 INJECTION INTRAMUSCULAR; INTRAVENOUS ONCE
Status: COMPLETED | OUTPATIENT
Start: 2024-10-10 | End: 2024-10-10

## 2024-10-10 RX ORDER — MORPHINE SULFATE 4 MG/ML
4 INJECTION, SOLUTION INTRAMUSCULAR; INTRAVENOUS ONCE
Status: COMPLETED | OUTPATIENT
Start: 2024-10-10 | End: 2024-10-10

## 2024-10-10 RX ORDER — DOCUSATE SODIUM 100 MG/1
100 CAPSULE, LIQUID FILLED ORAL 2 TIMES DAILY PRN
Qty: 30 CAPSULE | Refills: 0 | Status: SHIPPED | OUTPATIENT
Start: 2024-10-10 | End: 2024-10-17

## 2024-10-10 RX ORDER — IOPAMIDOL 755 MG/ML
100 INJECTION, SOLUTION INTRAVASCULAR
Status: COMPLETED | OUTPATIENT
Start: 2024-10-10 | End: 2024-10-10

## 2024-10-10 RX ORDER — KETOROLAC TROMETHAMINE 15 MG/ML
15 INJECTION, SOLUTION INTRAMUSCULAR; INTRAVENOUS ONCE
Status: COMPLETED | OUTPATIENT
Start: 2024-10-10 | End: 2024-10-10

## 2024-10-10 RX ADMIN — LACTULOSE 20 G: 20 SOLUTION ORAL at 20:48

## 2024-10-10 RX ADMIN — MORPHINE SULFATE 4 MG: 4 INJECTION, SOLUTION INTRAMUSCULAR; INTRAVENOUS at 18:02

## 2024-10-10 RX ADMIN — ONDANSETRON 4 MG: 2 INJECTION, SOLUTION INTRAMUSCULAR; INTRAVENOUS at 18:02

## 2024-10-10 RX ADMIN — IOPAMIDOL 100 ML: 755 INJECTION, SOLUTION INTRAVENOUS at 19:07

## 2024-10-10 RX ADMIN — KETOROLAC TROMETHAMINE 15 MG: 15 INJECTION, SOLUTION INTRAMUSCULAR; INTRAVENOUS at 20:47

## 2024-10-10 NOTE — TELEPHONE ENCOUNTER
Caller: TUCKER PULLIAM    Relationship: Emergency Contact    Best call back number: [phone]     What is the best time to reach you: ANY    Who are you requesting to speak with (clinical staff, provider,  specific staff member): CLINICAL STAFF    Do you know the name of the person who called: FREDY CEBALLOS    What was the call regarding: PATIENT FREDY CEBALLOS CALLED STATED THE PATIENT HAS NOT HAD A BOWEL MOVEMENT IN OVER 5 DAYS.  HE HAS BEEN TAKING POWDER MEDICATION MIX WITH WATER AND IT IS NOT HELPING.  CAN DR SIMMONS PLEASE CALL THEM BACK AND LET THE KNOW WHAT THEY NEED TO DO.  CAN THEY GET AN APPT TO SEE DR SIMMONS.          Is it okay if the provider responds through MyChart:

## 2024-10-10 NOTE — FSED PROVIDER NOTE
Subjective   History of Present Illness    Patient, history of cholangiocarcinoma, reports last normal bowel movement was about 6 days ago.  He developed generalized abdominal bloating and left upper abdominal pain 2 days ago.  Rates his pain a 9 out of 10 pain scale.  Associated symptoms include nausea.    Patient stopped chemotherapy about 1 month ago and starting radiation therapy for his cancer next week.  He is followed by Dr. Tejada, [facility] group.    Review of Systems   Constitutional:  Negative for activity change and appetite change.   Eyes:  Negative for pain.   Respiratory:  Negative for shortness of breath.    Gastrointestinal:  Positive for abdominal pain, constipation and nausea. Negative for vomiting.   Musculoskeletal:  Negative for arthralgias.   Skin:  Negative for color change.   Neurological:  Negative for dizziness.   All other systems reviewed and are negative.      Past Medical History:   Diagnosis Date    Cancer     Liver    Colon polyp     Diabetes mellitus     Hyperlipidemia     Hypertension     Liver cancer 07-    Liver disease     Postoperative wound infection     RA (rheumatoid arthritis)        Allergies   Allergen Reactions    Chlorhexidine Dermatitis     Please use alcohol to clean port site       Past Surgical History:   Procedure Laterality Date    ABDOMINAL SURGERY      BACK SURGERY      CHOLECYSTECTOMY      COLONOSCOPY  2017    [facility]    COLONOSCOPY N/A 06/04/2024    Procedure: COLONOSCOPY INTO CECUM WITH POLYPECTOMIES (COLD SNARE);  Surgeon: Angel Ness MD;  Location: [facility] ENDOSCOPY;  Service: General;  Laterality: N/A;  PRE: COLON MASS, ABNORMAL CT  POST: DIVERTICULOSIS, POLYPS, POOR PREP    ENDOSCOPY N/A 06/04/2024    Procedure: ESOPHAGOGASTRODUODENOSCOPY;  Surgeon: Angel Ness MD;  Location: [facility] ENDOSCOPY;  Service: General;  Laterality: N/A;  PRE: LIVER MASS  POST: NORMAL EGD    EYE SURGERY      PERICARDIUM  SURGERY      REPLACEMENT TOTAL KNEE BILATERAL      THORACOTOMY Bilateral 1997    VENOUS ACCESS DEVICE (PORT) INSERTION Right 2024    Procedure: INSERTION VENOUS ACCESS DEVICE;  Surgeon: Angel Ness MD;  Location: [facility] MAIN OR;  Service: General;  Laterality: Right;       Family History   Problem Relation Age of Onset    Hypertension Mother     Heart disease Mother     Arthritis Father     Diabetes Sister     Asthma Brother     Diabetes Brother     Malig Hyperthermia Neg Hx        Social History     Socioeconomic History    Marital status:    Tobacco Use    Smoking status: Some Days     Current packs/day: 0.00     Average packs/day: 1 pack/day for 53.7 years (53.7 ttl pk-yrs)     Types: Cigarettes     Start date: 1970     Last attempt to quit: 10/1/2023     Years since quittin.0     Passive exposure: Past    Smokeless tobacco: Never   Vaping Use    Vaping status: Never Used   Substance and Sexual Activity    Alcohol use: Not Currently    Drug use: Never    Sexual activity: Not Currently     Partners: Female     Birth control/protection: Vasectomy           Objective   Physical Exam  Vitals and nursing note reviewed.   Constitutional:       Appearance: Normal appearance. He is normal weight.   HENT:      Head: Normocephalic and atraumatic.      Nose: Nose normal.      Mouth/Throat:      Mouth: Mucous membranes are moist.   Eyes:      Pupils: Pupils are equal, round, and reactive to light.   Cardiovascular:      Rate and Rhythm: Normal rate and regular rhythm.      Pulses: Normal pulses.      Heart sounds: Normal heart sounds.   Pulmonary:      Effort: Pulmonary effort is normal.      Breath sounds: Normal breath sounds.   Abdominal:      Tenderness:  in the epigastric area and left upper quadrant   Musculoskeletal:         General: Normal range of motion.      Cervical back: Normal range of motion.      Right lower leg: No edema.      Left lower leg: No edema.   Skin:     General:  Skin is warm.   Neurological:      General: No focal deficit present.      Mental Status: He is alert.   Psychiatric:         Behavior: Behavior is cooperative.         Procedures           ED Course  ED Course as of 10/10/24 2043   Thu Oct 10, 2024   2034 CBC without acute findings.  At baseline per chart review.  He does have low platelets.  This has been present on previous labs as well. [AS]   2035 CMP with a mild hyponatremia and hypochloremia.  Patient always typically a little low.  There is a fluid shortage at this time.  Patient okay to tolerate oral intake.  Encouraged salt. [AS]   2036 Normal lipase. [AS]   2037 CT abdomen and pelvis with contrast:  IMPRESSION:  1. Loculated right pleural effusion with some pleural thickening and  pleural calcification, relatively unchanged from the 09/20/2024 study.  2. Moderately large right hepatic lobe lesion appears stable since the  09/20/2024 study and is probably neoplastic.  3. Status post cholecystectomy.  4. Large scrotal hydrocele.  5. Overall the CT abdomen/pelvis appears relatively stable since the  09/20/2024 study.   [AS]   2040 I personally spoke to and evaluated the patient as well.  Abdomen is soft, nonsurgical.  There is mild tenderness.  He has no rectal pain or pressure.  AKASH exam with chaperone reveals very enlarged prostate but no stool burden palpable.  I do not feel the patient would benefit from an enema at this time.  He overall appears well, no acute distress, nontoxic.  Vital signs are WNL.  He has tried MiraLAX over the past 2 days.  I recommend a greater and more consistent anticonstipation medication regimen.  However, patient also admits to very poor oral intake.  He has had almost nothing to eat today.  Very likely patient has very minimal stool anyway.  Will give lactulose and Toradol prior to discharge.  Patient to continue MiraLAX.  Prescribed Colace.  Encourage salt intake. [AS]      ED Course User Index  [AS] Fatuma Barton, DIOGO                                            Medical Decision Making  Problems Addressed:  Constipation, unspecified constipation type: complicated acute illness or injury  Epigastric abdominal pain: complicated acute illness or injury  History of cholangiocarcinoma: complicated acute illness or injury  Hyponatremia: complicated acute illness or injury    Amount and/or Complexity of Data Reviewed  Labs: ordered.  Radiology: ordered.    Risk  OTC drugs.  Prescription drug management.        Final diagnoses:   History of cholangiocarcinoma   Epigastric abdominal pain   Constipation, unspecified constipation type   Hyponatremia       ED Disposition  ED Disposition       ED Disposition   Discharge    Condition   Stable    Comment   --               Gloria Post MD  [address] [facility] [address]  [facility] [address]  [phone]    Schedule an appointment as soon as possible for a visit       Anthony Tejada MD  [address] [facility] [address]  [place] [id number] [phone]               Medication List        New Prescriptions      docusate sodium 100 MG capsule  Commonly known as: COLACE  Take 1 capsule by mouth 2 (Two) Times a Day As Needed for Constipation.               Where to Get Your Medications        These medications were sent to [facility] [address] [place] [address] [facility] - [phone]  - [phone]   [id number] [place] [id number]      Phone: [phone]   docusate sodium 100 MG capsule

## 2024-10-11 ENCOUNTER — PATIENT OUTREACH (OUTPATIENT)
Dept: CASE MANAGEMENT | Facility: OTHER | Age: 79
End: 2024-10-11
Payer: MEDICARE

## 2024-10-11 ENCOUNTER — OFFICE VISIT (OUTPATIENT)
Dept: FAMILY MEDICINE CLINIC | Facility: CLINIC | Age: 79
End: 2024-10-11
Payer: MEDICARE

## 2024-10-11 VITALS
HEIGHT: 72 IN | HEART RATE: 77 BPM | WEIGHT: 191 LBS | BODY MASS INDEX: 25.87 KG/M2 | SYSTOLIC BLOOD PRESSURE: 110 MMHG | OXYGEN SATURATION: 97 % | DIASTOLIC BLOOD PRESSURE: 74 MMHG

## 2024-10-11 DIAGNOSIS — G89.3 CANCER RELATED PAIN: Primary | ICD-10-CM

## 2024-10-11 PROCEDURE — 99213 OFFICE O/P EST LOW 20 MIN: CPT | Performed by: INTERNAL MEDICINE

## 2024-10-11 PROCEDURE — 3074F SYST BP LT 130 MM HG: CPT | Performed by: INTERNAL MEDICINE

## 2024-10-11 PROCEDURE — 1125F AMNT PAIN NOTED PAIN PRSNT: CPT | Performed by: INTERNAL MEDICINE

## 2024-10-11 PROCEDURE — 3078F DIAST BP <80 MM HG: CPT | Performed by: INTERNAL MEDICINE

## 2024-10-11 RX ORDER — HYDROCODONE BITARTRATE AND ACETAMINOPHEN 7.5; 325 MG/1; MG/1
1 TABLET ORAL EVERY 6 HOURS PRN
Qty: 120 TABLET | Refills: 0 | Status: ON HOLD | OUTPATIENT
Start: 2024-10-11

## 2024-10-11 NOTE — OUTREACH NOTE
AMBULATORY CASE MANAGEMENT NOTE    Names and Relationships of Patient/Support Persons: Contact: TUCKER PULLIAM; Relationship: Emergency Contact -     Patient Outreach  RN-ACM outreach with patient's spouse, Tucker.Discussed 10/10/24 ED visit regarding epigastric abdominal pain; constipation. Patient treated and discharged. Spouse states patient compliant with ED recommendations ; Colace and states to have had good results but continues with abdominal pain. Spouse states patient had 10/11/24 PCP visit and received recommendations. Spouse states patient compliant with medications. Reviewed with spouse ED AVS recommendations; 24/7 Nurse Line Telephone number; role of RN-ACM. Spouse verbalized understanding. Spouse states to appreciate outreach. No further questions voiced at this time.   Adult Patient Profile  Questions/Answers      Flowsheet Row Most Recent Value   Symptoms/Conditions Managed at Home gastrointestinal, other (see comments)  [cholangiocarcinoma]   Gastrointestinal Symptoms/Conditions constipation   Gastrointestinal Management Strategies medication therapy, other (see comments)  [Physician follow up]   Barriers to Taking Medication as Prescribed none        Send Education  Questions/Answers      Flowsheet Row Most Recent Value   Other Patient Education/Resources  24/7 [facility] Nurse Call Line                Lana SAUCEDA  Ambulatory Case Management    10/11/2024, 13:22 EDT

## 2024-10-11 NOTE — PROGRESS NOTES
Chief Complaint   Patient presents with    Hospital Follow Up Visit    History of Present Illness:  Patient is here today for hospital follow-up visit. He was seen yesterday at the ER due to abdominal pain and constipation.  CT abdomen/pelvis revealed hepatic lesion consistent with previous cholangiocarcinoma, loculated right pleural effusion and scrotal hydrocele, no evidence of large stool burden consistent with constipation or colitis.  He was discharged home on laxatives.   Today, he still having severe abdominal pain that disrupts his sleep. He has had bowel movement early this morning. Reports occasional nausea but no vomiting. No having much appetite.     PMH:   Outpatient Medications Prior to Visit   Medication Sig Dispense Refill    atenolol (TENORMIN) 25 MG tablet Take 1 tablet by mouth Daily. 90 tablet 0    docusate sodium (COLACE) 100 MG capsule Take 1 capsule by mouth 2 (Two) Times a Day As Needed for Constipation. 30 capsule 0    doxazosin (CARDURA) 2 MG tablet TAKE 1 TABLET BY MOUTH EVERY DAY AT BEDTIME 30 tablet 0    gabapentin (NEURONTIN) 300 MG capsule Take 2 capsules by mouth every night at bedtime. 60 capsule 0    glucose blood test strip Once a day 100 each 12    hydrOXYzine (ATARAX) 25 MG tablet Take 1 tablet by mouth 3 (Three) Times a Day As Needed for Itching. 60 tablet 1    losartan (COZAAR) 25 MG tablet Take 1 tablet by mouth Daily. 90 tablet 3    magnesium oxide (MAG-OX) 400 MG tablet Take 1 tablet by mouth every night at bedtime. 30 tablet 2    meloxicam (MOBIC) 15 MG tablet Take 1 tablet by mouth Daily. 90 tablet 3    metFORMIN ER (GLUCOPHAGE-XR) 500 MG 24 hr tablet Take 2 tablets by mouth Daily. 90 tablet 1    OLANZapine (zyPREXA) 5 MG tablet Take 1 tablet by mouth Every Night. 30 tablet 3    ondansetron ODT (ZOFRAN-ODT) 4 MG disintegrating tablet Place 1 tablet on the tongue Every 6 (Six) Hours As Needed for Nausea or Vomiting. 12 tablet 0    pantoprazole (Protonix) 40 MG EC tablet  Take 1 tablet by mouth Daily. 30 tablet 0    pilocarpine (SALAGEN) 5 MG tablet Take 1 tablet by mouth 3 times a day.      pravastatin (PRAVACHOL) 80 MG tablet Take 1 tablet by mouth Daily. 90 tablet 1    prochlorperazine (COMPAZINE) 10 MG tablet Take 1 tablet by mouth Every 6 (Six) Hours As Needed for Nausea or Vomiting. (Patient not taking: Reported on 10/8/2024) 30 tablet 0    promethazine (PHENERGAN) 25 MG tablet Take 1 tablet by mouth Every 6 (Six) Hours As Needed for Nausea or Vomiting. (Patient not taking: Reported on 10/8/2024) 12 tablet 0    traMADol (ULTRAM) 50 MG tablet Take 2 tablets by mouth Every 6 (Six) Hours As Needed.       Facility-Administered Medications Prior to Visit   Medication Dose Route Frequency Provider Last Rate Last Admin    heparin injection 500 Units  500 Units Intravenous PRN Anthony Tejada MD   500 Units at 09/13/24 1037    sodium chloride 0.9 % flush 10 mL  10 mL Intravenous PRN Anthony Tejada MD   10 mL at 09/13/24 1037      Allergies   Allergen Reactions    Chlorhexidine Dermatitis     Please use alcohol to clean port site     Past Surgical History:   Procedure Laterality Date    ABDOMINAL SURGERY      BACK SURGERY      CHOLECYSTECTOMY      COLONOSCOPY  2017    [facility]    COLONOSCOPY N/A 06/04/2024    Procedure: COLONOSCOPY INTO CECUM WITH POLYPECTOMIES (COLD SNARE);  Surgeon: Angel Ness MD;  Location: [facility] ENDOSCOPY;  Service: General;  Laterality: N/A;  PRE: COLON MASS, ABNORMAL CT  POST: DIVERTICULOSIS, POLYPS, POOR PREP    ENDOSCOPY N/A 06/04/2024    Procedure: ESOPHAGOGASTRODUODENOSCOPY;  Surgeon: Angel Ness MD;  Location: [facility] ENDOSCOPY;  Service: General;  Laterality: N/A;  PRE: LIVER MASS  POST: NORMAL EGD    EYE SURGERY      PERICARDIUM SURGERY      REPLACEMENT TOTAL KNEE BILATERAL      THORACOTOMY Bilateral 1997    VENOUS ACCESS DEVICE (PORT) INSERTION Right 7/30/2024    Procedure: INSERTION VENOUS ACCESS DEVICE;   "Surgeon: Angel Ness MD;  Location: [facility] OR;  Service: General;  Laterality: Right;     family history includes Arthritis in his father; Asthma in his brother; Diabetes in his brother and sister; Heart disease in his mother; Hypertension in his mother.   reports that he has been smoking cigarettes. He started smoking about 54 years ago. He has a 53.7 pack-year smoking history. He has been exposed to tobacco smoke. He has never used smokeless tobacco. He reports that he does not currently use alcohol. He reports that he does not use drugs.     /74   Pulse 77   Ht 182.9 cm (72.01\")   Wt 86.6 kg (191 lb)   SpO2 97%   BMI 25.90 kg/m²   Physical Exam  Constitutional:       Appearance: Normal appearance.   HENT:      Head: Normocephalic and atraumatic.   Cardiovascular:      Heart sounds: Normal heart sounds.   Pulmonary:      Breath sounds: Normal breath sounds.   Abdominal:      General: Bowel sounds are normal.      Palpations: Abdomen is soft. There is hepatomegaly. There is no splenomegaly.   Neurological:      Mental Status: He is alert and oriented to person, place, and time.          The following data was reviewed by: Gloria Post MD on 10/11/2024:    Data reviewed : Radiologic studies Loculated right pleural effusion with some pleural thickening and pleural calcification, moderately large right hepatic lobe lesion appears stable since the prior study.      Diagnoses and all orders for this visit:    1. Cancer related pain (Primary)  Comments:  Will switch Tramadol to Shannon PRN for severe pain relief  Orders:  -     HYDROcodone-acetaminophen (Norco) 7.5-325 MG per tablet; Take 1 tablet by mouth Every 6 (Six) Hours As Needed for Moderate Pain.  Dispense: 120 tablet; Refill: 0            Return for Next scheduled follow up.     "

## 2024-10-11 NOTE — DISCHARGE INSTRUCTIONS
There were no acute concerns on your abdominal CT today.  Alternate acetaminophen and ibuprofen every 4 hours at home.  Increase electrolyte fluids and small bland meals.  Increase your salt intake over the next couple of days, as it was mildly low today on your blood work and may need to be rechecked within the next several days by your primary care or Dr. Tejada.  Continue MiraLAX daily.  I have also prescribed Colace twice daily. Recommend juice.  You may try other over-the-counter anticonstipation medications as well, including fleets enemas and suppositories.  Continue to monitor symptoms closely.  Recommend reevaluation if you do not have a bowel movement in 24 to 48 hours.  Return to emergency department for worsening symptoms or other medical emergencies.  Recommended follow-up with PCP.  Refer to the attached instructions for further information.

## 2024-10-13 ENCOUNTER — HOSPITAL ENCOUNTER (EMERGENCY)
Facility: HOSPITAL | Age: 79
Discharge: HOME | DRG: 947 | End: 2024-10-13
Attending: EMERGENCY MEDICINE | Admitting: EMERGENCY MEDICINE
Payer: MEDICARE

## 2024-10-13 VITALS
BODY MASS INDEX: 27.09 KG/M2 | DIASTOLIC BLOOD PRESSURE: 69 MMHG | TEMPERATURE: 98.1 F | OXYGEN SATURATION: 97 % | WEIGHT: 200 LBS | SYSTOLIC BLOOD PRESSURE: 109 MMHG | HEIGHT: 72 IN | HEART RATE: 74 BPM | RESPIRATION RATE: 18 BRPM

## 2024-10-13 DIAGNOSIS — C22.1 CHOLANGIOCARCINOMA: ICD-10-CM

## 2024-10-13 DIAGNOSIS — R10.84 GENERALIZED ABDOMINAL PAIN: Primary | ICD-10-CM

## 2024-10-13 LAB
ALBUMIN SERPL-MCNC: 3.2 G/DL (ref 3.5–5.2)
ALBUMIN/GLOB SERPL: 0.7 G/DL
ALP SERPL-CCNC: 101 U/L (ref 39–117)
ALT SERPL W P-5'-P-CCNC: 25 U/L (ref 1–41)
ANION GAP SERPL CALCULATED.3IONS-SCNC: 10.8 MMOL/L (ref 5–15)
AST SERPL-CCNC: 48 U/L (ref 1–40)
BASOPHILS # BLD AUTO: 0.01 10*3/MM3 (ref 0–0.2)
BASOPHILS NFR BLD AUTO: 0.1 % (ref 0–1.5)
BILIRUB SERPL-MCNC: 1.1 MG/DL (ref 0–1.2)
BILIRUB UR QL STRIP: ABNORMAL
BUN SERPL-MCNC: 20 MG/DL (ref 8–23)
BUN/CREAT SERPL: 22.2 (ref 7–25)
CALCIUM SPEC-SCNC: 9 MG/DL (ref 8.6–10.5)
CHLORIDE SERPL-SCNC: 92 MMOL/L (ref 98–107)
CLARITY UR: CLEAR
CO2 SERPL-SCNC: 26.2 MMOL/L (ref 22–29)
COLOR UR: ABNORMAL
CREAT SERPL-MCNC: 0.9 MG/DL (ref 0.76–1.27)
DEPRECATED RDW RBC AUTO: 52.7 FL (ref 37–54)
EGFRCR SERPLBLD CKD-EPI 2021: 87.4 ML/MIN/1.73
EOSINOPHIL # BLD AUTO: 0.01 10*3/MM3 (ref 0–0.4)
EOSINOPHIL NFR BLD AUTO: 0.1 % (ref 0.3–6.2)
ERYTHROCYTE [DISTWIDTH] IN BLOOD BY AUTOMATED COUNT: 15.3 % (ref 12.3–15.4)
FLUAV SUBTYP SPEC NAA+PROBE: NOT DETECTED
FLUBV RNA ISLT QL NAA+PROBE: NOT DETECTED
GLOBULIN UR ELPH-MCNC: 4.8 GM/DL
GLUCOSE SERPL-MCNC: 171 MG/DL (ref 65–99)
GLUCOSE UR STRIP-MCNC: NEGATIVE MG/DL
HCT VFR BLD AUTO: 42.9 % (ref 37.5–51)
HGB BLD-MCNC: 14.4 G/DL (ref 13–17.7)
HGB UR QL STRIP.AUTO: NEGATIVE
IMM GRANULOCYTES # BLD AUTO: 0.02 10*3/MM3 (ref 0–0.05)
IMM GRANULOCYTES NFR BLD AUTO: 0.2 % (ref 0–0.5)
KETONES UR QL STRIP: ABNORMAL
LEUKOCYTE ESTERASE UR QL STRIP.AUTO: NEGATIVE
LIPASE SERPL-CCNC: 67 U/L (ref 13–60)
LYMPHOCYTES # BLD AUTO: 0.89 10*3/MM3 (ref 0.7–3.1)
LYMPHOCYTES NFR BLD AUTO: 8 % (ref 19.6–45.3)
MCH RBC QN AUTO: 30.7 PG (ref 26.6–33)
MCHC RBC AUTO-ENTMCNC: 33.6 G/DL (ref 31.5–35.7)
MCV RBC AUTO: 91.5 FL (ref 79–97)
MONOCYTES # BLD AUTO: 0.78 10*3/MM3 (ref 0.1–0.9)
MONOCYTES NFR BLD AUTO: 7 % (ref 5–12)
NEUTROPHILS NFR BLD AUTO: 84.6 % (ref 42.7–76)
NEUTROPHILS NFR BLD AUTO: 9.45 10*3/MM3 (ref 1.7–7)
NITRITE UR QL STRIP: NEGATIVE
PH UR STRIP.AUTO: 7 [PH] (ref 5–8)
PLATELET # BLD AUTO: 130 10*3/MM3 (ref 140–450)
PMV BLD AUTO: 9.6 FL (ref 6–12)
POTASSIUM SERPL-SCNC: 3.4 MMOL/L (ref 3.5–5.2)
PROT SERPL-MCNC: 8 G/DL (ref 6–8.5)
PROT UR QL STRIP: ABNORMAL
RBC # BLD AUTO: 4.69 10*6/MM3 (ref 4.14–5.8)
SARS-COV-2 RNA RESP QL NAA+PROBE: NOT DETECTED
SODIUM SERPL-SCNC: 129 MMOL/L (ref 136–145)
SP GR UR STRIP: 1.02 (ref 1–1.03)
UROBILINOGEN UR QL STRIP: ABNORMAL
WBC NRBC COR # BLD AUTO: 11.16 10*3/MM3 (ref 3.4–10.8)

## 2024-10-13 PROCEDURE — 25010000002 HYDROMORPHONE 1 MG/ML SOLUTION: Performed by: NURSE PRACTITIONER

## 2024-10-13 PROCEDURE — 80053 COMPREHEN METABOLIC PANEL: CPT | Performed by: NURSE PRACTITIONER

## 2024-10-13 PROCEDURE — 96375 TX/PRO/DX INJ NEW DRUG ADDON: CPT

## 2024-10-13 PROCEDURE — 25010000002 KETOROLAC TROMETHAMINE PER 15 MG: Performed by: NURSE PRACTITIONER

## 2024-10-13 PROCEDURE — 81003 URINALYSIS AUTO W/O SCOPE: CPT | Performed by: NURSE PRACTITIONER

## 2024-10-13 PROCEDURE — 25010000002 FENTANYL CITRATE (PF) 50 MCG/ML SOLUTION: Performed by: NURSE PRACTITIONER

## 2024-10-13 PROCEDURE — 96376 TX/PRO/DX INJ SAME DRUG ADON: CPT

## 2024-10-13 PROCEDURE — 99283 EMERGENCY DEPT VISIT LOW MDM: CPT

## 2024-10-13 PROCEDURE — 87636 SARSCOV2 & INF A&B AMP PRB: CPT | Performed by: NURSE PRACTITIONER

## 2024-10-13 PROCEDURE — 99283 EMERGENCY DEPT VISIT LOW MDM: CPT | Performed by: NURSE PRACTITIONER

## 2024-10-13 PROCEDURE — 96374 THER/PROPH/DIAG INJ IV PUSH: CPT

## 2024-10-13 PROCEDURE — 25010000002 HYDROMORPHONE PER 4 MG: Performed by: NURSE PRACTITIONER

## 2024-10-13 PROCEDURE — 83690 ASSAY OF LIPASE: CPT | Performed by: NURSE PRACTITIONER

## 2024-10-13 PROCEDURE — 36415 COLL VENOUS BLD VENIPUNCTURE: CPT

## 2024-10-13 PROCEDURE — 85025 COMPLETE CBC W/AUTO DIFF WBC: CPT | Performed by: NURSE PRACTITIONER

## 2024-10-13 PROCEDURE — 25010000002 ONDANSETRON PER 1 MG: Performed by: NURSE PRACTITIONER

## 2024-10-13 RX ORDER — KETOROLAC TROMETHAMINE 15 MG/ML
15 INJECTION, SOLUTION INTRAMUSCULAR; INTRAVENOUS ONCE
Status: COMPLETED | OUTPATIENT
Start: 2024-10-13 | End: 2024-10-13

## 2024-10-13 RX ORDER — SODIUM CHLORIDE 0.9 % (FLUSH) 0.9 %
10 SYRINGE (ML) INJECTION AS NEEDED
Status: DISCONTINUED | OUTPATIENT
Start: 2024-10-13 | End: 2024-10-13 | Stop reason: HOSPADM

## 2024-10-13 RX ORDER — FENTANYL CITRATE 50 UG/ML
100 INJECTION, SOLUTION INTRAMUSCULAR; INTRAVENOUS ONCE
Status: COMPLETED | OUTPATIENT
Start: 2024-10-13 | End: 2024-10-13

## 2024-10-13 RX ORDER — DICYCLOMINE HYDROCHLORIDE 10 MG/1
20 CAPSULE ORAL ONCE
Status: COMPLETED | OUTPATIENT
Start: 2024-10-13 | End: 2024-10-13

## 2024-10-13 RX ORDER — HYDROMORPHONE HYDROCHLORIDE 1 MG/ML
0.5 INJECTION, SOLUTION INTRAMUSCULAR; INTRAVENOUS; SUBCUTANEOUS ONCE
Status: COMPLETED | OUTPATIENT
Start: 2024-10-13 | End: 2024-10-13

## 2024-10-13 RX ORDER — ONDANSETRON 2 MG/ML
4 INJECTION INTRAMUSCULAR; INTRAVENOUS ONCE
Status: COMPLETED | OUTPATIENT
Start: 2024-10-13 | End: 2024-10-13

## 2024-10-13 RX ADMIN — DICYCLOMINE HYDROCHLORIDE 20 MG: 10 CAPSULE ORAL at 11:55

## 2024-10-13 RX ADMIN — FENTANYL CITRATE 100 MCG: 50 INJECTION INTRAMUSCULAR; INTRAVENOUS at 11:56

## 2024-10-13 RX ADMIN — HYDROMORPHONE HYDROCHLORIDE 1 MG: 1 INJECTION, SOLUTION INTRAMUSCULAR; INTRAVENOUS; SUBCUTANEOUS at 10:57

## 2024-10-13 RX ADMIN — HYDROMORPHONE HYDROCHLORIDE 0.5 MG: 1 INJECTION, SOLUTION INTRAMUSCULAR; INTRAVENOUS; SUBCUTANEOUS at 10:11

## 2024-10-13 RX ADMIN — ONDANSETRON 4 MG: 2 INJECTION, SOLUTION INTRAMUSCULAR; INTRAVENOUS at 10:11

## 2024-10-13 RX ADMIN — KETOROLAC TROMETHAMINE 15 MG: 15 INJECTION, SOLUTION INTRAMUSCULAR; INTRAVENOUS at 12:43

## 2024-10-13 NOTE — [facility] PROVIDER NOTE
EMERGENCY DEPARTMENT ENCOUNTER    Room Number:  05/05  Date seen:  10/13/2024  Time seen: 09:54 EDT  PCP: Gloria Post MD  Historian: patient, wife and son    Discussed/obtained information from independent historians: Not applicable    HPI:  Chief complaint: Abdominal pain, nausea  A complete HPI/ROS/PMH/PSH/SH/FH are unobtainable due to: Not applicable  Context:Gay Vazquez is a 79 y.o. male with history of diabetes, rheumatoid arthritis, hypertension, cholangiocarcinoma status postcholecystectomy, liver cancer who presents to the ED with c/o greater than 10 days of abdominal pain and nausea feeling bloated.  His symptoms are not made better by home pain medication which was recently changed to hydrocodone or nausea medication.  He is able to drink but not eating much.  He states he just feels terrible.  He has an appointment tomorrow with Dr. Tejada his oncologist and he recently had a visit with Dr. Hopper, surgical oncology.  Patient and his family both state that they are worried there really is not much they can do for the liver cancer.  His last chemo was about a month ago.  He denies any fever but has felt cold, urinary symptoms, constipation or diarrhea.    External (non-ED) record review:  pt seen here 10/10/24, CT scan stable.  Pt also had post ER follow up visit with PCP 10/11/24.  PCP changed his Tramadol to [place] for severe pain.     I reviewed recent surgical oncology office visit 9/26/2024.  He was seen that day for initial surgical consultation for cholangiocarcinoma.  The patient's tumor is very large and advanced intrahepatic cholangiocarcinoma.  In order for the tumor to be considered resectable he would need a portal vein embolization but this is not guaranteed it would work.  It is also concerning that he has extensive history of staph infections in the past.    Chronic or social conditions impacting care: Cholangiocarcinoma of the liver,  diabetes    ALLERGIES  Chlorhexidine    PAST MEDICAL HISTORY  Active Ambulatory Problems     Diagnosis Date Noted    Mixed hyperlipidemia 06/08/2023    Diabetes mellitus without complication 06/08/2023    Rheumatoid arthritis 06/08/2023    Overweight (BMI 25.0-29.9) 06/08/2023    Primary hypertension 06/08/2023    Nicotine dependence, cigarettes, uncomplicated 07/10/2023    Aneurysm of ascending aorta without rupture 08/23/2023    Liver mass 05/30/2024    Colonic mass 05/30/2024    Cholangiocarcinoma 07/25/2024    Encounter for long-term (current) use of other medications 07/29/2024    Fitting and adjustment of vascular catheter 08/02/2024    Thrombocytopenia 08/09/2024     Resolved Ambulatory Problems     Diagnosis Date Noted    Cellulitis 06/08/2023    Abdominal aortic aneurysm (AAA) 08/14/2023     Past Medical History:   Diagnosis Date    Cancer     Colon polyp     Diabetes mellitus     Hyperlipidemia     Hypertension     Liver cancer 07-    Liver disease     Postoperative wound infection     RA (rheumatoid arthritis)        PAST SURGICAL HISTORY  Past Surgical History:   Procedure Laterality Date    ABDOMINAL SURGERY      BACK SURGERY      CHOLECYSTECTOMY      COLONOSCOPY  2017    [facility]    COLONOSCOPY N/A 06/04/2024    Procedure: COLONOSCOPY INTO CECUM WITH POLYPECTOMIES (COLD SNARE);  Surgeon: Angel Ness MD;  Location: [facility] ENDOSCOPY;  Service: General;  Laterality: N/A;  PRE: COLON MASS, ABNORMAL CT  POST: DIVERTICULOSIS, POLYPS, POOR PREP    ENDOSCOPY N/A 06/04/2024    Procedure: ESOPHAGOGASTRODUODENOSCOPY;  Surgeon: Angel Ness MD;  [facility];  Service: General;  Laterality: N/A;  PRE: LIVER MASS  POST: NORMAL EGD    EYE SURGERY      PERICARDIUM SURGERY      REPLACEMENT TOTAL KNEE BILATERAL      THORACOTOMY Bilateral 1997    VENOUS ACCESS DEVICE (PORT) INSERTION Right 7/30/2024    Procedure: INSERTION VENOUS ACCESS  DEVICE;  Surgeon: Angel Ness MD;  Location: [facility] MAIN OR;  Service: General;  Laterality: Right;       FAMILY HISTORY  Family History   Problem Relation Age of Onset    Hypertension Mother     Heart disease Mother     Arthritis Father     Diabetes Sister     Asthma Brother     Diabetes Brother     Malig Hyperthermia Neg Hx        SOCIAL HISTORY  Social History     Socioeconomic History    Marital status:    Tobacco Use    Smoking status: Some Days     Current packs/day: 0.00     Average packs/day: 1 pack/day for 53.7 years (53.7 ttl pk-yrs)     Types: Cigarettes     Start date: 1970     Last attempt to quit: 10/1/2023     Years since quittin.0     Passive exposure: Past    Smokeless tobacco: Never   Vaping Use    Vaping status: Never Used   Substance and Sexual Activity    Alcohol use: Not Currently    Drug use: Never    Sexual activity: Not Currently     Partners: Female     Birth control/protection: Vasectomy       REVIEW OF SYSTEMS  Review of Systems    All systems reviewed and negative except for those discussed in HPI.     PHYSICAL EXAM    I have reviewed the triage vital signs and nursing notes.  Vitals:    10/13/24 1323   BP:    Pulse: 74   Resp:    Temp:    SpO2: 97%     Physical Exam    GENERAL: not distressed, chronically ill-appearing  HENT: nares patent  EYES: no scleral icterus  NECK: no ROM limitations  CV: regular rhythm, regular rate, no murmur  RESPIRATORY: normal effort, clear to auscultate bilaterally  ABDOMEN: soft, rounded, no distention.  There is mild tenderness.  There is a well-healed midline surgical wound with a small reducible possible hernia to the right of the incision.  No lower abdominal pain, epigastric pain or right upper quadrant pain.  Bowel sounds are normal  : deferred  MUSCULOSKELETAL: no deformity  NEURO: alert, moves all extremities, follows commands  SKIN: warm, dry    LAB RESULTS  Recent Results (from the past 24 hours)   Comprehensive  Metabolic Panel    Collection Time: 10/13/24 10:05 AM    Specimen: Blood   Result Value Ref Range    Glucose 171 (H) 65 - 99 mg/dL    BUN 20 8 - 23 mg/dL    Creatinine 0.90 0.76 - 1.27 mg/dL    Sodium 129 (L) 136 - 145 mmol/L    Potassium 3.4 (L) 3.5 - 5.2 mmol/L    Chloride 92 (L) 98 - 107 mmol/L    CO2 26.2 22.0 - 29.0 mmol/L    Calcium 9.0 8.6 - 10.5 mg/dL    Total Protein 8.0 6.0 - 8.5 g/dL    Albumin 3.2 (L) 3.5 - 5.2 g/dL    ALT (SGPT) 25 1 - 41 U/L    AST (SGOT) 48 (H) 1 - 40 U/L    Alkaline Phosphatase 101 39 - 117 U/L    Total Bilirubin 1.1 0.0 - 1.2 mg/dL    Globulin 4.8 gm/dL    A/G Ratio 0.7 g/dL    BUN/Creatinine Ratio 22.2 7.0 - 25.0    Anion Gap 10.8 5.0 - 15.0 mmol/L    eGFR 87.4 >60.0 mL/min/1.73   Lipase    Collection Time: 10/13/24 10:05 AM    Specimen: Blood   Result Value Ref Range    Lipase 67 (H) 13 - 60 U/L   COVID-19 and FLU A/B PCR, 1 HR TAT - Swab, Nasopharynx    Collection Time: 10/13/24 10:05 AM    Specimen: Nasopharynx; Swab   Result Value Ref Range    COVID19 Not Detected Not Detected - Ref. Range    Influenza A PCR Not Detected Not Detected    Influenza B PCR Not Detected Not Detected   CBC Auto Differential    Collection Time: 10/13/24 10:05 AM    Specimen: Blood   Result Value Ref Range    WBC 11.16 (H) 3.40 - 10.80 10*3/mm3    RBC 4.69 4.14 - 5.80 10*6/mm3    Hemoglobin 14.4 13.0 - 17.7 g/dL    Hematocrit 42.9 37.5 - 51.0 %    MCV 91.5 79.0 - 97.0 fL    MCH 30.7 26.6 - 33.0 pg    MCHC 33.6 31.5 - 35.7 g/dL    RDW 15.3 12.3 - 15.4 %    RDW-SD 52.7 37.0 - 54.0 fl    MPV 9.6 6.0 - 12.0 fL    Platelets 130 (L) 140 - 450 10*3/mm3    Neutrophil % 84.6 (H) 42.7 - 76.0 %    Lymphocyte % 8.0 (L) 19.6 - 45.3 %    Monocyte % 7.0 5.0 - 12.0 %    Eosinophil % 0.1 (L) 0.3 - 6.2 %    Basophil % 0.1 0.0 - 1.5 %    Immature Grans % 0.2 0.0 - 0.5 %    Neutrophils, Absolute 9.45 (H) 1.70 - 7.00 10*3/mm3    Lymphocytes, Absolute 0.89 0.70 - 3.10 10*3/mm3    Monocytes, Absolute 0.78 0.10 - 0.90  10*3/mm3    Eosinophils, Absolute 0.01 0.00 - 0.40 10*3/mm3    Basophils, Absolute 0.01 0.00 - 0.20 10*3/mm3    Immature Grans, Absolute 0.02 0.00 - 0.05 10*3/mm3   Urinalysis without microscopic (no culture) - Urine, Clean Catch    Collection Time: 10/13/24  1:05 PM    Specimen: Urine, Clean Catch   Result Value Ref Range    Color, UA Dark Yellow (A) Yellow, Straw    Appearance, UA Clear Clear    pH, UA 7.0 5.0 - 8.0    Specific Gravity, UA 1.025 1.005 - 1.030    Glucose, UA Negative Negative    Ketones, UA 40 mg/dL (2+) (A) Negative    Bilirubin, UA Moderate (2+) (A) Negative    Blood, UA Negative Negative    Protein,  mg/dL (2+) (A) Negative    Leuk Esterase, UA Negative Negative    Nitrite, UA Negative Negative    Urobilinogen, UA 2.0 E.U./dL (A) 0.2 - 1.0 E.U./dL       Ordered the above labs and independently interpreted results.  My findings will be discussed in the ED course or medical decision making section below    PROGRESS, DATA ANALYSIS, CONSULTS AND MEDICAL DECISION MAKING    Please note that this section constitutes my independent interpretation of clinical data including lab results, radiology, EKG's.  This constitutes my independent professional opinion regarding differential diagnosis and management of this patient.  It may include any factors such as history from outside sources, review of external records, social determinants of health, management of medications, response to those treatments, and discussions with other providers.    ED Course as of 10/13/24 1358   Sun Oct 13, 2024   1043 Pt states no improvement in pain after 0.5 mg Dilaudid.  Larger dose ordered.  [EW]   1108 Discussed plan with family to recheck labs and treat pain.  If pain can be improved then can consider d/c home since he has follow up scheduled with his Oncologist, Dr. Tejada, tomorrow.  If pain not improved, labs look worse then will consider admission.   [EW]   1138 No change in pain.  No improvement after larger  dose of Dilaudid.  Giving dose of Fentanyl now.  Then will re-evaluate.  [EW]   1229 Pain finally a bit better.  [EW]      ED Course User Index  [EW] Ginny Castro APRN     Orders placed during this visit:  Orders Placed This Encounter   Procedures    COVID-19 and FLU A/B PCR, 1 HR TAT - Swab, Nasopharynx    Comprehensive Metabolic Panel    Lipase    Urinalysis without microscopic (no culture) - Urine, Clean Catch    CBC Auto Differential    Blood Draw With IV Start    Insert peripheral IV    CBC & Differential    ED Acknowledgement Form Needed;            Medical Decision Making    Pt presents with abdominal pain and nausea.  Known liver cancer.  Seen here 3 days ago and had stable CT abd/pelvis.  Pain controlled here after several attempts.  Labs stable for patient.  He is not vomiting or severely dehydrated.  He is established with Dr. Tejada for Oncology and Dr. Hopper - surgical oncology.  He has appointment tomorrow with Dr. Tejada.  Wishes to be d/c and will keep this appointment.  We did consider admission but patient finally feeling a bit better.  He has pain meds and antiemetics at home.  RTER precautions advised.  On exam, he has non-focal, non-peritonitic abdomen, no rebound/guarding.          DIAGNOSIS  Final diagnoses:   Generalized abdominal pain   Cholangiocarcinoma          Medication List      No changes were made to your prescriptions during this visit.         FOLLOW-UP  Anthony Tejada MD  [facility] [id number] [phone]      Keep appointment as scheduled for tomorrow        Latest Documented Vital Signs:  As of 13:58 EDT  BP- 109/69 HR- 74 Temp- 98.1 °F (36.7 °C) (Oral) O2 sat- 97%    Appropriate PPE utilized throughout this patient encounter to include mask, if indicated, per current protocol. Hand hygiene was performed before donning PPE and after removal when leaving the room.    Please note that portions of this were completed with a voice recognition  program.     Note Disclaimer: At [facility], we believe that sharing information builds trust and better relationships. You are receiving this note because you are receiving care at [facility] or recently visited. It is possible you will see health information before a provider has talked with you about it. This kind of information can be easy to misunderstand. To help you fully understand what it means for your health, we urge you to discuss this note with your provider.

## 2024-10-13 NOTE — DISCHARGE INSTRUCTIONS
Resume home pain medication.  Try to break the Hydrocodone in 1/2 and take every 4 hours vs one tablet every 6 hours.    Keep appointment tomorrow with Dr. Tejada    Try to eat small bites of food vs larger meals.  Stay hydrated.  Consider fluids with electrolytes such as gatorade.    If you get worse, please consider going to [facility] as you will likely need admission    Return Precautions    Although you are being discharged from the ED today, I encourage you to return for worsening symptoms.  Things can, and do, change such that treatment at home with medication may not be adequate.      Specifically, return for any of the following:    Chest pain, shortness of breath, pain or nausea and vomiting not controlled by medications provided.    Please make a follow up with your Primary Care Provider for a blood pressure recheck.

## 2024-10-14 ENCOUNTER — TELEPHONE (OUTPATIENT)
Dept: ONCOLOGY | Facility: CLINIC | Age: 79
End: 2024-10-14

## 2024-10-14 ENCOUNTER — HOSPITAL ENCOUNTER (INPATIENT)
Facility: HOSPITAL | Age: 79
LOS: 3 days | Discharge: HOME | DRG: 947 | End: 2024-10-17
Attending: STUDENT IN AN ORGANIZED HEALTH CARE EDUCATION/TRAINING PROGRAM | Admitting: INTERNAL MEDICINE
Payer: MEDICARE

## 2024-10-14 ENCOUNTER — APPOINTMENT (OUTPATIENT)
Dept: GENERAL RADIOLOGY | Facility: HOSPITAL | Age: 79
DRG: 947 | End: 2024-10-14
Payer: MEDICARE

## 2024-10-14 DIAGNOSIS — G89.3 CANCER-RELATED BREAKTHROUGH PAIN: Primary | ICD-10-CM

## 2024-10-14 DIAGNOSIS — C22.1 CHOLANGIOCARCINOMA: ICD-10-CM

## 2024-10-14 PROBLEM — J90 CHRONIC PLEURAL EFFUSION: Status: ACTIVE | Noted: 2024-10-14

## 2024-10-14 LAB
ALBUMIN SERPL-MCNC: 2.9 G/DL (ref 3.5–5.2)
ALBUMIN/GLOB SERPL: 0.7 G/DL
ALP SERPL-CCNC: 84 U/L (ref 39–117)
ALT SERPL W P-5'-P-CCNC: 25 U/L (ref 1–41)
ANION GAP SERPL CALCULATED.3IONS-SCNC: 14 MMOL/L (ref 5–15)
AST SERPL-CCNC: 45 U/L (ref 1–40)
BASOPHILS # BLD AUTO: 0.02 10*3/MM3 (ref 0–0.2)
BASOPHILS NFR BLD AUTO: 0.2 % (ref 0–1.5)
BILIRUB SERPL-MCNC: 1 MG/DL (ref 0–1.2)
BUN SERPL-MCNC: 33 MG/DL (ref 8–23)
BUN/CREAT SERPL: 28 (ref 7–25)
CALCIUM SPEC-SCNC: 8.2 MG/DL (ref 8.6–10.5)
CHLORIDE SERPL-SCNC: 93 MMOL/L (ref 98–107)
CO2 SERPL-SCNC: 27 MMOL/L (ref 22–29)
CREAT SERPL-MCNC: 1.18 MG/DL (ref 0.76–1.27)
DEPRECATED RDW RBC AUTO: 53 FL (ref 37–54)
EGFRCR SERPLBLD CKD-EPI 2021: 63.2 ML/MIN/1.73
EOSINOPHIL # BLD AUTO: 0.02 10*3/MM3 (ref 0–0.4)
EOSINOPHIL NFR BLD AUTO: 0.2 % (ref 0.3–6.2)
ERYTHROCYTE [DISTWIDTH] IN BLOOD BY AUTOMATED COUNT: 15.3 % (ref 12.3–15.4)
GLOBULIN UR ELPH-MCNC: 4.1 GM/DL
GLUCOSE BLDC GLUCOMTR-MCNC: 122 MG/DL (ref 70–130)
GLUCOSE BLDC GLUCOMTR-MCNC: 149 MG/DL (ref 70–130)
GLUCOSE SERPL-MCNC: 144 MG/DL (ref 65–99)
HCT VFR BLD AUTO: 40.2 % (ref 37.5–51)
HGB BLD-MCNC: 13.5 G/DL (ref 13–17.7)
IMM GRANULOCYTES # BLD AUTO: 0.04 10*3/MM3 (ref 0–0.05)
IMM GRANULOCYTES NFR BLD AUTO: 0.4 % (ref 0–0.5)
LIPASE SERPL-CCNC: 50 U/L (ref 13–60)
LYMPHOCYTES # BLD AUTO: 0.95 10*3/MM3 (ref 0.7–3.1)
LYMPHOCYTES NFR BLD AUTO: 10 % (ref 19.6–45.3)
MCH RBC QN AUTO: 32.1 PG (ref 26.6–33)
MCHC RBC AUTO-ENTMCNC: 33.6 G/DL (ref 31.5–35.7)
MCV RBC AUTO: 95.5 FL (ref 79–97)
MONOCYTES # BLD AUTO: 0.61 10*3/MM3 (ref 0.1–0.9)
MONOCYTES NFR BLD AUTO: 6.4 % (ref 5–12)
NEUTROPHILS NFR BLD AUTO: 7.86 10*3/MM3 (ref 1.7–7)
NEUTROPHILS NFR BLD AUTO: 82.8 % (ref 42.7–76)
NRBC BLD AUTO-RTO: 0 /100 WBC (ref 0–0.2)
PLATELET # BLD AUTO: 109 10*3/MM3 (ref 140–450)
PLATELET # BLD AUTO: 116 10*3/MM3 (ref 140–450)
PLATELETS.RETICULATED NFR BLD AUTO: 4.4 % (ref 0.9–6.5)
PMV BLD AUTO: 10 FL (ref 6–12)
POTASSIUM SERPL-SCNC: 3.6 MMOL/L (ref 3.5–5.2)
PROT SERPL-MCNC: 7 G/DL (ref 6–8.5)
RBC # BLD AUTO: 4.21 10*6/MM3 (ref 4.14–5.8)
SODIUM SERPL-SCNC: 134 MMOL/L (ref 136–145)
WBC NRBC COR # BLD AUTO: 9.5 10*3/MM3 (ref 3.4–10.8)

## 2024-10-14 PROCEDURE — 80053 COMPREHEN METABOLIC PANEL: CPT | Performed by: STUDENT IN AN ORGANIZED HEALTH CARE EDUCATION/TRAINING PROGRAM

## 2024-10-14 PROCEDURE — 85025 COMPLETE CBC W/AUTO DIFF WBC: CPT | Performed by: STUDENT IN AN ORGANIZED HEALTH CARE EDUCATION/TRAINING PROGRAM

## 2024-10-14 PROCEDURE — 85055 RETICULATED PLATELET ASSAY: CPT | Performed by: INTERNAL MEDICINE

## 2024-10-14 PROCEDURE — 99285 EMERGENCY DEPT VISIT HI MDM: CPT

## 2024-10-14 PROCEDURE — 74022 RADEX COMPL AQT ABD SERIES: CPT

## 2024-10-14 PROCEDURE — 99223 1ST HOSP IP/OBS HIGH 75: CPT | Performed by: INTERNAL MEDICINE

## 2024-10-14 PROCEDURE — 36415 COLL VENOUS BLD VENIPUNCTURE: CPT

## 2024-10-14 PROCEDURE — 25010000002 ENOXAPARIN PER 10 MG: Performed by: INTERNAL MEDICINE

## 2024-10-14 PROCEDURE — 83690 ASSAY OF LIPASE: CPT | Performed by: STUDENT IN AN ORGANIZED HEALTH CARE EDUCATION/TRAINING PROGRAM

## 2024-10-14 PROCEDURE — 82948 REAGENT STRIP/BLOOD GLUCOSE: CPT

## 2024-10-14 PROCEDURE — 25010000002 MORPHINE PER 10 MG: Performed by: STUDENT IN AN ORGANIZED HEALTH CARE EDUCATION/TRAINING PROGRAM

## 2024-10-14 PROCEDURE — 25010000002 ONDANSETRON PER 1 MG: Performed by: STUDENT IN AN ORGANIZED HEALTH CARE EDUCATION/TRAINING PROGRAM

## 2024-10-14 PROCEDURE — 25010000002 HYDROMORPHONE 1 MG/ML SOLUTION: Performed by: INTERNAL MEDICINE

## 2024-10-14 PROCEDURE — 25010000002 HYDROMORPHONE PER 4 MG: Performed by: INTERNAL MEDICINE

## 2024-10-14 RX ORDER — ATENOLOL 25 MG/1
25 TABLET ORAL DAILY
Status: DISCONTINUED | OUTPATIENT
Start: 2024-10-14 | End: 2024-10-17 | Stop reason: HOSPADM

## 2024-10-14 RX ORDER — MORPHINE SULFATE 2 MG/ML
4 INJECTION, SOLUTION INTRAMUSCULAR; INTRAVENOUS ONCE
Status: COMPLETED | OUTPATIENT
Start: 2024-10-14 | End: 2024-10-14

## 2024-10-14 RX ORDER — ONDANSETRON 4 MG/1
4 TABLET, ORALLY DISINTEGRATING ORAL EVERY 6 HOURS PRN
Status: DISCONTINUED | OUTPATIENT
Start: 2024-10-14 | End: 2024-10-17 | Stop reason: HOSPADM

## 2024-10-14 RX ORDER — ACETAMINOPHEN 325 MG/1
650 TABLET ORAL EVERY 4 HOURS PRN
Status: DISCONTINUED | OUTPATIENT
Start: 2024-10-14 | End: 2024-10-17 | Stop reason: HOSPADM

## 2024-10-14 RX ORDER — DEXTROSE MONOHYDRATE 25 G/50ML
25 INJECTION, SOLUTION INTRAVENOUS
Status: DISCONTINUED | OUTPATIENT
Start: 2024-10-14 | End: 2024-10-17 | Stop reason: HOSPADM

## 2024-10-14 RX ORDER — AMOXICILLIN 250 MG
2 CAPSULE ORAL 2 TIMES DAILY
Status: DISCONTINUED | OUTPATIENT
Start: 2024-10-14 | End: 2024-10-15

## 2024-10-14 RX ORDER — SODIUM CHLORIDE 0.9 % (FLUSH) 0.9 %
10 SYRINGE (ML) INJECTION EVERY 12 HOURS SCHEDULED
Status: DISCONTINUED | OUTPATIENT
Start: 2024-10-14 | End: 2024-10-17 | Stop reason: HOSPADM

## 2024-10-14 RX ORDER — BISACODYL 10 MG
10 SUPPOSITORY, RECTAL RECTAL DAILY PRN
Status: DISCONTINUED | OUTPATIENT
Start: 2024-10-14 | End: 2024-10-15

## 2024-10-14 RX ORDER — PILOCARPINE HYDROCHLORIDE 5 MG/1
5 TABLET, FILM COATED ORAL 3 TIMES DAILY
Status: DISCONTINUED | OUTPATIENT
Start: 2024-10-14 | End: 2024-10-17 | Stop reason: HOSPADM

## 2024-10-14 RX ORDER — ONDANSETRON 2 MG/ML
4 INJECTION INTRAMUSCULAR; INTRAVENOUS ONCE
Status: COMPLETED | OUTPATIENT
Start: 2024-10-14 | End: 2024-10-14

## 2024-10-14 RX ORDER — ENOXAPARIN SODIUM 100 MG/ML
40 INJECTION SUBCUTANEOUS DAILY
Status: DISCONTINUED | OUTPATIENT
Start: 2024-10-14 | End: 2024-10-15

## 2024-10-14 RX ORDER — NALOXONE HCL 0.4 MG/ML
0.4 VIAL (ML) INJECTION
Status: DISCONTINUED | OUTPATIENT
Start: 2024-10-14 | End: 2024-10-15

## 2024-10-14 RX ORDER — HEPARIN SODIUM (PORCINE) LOCK FLUSH IV SOLN 100 UNIT/ML 100 UNIT/ML
5 SOLUTION INTRAVENOUS AS NEEDED
Status: DISCONTINUED | OUTPATIENT
Start: 2024-10-14 | End: 2024-10-17 | Stop reason: HOSPADM

## 2024-10-14 RX ORDER — SODIUM CHLORIDE 0.9 % (FLUSH) 0.9 %
10 SYRINGE (ML) INJECTION AS NEEDED
Status: DISCONTINUED | OUTPATIENT
Start: 2024-10-14 | End: 2024-10-17 | Stop reason: HOSPADM

## 2024-10-14 RX ORDER — SODIUM CHLORIDE 0.9 % (FLUSH) 0.9 %
20 SYRINGE (ML) INJECTION AS NEEDED
Status: DISCONTINUED | OUTPATIENT
Start: 2024-10-14 | End: 2024-10-17 | Stop reason: HOSPADM

## 2024-10-14 RX ORDER — NICOTINE POLACRILEX 4 MG
15 LOZENGE BUCCAL
Status: DISCONTINUED | OUTPATIENT
Start: 2024-10-14 | End: 2024-10-17 | Stop reason: HOSPADM

## 2024-10-14 RX ORDER — SODIUM CHLORIDE 9 MG/ML
40 INJECTION, SOLUTION INTRAVENOUS AS NEEDED
Status: ACTIVE | OUTPATIENT
Start: 2024-10-14 | End: 2024-10-15

## 2024-10-14 RX ORDER — OLANZAPINE 5 MG/1
5 TABLET ORAL NIGHTLY
Status: DISCONTINUED | OUTPATIENT
Start: 2024-10-14 | End: 2024-10-17 | Stop reason: HOSPADM

## 2024-10-14 RX ORDER — ACETAMINOPHEN 650 MG/1
650 SUPPOSITORY RECTAL EVERY 4 HOURS PRN
Status: DISCONTINUED | OUTPATIENT
Start: 2024-10-14 | End: 2024-10-17 | Stop reason: HOSPADM

## 2024-10-14 RX ORDER — ACETAMINOPHEN 160 MG/5ML
650 SOLUTION ORAL EVERY 4 HOURS PRN
Status: DISCONTINUED | OUTPATIENT
Start: 2024-10-14 | End: 2024-10-17 | Stop reason: HOSPADM

## 2024-10-14 RX ORDER — LOSARTAN POTASSIUM 25 MG/1
25 TABLET ORAL DAILY
Status: DISCONTINUED | OUTPATIENT
Start: 2024-10-14 | End: 2024-10-15

## 2024-10-14 RX ORDER — ONDANSETRON 2 MG/ML
4 INJECTION INTRAMUSCULAR; INTRAVENOUS EVERY 6 HOURS PRN
Status: DISCONTINUED | OUTPATIENT
Start: 2024-10-14 | End: 2024-10-17 | Stop reason: HOSPADM

## 2024-10-14 RX ORDER — PANTOPRAZOLE SODIUM 40 MG/1
40 TABLET, DELAYED RELEASE ORAL
Status: DISCONTINUED | OUTPATIENT
Start: 2024-10-15 | End: 2024-10-17 | Stop reason: HOSPADM

## 2024-10-14 RX ORDER — NALOXONE HCL 0.4 MG/ML
0.4 VIAL (ML) INJECTION
Status: DISCONTINUED | OUTPATIENT
Start: 2024-10-14 | End: 2024-10-14

## 2024-10-14 RX ORDER — PRAVASTATIN SODIUM 40 MG
80 TABLET ORAL DAILY
Status: DISCONTINUED | OUTPATIENT
Start: 2024-10-15 | End: 2024-10-17 | Stop reason: HOSPADM

## 2024-10-14 RX ORDER — HYDROMORPHONE HYDROCHLORIDE 1 MG/ML
0.5 INJECTION, SOLUTION INTRAMUSCULAR; INTRAVENOUS; SUBCUTANEOUS
Status: DISCONTINUED | OUTPATIENT
Start: 2024-10-14 | End: 2024-10-14

## 2024-10-14 RX ORDER — INSULIN LISPRO 100 [IU]/ML
2-7 INJECTION, SOLUTION INTRAVENOUS; SUBCUTANEOUS
Status: DISCONTINUED | OUTPATIENT
Start: 2024-10-14 | End: 2024-10-17 | Stop reason: HOSPADM

## 2024-10-14 RX ORDER — TERAZOSIN 2 MG/1
2 CAPSULE ORAL NIGHTLY
Status: DISCONTINUED | OUTPATIENT
Start: 2024-10-14 | End: 2024-10-17 | Stop reason: HOSPADM

## 2024-10-14 RX ORDER — BISACODYL 5 MG/1
5 TABLET, DELAYED RELEASE ORAL DAILY PRN
Status: DISCONTINUED | OUTPATIENT
Start: 2024-10-14 | End: 2024-10-15

## 2024-10-14 RX ORDER — IBUPROFEN 600 MG/1
1 TABLET ORAL
Status: DISCONTINUED | OUTPATIENT
Start: 2024-10-14 | End: 2024-10-17 | Stop reason: HOSPADM

## 2024-10-14 RX ORDER — MORPHINE SULFATE 15 MG/1
15 TABLET, FILM COATED, EXTENDED RELEASE ORAL EVERY 12 HOURS SCHEDULED
Status: DISCONTINUED | OUTPATIENT
Start: 2024-10-14 | End: 2024-10-15

## 2024-10-14 RX ORDER — POLYETHYLENE GLYCOL 3350 17 G/17G
17 POWDER, FOR SOLUTION ORAL DAILY PRN
Status: DISCONTINUED | OUTPATIENT
Start: 2024-10-14 | End: 2024-10-15

## 2024-10-14 RX ORDER — GABAPENTIN 300 MG/1
600 CAPSULE ORAL NIGHTLY
Status: DISCONTINUED | OUTPATIENT
Start: 2024-10-14 | End: 2024-10-17 | Stop reason: HOSPADM

## 2024-10-14 RX ORDER — TRAMADOL HYDROCHLORIDE 50 MG/1
50-100 TABLET ORAL EVERY 6 HOURS PRN
COMMUNITY
End: 2024-10-14

## 2024-10-14 RX ORDER — OXYCODONE HYDROCHLORIDE 5 MG/1
10 TABLET ORAL EVERY 4 HOURS PRN
Status: DISCONTINUED | OUTPATIENT
Start: 2024-10-14 | End: 2024-10-14

## 2024-10-14 RX ADMIN — HYDROMORPHONE HYDROCHLORIDE 0.5 MG: 1 INJECTION, SOLUTION INTRAMUSCULAR; INTRAVENOUS; SUBCUTANEOUS at 17:16

## 2024-10-14 RX ADMIN — PILOCARPINE HYDRCHLORIDE 5 MG: 5 TABLET, FILM COATED ORAL at 22:31

## 2024-10-14 RX ADMIN — MORPHINE SULFATE 4 MG: 2 INJECTION, SOLUTION INTRAMUSCULAR; INTRAVENOUS at 13:06

## 2024-10-14 RX ADMIN — HYDROMORPHONE HYDROCHLORIDE 1 MG: 1 INJECTION, SOLUTION INTRAMUSCULAR; INTRAVENOUS; SUBCUTANEOUS at 22:30

## 2024-10-14 RX ADMIN — HYDROMORPHONE HYDROCHLORIDE 0.5 MG: 1 INJECTION, SOLUTION INTRAMUSCULAR; INTRAVENOUS; SUBCUTANEOUS at 14:30

## 2024-10-14 RX ADMIN — ONDANSETRON 4 MG: 2 INJECTION INTRAMUSCULAR; INTRAVENOUS at 11:30

## 2024-10-14 RX ADMIN — ENOXAPARIN SODIUM 40 MG: 100 INJECTION SUBCUTANEOUS at 15:42

## 2024-10-14 RX ADMIN — SENNOSIDES AND DOCUSATE SODIUM 2 TABLET: 50; 8.6 TABLET ORAL at 21:19

## 2024-10-14 RX ADMIN — Medication 10 ML: at 21:17

## 2024-10-14 RX ADMIN — TERAZOSIN 2 MG: 2 CAPSULE ORAL at 22:32

## 2024-10-14 RX ADMIN — OXYCODONE HYDROCHLORIDE 10 MG: 5 TABLET ORAL at 15:42

## 2024-10-14 RX ADMIN — MORPHINE SULFATE 4 MG: 2 INJECTION, SOLUTION INTRAMUSCULAR; INTRAVENOUS at 11:29

## 2024-10-14 RX ADMIN — Medication 10 ML: at 11:30

## 2024-10-14 RX ADMIN — MORPHINE SULFATE 15 MG: 15 TABLET, FILM COATED, EXTENDED RELEASE ORAL at 21:16

## 2024-10-14 RX ADMIN — Medication 10 ML: at 14:38

## 2024-10-14 RX ADMIN — HYDROMORPHONE HYDROCHLORIDE 1 MG: 1 INJECTION, SOLUTION INTRAMUSCULAR; INTRAVENOUS; SUBCUTANEOUS at 20:06

## 2024-10-14 RX ADMIN — OLANZAPINE 5 MG: 5 TABLET, FILM COATED ORAL at 21:16

## 2024-10-14 RX ADMIN — GABAPENTIN 600 MG: 300 CAPSULE ORAL at 21:16

## 2024-10-14 NOTE — ED PROVIDER NOTES
EMERGENCY DEPARTMENT ENCOUNTER  Room Number:  29/29  PCP: Gloria Post MD  Independent Historians: Patient and Son      HPI:  Chief Complaint: had concerns including Pain (Abd pian ).     A complete HPI/ROS/PMH/PSH/SH/FH are unobtainable due to: None    Chronic or social conditions impacting patient care (Social Determinants of Health): None      Context: Gay Vazquez is a 79 y.o. male with a medical history of cholangiocarcinoma with metastasis, who presents to the ED c/o acute right upper quadrant abdominal pain and back pain.  Patient states his pain has been worsening for almost 2 weeks.  He has been to the ER 3 times for his pain.  He has been given multiple pain medications, most recently Norco 7.5.  Wife states that has been helping some until this morning.  Patient reports positive bowel movements.  No nausea or vomiting.  No other complaints at this time.      Review of prior external notes (non-ED) -and- Review of prior external test results outside of this encounter:  Patient seen yesterday on 10/13/2024 labs reviewed, sodium 129, creatinine 0.9, T. bili 1.1.  Lipase 67.  White count 11.2.  Prescription for Norco 7.5..    Prescription drug monitoring program review:     N/A    PAST MEDICAL HISTORY  Active Ambulatory Problems     Diagnosis Date Noted    Mixed hyperlipidemia 06/08/2023    Diabetes mellitus without complication 06/08/2023    Rheumatoid arthritis 06/08/2023    Overweight (BMI 25.0-29.9) 06/08/2023    Primary hypertension 06/08/2023    Nicotine dependence, cigarettes, uncomplicated 07/10/2023    Aneurysm of ascending aorta without rupture 08/23/2023    Liver mass 05/30/2024    Colonic mass 05/30/2024    Cholangiocarcinoma 07/25/2024    Encounter for long-term (current) use of other medications 07/29/2024    Fitting and adjustment of vascular catheter 08/02/2024    Thrombocytopenia 08/09/2024     Resolved Ambulatory Problems     Diagnosis Date Noted    Cellulitis 06/08/2023    Abdominal  aortic aneurysm (AAA) 2023     Past Medical History:   Diagnosis Date    Cancer     Colon polyp     Diabetes mellitus     Hyperlipidemia     Hypertension     Liver cancer 2024    Liver disease     Postoperative wound infection     RA (rheumatoid arthritis)          PAST SURGICAL HISTORY  Past Surgical History:   Procedure Laterality Date    ABDOMINAL SURGERY      BACK SURGERY      CHOLECYSTECTOMY      COLONOSCOPY      [facility]    COLONOSCOPY N/A 2024    Procedure: COLONOSCOPY INTO CECUM WITH POLYPECTOMIES (COLD SNARE);  Surgeon: Angel Ness MD;  Location: [facility] ENDOSCOPY;  Service: General;  Laterality: N/A;  PRE: COLON MASS, ABNORMAL CT  POST: DIVERTICULOSIS, POLYPS, POOR PREP    ENDOSCOPY N/A 2024    Procedure: ESOPHAGOGASTRODUODENOSCOPY;  Surgeon: Angel Ness MD;  Location: [facility] ENDOSCOPY;  Service: General;  Laterality: N/A;  PRE: LIVER MASS  POST: NORMAL EGD    EYE SURGERY      PERICARDIUM SURGERY      REPLACEMENT TOTAL KNEE BILATERAL      THORACOTOMY Bilateral 1997    VENOUS ACCESS DEVICE (PORT) INSERTION Right 2024    Procedure: INSERTION VENOUS ACCESS DEVICE;  Surgeon: Angel Ness MD;  Location: [facility] OR;  Service: General;  Laterality: Right;         FAMILY HISTORY  Family History   Problem Relation Age of Onset    Hypertension Mother     Heart disease Mother     Arthritis Father     Diabetes Sister     Asthma Brother     Diabetes Brother     Malig Hyperthermia Neg Hx          SOCIAL HISTORY  Social History     Socioeconomic History    Marital status:    Tobacco Use    Smoking status: Some Days     Current packs/day: 0.00     Average packs/day: 1 pack/day for 53.7 years (53.7 ttl pk-yrs)     Types: Cigarettes     Start date: 1970     Last attempt to quit: 10/1/2023     Years since quittin.0     Passive exposure: Past    Smokeless tobacco: Never   Vaping Use    Vaping status: Never  Used   Substance and Sexual Activity    Alcohol use: Not Currently    Drug use: Never    Sexual activity: Not Currently     Partners: Female     Birth control/protection: Vasectomy         ALLERGIES  Chlorhexidine      REVIEW OF SYSTEMS  Review of Systems  Included in HPI  All systems reviewed and negative except for those discussed in HPI.      PHYSICAL EXAM    I have reviewed the triage vital signs and nursing notes.    ED Triage Vitals   Temp Heart Rate Resp BP SpO2   10/14/24 1013 10/14/24 1013 10/14/24 1013 10/14/24 1037 10/14/24 1013   97.3 °F (36.3 °C) 74 16 95/71 96 %      Temp src Heart Rate Source Patient Position BP Location FiO2 (%)   10/14/24 1013 10/14/24 1013 -- -- --   Tympanic Monitor          Physical Exam  GENERAL: Nonicteric, alert, no acute distress  SKIN: Warm, dry  HENT: Normocephalic, atraumatic  EYES: no scleral icterus  CV: regular rhythm, regular rate  RESPIRATORY: normal effort, lungs clear  ABDOMEN: soft, nontender, nondistended  MUSCULOSKELETAL: no deformity  NEURO: alert, moves all extremities, follows commands            LAB RESULTS  Recent Results (from the past 24 hours)   Urinalysis without microscopic (no culture) - Urine, Clean Catch    Collection Time: 10/13/24  1:05 PM    Specimen: Urine, Clean Catch   Result Value Ref Range    Color, UA Dark Yellow (A) Yellow, Straw    Appearance, UA Clear Clear    pH, UA 7.0 5.0 - 8.0    Specific Gravity, UA 1.025 1.005 - 1.030    Glucose, UA Negative Negative    Ketones, UA 40 mg/dL (2+) (A) Negative    Bilirubin, UA Moderate (2+) (A) Negative    Blood, UA Negative Negative    Protein,  mg/dL (2+) (A) Negative    Leuk Esterase, UA Negative Negative    Nitrite, UA Negative Negative    Urobilinogen, UA 2.0 E.U./dL (A) 0.2 - 1.0 E.U./dL   Comprehensive Metabolic Panel    Collection Time: 10/14/24 11:30 AM    Specimen: Blood   Result Value Ref Range    Glucose 144 (H) 65 - 99 mg/dL    BUN 33 (H) 8 - 23 mg/dL    Creatinine 1.18 0.76 - 1.27  mg/dL    Sodium 134 (L) 136 - 145 mmol/L    Potassium 3.6 3.5 - 5.2 mmol/L    Chloride 93 (L) 98 - 107 mmol/L    CO2 27.0 22.0 - 29.0 mmol/L    Calcium 8.2 (L) 8.6 - 10.5 mg/dL    Total Protein 7.0 6.0 - 8.5 g/dL    Albumin 2.9 (L) 3.5 - 5.2 g/dL    ALT (SGPT) 25 1 - 41 U/L    AST (SGOT) 45 (H) 1 - 40 U/L    Alkaline Phosphatase 84 39 - 117 U/L    Total Bilirubin 1.0 0.0 - 1.2 mg/dL    Globulin 4.1 gm/dL    A/G Ratio 0.7 g/dL    BUN/Creatinine Ratio 28.0 (H) 7.0 - 25.0    Anion Gap 14.0 5.0 - 15.0 mmol/L    eGFR 63.2 >60.0 mL/min/1.73   Lipase    Collection Time: 10/14/24 11:30 AM    Specimen: Blood   Result Value Ref Range    Lipase 50 13 - 60 U/L   CBC Auto Differential    Collection Time: 10/14/24 11:30 AM    Specimen: Blood   Result Value Ref Range    WBC 9.50 3.40 - 10.80 10*3/mm3    RBC 4.21 4.14 - 5.80 10*6/mm3    Hemoglobin 13.5 13.0 - 17.7 g/dL    Hematocrit 40.2 37.5 - 51.0 %    MCV 95.5 79.0 - 97.0 fL    MCH 32.1 26.6 - 33.0 pg    MCHC 33.6 31.5 - 35.7 g/dL    RDW 15.3 12.3 - 15.4 %    RDW-SD 53.0 37.0 - 54.0 fl    MPV 10.0 6.0 - 12.0 fL    Platelets 109 (L) 140 - 450 10*3/mm3    Neutrophil % 82.8 (H) 42.7 - 76.0 %    Lymphocyte % 10.0 (L) 19.6 - 45.3 %    Monocyte % 6.4 5.0 - 12.0 %    Eosinophil % 0.2 (L) 0.3 - 6.2 %    Basophil % 0.2 0.0 - 1.5 %    Immature Grans % 0.4 0.0 - 0.5 %    Neutrophils, Absolute 7.86 (H) 1.70 - 7.00 10*3/mm3    Lymphocytes, Absolute 0.95 0.70 - 3.10 10*3/mm3    Monocytes, Absolute 0.61 0.10 - 0.90 10*3/mm3    Eosinophils, Absolute 0.02 0.00 - 0.40 10*3/mm3    Basophils, Absolute 0.02 0.00 - 0.20 10*3/mm3    Immature Grans, Absolute 0.04 0.00 - 0.05 10*3/mm3    nRBC 0.0 0.0 - 0.2 /100 WBC         RADIOLOGY  No Radiology Exams Resulted Within Past 24 Hours      MEDICATIONS GIVEN IN ER  Medications   sodium chloride 0.9 % flush 10 mL (has no administration in time range)   sodium chloride 0.9 % flush 10 mL (has no administration in time range)   sodium chloride 0.9 % flush 10 mL  (has no administration in time range)   sodium chloride 0.9 % flush 20 mL (has no administration in time range)   heparin injection 500 Units (has no administration in time range)   morphine injection 4 mg (4 mg Intravenous Given 10/14/24 1129)   ondansetron (ZOFRAN) injection 4 mg (4 mg Intravenous Given 10/14/24 1130)         ORDERS PLACED DURING THIS VISIT:  Orders Placed This Encounter   Procedures    Comprehensive Metabolic Panel    Lipase    CBC Auto Differential    Connectors / Hubs Must Be Scrubbed 15 Seconds Using 70% Alcohol Before Access - Allow to Dry Before Accessing Line    Change Dressing to IV Site As Needed When Damp, Loose or Soiled    Change Needleless Connectors    Change Grace Needle & Transparent Dressing Every 7 Days    Change Grace Needle As Needed    Daily CHG Bath While Central Line in Place    [facility] (on-call MD unless specified) Details    Hematology and Oncology (on-call MD unless specified)    Access VAD    Inpatient Admission    CBC & Differential         OUTPATIENT MEDICATION MANAGEMENT:  Current Facility-Administered Medications Ordered in Epic   Medication Dose Route Frequency Provider Last Rate Last Admin    heparin injection 500 Units  500 Units Intravenous PRN Anthony Tejada MD   500 Units at 09/13/24 1037    heparin injection 500 Units  5 mL Intravenous PRN Matty Aaron MD        sodium chloride 0.9 % flush 10 mL  10 mL Intravenous PRN Anthony Tejada MD   10 mL at 09/13/24 1037    sodium chloride 0.9 % flush 10 mL  10 mL Intravenous PRN Matty Aaron MD        sodium chloride 0.9 % flush 10 mL  10 mL Intravenous Q12H Matty Aaron MD        sodium chloride 0.9 % flush 10 mL  10 mL Intravenous PRN Matty Aaron MD        sodium chloride 0.9 % flush 20 mL  20 mL Intravenous PRN Matty Aaron MD         Current Outpatient Medications Ordered in Epic   Medication Sig Dispense Refill    atenolol (TENORMIN) 25 MG tablet Take 1 tablet by mouth Daily. 90 tablet 0     docusate sodium (COLACE) 100 MG capsule Take 1 capsule by mouth 2 (Two) Times a Day As Needed for Constipation. 30 capsule 0    doxazosin (CARDURA) 2 MG tablet TAKE 1 TABLET BY MOUTH EVERY DAY AT BEDTIME 30 tablet 0    gabapentin (NEURONTIN) 300 MG capsule Take 2 capsules by mouth every night at bedtime. 60 capsule 0    glucose blood test strip Once a day 100 each 12    HYDROcodone-acetaminophen (Norco) 7.5-325 MG per tablet Take 1 tablet by mouth Every 6 (Six) Hours As Needed for Moderate Pain. 120 tablet 0    hydrOXYzine (ATARAX) 25 MG tablet Take 1 tablet by mouth 3 (Three) Times a Day As Needed for Itching. 60 tablet 1    losartan (COZAAR) 25 MG tablet Take 1 tablet by mouth Daily. 90 tablet 3    magnesium oxide (MAG-OX) 400 MG tablet Take 1 tablet by mouth every night at bedtime. 30 tablet 2    meloxicam (MOBIC) 15 MG tablet Take 1 tablet by mouth Daily. 90 tablet 3    metFORMIN ER (GLUCOPHAGE-XR) 500 MG 24 hr tablet Take 2 tablets by mouth Daily. 90 tablet 1    OLANZapine (zyPREXA) 5 MG tablet Take 1 tablet by mouth Every Night. 30 tablet 3    ondansetron ODT (ZOFRAN-ODT) 4 MG disintegrating tablet Place 1 tablet on the tongue Every 6 (Six) Hours As Needed for Nausea or Vomiting. 12 tablet 0    pantoprazole (Protonix) 40 MG EC tablet Take 1 tablet by mouth Daily. 30 tablet 0    pilocarpine (SALAGEN) 5 MG tablet Take 1 tablet by mouth 3 times a day.      pravastatin (PRAVACHOL) 80 MG tablet Take 1 tablet by mouth Daily. 90 tablet 1    prochlorperazine (COMPAZINE) 10 MG tablet Take 1 tablet by mouth Every 6 (Six) Hours As Needed for Nausea or Vomiting. (Patient not taking: Reported on 10/8/2024) 30 tablet 0    promethazine (PHENERGAN) 25 MG tablet Take 1 tablet by mouth Every 6 (Six) Hours As Needed for Nausea or Vomiting. (Patient not taking: Reported on 10/8/2024) 12 tablet 0         PROCEDURES  Procedures            PROGRESS, DATA ANALYSIS, CONSULTS, AND MEDICAL DECISION MAKING  All labs have been  independently interpreted by me.  All radiology studies have been reviewed by me. All EKG's have been independently viewed and interpreted by me.  Discussion below represents my analysis of pertinent findings related to patient's condition, differential diagnosis, treatment plan and final disposition.    Differential diagnosis includes but is not limited to cancer related pain, intractable pain, metastatic cancer, biliary obstruction, pancreatitis, acute abdominal infection, ascending cholangitis.    Clinical Scores:                   ED Course as of 10/14/24 1219   Mon Oct 14, 2024   1112 Patient is evaluated, no acute distress.  I discussed his intractable pain related to his metastatic cancer with him and his family at bedside.  They are agreeable with plan for admission for long-term pain control plan.  They are agreeable with plan for blood work to evaluate for decompensation.  Will give IV morphine for acute pain. [DN]   1135 Discussed patient with Dr. Tejada, patient's oncologist, aware and agreeable to follow [DN]   1217 I discussed patient with Dr. Rivera [facility], agreeable plan to admit [DN]   1219 Patient's labs reviewed, reassuring.  No evidence of cholestasis/obstructive pathology.  Lipase normal, white count normal. [DN]      ED Course User Index  [DN] Matty Aaron MD             AS OF 12:19 EDT VITALS:    BP - 104/75  HR - 89  TEMP - 97.3 °F (36.3 °C) (Tympanic)  O2 SATS - 95%    COMPLEXITY OF CARE  The patient requires admission.      DIAGNOSIS  Final diagnoses:   Cancer-related breakthrough pain   Cholangiocarcinoma         DISPOSITION  ED Disposition       ED Disposition   Decision to Admit    Condition   --    Comment   Level of Care: Telemetry [5]   Diagnosis: Cancer-related breakthrough pain [[id number]]   Admitting Physician: ZO RIVERA [[id number]]   Attending Physician: ZO RIVERA [[id number]]   Certification: I Certify That Inpatient Hospital Services Are Medically Necessary For  Greater Than 2 Midnights                  Please note that portions of this document were completed with a voice recognition program.    Note Disclaimer: At [facility], we believe that sharing information builds trust and better relationships. You are receiving this note because you recently visited [facility]. It is possible you will see health information before a provider has talked with you about it. This kind of information can be easy to misunderstand. To help you fully understand what it means for your health, we urge you to discuss this note with your provider.       Matty aAron MD  10/14/24 1112       Matty Aaron MD  10/14/24 1219

## 2024-10-14 NOTE — PLAN OF CARE
Goal Outcome Evaluation:  Plan of Care Reviewed With: patient        Progress: no change     Pt admitted from the ED to 3P, later this afternoon. AxOx4, calm and cooperative. Complaining of ABD pain. PRN Dilaudid given x2 and PRN Binta given once. Went down for an ABD x-ray. Assist x1 to the bathroom. Spouse and son at bedside, attentive to pt. Plan of care ongoing.

## 2024-10-14 NOTE — ED NOTES
Upper abd pain.  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  He was seen at stand alone ER sat and sun and was given pain meds but was told by oncology to come to ER for pain control

## 2024-10-14 NOTE — ED NOTES
Nursing report ED to floor  Gay Vazquez  79 y.o.  male    HPI :  HPI  Stated Reason for Visit: abd pain  History Obtained From: patient    Chief Complaint  Chief Complaint   Patient presents with    Pain     Abd pian        Admitting doctor:   Mike Rivera MD    Admitting diagnosis:   The primary encounter diagnosis was Cancer-related breakthrough pain. A diagnosis of Cholangiocarcinoma was also pertinent to this visit.    Code status:   Current Code Status       Date Active Code Status Order ID Comments User Context       10/14/2024 1311 CPR (Attempt to Resuscitate) [id number]  Mike Rivera MD ED        Question Answer    Code Status (Patient has no pulse and is not breathing) CPR (Attempt to Resuscitate)    Medical Interventions (Patient has pulse or is breathing) Full Support    Comments please confirm with patient/medical decision maker                    Allergies:   Chlorhexidine    Isolation:   No active isolations    Intake and Output  No intake or output data in the 24 hours ending 10/14/24 1312    Weight:       10/14/24  1039   Weight: 90.7 kg (200 lb)       Most recent vitals:   Vitals:    10/14/24 1201 10/14/24 1204 10/14/24 1231 10/14/24 1254   BP: 104/75  103/70    Pulse:  89 87 87   Resp:       Temp:       TempSrc:       SpO2:  95% 96% 95%   Weight:       Height:           Active LDAs/IV Access:   Lines, Drains & Airways       Active LDAs       Name Placement date Placement time Site Days    Single Lumen Implantable Port 07/30/24 Right Chest 07/30/24  0900  Chest  76                    Labs (abnormal labs have a star):   Labs Reviewed   COMPREHENSIVE METABOLIC PANEL - Abnormal; Notable for the following components:       Result Value    Glucose 144 (*)     BUN 33 (*)     Sodium 134 (*)     Chloride 93 (*)     Calcium 8.2 (*)     Albumin 2.9 (*)     AST (SGOT) 45 (*)     BUN/Creatinine Ratio 28.0 (*)     All other components within normal limits    Narrative:     GFR Normal >60  Chronic  Kidney Disease <60  Kidney Failure <15    The GFR formula is only valid for adults with stable renal function between ages 18 and 70.   CBC WITH AUTO DIFFERENTIAL - Abnormal; Notable for the following components:    Platelets 109 (*)     Neutrophil % 82.8 (*)     Lymphocyte % 10.0 (*)     Eosinophil % 0.2 (*)     Neutrophils, Absolute 7.86 (*)     All other components within normal limits   LIPASE - Normal   CBC AND DIFFERENTIAL    Narrative:     The following orders were created for panel order CBC & Differential.  Procedure                               Abnormality         Status                     ---------                               -----------         ------                     CBC Auto Differential[[id number]]        Abnormal            Final result                 Please view results for these tests on the individual orders.       EKG:   No orders to display       Meds given in ED:   Medications   sodium chloride 0.9 % flush 10 mL (has no administration in time range)   sodium chloride 0.9 % flush 10 mL (10 mL Intravenous Given 10/14/24 1130)   sodium chloride 0.9 % flush 10 mL (has no administration in time range)   sodium chloride 0.9 % flush 20 mL (has no administration in time range)   heparin injection 500 Units (has no administration in time range)   sodium chloride 0.9 % flush 10 mL (has no administration in time range)   sodium chloride 0.9 % flush 10 mL (has no administration in time range)   Enoxaparin Sodium (LOVENOX) syringe 40 mg (has no administration in time range)   sodium chloride 0.9 % infusion 40 mL (has no administration in time range)   acetaminophen (TYLENOL) tablet 650 mg (has no administration in time range)     Or   acetaminophen (TYLENOL) 160 MG/5ML oral solution 650 mg (has no administration in time range)     Or   acetaminophen (TYLENOL) suppository 650 mg (has no administration in time range)   HYDROmorphone (DILAUDID) injection 0.5 mg (has no administration in time range)      And   naloxone (NARCAN) injection 0.4 mg (has no administration in time range)   sennosides-docusate (PERICOLACE) 8.6-50 MG per tablet 2 tablet (has no administration in time range)     And   polyethylene glycol (MIRALAX) packet 17 g (has no administration in time range)     And   bisacodyl (DULCOLAX) EC tablet 5 mg (has no administration in time range)     And   bisacodyl (DULCOLAX) suppository 10 mg (has no administration in time range)   ondansetron ODT (ZOFRAN-ODT) disintegrating tablet 4 mg (has no administration in time range)     Or   ondansetron (ZOFRAN) injection 4 mg (has no administration in time range)   oxyCODONE (ROXICODONE) immediate release tablet 10 mg (has no administration in time range)   morphine injection 4 mg (4 mg Intravenous Given 10/14/24 1129)   ondansetron (ZOFRAN) injection 4 mg (4 mg Intravenous Given 10/14/24 1130)   morphine injection 4 mg (4 mg Intravenous Given 10/14/24 1306)       Imaging results:  No radiology results for the last day    Ambulatory status:   - up ad lida     Social issues:   Social History     Socioeconomic History    Marital status:    Tobacco Use    Smoking status: Some Days     Current packs/day: 0.00     Average packs/day: 1 pack/day for 53.7 years (53.7 ttl pk-yrs)     Types: Cigarettes     Start date: 1970     Last attempt to quit: 10/1/2023     Years since quittin.0     Passive exposure: Past    Smokeless tobacco: Never   Vaping Use    Vaping status: Never Used   Substance and Sexual Activity    Alcohol use: Not Currently    Drug use: Never    Sexual activity: Not Currently     Partners: Female     Birth control/protection: Vasectomy       Peripheral Neurovascular  Peripheral Neurovascular (Adult)  Peripheral Neurovascular WDL: WDL, pulse assessment  Pulse Assessment: radial    Neuro Cognitive  Neuro Cognitive (Adult)  Cognitive/Neuro/Behavioral WDL: WDL, arousability, level of consciousness, mood/behavior, orientation, motor response,  speech  Level of Consciousness: Alert  Arousal Level: opens eyes spontaneously  Orientation: oriented x 4  Speech: clear, spontaneous, logical  Mood/Behavior: calm, cooperative, behavior appropriate to situation  Motor Response  Motor Response: general motor response  General Motor Response: purposeful motor response    Learning  Learning Assessment  Learning Readiness and Ability: no barriers identified  Education Provided  Person Taught: patient, significant other/partner, spouse  Teaching Method: verbal instruction  Teaching Focus: symptom/problem overview, self-management, medical device/equipment use, diagnostic test, risk factors/triggers  Education Outcome Evaluation: verbalizes understanding    Respiratory  Respiratory  Airway WDL: WDL  Respiratory WDL  Respiratory WDL: WDL, all  Rhythm/Pattern, Respiratory: unlabored, pattern regular, depth regular, no shortness of breath reported  Expansion/Accessory Muscles/Retractions: no retractions, no use of accessory muscles, expansion symmetric    Abdominal Pain       Pain Assessments  Pain (Adult)  (0-10) Pain Rating: Rest: 8  Pain Location: abdomen  Pain Side/Orientation: generalized  Pain Management Interventions: pain medication given  Response to Pain Interventions: interventions effective per patient, nonverbal indicators absent/decreased    NIH Stroke Scale       Jaimee Barrett RN  10/14/24 13:12 EDT

## 2024-10-14 NOTE — CONSULTS
[facility] GROUP INITIAL INPATIENT CONSULTATION NOTE    REASON FOR CONSULTATION:    Cholangiocarcinoma, cancer related pain    HISTORY OF PRESENT ILLNESS:  Gay Vazquez is a 79 y.o. male who we are asked to see today in consultation for the above issue.    Patient has past medical history significant for hypertension, hyperlipidemia, diabetes mellitus type 2, rheumatoid arthritis.  Patient did have previous pericarditis that required a pericardial window.    Patient is known to our practice, is followed in the outpatient setting by Dr. Tejada in regards to cholangiocarcinoma.  The patient had incidental finding on CT scan 5/10/2024 of the right hepatic lobe mass 7 cm.  The patient underwent CT-guided liver biopsy on 6/17/2024 which showed no evidence of malignancy.  A repeat biopsy on 7/18/2024 showed adenocarcinoma consistent with cholangiocarcinoma.  CA 19-9 was elevated at 133, AFP normal at 3.81.  The patient was treated with cisplatin, gemcitabine, nivolumab x 2 cycles.  He required dose reduction due to thrombocytopenia however even despite dose reduction had intractable nausea and vomiting resulting in dehydration and MALI.  He did not receive cycle 2-day 8 chemotherapy due to side effects.  Repeat scan on 9/6/2024 showed stable mass in the right liver 7.6 x 6 cm.  Given his lack of response to systemic therapy and poor tolerance, patient was referred to Dr. Hopper in surgical oncology at [facility].  He was seen on 9/26/2024 by Dr. Hopper with recommendation to pursue liver directed therapy with chemoembolization.  Surgery was not advised, would require full right hepatectomy with preoperative portal vein embolization that would preclude future ability to undergo liver directed therapy.  He was also felt to be at significant risk with history of staph infections.  Patient was in agreement to proceed with chemoembolization with plan for repeat CT imaging 6 to 8 weeks after liver  directed therapy.    The patient developed significant abdominal pain.  He was seen in the emergency department on 9/20/2024 with CT abdomen and pelvis that showed stable right liver mass 7.5 x 6 cm, loculated right pleural effusion with surrounding pleural thickening and calcification unchanged, no acute findings.    Patient developed worsening abdominal pain in the epigastric region, was constipated with no bowel movement for 1 week.  CT abdomen and pelvis on 10/10/2024 showed no change.  Patient again presented to emergency department on 10/13/2024 with report of ongoing abdominal pain along with nausea and bloating.  Oral intake was significantly diminished.  KUB showed nonobstructive bowel gas pattern.  Patient had been taking hydrocodone 7.5/325 every 6 hours as needed at home with no relief.  He had also been using Zofran ODT 4 mg every 6 hours as needed.  Labs on 10/14/2024 with AST 45, ALT 25, total bilirubin 1.0, alkaline phosphatase 84, lipase 50.  CBC with WBC 9.5, hemoglobin 13.5, platelet count stable at 109,000.  He is now receiving Dilaudid 0.5 mg every 2 hours as needed in addition to oxycodone 10 mg p.o. every 4 hours as needed.  He is continuing on gabapentin 600 mg nightly and Zyprexa 5 mg nightly.  He continues on bowel regimen with Sarika-Colace 2 tablets twice daily and MiraLAX daily as needed.  Patient reports that pain is still not controlled at all, Dilaudid works for a very brief time and then wears off.  He is not experiencing any sedation from narcotic pain medication.  He does report that chemoembolization at [facility] is currently scheduled for 10/21/2024.         Past Medical History:   Diagnosis Date    Cancer     Liver    Colon polyp     Diabetes mellitus     Hyperlipidemia     Hypertension     Liver cancer 07-    Liver disease     Postoperative wound infection     RA (rheumatoid arthritis)        Past Surgical History:   Procedure Laterality  Date    ABDOMINAL SURGERY      BACK SURGERY      CHOLECYSTECTOMY      COLONOSCOPY  2017    [facility]    COLONOSCOPY N/A 06/04/2024    Procedure: COLONOSCOPY INTO CECUM WITH POLYPECTOMIES (COLD SNARE);  Surgeon: Angel Ness MD;  [facility];  Service: General;  Laterality: N/A;  PRE: COLON MASS, ABNORMAL CT  POST: DIVERTICULOSIS, POLYPS, POOR PREP    ENDOSCOPY N/A 06/04/2024    Procedure: ESOPHAGOGASTRODUODENOSCOPY;  Surgeon: Angel Ness MD;  Location: [facility] ENDOSCOPY;  Service: General;  Laterality: N/A;  PRE: LIVER MASS  POST: NORMAL EGD    EYE SURGERY      PERICARDIUM SURGERY      REPLACEMENT TOTAL KNEE BILATERAL      THORACOTOMY Bilateral 1997    VENOUS ACCESS DEVICE (PORT) INSERTION Right 7/30/2024    Procedure: INSERTION VENOUS ACCESS DEVICE;  Surgeon: Angel Ness MD;  Location: [facility] OR;  Service: General;  Laterality: Right;       SOCIAL HISTORY:   reports that he has been smoking cigarettes. He started smoking about 54 years ago. He has a 53.7 pack-year smoking history. He has been exposed to tobacco smoke. He has never used smokeless tobacco. He reports that he does not currently use alcohol. He reports that he does not use drugs.    FAMILY HISTORY:  family history includes Arthritis in his father; Asthma in his brother; Diabetes in his brother and sister; Heart disease in his mother; Hypertension in his mother.    ALLERGIES:  Allergies   Allergen Reactions    Chlorhexidine Dermatitis     Please use alcohol to clean port site       MEDICATIONS:  As listed in the electronic medical record.    Review of Systems  A comprehensive review of systems was obtained with pertinent positive findings as noted in the interval history above.    Vitals:    10/14/24 1331 10/14/24 1426 10/14/24 1552 10/14/24 1700   BP: 109/75  97/67    BP Location:   Left arm    Patient Position:   Lying    Pulse: 89  90 90   Resp:   16    Temp:   98.4 °F (36.9 °C)   "  TempSrc:   Oral    SpO2: 97%  94% 95%   Weight:  85.4 kg (188 lb 4.4 oz)     Height:   182.9 cm (72\")        Physical Exam  Constitutional:       Appearance: He is well-developed.   Eyes:      Conjunctiva/sclera: Conjunctivae normal.   Neck:      Thyroid: No thyromegaly.   Cardiovascular:      Rate and Rhythm: Normal rate and regular rhythm.      Heart sounds: No murmur heard.     No friction rub. No gallop.   Pulmonary:      Effort: No respiratory distress.      Breath sounds: Normal breath sounds.   Abdominal:      General: Bowel sounds are normal. There is no distension.      Palpations: Abdomen is soft.      Tenderness: There is no abdominal tenderness.   Musculoskeletal:      Comments: Chronic changes in the hands from rheumatoid arthritis   Lymphadenopathy:      Head:      Right side of head: No submandibular adenopathy.      Cervical: No cervical adenopathy.      Upper Body:      Right upper body: No supraclavicular adenopathy.      Left upper body: No supraclavicular adenopathy.   Skin:     General: Skin is warm and dry.      Findings: No rash.   Neurological:      Mental Status: He is alert and oriented to person, place, and time.      Cranial Nerves: No cranial nerve deficit.      Motor: No abnormal muscle tone.      Deep Tendon Reflexes: Reflexes normal.   Psychiatric:         Behavior: Behavior normal.         DIAGNOSTIC DATA:    Results from last 7 days   Lab Units 10/14/24  1130 10/13/24  1005 10/10/24  1756   WBC 10*3/mm3 9.50 11.16* 6.53   HEMOGLOBIN g/dL 13.5 14.4 13.4   HEMATOCRIT % 40.2 42.9 42.1   PLATELETS 10*3/mm3 109* 130* 103*      Results from last 7 days   Lab Units 10/14/24  1130 10/13/24  1005 10/10/24  1756   SODIUM mmol/L 134* 129* 131*   POTASSIUM mmol/L 3.6 3.4* 4.8   CHLORIDE mmol/L 93* 92* 95*   CO2 mmol/L 27.0 26.2 26.6   BUN mg/dL 33* 20 15   CREATININE mg/dL 1.18 0.90 0.95   CALCIUM mg/dL 8.2* 9.0 9.0   BILIRUBIN mg/dL 1.0 1.1 0.7   ALK PHOS U/L 84 101 102   ALT (SGPT) U/L 25 " 25 32   AST (SGOT) U/L 45* 48* 80*   GLUCOSE mg/dL 144* 171* 99            Assessment & Plan   ASSESSMENT:  This is a 79 y.o. male with:    *Intrahepatic cholangiocarcinoma, multifocal:  7/5/24 MRI abd-heterogenous, large peripherally enhancing mass withinthe posterior aspect of the right hepatic dome which is grossly unchanged to minimally increased in size since 5/10/2024. small peripheral enhancing and solid enhancing lesions are located within the surrounding right hepatic lobe and spread of malignancy is likely  7/18/24 CT liver biopsy-adenocarcinoma consistent with cholangiocarcinoma by IHC  CA 19-9 133  8/2/2024-initiated palliative treatment with cisplatin/Gemzar plus nivolumab; day 8 Gemzar given at 50% because of thrombocytopenia (60)  Cycle 2 of treatment was given with a dose reduction of gemcitabine 750 mg/m² and cisplatin same dose 25 mg/m² along with durvalumab.  Despite dose reduction he had intractable nausea vomiting acute kidney injury requiring IV fluids.  He did not receive day 8 cycle 2 of treatment because of side effects.  9/6/2024 CT abdomen liver protocol-stable 7.6 x 6 cm mass in the right lobe of the liver  Patient was seen in emergency department on 9/20/2024 with CT abdomen and pelvis that showed stable right liver mass 7.5 x 6 cm, loculated right pleural effusion with surrounding pleural thickening and calcification unchanged, no acute findings.  CT abdomen and pelvis on 10/10/2024 showed no change.  Patient was referred to Dr. Hopper at [facility], was seen on 9/26/2024.  Patient was not felt to be a good candidate for surgical intervention.  Recommendation to proceed with chemoembolization.  Patient reports that chemoembolization currently scheduled for 10/21/2024.    *Cancer related pain  Patient with ongoing pain primarily located in the right upper quadrant region  Patient was receiving hydrocodone 7.5/325 prior to hospital admission, reports he had been taking  this every 6 hours at home with no pain relief  Patient currently receiving Dilaudid 0.5 mg every 2 hours as needed in addition to oxycodone 10 mg p.o. every 4 hours as needed.  Patient notes that current pain regimen is not producing any significant relief of his pain.  He is not experiencing any sedation from narcotics.  His pain is located in the right upper quadrant and is consistent with pain related to his underlying malignancy.  He is currently scheduled for chemoembolization on 10/21/2024 which may ultimately help to improve his pain.  In the interim, we will manage this with narcotics and I will go ahead and begin treatment with MS Contin 15 mg twice daily and change his breakthrough oral medication to oxycodone 20 mg every 4 hours as needed.  We will change breakthrough Dilaudid to 1 mg every 2 hours as needed.  Monitor for sedation with significant escalation in narcotic dosing.    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  Check B12, folate, IPF    *Restless leg syndrome, chronic chest/back pain  Patient with chronic pain after previous thoracotomy  Patient continues on long-term gabapentin 600 mg nightly    *RA  significant rheumatoid arthritis history with joint deviation, history of iritis, previous pericarditis requiring pericardial window  Patient previously treated with Simponi, held per rheumatology prior to initiation of chemotherapy/immunotherapy  Fortunately with recent immunotherapy patient did not develop flare of rheumatoid arthritis    *History of  pericarditis  Patient did have previous pericarditis that required a pericardial window.    *Additional comorbidities-diabetes mellitus, hyperlipidemia, hypertension, tobacco abuse, BPH     *Skin rash/pruritus secondary to chemotherapy versus immunotherapy  Previously required a steroid Dosepak-resolved    *DVT prophylaxis  Lovenox 40 mg daily    *GERD  Protonix 40 mg daily    *Constipation  Sarika-Colace 2 tablets twice daily and MiraLAX daily as needed  We will need to monitor constipation closely on escalating narcotic regimen      PLAN:   Begin MS Contin 15 mg every 12 hours  Increase breakthrough oxycodone to 20 mg every 4 hours as needed  Increase Dilaudid to 1 mg every 2 hours as needed  Monitor for sedation with significant increase in narcotic regimen  Additional labs with B12, folate, IPF  Continue Zyprexa 5 mg nightly  Continue Zofran 4 mg ODT/IV every 6 hours for nausea  Continue current bowel regimen  Patient currently scheduled for chemoembolization right liver mass at [facility] on 10/21/2024  Daily CBC, CMP    Discussed with patient and wife at bedside          Eyad Grace MD

## 2024-10-14 NOTE — H&P
Patient Name:  Gay Vazquez  YOB: 1945  MRN:  [id number]  Admit Date:  10/14/2024  Patient Care Team:  Gloria Post MD as PCP - General (Internal Medicine)  Angel Ness MD as Referring Physician (Colon and Rectal Surgery)  Demetrius Long MD as Consulting Physician (Hematology and Oncology)  Anthony Tejada MD as Consulting Physician (Hematology and Oncology)  Lana Tao RN as Ambulatory  ([facility])      Subjective   History Present Illness     Chief Complaint   Patient presents with    Pain     Abd pian        Mr. Vazquez is a 79 y.o. with a history of cholangiocarcinoma and loculated right pleural effusion who presents to [facility] complaining of worsening upper abdominal pain.  He saw his primary care physician and received oral pain medication.  This helped some but then has been worsening again.  He had some nausea and vomiting a few days ago but did not have any vomiting yesterday.  Reports lower stool output but he also has not been eating much.  He does report passing flatus.  No fevers or chills.  Not reporting any chest pain palpitations or acute shortness of breath.    Review of Systems   Constitutional:  Negative for chills and fever.   HENT:  Negative for sore throat and trouble swallowing.    Eyes:  Negative for pain and visual disturbance.   Respiratory:  Negative for cough, shortness of breath and wheezing.    Cardiovascular:  Negative for chest pain, palpitations and leg swelling.   Gastrointestinal:  Positive for abdominal pain, nausea and vomiting.   Endocrine: Negative for cold intolerance and heat intolerance.   Genitourinary:  Negative for dysuria and flank pain.   Musculoskeletal:  Negative for neck pain and neck stiffness.   Skin:  Negative for pallor and rash.   Allergic/Immunologic: Negative for environmental allergies and food allergies.   Neurological:  Negative for seizures and syncope.    Hematological:  Negative for adenopathy. Does not bruise/bleed easily.   Psychiatric/Behavioral:  Negative for agitation and confusion.         Personal History     Past Medical History:   Diagnosis Date    Cancer     Liver    Colon polyp     Diabetes mellitus     Hyperlipidemia     Hypertension     Liver cancer 2024    Liver disease     Postoperative wound infection     RA (rheumatoid arthritis)      Past Surgical History:   Procedure Laterality Date    ABDOMINAL SURGERY      BACK SURGERY      CHOLECYSTECTOMY      COLONOSCOPY      [facility]    COLONOSCOPY N/A 2024    Procedure: COLONOSCOPY INTO CECUM WITH POLYPECTOMIES (COLD SNARE);  Surgeon: Angel Ness MD;  Location: [facility] ENDOSCOPY;  Service: General;  Laterality: N/A;  PRE: COLON MASS, ABNORMAL CT  POST: DIVERTICULOSIS, POLYPS, POOR PREP    ENDOSCOPY N/A 2024    Procedure: ESOPHAGOGASTRODUODENOSCOPY;  Surgeon: Angel Ness MD;  Location: [facility] ENDOSCOPY;  Service: General;  Laterality: N/A;  PRE: LIVER MASS  POST: NORMAL EGD    EYE SURGERY      PERICARDIUM SURGERY      REPLACEMENT TOTAL KNEE BILATERAL      THORACOTOMY Bilateral     VENOUS ACCESS DEVICE (PORT) INSERTION Right 2024    Procedure: INSERTION VENOUS ACCESS DEVICE;  Surgeon: Angel Ness MD;  Location: [facility] OR;  Service: General;  Laterality: Right;     Family History   Problem Relation Age of Onset    Hypertension Mother     Heart disease Mother     Arthritis Father     Diabetes Sister     Asthma Brother     Diabetes Brother     Malig Hyperthermia Neg Hx      Social History     Tobacco Use    Smoking status: Some Days     Current packs/day: 0.00     Average packs/day: 1 pack/day for 53.7 years (53.7 ttl pk-yrs)     Types: Cigarettes     Start date: 1970     Last attempt to quit: 10/1/2023     Years since quittin.0     Passive exposure: Past    Smokeless tobacco: Never   Vaping Use     Vaping status: Never Used   Substance Use Topics    Alcohol use: Not Currently    Drug use: Never     Current Facility-Administered Medications on File Prior to Encounter   Medication Dose Route Frequency Provider Last Rate Last Admin    heparin injection 500 Units  500 Units Intravenous PRN Anthony Tejada MD   500 Units at 09/13/24 1037    sodium chloride 0.9 % flush 10 mL  10 mL Intravenous PRN Anthony Tejada MD   10 mL at 09/13/24 1037    [DISCONTINUED] sodium chloride 0.9 % flush 10 mL  10 mL Intravenous PRN Ginny Castro APRN         Current Outpatient Medications on File Prior to Encounter   Medication Sig Dispense Refill    atenolol (TENORMIN) 25 MG tablet Take 1 tablet by mouth Daily. 90 tablet 0    docusate sodium (COLACE) 100 MG capsule Take 1 capsule by mouth 2 (Two) Times a Day As Needed for Constipation. 30 capsule 0    doxazosin (CARDURA) 2 MG tablet TAKE 1 TABLET BY MOUTH EVERY DAY AT BEDTIME 30 tablet 0    gabapentin (NEURONTIN) 300 MG capsule Take 2 capsules by mouth every night at bedtime. 60 capsule 0    HYDROcodone-acetaminophen (Norco) 7.5-325 MG per tablet Take 1 tablet by mouth Every 6 (Six) Hours As Needed for Moderate Pain. 120 tablet 0    hydrOXYzine (ATARAX) 25 MG tablet Take 1 tablet by mouth 3 (Three) Times a Day As Needed for Itching. 60 tablet 1    losartan (COZAAR) 25 MG tablet Take 1 tablet by mouth Daily. 90 tablet 3    magnesium oxide (MAG-OX) 400 MG tablet Take 1 tablet by mouth every night at bedtime. 30 tablet 2    meloxicam (MOBIC) 15 MG tablet Take 1 tablet by mouth Daily. 90 tablet 3    metFORMIN ER (GLUCOPHAGE-XR) 500 MG 24 hr tablet Take 2 tablets by mouth Daily. 90 tablet 1    OLANZapine (zyPREXA) 5 MG tablet Take 1 tablet by mouth Every Night. 30 tablet 3    ondansetron ODT (ZOFRAN-ODT) 4 MG disintegrating tablet Place 1 tablet on the tongue Every 6 (Six) Hours As Needed for Nausea or Vomiting. 12 tablet 0    pantoprazole (Protonix) 40 MG EC tablet Take 1 tablet  by mouth Daily. 30 tablet 0    pilocarpine (SALAGEN) 5 MG tablet Take 1 tablet by mouth 3 times a day.      pravastatin (PRAVACHOL) 80 MG tablet Take 1 tablet by mouth Daily. 90 tablet 1    [DISCONTINUED] glucose blood test strip Once a day 100 each 12    [DISCONTINUED] prochlorperazine (COMPAZINE) 10 MG tablet Take 1 tablet by mouth Every 6 (Six) Hours As Needed for Nausea or Vomiting. (Patient not taking: Reported on 10/8/2024) 30 tablet 0    [DISCONTINUED] promethazine (PHENERGAN) 25 MG tablet Take 1 tablet by mouth Every 6 (Six) Hours As Needed for Nausea or Vomiting. (Patient not taking: Reported on 10/8/2024) 12 tablet 0    [DISCONTINUED] traMADol (ULTRAM) 50 MG tablet Take 1-2 tablets by mouth Every 6 (Six) Hours As Needed for Moderate Pain.       Allergies   Allergen Reactions    Chlorhexidine Dermatitis     Please use alcohol to clean port site       Objective    Objective     Vital Signs  Temp:  [97.3 °F (36.3 °C)] 97.3 °F (36.3 °C)  Heart Rate:  [74-89] 89  Resp:  [16] 16  BP: ()/(70-75) 109/75  SpO2:  [95 %-98 %] 97 %  on   ;   Device (Oxygen Therapy): room air  Body mass index is 27.12 kg/m².    Physical Exam  Vitals and nursing note reviewed.   Constitutional:       General: He is not in acute distress.     Appearance: He is not diaphoretic.   HENT:      Head: Atraumatic.   Eyes:      General: No scleral icterus.     Pupils: Pupils are equal, round, and reactive to light.   Cardiovascular:      Rate and Rhythm: Normal rate and regular rhythm.      Pulses: Normal pulses.   Pulmonary:      Effort: Pulmonary effort is normal.      Breath sounds: Decreased breath sounds present. No wheezing.   Abdominal:      General: There is distension (mild).      Palpations: Abdomen is soft.      Tenderness: There is no guarding or rebound.   Musculoskeletal:         General: No tenderness.   Skin:     General: Skin is warm and dry.   Neurological:      Mental Status: He is alert.      Cranial Nerves: No cranial  nerve deficit.   Psychiatric:         Mood and Affect: Mood normal.         Behavior: Behavior normal.         Results Review:  I reviewed the patient's new clinical results.  I reviewed the patient's new imaging results and agree with the interpretation.  I personally viewed and interpreted the patient's EKG/Telemetry data  I reviewed prior records.    Lab Results (last 24 hours)       Procedure Component Value Units Date/Time    CBC & Differential [[id number]]  (Abnormal) Collected: 10/14/24 1130    Specimen: Blood Updated: 10/14/24 1143    Narrative:      The following orders were created for panel order CBC & Differential.  Procedure                               Abnormality         Status                     ---------                               -----------         ------                     CBC Auto Differential[[id number]]        Abnormal            Final result                 Please view results for these tests on the individual orders.    Comprehensive Metabolic Panel [[id number]]  (Abnormal) Collected: 10/14/24 1130    Specimen: Blood Updated: 10/14/24 1216     Glucose 144 mg/dL      BUN 33 mg/dL      Creatinine 1.18 mg/dL      Sodium 134 mmol/L      Potassium 3.6 mmol/L      Comment: Slight hemolysis detected by analyzer. Result may be falsely elevated.        Chloride 93 mmol/L      CO2 27.0 mmol/L      Calcium 8.2 mg/dL      Total Protein 7.0 g/dL      Albumin 2.9 g/dL      ALT (SGPT) 25 U/L      AST (SGOT) 45 U/L      Comment: Slight hemolysis detected by analyzer. Result may be falsely elevated.        Alkaline Phosphatase 84 U/L      Total Bilirubin 1.0 mg/dL      Globulin 4.1 gm/dL      A/G Ratio 0.7 g/dL      BUN/Creatinine Ratio 28.0     Anion Gap 14.0 mmol/L      eGFR 63.2 mL/min/1.73     Narrative:      GFR Normal >60  Chronic Kidney Disease <60  Kidney Failure <15    The GFR formula is only valid for adults with stable renal function between ages 18 and 70.    Lipase [[id number]]  (Normal)  Collected: 10/14/24 1130    Specimen: Blood Updated: 10/14/24 1216     Lipase 50 U/L     CBC Auto Differential [[id number]]  (Abnormal) Collected: 10/14/24 1130    Specimen: Blood Updated: 10/14/24 1143     WBC 9.50 10*3/mm3      RBC 4.21 10*6/mm3      Hemoglobin 13.5 g/dL      Hematocrit 40.2 %      MCV 95.5 fL      MCH 32.1 pg      MCHC 33.6 g/dL      RDW 15.3 %      RDW-SD 53.0 fl      MPV 10.0 fL      Platelets 109 10*3/mm3      Neutrophil % 82.8 %      Lymphocyte % 10.0 %      Monocyte % 6.4 %      Eosinophil % 0.2 %      Basophil % 0.2 %      Immature Grans % 0.4 %      Neutrophils, Absolute 7.86 10*3/mm3      Lymphocytes, Absolute 0.95 10*3/mm3      Monocytes, Absolute 0.61 10*3/mm3      Eosinophils, Absolute 0.02 10*3/mm3      Basophils, Absolute 0.02 10*3/mm3      Immature Grans, Absolute 0.04 10*3/mm3      nRBC 0.0 /100 WBC             Imaging Results (Last 24 Hours)       ** No results found for the last 24 hours. **                No orders to display        Assessment/Plan     Active Hospital Problems    Diagnosis  POA    **Cancer-related breakthrough pain [G89.3]  Yes    Chronic pleural effusion [J90]  Yes    Cholangiocarcinoma [C22.1]  Yes    Diabetes mellitus without complication [E11.9]  Yes    Primary hypertension [I10]  Yes      Resolved Hospital Problems   No resolved problems to display.       Mr. Vazquez is a 79 y.o.     Cholangiocarcinoma/cancer related pain: He had a CT abdomen and pelvis 4 days ago in the emergency room.  This showed the liver lesion and loculated pleural effusion.  Both were unchanged for the most part in size paired to the previous CT in September.  [facility]  Labs appear stable with a normal bilirubin and lipase is not elevated.  Will increase his oral pain medication and make IV available as needed.  Control symptoms.  Check XR abdominal series.  Consult oncology.  Diabetes: Check A1c.  Hold orals.  SSI.  Will monitor requirements.  GERD:  PPI  Hypertension: Resume home regimen and monitor  Loculated right pleural effusion  Pericardial calcifications  PPx: Lovenox  I discussed the patient's findings and my recommendations with patient, family, and ED provider.      Mike Rivera MD  [facility]  10/14/24  14:20 EDT    Dictated portions of note using dragon dictation software.

## 2024-10-14 NOTE — TELEPHONE ENCOUNTER
Provider: Terrell  Caller: Marcus  Relationship to Patient: son  Call Back Phone Number: [phone]  Reason for Call: patient is being admitted and needs to cancel his appointment for today

## 2024-10-14 NOTE — TELEPHONE ENCOUNTER
Called Marcus, said that the patient is in severe abd pain this morning and nauseous. Marcus states that he is taking the patient back to the ER this morning. Has been struggling with this over the weekend and this trip will be their third trip to the ER in 3 days. Patient was upgraded from tramodol to norco 7.5 mg yesterday in the ER. Marcus states the Norco has not helped his pain at all. I told Marcus he had an appt with Dr. Tejada today and Marcus said that he need immediate attention. I agreed with Marcus and recommended the patient head back to the ER.

## 2024-10-14 NOTE — TELEPHONE ENCOUNTER
Provider: Terrell  Caller: Marcus  Relationship to Patient: son  Call Back Phone Number: [phone]  Reason for Call: patient has been having severe abdominal pain and has been to the ER twice over the weekend.  He still has not gotten any relief and wants to know if he should go back to the ER again.

## 2024-10-15 LAB
ALBUMIN SERPL-MCNC: 2.4 G/DL (ref 3.5–5.2)
ALBUMIN/GLOB SERPL: 0.7 G/DL
ALP SERPL-CCNC: 67 U/L (ref 39–117)
ALT SERPL W P-5'-P-CCNC: 17 U/L (ref 1–41)
AMMONIA BLD-SCNC: 34 UMOL/L (ref 16–60)
ANION GAP SERPL CALCULATED.3IONS-SCNC: 8.7 MMOL/L (ref 5–15)
ARTERIAL PATENCY WRIST A: POSITIVE
AST SERPL-CCNC: 33 U/L (ref 1–40)
ATMOSPHERIC PRESS: 753.4 MMHG
BASE EXCESS BLDA CALC-SCNC: 3.4 MMOL/L (ref 0–2)
BASOPHILS # BLD AUTO: 0.03 10*3/MM3 (ref 0–0.2)
BASOPHILS NFR BLD AUTO: 0.6 % (ref 0–1.5)
BDY SITE: ABNORMAL
BILIRUB SERPL-MCNC: 0.8 MG/DL (ref 0–1.2)
BUN SERPL-MCNC: 46 MG/DL (ref 8–23)
BUN/CREAT SERPL: 20.9 (ref 7–25)
CALCIUM SPEC-SCNC: 7.8 MG/DL (ref 8.6–10.5)
CHLORIDE SERPL-SCNC: 95 MMOL/L (ref 98–107)
CO2 BLDA-SCNC: 29 MMOL/L (ref 23–27)
CO2 SERPL-SCNC: 28.3 MMOL/L (ref 22–29)
CREAT SERPL-MCNC: 2.2 MG/DL (ref 0.76–1.27)
DEPRECATED RDW RBC AUTO: 52.3 FL (ref 37–54)
EGFRCR SERPLBLD CKD-EPI 2021: 29.9 ML/MIN/1.73
EOSINOPHIL # BLD AUTO: 0.08 10*3/MM3 (ref 0–0.4)
EOSINOPHIL NFR BLD AUTO: 1.6 % (ref 0.3–6.2)
ERYTHROCYTE [DISTWIDTH] IN BLOOD BY AUTOMATED COUNT: 15.1 % (ref 12.3–15.4)
FOLATE SERPL-MCNC: 6.72 NG/ML (ref 4.78–24.2)
GAS FLOW AIRWAY: 2 LPM
GLOBULIN UR ELPH-MCNC: 3.6 GM/DL
GLUCOSE BLDC GLUCOMTR-MCNC: 101 MG/DL (ref 70–130)
GLUCOSE BLDC GLUCOMTR-MCNC: 104 MG/DL (ref 70–130)
GLUCOSE BLDC GLUCOMTR-MCNC: 122 MG/DL (ref 70–130)
GLUCOSE BLDC GLUCOMTR-MCNC: 129 MG/DL (ref 70–130)
GLUCOSE SERPL-MCNC: 98 MG/DL (ref 65–99)
HCO3 BLDA-SCNC: 27.8 MMOL/L (ref 22–28)
HCT VFR BLD AUTO: 35.3 % (ref 37.5–51)
HEMODILUTION: NO
HGB BLD-MCNC: 11.4 G/DL (ref 13–17.7)
IMM GRANULOCYTES # BLD AUTO: 0.01 10*3/MM3 (ref 0–0.05)
IMM GRANULOCYTES NFR BLD AUTO: 0.2 % (ref 0–0.5)
LDH SERPL-CCNC: 142 U/L (ref 135–225)
LYMPHOCYTES # BLD AUTO: 1.13 10*3/MM3 (ref 0.7–3.1)
LYMPHOCYTES NFR BLD AUTO: 22.7 % (ref 19.6–45.3)
MAGNESIUM SERPL-MCNC: 2 MG/DL (ref 1.6–2.4)
MCH RBC QN AUTO: 30.6 PG (ref 26.6–33)
MCHC RBC AUTO-ENTMCNC: 32.3 G/DL (ref 31.5–35.7)
MCV RBC AUTO: 94.9 FL (ref 79–97)
MODALITY: ABNORMAL
MONOCYTES # BLD AUTO: 0.4 10*3/MM3 (ref 0.1–0.9)
MONOCYTES NFR BLD AUTO: 8 % (ref 5–12)
NEUTROPHILS NFR BLD AUTO: 3.33 10*3/MM3 (ref 1.7–7)
NEUTROPHILS NFR BLD AUTO: 66.9 % (ref 42.7–76)
NRBC BLD AUTO-RTO: 0 /100 WBC (ref 0–0.2)
PCO2 BLDA: 40.3 MM HG (ref 35–45)
PH BLDA: 7.45 PH UNITS (ref 7.35–7.45)
PHOSPHATE SERPL-MCNC: 4.4 MG/DL (ref 2.5–4.5)
PLATELET # BLD AUTO: 74 10*3/MM3 (ref 140–450)
PMV BLD AUTO: 9.6 FL (ref 6–12)
PO2 BLDA: 77.5 MM HG (ref 80–100)
POTASSIUM SERPL-SCNC: 3.4 MMOL/L (ref 3.5–5.2)
PROT SERPL-MCNC: 6 G/DL (ref 6–8.5)
RBC # BLD AUTO: 3.72 10*6/MM3 (ref 4.14–5.8)
SAO2 % BLDCOA: 95.9 % (ref 92–98.5)
SET MECH RESP RATE: 14
SODIUM SERPL-SCNC: 132 MMOL/L (ref 136–145)
VIT B12 BLD-MCNC: 844 PG/ML (ref 211–946)
WBC NRBC COR # BLD AUTO: 4.98 10*3/MM3 (ref 3.4–10.8)

## 2024-10-15 PROCEDURE — 82948 REAGENT STRIP/BLOOD GLUCOSE: CPT

## 2024-10-15 PROCEDURE — 82803 BLOOD GASES ANY COMBINATION: CPT | Performed by: INTERNAL MEDICINE

## 2024-10-15 PROCEDURE — 82140 ASSAY OF AMMONIA: CPT | Performed by: INTERNAL MEDICINE

## 2024-10-15 PROCEDURE — 36600 WITHDRAWAL OF ARTERIAL BLOOD: CPT | Performed by: INTERNAL MEDICINE

## 2024-10-15 PROCEDURE — 83615 LACTATE (LD) (LDH) ENZYME: CPT | Performed by: INTERNAL MEDICINE

## 2024-10-15 PROCEDURE — 94799 UNLISTED PULMONARY SVC/PX: CPT

## 2024-10-15 PROCEDURE — 82607 VITAMIN B-12: CPT | Performed by: INTERNAL MEDICINE

## 2024-10-15 PROCEDURE — 85025 COMPLETE CBC W/AUTO DIFF WBC: CPT | Performed by: INTERNAL MEDICINE

## 2024-10-15 PROCEDURE — 83735 ASSAY OF MAGNESIUM: CPT | Performed by: INTERNAL MEDICINE

## 2024-10-15 PROCEDURE — 82746 ASSAY OF FOLIC ACID SERUM: CPT | Performed by: INTERNAL MEDICINE

## 2024-10-15 PROCEDURE — 94760 N-INVAS EAR/PLS OXIMETRY 1: CPT

## 2024-10-15 PROCEDURE — 97530 THERAPEUTIC ACTIVITIES: CPT

## 2024-10-15 PROCEDURE — 99232 SBSQ HOSP IP/OBS MODERATE 35: CPT | Performed by: INTERNAL MEDICINE

## 2024-10-15 PROCEDURE — 84100 ASSAY OF PHOSPHORUS: CPT | Performed by: INTERNAL MEDICINE

## 2024-10-15 PROCEDURE — 25810000003 SODIUM CHLORIDE 0.9 % SOLUTION: Performed by: INTERNAL MEDICINE

## 2024-10-15 PROCEDURE — 25010000002 HYDROMORPHONE PER 4 MG: Performed by: INTERNAL MEDICINE

## 2024-10-15 PROCEDURE — 94761 N-INVAS EAR/PLS OXIMETRY MLT: CPT

## 2024-10-15 PROCEDURE — 25010000002 NALOXONE PER 1 MG: Performed by: INTERNAL MEDICINE

## 2024-10-15 PROCEDURE — 97162 PT EVAL MOD COMPLEX 30 MIN: CPT

## 2024-10-15 PROCEDURE — 80053 COMPREHEN METABOLIC PANEL: CPT | Performed by: INTERNAL MEDICINE

## 2024-10-15 RX ORDER — BISACODYL 5 MG/1
5 TABLET, DELAYED RELEASE ORAL DAILY PRN
Status: DISCONTINUED | OUTPATIENT
Start: 2024-10-15 | End: 2024-10-17 | Stop reason: HOSPADM

## 2024-10-15 RX ORDER — BISACODYL 10 MG
10 SUPPOSITORY, RECTAL RECTAL DAILY PRN
Status: DISCONTINUED | OUTPATIENT
Start: 2024-10-15 | End: 2024-10-17 | Stop reason: HOSPADM

## 2024-10-15 RX ORDER — OXYCODONE HYDROCHLORIDE 10 MG/1
10 TABLET ORAL EVERY 4 HOURS PRN
Status: DISCONTINUED | OUTPATIENT
Start: 2024-10-15 | End: 2024-10-17 | Stop reason: HOSPADM

## 2024-10-15 RX ORDER — NITROGLYCERIN 0.4 MG/1
0.4 TABLET SUBLINGUAL
Status: DISCONTINUED | OUTPATIENT
Start: 2024-10-15 | End: 2024-10-17 | Stop reason: HOSPADM

## 2024-10-15 RX ORDER — POLYETHYLENE GLYCOL 3350 17 G/17G
17 POWDER, FOR SOLUTION ORAL DAILY
Status: DISCONTINUED | OUTPATIENT
Start: 2024-10-15 | End: 2024-10-17 | Stop reason: HOSPADM

## 2024-10-15 RX ORDER — SODIUM CHLORIDE 9 MG/ML
100 INJECTION, SOLUTION INTRAVENOUS CONTINUOUS
Status: ACTIVE | OUTPATIENT
Start: 2024-10-15 | End: 2024-10-16

## 2024-10-15 RX ORDER — AMOXICILLIN 250 MG
2 CAPSULE ORAL 2 TIMES DAILY
Status: DISCONTINUED | OUTPATIENT
Start: 2024-10-15 | End: 2024-10-17 | Stop reason: HOSPADM

## 2024-10-15 RX ORDER — SODIUM CHLORIDE 9 MG/ML
100 INJECTION, SOLUTION INTRAVENOUS CONTINUOUS
Status: DISCONTINUED | OUTPATIENT
Start: 2024-10-15 | End: 2024-10-15

## 2024-10-15 RX ORDER — LACTULOSE 10 G/15ML
20 SOLUTION ORAL 2 TIMES DAILY PRN
Status: DISCONTINUED | OUTPATIENT
Start: 2024-10-15 | End: 2024-10-17 | Stop reason: HOSPADM

## 2024-10-15 RX ORDER — NALOXONE HCL 0.4 MG/ML
0.4 VIAL (ML) INJECTION
Status: DISCONTINUED | OUTPATIENT
Start: 2024-10-15 | End: 2024-10-17 | Stop reason: HOSPADM

## 2024-10-15 RX ORDER — HYDROMORPHONE HYDROCHLORIDE 1 MG/ML
0.5 INJECTION, SOLUTION INTRAMUSCULAR; INTRAVENOUS; SUBCUTANEOUS
Status: DISCONTINUED | OUTPATIENT
Start: 2024-10-15 | End: 2024-10-17 | Stop reason: HOSPADM

## 2024-10-15 RX ADMIN — Medication 10 ML: at 08:26

## 2024-10-15 RX ADMIN — PANTOPRAZOLE SODIUM 40 MG: 40 TABLET, DELAYED RELEASE ORAL at 07:22

## 2024-10-15 RX ADMIN — SENNOSIDES AND DOCUSATE SODIUM 2 TABLET: 50; 8.6 TABLET ORAL at 21:17

## 2024-10-15 RX ADMIN — OXYCODONE HYDROCHLORIDE 20 MG: 15 TABLET ORAL at 04:14

## 2024-10-15 RX ADMIN — GABAPENTIN 600 MG: 300 CAPSULE ORAL at 21:17

## 2024-10-15 RX ADMIN — OLANZAPINE 5 MG: 5 TABLET, FILM COATED ORAL at 22:36

## 2024-10-15 RX ADMIN — TERAZOSIN 2 MG: 2 CAPSULE ORAL at 22:36

## 2024-10-15 RX ADMIN — NALXONE HYDROCHLORIDE 0.4 MG: 0.4 INJECTION INTRAMUSCULAR; INTRAVENOUS; SUBCUTANEOUS at 07:59

## 2024-10-15 RX ADMIN — HYDROMORPHONE HYDROCHLORIDE 0.5 MG: 1 INJECTION, SOLUTION INTRAMUSCULAR; INTRAVENOUS; SUBCUTANEOUS at 12:28

## 2024-10-15 RX ADMIN — HYDROMORPHONE HYDROCHLORIDE 0.5 MG: 1 INJECTION, SOLUTION INTRAMUSCULAR; INTRAVENOUS; SUBCUTANEOUS at 16:47

## 2024-10-15 RX ADMIN — SODIUM CHLORIDE 100 ML/HR: 9 INJECTION, SOLUTION INTRAVENOUS at 10:05

## 2024-10-15 RX ADMIN — PILOCARPINE HYDRCHLORIDE 5 MG: 5 TABLET, FILM COATED ORAL at 21:17

## 2024-10-15 RX ADMIN — HYDROMORPHONE HYDROCHLORIDE 0.5 MG: 1 INJECTION, SOLUTION INTRAMUSCULAR; INTRAVENOUS; SUBCUTANEOUS at 19:18

## 2024-10-15 RX ADMIN — POLYETHYLENE GLYCOL 3350 17 G: 17 POWDER, FOR SOLUTION ORAL at 12:28

## 2024-10-15 RX ADMIN — SODIUM CHLORIDE 100 ML/HR: 9 INJECTION, SOLUTION INTRAVENOUS at 08:26

## 2024-10-15 NOTE — CODE DOCUMENTATION
"Patient Name:  Gay Vazquez  YOB: 1945  MRN:  [id number]  Admit Date:  10/14/2024    Visit Diagnoses:     ICD-10-CM ICD-9-CM   1. Cancer-related breakthrough pain  G89.3 338.3   2. Cholangiocarcinoma  C22.1 155.1       Reason For Rapid:    hypotension and lethargy    RN Communicated With:   page placed to Dr. Rivera, primary RN to update      Rapid Outcome:   BP improved, pt to stay on 3P w/  in place    Communication From Rapid Team:   RRT arrived to find lethargic pt laying flat in bed, poorly responsive to painful stimulus. BP 60s/40s. 99% on 2L n/c. Bladder scan 46mL. Responds equally on all extremities. Pain meds had been doubled overnight. Given 0.4mg NARCAN IV. Labs sent STAT, added ammonia. Given 250mL NS bolus prior to our arrival. BP improved to 100s/60s. Primary RN to update [facility] doc.       Most Recent Vital Signs  Temp:  [96.9 °F (36.1 °C)-98.4 °F (36.9 °C)] 96.9 °F (36.1 °C)  Heart Rate:  [69-90] 78  Resp:  [14-18] 14  BP: ()/(40-75) 102/66  SpO2:  [92 %-100 %] 100 %  on   ;   Device (Oxygen Therapy): room air    Labs:  Results from last 7 days   Lab Units 10/13/24  1005   COVID19  Not Detected     Glucose   Date/Time Value Ref Range Status   10/15/2024 0740 101 70 - 130 mg/dL Final   10/14/2024 2027 149 (H) 70 - 130 mg/dL Final   10/14/2024 1548 122 70 - 130 mg/dL Final     No results found for: \"SITE\", \"ALLENTEST\", \"PHART\", \"VBX6OPK\", \"PO2ART\", \"MXA3JZS\", \"BASEEXCESS\", \"Y8MXCKIS\", \"HGBBG\", \"HCTABG\", \"OXYHEMOGLOBI\", \"METHHGBN\", \"CARBOXYHGB\", \"CO2CT\", \"BAROMETRIC\", \"MODALITY\", \"FIO2\"  Results from last 7 days   Lab Units 10/14/24  2110 10/14/24  1130 10/13/24  1005 10/10/24  1756   WBC 10*3/mm3  --  9.50 11.16* 6.53   HEMOGLOBIN g/dL  --  13.5 14.4 13.4   PLATELETS 10*3/mm3 116* 109* 130* 103*     Results from last 7 days   Lab Units 10/14/24  1130 10/13/24  1005 10/10/24  1756   SODIUM mmol/L 134* 129* 131*   POTASSIUM mmol/L 3.6 3.4* 4.8   CHLORIDE mmol/L 93* 92* 95*   CO2 " "mmol/L 27.0 26.2 26.6   BUN mg/dL 33* 20 15   CREATININE mg/dL 1.18 0.90 0.95   GLUCOSE mg/dL 144* 171* 99   ALBUMIN g/dL 2.9* 3.2* 3.1*   BILIRUBIN mg/dL 1.0 1.1 0.7   ALK PHOS U/L 84 101 102   AST (SGOT) U/L 45* 48* 80*   ALT (SGPT) U/L 25 25 32   Estimated Creatinine Clearance: 62.3 mL/min (by C-G formula based on SCr of 1.18 mg/dL).          No results found for: \"STREPPNEUAG\", \"LEGANTIGENUR\"    Results from last 7 days   Lab Units 10/13/24  1005   INFLUENZA B PCR  Not Detected   INFLUENZA A PCR  Not Detected       NIH Stroke Scale:   n/a                                                         Please refer to full rapid documentation on summary page under Index / Code Timeline  "

## 2024-10-15 NOTE — NURSING NOTE
0900 RN spoke with pt's son, Marcus Vazquez, over the phone. RN updated Marcus regarding care plan, low BP this AM, and rapid response team. Marcus notified that pt is been transferred to Telemetry 5N-31. Marcus was going to call pt's wife and let her know.

## 2024-10-15 NOTE — NURSING NOTE
0800 RN notified by nursing assistant of pt's low BP. Manual BP obtained 64/48. Bolus of NS started 500 ml. Rapid response team called. Charge nurse notified. Dr. Rivera paged. Pt very lethargic at this time, unable to stay awake.

## 2024-10-15 NOTE — PLAN OF CARE
Goal Outcome Evaluation:              Outcome Evaluation: Pt is A&Ox4; pt complains of pain in upper abdomen- medicated per orders; up chair during shift; IVF infusing;  plan of care on going.

## 2024-10-15 NOTE — PROGRESS NOTES
Name: Gay Vazquez ADMIT: 10/14/2024   : 1945  PCP: Gloria Post MD    MRN: [id number] LOS: 1 days   AGE/SEX: 79 y.o. male  ROOM: [facility], [place]     Subjective   Subjective   Chief Complaint   Patient presents with    Pain     Abd pian      I was called about lethargy and hypotension this morning.  Patient had a rapid response called and received fluid bolus and Narcan dose.  This improved his blood pressure some but he was still lethargic.  Transferred him to telemetry for increased monitoring.  Held long-acting opiate and decreased to the as needed medication doses.  Came to see the patient.  He was alert and interactive.  Reporting right upper quadrant pain.  Not reporting any nausea.  No headache.  No chest pain palpitations or shortness of breath.  I discussed with him about the medication dose adjustments and his increased creatinine today.  Discussed with him that we would monitor his mentation today and he could probably go back on a long-acting opiate if he needs to in the future.     Objective   Objective   Vital Signs  Temp:  [96.9 °F (36.1 °C)-98.4 °F (36.9 °C)] 97.5 °F (36.4 °C)  Heart Rate:  [69-90] 75  Resp:  [14-18] 16  BP: ()/(40-83) 101/66  SpO2:  [92 %-100 %] 99 %  on  Flow (L/min) (Oxygen Therapy):  [2] 2;   Device (Oxygen Therapy): nasal cannula  Body mass index is 25 kg/m².    Physical Exam  Vitals and nursing note reviewed.   Constitutional:       General: He is not in acute distress.     Appearance: He is not diaphoretic.   HENT:      Head: Atraumatic.   Eyes:      General: No scleral icterus.  Cardiovascular:      Rate and Rhythm: Normal rate and regular rhythm.      Pulses: Normal pulses.   Pulmonary:      Effort: Pulmonary effort is normal.      Breath sounds: No wheezing.   Abdominal:      General: There is no distension.      Palpations: Abdomen is soft.      Tenderness: There is abdominal tenderness. There is no guarding.   Musculoskeletal:         General: No tenderness.    Skin:     General: Skin is warm and dry.   Neurological:      Mental Status: He is alert. Mental status is at baseline.   Psychiatric:         Mood and Affect: Mood normal.         Behavior: Behavior normal.       Results Review  I reviewed the patient's new clinical results.    Results from last 7 days   Lab Units 10/15/24  0806 10/14/24  2110 10/14/24  1130 10/13/24  1005 10/10/24  1756   WBC 10*3/mm3 4.98  --  9.50 11.16* 6.53   HEMOGLOBIN g/dL 11.4*  --  13.5 14.4 13.4   PLATELETS 10*3/mm3 74* 116* 109* 130* 103*     Results from last 7 days   Lab Units 10/15/24  0806 10/14/24  1130 10/13/24  1005 10/10/24  1756   SODIUM mmol/L 132* 134* 129* 131*   POTASSIUM mmol/L 3.4* 3.6 3.4* 4.8   CHLORIDE mmol/L 95* 93* 92* 95*   CO2 mmol/L 28.3 27.0 26.2 26.6   BUN mg/dL 46* 33* 20 15   CREATININE mg/dL 2.20* 1.18 0.90 0.95   GLUCOSE mg/dL 98 144* 171* 99   EGFR mL/min/1.73 29.9* 63.2 87.4 81.9     Results from last 7 days   Lab Units 10/15/24  0806 10/14/24  1130 10/13/24  1005 10/10/24  1756   ALBUMIN g/dL 2.4* 2.9* 3.2* 3.1*   BILIRUBIN mg/dL 0.8 1.0 1.1 0.7   ALK PHOS U/L 67 84 101 102   AST (SGOT) U/L 33 45* 48* 80*   ALT (SGPT) U/L 17 25 25 32     Results from last 7 days   Lab Units 10/15/24  0806 10/14/24  1130 10/13/24  1005 10/10/24  1756   CALCIUM mg/dL 7.8* 8.2* 9.0 9.0   ALBUMIN g/dL 2.4* 2.9* 3.2* 3.1*   MAGNESIUM mg/dL 2.0  --   --   --    PHOSPHORUS mg/dL 4.4  --   --   --          Glucose   Date/Time Value Ref Range Status   10/15/2024 1109 104 70 - 130 mg/dL Final   10/15/2024 0740 101 70 - 130 mg/dL Final   10/14/2024 2027 149 (H) 70 - 130 mg/dL Final   10/14/2024 1548 122 70 - 130 mg/dL Final       XR Abdomen 2+ VW with Chest 1 VW    Result Date: 10/14/2024   As described.    This report was finalized on 10/14/2024 4:24 PM by Dr. Mando Parikh M.D on Workstation: [id number]       I have personally reviewed all medications:  Scheduled Medications  atenolol, 25 mg, Oral, Daily  gabapentin, 600 mg,  Oral, Nightly  insulin lispro, 2-7 Units, Subcutaneous, 4x Daily AC & at Bedtime  OLANZapine, 5 mg, Oral, Nightly  pantoprazole, 40 mg, Oral, Q AM  pilocarpine, 5 mg, Oral, TID  senna-docusate sodium, 2 tablet, Oral, BID   And  polyethylene glycol, 17 g, Oral, Daily  pravastatin, 80 mg, Oral, Daily  sodium chloride, 10 mL, Intravenous, Q12H  sodium chloride, 10 mL, Intravenous, Q12H  terazosin, 2 mg, Oral, Nightly      Infusions  sodium chloride, 100 mL/hr, Last Rate: 100 mL/hr (10/15/24 1005)      Diet  Diet: Diabetic; Consistent Carbohydrate; Fluid Consistency: Thin (IDDSI 0)       Assessment/Plan     Active Hospital Problems    Diagnosis  POA    **Cancer-related breakthrough pain [G89.3]  Yes    Chronic pleural effusion [J90]  Yes    Cholangiocarcinoma [C22.1]  Yes    Diabetes mellitus without complication [E11.9]  Yes    Primary hypertension [I10]  Yes      Resolved Hospital Problems   No resolved problems to display.       79 y.o. male admitted with Cancer-related breakthrough pain.    Cancer related pain: Altered mentation on the increased medication doses and with an MALI.  OxyContin held.  Decreased the Roxicodone to 10 mg and Dilaudid to 0.5 mg.  Will monitor his progression and mentation.  Giving IV fluids for the MALI.  Monitor blood pressure.  Holding ARB.  He could go back on a long-acting opiate in the future if his pain remains uncontrolled.  Cholangiocarcinoma: Oncology following.  Discussed with Dr. Grace.  Chronic pleural effusion  Hypertension: Holding ARB due to the MALI.  Beta-blocker with hold parameters in place.  MALI: Hypotensive this morning.  Give IV fluids.  Repeat to monitor.  Postvoid residual as needed.  Metabolic encephalopathy: See above.  Improving now.  ABG did not show any acidosis and ammonia was not elevated.  His bilirubin remained stable.  Diabetes: SSI  PPX: Lovenox stopped due to thrombocytopenia, SCD  Disposition: TBD    Expected Discharge Date: 10/16/2024; Expected Discharge  Time:      Mike Rivera MD  [facility]  10/15/24  12:58 EDT    Dictated portions of note using Dragon dictation software.  Copied text in this note has been reviewed by me and remains accurate as of 10/15/24

## 2024-10-15 NOTE — PLAN OF CARE
Goal Outcome Evaluation:  Plan of Care Reviewed With: patient, spouse           Outcome Evaluation: Pt is a 80 y/o M admitted to [facility] with c/o abdominal pain. Pt has a med hx of cholangiocarcinoma and loculated right pleural effusion. Pt reports he lives with his spouse with 2 ANTHONY and is normally (I) with mobility at baseline. Pt presents to PT with generalized weakness, decreased endurance, and impaired functional mobility. Pt stood and ambulated 30' c RW requiring CGA/min A. Pt demo's a very slow pace and heavy flexed posture. Pt unsteady throughout although slight improvement with use of AD. Pt fatigues quickly with minimal activity. Pt is hopeful to D/C home but may need rehab pending progress.    Anticipated Discharge Disposition (PT): skilled nursing facility, home with 24/7 care, home with home health (pending progress)

## 2024-10-15 NOTE — PROGRESS NOTES
"[facility] INPATIENT PROGRESS NOTE    Length of Stay:  1 days    CHIEF COMPLAINT:  Cholangiocarcinoma, cancer related pain, constipation    SUBJECTIVE:   Patient was hypotensive, BP 64/48 and lethargic at 8 AM, received 500 cc bolus and rapid response called.  Patient received Narcan with immediate improvement in level of responsiveness.  Transferred to monitored bed.  Currently patient is awake, alert, wife is at the bedside, no current complaints apart from ongoing pain in the right upper quadrant.  He does note the pain was improving last night with increased doses of narcotics.  He still has not had a bowel movement in 3 days.    ROS:  Review of Systems   A comprehensive review of systems was obtained with pertinent positive findings as noted in the interval history above.  All other systems negative.    OBJECTIVE:  Vitals:    10/14/24 1552 10/14/24 1700 10/14/24 1925 10/15/24 0404   BP: 97/67  104/63 105/62   BP Location: Left arm  Left arm Left arm   Patient Position: Lying  Lying Lying   Pulse: 90 90 87 90   Resp: 16  16 18   Temp: 98.4 °F (36.9 °C)  97.2 °F (36.2 °C) 96.9 °F (36.1 °C)   TempSrc: Oral  Oral Oral   SpO2: 94% 95% 92% 93%   Weight:       Height: 182.9 cm (72\")            PHYSICAL EXAMINATION:  General: Alert and orient x 3 no distress  Chest/Lungs: Clear to auscultation bilaterally anteriorly  Heart: Regular rate and rhythm  Abdomen/GI: Soft, minimal tenderness right upper quadrant, no rebound or guarding  Extremities: No edema    DIAGNOSTIC DATA:  Results Review:     I reviewed the patient's new clinical results.    Results from last 7 days   Lab Units 10/14/24  2110 10/14/24  1130 10/13/24  1005 10/10/24  1756   WBC 10*3/mm3  --  9.50 11.16* 6.53   HEMOGLOBIN g/dL  --  13.5 14.4 13.4   HEMATOCRIT %  --  40.2 42.9 42.1   PLATELETS 10*3/mm3 116* 109* 130* 103*      Results from last 7 days   Lab Units 10/14/24  1130 10/13/24  1005 10/10/24  1756   SODIUM mmol/L 134* 129* 131* "   POTASSIUM mmol/L 3.6 3.4* 4.8   CHLORIDE mmol/L 93* 92* 95*   CO2 mmol/L 27.0 26.2 26.6   BUN mg/dL 33* 20 15   CREATININE mg/dL 1.18 0.90 0.95   CALCIUM mg/dL 8.2* 9.0 9.0   BILIRUBIN mg/dL 1.0 1.1 0.7   ALK PHOS U/L 84 101 102   ALT (SGPT) U/L 25 25 32   AST (SGOT) U/L 45* 48* 80*   GLUCOSE mg/dL 144* 171* 99      Lab Results   Component Value Date    NEUTROABS 7.86 (H) 10/14/2024                   Assessment & Plan   ASSESSMENT/PLAN:  This is a 79 y.o. male with:     *Intrahepatic cholangiocarcinoma, multifocal:  7/5/24 MRI abd-heterogenous, large peripherally enhancing mass withinthe posterior aspect of the right hepatic dome which is grossly unchanged to minimally increased in size since 5/10/2024. small peripheral enhancing and solid enhancing lesions are located within the surrounding right hepatic lobe and spread of malignancy is likely  7/18/24 CT liver biopsy-adenocarcinoma consistent with cholangiocarcinoma by IHC  CA 19-9 133  8/2/2024-initiated palliative treatment with cisplatin/Gemzar plus nivolumab; day 8 Gemzar given at 50% because of thrombocytopenia (60)  Cycle 2 of treatment was given with a dose reduction of gemcitabine 750 mg/m² and cisplatin same dose 25 mg/m² along with durvalumab.  Despite dose reduction he had intractable nausea vomiting acute kidney injury requiring IV fluids.  He did not receive day 8 cycle 2 of treatment because of side effects.  9/6/2024 CT abdomen liver protocol-stable 7.6 x 6 cm mass in the right lobe of the liver  Patient was seen in emergency department on 9/20/2024 with CT abdomen and pelvis that showed stable right liver mass 7.5 x 6 cm, loculated right pleural effusion with surrounding pleural thickening and calcification unchanged, no acute findings.  CT abdomen and pelvis on 10/10/2024 showed no change.  Patient was referred to Dr. Hopper at [facility], was seen on 9/26/2024.  Patient was not felt to be a good candidate for surgical  intervention.  Recommendation to proceed with chemoembolization.  Patient reports that chemoembolization currently scheduled for 10/21/2024.     *Cancer related pain  Patient with ongoing pain primarily located in the right upper quadrant region related to cholangiocarcinoma  Patient was receiving hydrocodone 7.5/325 prior to hospital admission, was taking this every 6 hours at home with no pain relief  Patient in hospital initially receiving Dilaudid 0.5 mg every 2 hours as needed in addition to oxycodone 10 mg p.o. every 4 hours as needed.  Patient currently scheduled for chemoembolization on 10/21/2024 which may ultimately help to improve his pain.    On 10/14/2024 added MS Contin 15 mg twice daily and increased oxycodone  to 20 mg every 4 hours as needed, increased breakthrough Dilaudid to 1 mg every 2 hours as needed.    Patient became lethargic this morning, hypotensive, rapid response called and received IV fluid bolus as well as Narcan.  Patient responded immediately to Narcan and currently is awake, alert at baseline.  Presume episode occurred due to oversedation from narcotics.  Discontinue MS Contin.  Dilaudid decreased to 0.5 mg IV every 2 hours as needed.  Oxycodone decreased to 10 mg every 4 hours as needed.     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  Recheck IPF and will check PT, PTT, fibrinogen and LDH.     *Restless leg syndrome, chronic chest/back  pain  Patient with chronic pain after previous thoracotomy  Patient continues on long-term gabapentin 600 mg nightly     *RA  significant rheumatoid arthritis history with joint deviation, history of iritis, previous pericarditis requiring pericardial window  Patient previously treated with Simponi, held per rheumatology prior to initiation of chemotherapy/immunotherapy  Fortunately with recent immunotherapy patient did not develop flare of rheumatoid arthritis     *History of pericarditis  Patient did have previous pericarditis that required a pericardial window.     *Additional comorbidities-diabetes mellitus, hyperlipidemia, hypertension, tobacco abuse, BPH     *Skin rash/pruritus secondary to chemotherapy versus immunotherapy  Previously required a steroid Dosepak-resolved     *DVT prophylaxis  With declining platelet count, discontinue Lovenox.     *GERD  Protonix 40 mg daily     *Constipation  Sarika-Colace 2 tablets twice daily and MiraLAX daily as needed  Patient with no bowel movement in 3 days, change MiraLAX to daily scheduled and add lactulose 20 g twice daily as needed    *CODE STATUS   Discussed with patient and wife today, patient does wish to be DNR/DNI but maintain current level of medical support.    PLAN:   Discontinue MS Contin  Oxycodone dose decreased back to 10 mg every 4 hours as needed  Dilaudid dose decreased back to 0.5 mg every 2 hours as needed  Continue to monitor for sedation with ongoing narcotics  Additional labs today with IPF, PT, PTT, fibrinogen, LDH  Discontinue Lovenox  Continue Zyprexa 5 mg nightly  Continue Zofran 4 mg ODT/IV every 6 hours for nausea  Continue Sarika-Colace 2 tablets twice daily  Change MiraLAX to daily scheduled  Add lactulose 20 g twice daily as needed for constipation  Patient is DNR/DNI  Patient currently scheduled for chemoembolization right liver mass at [facility] on 10/21/2024  Daily CBC, CMP    Discussed with patient and wife at  bedside           Eyad Grace MD

## 2024-10-15 NOTE — THERAPY EVALUATION
Patient Name: Gay Vazquez  : 1945    MRN: [id number]                              Today's Date: 10/15/2024       Admit Date: 10/14/2024    Visit Dx:     ICD-10-CM ICD-9-CM   1. Cancer-related breakthrough pain  G89.3 338.3   2. Cholangiocarcinoma  C22.1 155.1     Patient Active Problem List   Diagnosis    Mixed hyperlipidemia    Diabetes mellitus without complication    Rheumatoid arthritis    Overweight (BMI 25.0-29.9)    Primary hypertension    Nicotine dependence, cigarettes, uncomplicated    Aneurysm of ascending aorta without rupture    Liver mass    Colonic mass    Cholangiocarcinoma    Encounter for long-term (current) use of other medications    Fitting and adjustment of vascular catheter    Thrombocytopenia    Cancer-related breakthrough pain    Chronic pleural effusion     Past Medical History:   Diagnosis Date    Cancer     Liver    Colon polyp     Diabetes mellitus     Hyperlipidemia     Hypertension     Liver cancer 2024    Liver disease     Postoperative wound infection     RA (rheumatoid arthritis)      Past Surgical History:   Procedure Laterality Date    ABDOMINAL SURGERY      BACK SURGERY      CHOLECYSTECTOMY      COLONOSCOPY      [facility]    COLONOSCOPY N/A 2024    Procedure: COLONOSCOPY INTO CECUM WITH POLYPECTOMIES (COLD SNARE);  Surgeon: Angel Ness MD;  Location: [facility] ENDOSCOPY;  Service: General;  Laterality: N/A;  PRE: COLON MASS, ABNORMAL CT  POST: DIVERTICULOSIS, POLYPS, POOR PREP    ENDOSCOPY N/A 2024    Procedure: ESOPHAGOGASTRODUODENOSCOPY;  Surgeon: Angel Ness MD;  Location: [facility] ENDOSCOPY;  Service: General;  Laterality: N/A;  PRE: LIVER MASS  POST: NORMAL EGD    EYE SURGERY      PERICARDIUM SURGERY      REPLACEMENT TOTAL KNEE BILATERAL      THORACOTOMY Bilateral 1997    VENOUS ACCESS DEVICE (PORT) INSERTION Right 2024    Procedure: INSERTION VENOUS ACCESS DEVICE;  Surgeon: Thi  Angel Rendon MD;  Location: [facility] MAIN OR;  Service: General;  Laterality: Right;      General Information       Row Name 10/15/24 1336          Physical Therapy Time and Intention    Document Type evaluation  -CS     Mode of Treatment individual therapy;physical therapy  -CS       Row Name 10/15/24 1337          General Information    Patient Profile Reviewed yes  -CS     Prior Level of Function independent:;gait;transfer;bed mobility  -CS     Existing Precautions/Restrictions fall  -CS     Barriers to Rehab medically complex  -CS       Row Name 10/15/24 1336          Living Environment    People in Home spouse  -CS       Row Name 10/15/24 1336          Home Main Entrance    Number of Stairs, Main Entrance two  -CS     Stair Railings, Main Entrance railings safe and in good condition  -CS       Row Name 10/15/24 1336          Stairs Within Home, Primary    Number of Stairs, Within Home, Primary none  -CS       Row Name 10/15/24 133          Cognition    Orientation Status (Cognition) oriented x 4  -CS       Row Name 10/15/24 133          Safety Issues/Impairments Affecting Functional Mobility    Impairments Affecting Function (Mobility) balance;endurance/activity tolerance;pain;strength  -CS               User Key  (r) = Recorded By, (t) = Taken By, (c) = Cosigned By      Initials Name Provider Type    CS Flori Kim, PT Physical Therapist                   Mobility       Row Name 10/15/24 1002          Bed Mobility    Comment, (Bed Mobility) NT - UIC  -CS       Row Name 10/15/24 1339          Sit-Stand Transfer    Sit-Stand [place] (Transfers) contact guard;verbal cues  -CS     Assistive Device (Sit-Stand Transfers) other (see comments)  no AD  -CS       Row Name 10/15/24 1332          Gait/Stairs (Locomotion)    [place] Level (Gait) minimum assist (75% patient effort);verbal cues  -CS     Assistive Device (Gait) walker, front-wheeled  -CS     Distance in Feet (Gait) 30  -CS      Deviations/Abnormal Patterns (Gait) maricarmen decreased;gait speed decreased;stride length decreased;weight shifting decreased  -CS     Bilateral Gait Deviations forward flexed posture;heel strike decreased  -CS     [place] Level (Stairs) not tested  -CS     Comment, (Gait/Stairs) slow pace and heavy flexed posture; unsteady but slight improvement with AD  -CS               User Key  (r) = Recorded By, (t) = Taken By, (c) = Cosigned By      Initials Name Provider Type    CS Flori Kim, PT Physical Therapist                   Obj/Interventions       Row Name 10/15/24 1338          Range of Motion Comprehensive    General Range of Motion bilateral lower extremity ROM WFL  -       Row Name 10/15/24 1338          Strength Comprehensive (MMT)    General Manual Muscle Testing (MMT) Assessment other (see comments)  -CS     Comment, General Manual Muscle Testing (MMT) Assessment B LE grossly 4/5  -       Row Name 10/15/24 1338          Balance    Balance Assessment standing static balance;standing dynamic balance  -CS     Static Standing Balance contact guard  -CS     Dynamic Standing Balance minimal assist  -CS     Position/Device Used, Standing Balance supported;walker, front-wheeled  -CS               User Key  (r) = Recorded By, (t) = Taken By, (c) = Cosigned By      Initials Name Provider Type    CS Flori Kim, PT Physical Therapist                   Goals/Plan       Row Name 10/15/24 1342          Bed Mobility Goal 1 (PT)    Activity/Assistive Device (Bed Mobility Goal 1, PT) bed mobility activities, all  -CS     [place] Level/Cues Needed (Bed Mobility Goal 1, PT) supervision required  -CS     Time Frame (Bed Mobility Goal 1, PT) 1 week  -CS       Row Name 10/15/24 1348          Transfer Goal 1 (PT)    Activity/Assistive Device (Transfer Goal 1, PT) sit-to-stand/stand-to-sit;bed-to-chair/chair-to-bed  -CS     [place] Level/Cues Needed (Transfer Goal 1, PT) supervision required  -CS     Time  Frame (Transfer Goal 1, PT) 1 week  -CS       Row Name 10/15/24 1342          Gait Training Goal 1 (PT)    Activity/Assistive Device (Gait Training Goal 1, PT) gait (walking locomotion);assistive device use;decrease fall risk;improve balance and speed;increase endurance/gait distance  -CS     [place] Level (Gait Training Goal 1, PT) standby assist;contact guard required  -CS     Distance (Gait Training Goal 1, PT) 100'  -CS     Time Frame (Gait Training Goal 1, PT) 1 week  -CS               User Key  (r) = Recorded By, (t) = Taken By, (c) = Cosigned By      Initials Name Provider Type    CS Flori Kim, PT Physical Therapist                   Clinical Impression       Row Name 10/15/24 2613          Pain    Pretreatment Pain Rating 7/10  -CS     Posttreatment Pain Rating 7/10  -CS     Pain Location flank  -CS     Pain Side/Orientation right;upper  -CS     Pain Management Interventions activity modification encouraged;exercise or physical activity utilized  -CS       Row Name 10/15/24 1095          Plan of Care Review    Plan of Care Reviewed With patient;spouse  -CS     Outcome Evaluation Pt is a 78 y/o M admitted to [facility] with c/o abdominal pain. Pt has a med hx of cholangiocarcinoma and loculated right pleural effusion. Pt reports he lives with his spouse with 2 ANTHONY and is normally (I) with mobility at baseline. Pt presents to PT with generalized weakness, decreased endurance, and impaired functional mobility. Pt stood and ambulated 30' c RW requiring CGA/min A. Pt demo's a very slow pace and heavy flexed posture. Pt unsteady throughout although slight improvement with use of AD. Pt fatigues quickly with minimal activity. Pt is hopeful to D/C home but may need rehab pending progress.  -CS       Row Name 10/15/24 7283          Therapy Assessment/Plan (PT)    Rehab Potential (PT) good  -CS     Criteria for Skilled Interventions Met (PT) yes;meets criteria  -CS     Therapy Frequency (PT) 5 times/wk  -CS        Row Name 10/15/24 1338          Positioning and Restraints    Pre-Treatment Position sitting in chair/recliner  -CS     Post Treatment Position chair  -CS     In Chair sitting;call light within reach;encouraged to call for assist;exit alarm on;with family/caregiver  -CS               User Key  (r) = Recorded By, (t) = Taken By, (c) = Cosigned By      Initials Name Provider Type    CS Flori Kim, PT Physical Therapist                   Outcome Measures       Row Name 10/15/24 1342          How much help from another person do you currently need...    Turning from your back to your side while in flat bed without using bedrails? 4  -CS     Moving from lying on back to sitting on the side of a flat bed without bedrails? 4  -CS     Moving to and from a bed to a chair (including a wheelchair)? 4  -CS     Standing up from a chair using your arms (e.g., wheelchair, bedside chair)? 4  -CS     Climbing 3-5 steps with a railing? 3  -CS     To walk in hospital room? 3  -CS     AM-PAC 6 Clicks Score (PT) 22  -CS     Highest Level of Mobility Goal 7 --> Walk 25 feet or more  -CS       Row Name 10/15/24 1342          Functional Assessment    Outcome Measure Options AM-PAC 6 Clicks Basic Mobility (PT)  -CS               User Key  (r) = Recorded By, (t) = Taken By, (c) = Cosigned By      Initials Name Provider Type    Flori Larios, IZA Physical Therapist                                 Physical Therapy Education       Title: PT OT SLP Therapies (In Progress)       Topic: Physical Therapy (In Progress)       Point: Mobility training (Done)       Learning Progress Summary            Patient Acceptance, E,TB, VU,DU,NR by  at 10/15/2024 1343                      Point: Home exercise program (Not Started)       Learner Progress:  Not documented in this visit.              Point: Body mechanics (Done)       Learning Progress Summary            Patient Acceptance, E,TB, VU,DU,NR by  at 10/15/2024 1343                       Point: Precautions (Done)       Learning Progress Summary            Patient Acceptance, E,TB, VU,DU,NR by  at 10/15/2024 1343                                      User Key       Initials Effective Dates Name Provider Type Discipline     09/22/22 -  Flori Kim PT Physical Therapist PT                  PT Recommendation and Plan     Outcome Evaluation: Pt is a 78 y/o M admitted to [facility] with c/o abdominal pain. Pt has a med hx of cholangiocarcinoma and loculated right pleural effusion. Pt reports he lives with his spouse with 2 ANTHONY and is normally (I) with mobility at baseline. Pt presents to PT with generalized weakness, decreased endurance, and impaired functional mobility. Pt stood and ambulated 30' c RW requiring CGA/min A. Pt demo's a very slow pace and heavy flexed posture. Pt unsteady throughout although slight improvement with use of AD. Pt fatigues quickly with minimal activity. Pt is hopeful to D/C home but may need rehab pending progress.     Time Calculation:         PT Charges       Row Name 10/15/24 1343             Time Calculation    Start Time 1303  -CS      Stop Time 1315  -CS      Time Calculation (min) 12 min  -CS      PT Received On 10/15/24  -      PT - Next Appointment 10/16/24  -      PT Goal Re-Cert Due Date 10/22/24  -         Time Calculation- PT    Total Timed Code Minutes- PT 10 minute(s)  -CS         Timed Charges    74343 - PT Therapeutic Activity Minutes 10  -CS         Total Minutes    Timed Charges Total Minutes 10  -CS       Total Minutes 10  -CS                User Key  (r) = Recorded By, (t) = Taken By, (c) = Cosigned By      Initials Name Provider Type    CS Flori Kim, PT Physical Therapist                  Therapy Charges for Today       Code Description Service Date Service Provider Modifiers Qty    16838102925  PT THERAPEUTIC ACT EA 15 MIN 10/15/2024 Flori Kim, PT GP 1    11521583652 HC PT EVAL MOD COMPLEXITY 2 10/15/2024 Flori Kim PT  GP 1            PT G-Codes  Outcome Measure Options: AM-PAC 6 Clicks Basic Mobility (PT)  AM-PAC 6 Clicks Score (PT): 22  PT Discharge Summary  Anticipated Discharge Disposition (PT): skilled nursing facility, home with 24/7 care, home with home health (pending progress)    Flori Kim, PT  10/15/2024

## 2024-10-16 LAB
ALBUMIN SERPL-MCNC: 2.3 G/DL (ref 3.5–5.2)
ALBUMIN/GLOB SERPL: 0.6 G/DL
ALP SERPL-CCNC: 64 U/L (ref 39–117)
ALT SERPL W P-5'-P-CCNC: 16 U/L (ref 1–41)
ANION GAP SERPL CALCULATED.3IONS-SCNC: 5.4 MMOL/L (ref 5–15)
APTT PPP: 31.8 SECONDS (ref 22.7–35.4)
AST SERPL-CCNC: 32 U/L (ref 1–40)
BASOPHILS # BLD AUTO: 0.02 10*3/MM3 (ref 0–0.2)
BASOPHILS NFR BLD AUTO: 0.4 % (ref 0–1.5)
BILIRUB SERPL-MCNC: 0.9 MG/DL (ref 0–1.2)
BUN SERPL-MCNC: 38 MG/DL (ref 8–23)
BUN/CREAT SERPL: 34.9 (ref 7–25)
CALCIUM SPEC-SCNC: 7.8 MG/DL (ref 8.6–10.5)
CHLORIDE SERPL-SCNC: 98 MMOL/L (ref 98–107)
CO2 SERPL-SCNC: 27.6 MMOL/L (ref 22–29)
CREAT SERPL-MCNC: 1.09 MG/DL (ref 0.76–1.27)
DEPRECATED RDW RBC AUTO: 53 FL (ref 37–54)
EGFRCR SERPLBLD CKD-EPI 2021: 69.5 ML/MIN/1.73
EOSINOPHIL # BLD AUTO: 0.06 10*3/MM3 (ref 0–0.4)
EOSINOPHIL NFR BLD AUTO: 1.3 % (ref 0.3–6.2)
ERYTHROCYTE [DISTWIDTH] IN BLOOD BY AUTOMATED COUNT: 14.9 % (ref 12.3–15.4)
FIBRINOGEN PPP-MCNC: 278 MG/DL (ref 219–464)
GLOBULIN UR ELPH-MCNC: 3.6 GM/DL
GLUCOSE BLDC GLUCOMTR-MCNC: 103 MG/DL (ref 70–130)
GLUCOSE BLDC GLUCOMTR-MCNC: 124 MG/DL (ref 70–130)
GLUCOSE BLDC GLUCOMTR-MCNC: 125 MG/DL (ref 70–130)
GLUCOSE BLDC GLUCOMTR-MCNC: 155 MG/DL (ref 70–130)
GLUCOSE SERPL-MCNC: 114 MG/DL (ref 65–99)
HCT VFR BLD AUTO: 35.3 % (ref 37.5–51)
HGB BLD-MCNC: 11.2 G/DL (ref 13–17.7)
IMM GRANULOCYTES # BLD AUTO: 0.02 10*3/MM3 (ref 0–0.05)
IMM GRANULOCYTES NFR BLD AUTO: 0.4 % (ref 0–0.5)
INR PPP: 1.25 (ref 0.9–1.1)
LYMPHOCYTES # BLD AUTO: 0.66 10*3/MM3 (ref 0.7–3.1)
LYMPHOCYTES NFR BLD AUTO: 14.7 % (ref 19.6–45.3)
MCH RBC QN AUTO: 30.5 PG (ref 26.6–33)
MCHC RBC AUTO-ENTMCNC: 31.7 G/DL (ref 31.5–35.7)
MCV RBC AUTO: 96.2 FL (ref 79–97)
MONOCYTES # BLD AUTO: 0.32 10*3/MM3 (ref 0.1–0.9)
MONOCYTES NFR BLD AUTO: 7.1 % (ref 5–12)
NEUTROPHILS NFR BLD AUTO: 3.4 10*3/MM3 (ref 1.7–7)
NEUTROPHILS NFR BLD AUTO: 76.1 % (ref 42.7–76)
NRBC BLD AUTO-RTO: 0 /100 WBC (ref 0–0.2)
PLATELET # BLD AUTO: 76 10*3/MM3 (ref 140–450)
PLATELET # BLD AUTO: 76 10*3/MM3 (ref 140–450)
PLATELETS.RETICULATED NFR BLD AUTO: 4.1 % (ref 0.9–6.5)
PMV BLD AUTO: 10.5 FL (ref 6–12)
POTASSIUM SERPL-SCNC: 3.4 MMOL/L (ref 3.5–5.2)
POTASSIUM SERPL-SCNC: 4.3 MMOL/L (ref 3.5–5.2)
PROT SERPL-MCNC: 5.9 G/DL (ref 6–8.5)
PROTHROMBIN TIME: 15.9 SECONDS (ref 11.7–14.2)
RBC # BLD AUTO: 3.67 10*6/MM3 (ref 4.14–5.8)
SODIUM SERPL-SCNC: 131 MMOL/L (ref 136–145)
WBC NRBC COR # BLD AUTO: 4.48 10*3/MM3 (ref 3.4–10.8)

## 2024-10-16 PROCEDURE — 85610 PROTHROMBIN TIME: CPT | Performed by: INTERNAL MEDICINE

## 2024-10-16 PROCEDURE — 99232 SBSQ HOSP IP/OBS MODERATE 35: CPT | Performed by: INTERNAL MEDICINE

## 2024-10-16 PROCEDURE — 85055 RETICULATED PLATELET ASSAY: CPT | Performed by: INTERNAL MEDICINE

## 2024-10-16 PROCEDURE — 85730 THROMBOPLASTIN TIME PARTIAL: CPT | Performed by: INTERNAL MEDICINE

## 2024-10-16 PROCEDURE — 85384 FIBRINOGEN ACTIVITY: CPT | Performed by: INTERNAL MEDICINE

## 2024-10-16 PROCEDURE — 25810000003 SODIUM CHLORIDE 0.9 % SOLUTION: Performed by: INTERNAL MEDICINE

## 2024-10-16 PROCEDURE — 84132 ASSAY OF SERUM POTASSIUM: CPT | Performed by: INTERNAL MEDICINE

## 2024-10-16 PROCEDURE — 82948 REAGENT STRIP/BLOOD GLUCOSE: CPT

## 2024-10-16 PROCEDURE — 25010000002 HYDROMORPHONE PER 4 MG: Performed by: INTERNAL MEDICINE

## 2024-10-16 PROCEDURE — 97116 GAIT TRAINING THERAPY: CPT

## 2024-10-16 PROCEDURE — 80053 COMPREHEN METABOLIC PANEL: CPT | Performed by: INTERNAL MEDICINE

## 2024-10-16 PROCEDURE — 85025 COMPLETE CBC W/AUTO DIFF WBC: CPT | Performed by: INTERNAL MEDICINE

## 2024-10-16 RX ORDER — MORPHINE SULFATE 15 MG/1
15 TABLET, FILM COATED, EXTENDED RELEASE ORAL EVERY 12 HOURS SCHEDULED
Status: DISCONTINUED | OUTPATIENT
Start: 2024-10-16 | End: 2024-10-17 | Stop reason: HOSPADM

## 2024-10-16 RX ORDER — POTASSIUM CHLORIDE 750 MG/1
40 TABLET, FILM COATED, EXTENDED RELEASE ORAL EVERY 4 HOURS
Status: COMPLETED | OUTPATIENT
Start: 2024-10-16 | End: 2024-10-16

## 2024-10-16 RX ADMIN — PILOCARPINE HYDRCHLORIDE 5 MG: 5 TABLET, FILM COATED ORAL at 15:01

## 2024-10-16 RX ADMIN — POTASSIUM CHLORIDE 40 MEQ: 750 TABLET, EXTENDED RELEASE ORAL at 18:18

## 2024-10-16 RX ADMIN — OXYCODONE HYDROCHLORIDE 10 MG: 10 TABLET ORAL at 10:32

## 2024-10-16 RX ADMIN — OLANZAPINE 5 MG: 5 TABLET, FILM COATED ORAL at 20:33

## 2024-10-16 RX ADMIN — Medication 10 ML: at 09:04

## 2024-10-16 RX ADMIN — OXYCODONE HYDROCHLORIDE 10 MG: 10 TABLET ORAL at 06:12

## 2024-10-16 RX ADMIN — PANTOPRAZOLE SODIUM 40 MG: 40 TABLET, DELAYED RELEASE ORAL at 05:38

## 2024-10-16 RX ADMIN — PILOCARPINE HYDRCHLORIDE 5 MG: 5 TABLET, FILM COATED ORAL at 09:04

## 2024-10-16 RX ADMIN — HYDROMORPHONE HYDROCHLORIDE 0.5 MG: 1 INJECTION, SOLUTION INTRAMUSCULAR; INTRAVENOUS; SUBCUTANEOUS at 09:08

## 2024-10-16 RX ADMIN — SENNOSIDES AND DOCUSATE SODIUM 2 TABLET: 50; 8.6 TABLET ORAL at 09:04

## 2024-10-16 RX ADMIN — SODIUM CHLORIDE 100 ML/HR: 9 INJECTION, SOLUTION INTRAVENOUS at 00:13

## 2024-10-16 RX ADMIN — SENNOSIDES AND DOCUSATE SODIUM 2 TABLET: 50; 8.6 TABLET ORAL at 20:33

## 2024-10-16 RX ADMIN — TERAZOSIN 2 MG: 2 CAPSULE ORAL at 20:33

## 2024-10-16 RX ADMIN — GABAPENTIN 600 MG: 300 CAPSULE ORAL at 20:33

## 2024-10-16 RX ADMIN — PILOCARPINE HYDRCHLORIDE 5 MG: 5 TABLET, FILM COATED ORAL at 20:33

## 2024-10-16 RX ADMIN — ATENOLOL 25 MG: 25 TABLET ORAL at 09:04

## 2024-10-16 RX ADMIN — MORPHINE SULFATE 15 MG: 15 TABLET, FILM COATED, EXTENDED RELEASE ORAL at 20:33

## 2024-10-16 RX ADMIN — MORPHINE SULFATE 15 MG: 15 TABLET, FILM COATED, EXTENDED RELEASE ORAL at 12:15

## 2024-10-16 RX ADMIN — POTASSIUM CHLORIDE 40 MEQ: 750 TABLET, EXTENDED RELEASE ORAL at 15:01

## 2024-10-16 RX ADMIN — OXYCODONE HYDROCHLORIDE 10 MG: 10 TABLET ORAL at 00:12

## 2024-10-16 RX ADMIN — PRAVASTATIN SODIUM 80 MG: 40 TABLET ORAL at 09:04

## 2024-10-16 RX ADMIN — POLYETHYLENE GLYCOL 3350 17 G: 17 POWDER, FOR SOLUTION ORAL at 09:04

## 2024-10-16 NOTE — PLAN OF CARE
Goal Outcome Evaluation:  Plan of Care Reviewed With: patient        Progress: improving  Outcome Evaluation: Pt seen for PT tx today. Pt agreeable to participate. Pt completed bed mobility and stands with SBA. Pt able to increase gait distance to 150ft with rwx, CGA. 2 rest breaks during gait. Cues for upright posture and maintaining a safe distance from walker. Will continue to follow pt for d/c needs.

## 2024-10-16 NOTE — PLAN OF CARE
Problem: Adult Inpatient Plan of Care  Goal: Plan of Care Review  Outcome: Progressing  Goal: Patient-Specific Goal (Individualized)  Outcome: Progressing  Goal: Absence of Hospital-Acquired Illness or Injury  Outcome: Progressing  Intervention: Identify and Manage Fall Risk  Recent Flowsheet Documentation  Taken 10/15/2024 2115 by Shilpi Leary RN  Safety Promotion/Fall Prevention:   safety round/check completed   room organization consistent   nonskid shoes/slippers when out of bed   fall prevention program maintained   clutter free environment maintained  Goal: Optimal Comfort and Wellbeing  Outcome: Progressing  Intervention: Monitor Pain and Promote Comfort  Recent Flowsheet Documentation  Taken 10/16/2024 0010 by Shilpi Leary RN  Pain Management Interventions: pain medication given  Taken 10/15/2024 2115 by Shilpi Leary RN  Pain Management Interventions:   position adjusted   pain medication given  Taken 10/15/2024 1948 by Shilpi Leary RN  Pain Management Interventions: position adjusted  Intervention: Provide Person-Centered Care  Recent Flowsheet Documentation  Taken 10/15/2024 2115 by Shilpi Leary RN  Trust Relationship/Rapport:   care explained   choices provided  Goal: Readiness for Transition of Care  Outcome: Progressing   Goal Outcome Evaluation:            VS WNL. Pain treated per MAR. O2 2L necessary while asleep to keep O2 SAT above 90%.

## 2024-10-16 NOTE — PLAN OF CARE
Goal Outcome Evaluation: alert and oriented x4.  2l per NC.  Right chest port flushed, good blood return and caps changed. VSS and call light in reach. Family at bedside.

## 2024-10-16 NOTE — PROGRESS NOTES
[facility] GROUP INPATIENT PROGRESS NOTE    Length of Stay:  2 days    CHIEF COMPLAINT:  Cholangiocarcinoma, cancer related pain, constipation    SUBJECTIVE:   Over the past 24 hours patient has been primarily using oxycodone for pain control, has received a few doses of IV Dilaudid.  He does note that the pain medication wears off before he is due for the next dose.  He has no current sedation.  He does report that he feels more comfortable overall in terms of pain control.  He is anxious to try to get home.  Unfortunately he has not had a bowel movement (4 days).    ROS:  Review of Systems   A comprehensive review of systems was obtained with pertinent positive findings as noted in interval history above.  All other systems negative.      OBJECTIVE:  Vitals:    10/15/24 0830 10/15/24 0931 10/15/24 1909 10/15/24 2315   BP: 99/83 101/66 114/73 106/68   BP Location: Left arm Left arm Left arm Left arm   Patient Position: Lying Lying Lying Lying   Pulse: 75 79 84 92   Resp:  16 16 16   Temp:  97.5 °F (36.4 °C) 98.6 °F (37 °C) 98.6 °F (37 °C)   TempSrc:  Oral Oral Oral   SpO2: 99% 97% 94%    Weight:  83.6 kg (184 lb 5.8 oz)     Height:             PHYSICAL EXAMINATION:  General: Alert and orient x 3 no distress  Chest/Lungs: Clear to auscultation bilaterally anteriorly  Heart: Regular rate and rhythm  Abdomen/GI: Soft, nontender, nondistended    Extremities: No edema        DIAGNOSTIC DATA:  Results Review:     I reviewed the patient's new clinical results.    Results from last 7 days   Lab Units 10/15/24  0806 10/14/24  2110 10/14/24  1130 10/13/24  1005   WBC 10*3/mm3 4.98  --  9.50 11.16*   HEMOGLOBIN g/dL 11.4*  --  13.5 14.4   HEMATOCRIT % 35.3*  --  40.2 42.9   PLATELETS 10*3/mm3 74* 116* 109* 130*      Results from last 7 days   Lab Units 10/15/24  0806 10/14/24  1130 10/13/24  1005   SODIUM mmol/L 132* 134* 129*   POTASSIUM mmol/L 3.4* 3.6 3.4*   CHLORIDE mmol/L 95* 93* 92*   CO2 mmol/L 28.3 27.0 26.2    BUN mg/dL 46* 33* 20   CREATININE mg/dL 2.20* 1.18 0.90   CALCIUM mg/dL 7.8* 8.2* 9.0   BILIRUBIN mg/dL 0.8 1.0 1.1   ALK PHOS U/L 67 84 101   ALT (SGPT) U/L 17 25 25   AST (SGOT) U/L 33 45* 48*   GLUCOSE mg/dL 98 144* 171*      Lab Results   Component Value Date    NEUTROABS 3.33 10/15/2024         Results from last 7 days   Lab Units 10/15/24  0806   MAGNESIUM mg/dL 2.0           Assessment & Plan   ASSESSMENT/PLAN:  This is a 79 y.o. male with:     *Intrahepatic cholangiocarcinoma, multifocal:  7/5/24 MRI abd-heterogenous, large peripherally enhancing mass withinthe posterior aspect of the right hepatic dome which is grossly unchanged to minimally increased in size since 5/10/2024. small peripheral enhancing and solid enhancing lesions are located within the surrounding right hepatic lobe and spread of malignancy is likely  7/18/24 CT liver biopsy-adenocarcinoma consistent with cholangiocarcinoma by IHC  CA 19-9 133  8/2/2024-initiated palliative treatment with cisplatin/Gemzar plus nivolumab; day 8 Gemzar given at 50% because of thrombocytopenia (60)  Cycle 2 of treatment was given with a dose reduction of gemcitabine 750 mg/m² and cisplatin same dose 25 mg/m² along with durvalumab.  Despite dose reduction he had intractable nausea vomiting acute kidney injury requiring IV fluids.  He did not receive day 8 cycle 2 of treatment because of side effects.  9/6/2024 CT abdomen liver protocol-stable 7.6 x 6 cm mass in the right lobe of the liver  Patient was seen in emergency department on 9/20/2024 with CT abdomen and pelvis that showed stable right liver mass 7.5 x 6 cm, loculated right pleural effusion with surrounding pleural thickening and calcification unchanged, no acute findings.  CT abdomen and pelvis on 10/10/2024 showed no change.  Patient was referred to Dr. Hopper at [facility], was seen on 9/26/2024.  Patient was not felt to be a good candidate for surgical intervention.  Recommendation  to proceed with chemoembolization.  Patient reports that chemoembolization currently scheduled for 10/21/2024.     *Cancer related pain  Patient with ongoing pain primarily located in the right upper quadrant region related to cholangiocarcinoma  Patient was receiving hydrocodone 7.5/325 prior to hospital admission, was taking this every 6 hours at home with no pain relief  Patient in hospital initially receiving Dilaudid 0.5 mg every 2 hours as needed in addition to oxycodone 10 mg p.o. every 4 hours as needed.  Patient currently scheduled for chemoembolization on 10/21/2024 which may ultimately help to improve his pain.    On 10/14/2024 added MS Contin 15 mg twice daily and increased oxycodone  to 20 mg every 4 hours as needed, increased breakthrough Dilaudid to 1 mg every 2 hours as needed.    On 10/15/2024 patient became lethargic, hypotensive, rapid response called and received IV fluid bolus as well as Narcan.  Patient responded immediately to Narcan.  Presume episode occurred due to oversedation from narcotics.  Discontinued MS Contin.  Dilaudid decreased to 0.5 mg IV every 2 hours as needed.  Oxycodone decreased to 10 mg every 4 hours as needed.  Today, patient's pain is under somewhat improved control.  He has been primarily using oxycodone and has only received a few doses of IV Dilaudid.  Pain control is still not optimal.  We discussed a retrial of low-dose MS Contin 15 mg every 12 hours and patient agrees.  We will leave the IV Dilaudid as available however he will try not to use IV pain medication over the next 24 hours.  If he is experiencing adequate control of pain tomorrow we could consider discharge home.     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  Additional labs with , B12 844, folate 6.72.  Labs today with platelet count stable at 76,000, IPF remains normal at 4.1%, therefore no evidence of ITP.  Additional labs with fibrinogen 278, INR 1.25, PTT 31.8 and therefore no evidence of DIC.  Unclear if this is related to the patient's rheumatoid arthritis, has had intermittent transient thrombocytopenia in the past.  Timeframe from Lovenox initiation would not be consistent with HIT, Lovenox was discontinued.  We will recheck platelet count in a.m.     *Restless leg syndrome, chronic chest/back pain  Patient with chronic pain after previous thoracotomy  Patient continues on long-term gabapentin 600 mg nightly     *RA  significant rheumatoid arthritis history with joint deviation, history of iritis, previous pericarditis requiring pericardial window  Patient previously treated with Simponi, held per rheumatology prior to initiation of chemotherapy/immunotherapy  Fortunately with recent immunotherapy patient did not develop flare of rheumatoid arthritis     *History of pericarditis  Patient did have previous pericarditis that required a pericardial window.     *Additional comorbidities-diabetes mellitus, hyperlipidemia, hypertension, tobacco abuse, BPH     *Skin rash/pruritus secondary to chemotherapy versus immunotherapy  Previously required a steroid Dosepak-resolved     *DVT prophylaxis  With declining platelet count, discontinued Lovenox.     *GERD  Protonix 40 mg daily     *Constipation  Sarika-Colace 2 tablets twice daily and MiraLAX daily, lactulose 20 g twice daily as needed  Patient still has not experienced a bowel movement.   We will administer the lactulose which is currently ordered as needed    *CODE STATUS   Patient DNR/DNI but maintain current level of medical support.    PLAN:   Add back in MS Contin 15 mg twice daily  Continue oxycodone 10 mg every 4 hours as needed  Patient has Dilaudid 0.5 mg every 2 hours as needed ordered however he will try not to use IV pain medication unless absolutely necessary  Continue to monitor for sedation with ongoing narcotics  Continue Zyprexa 5 mg nightly  Continue Zofran 4 mg ODT/IV every 6 hours for nausea  Continue Sarika-Colace 2 tablets twice daily  Change MiraLAX to daily scheduled  Continue lactulose 20 g twice daily as needed for constipation (patient will receive lactulose today)  Consider possible discharge in a.m. if pain is well-controlled on primarily oral regimen  Patient currently scheduled for chemoembolization right liver mass at [facility] on 10/21/2024  Daily CBC, CMP    Discussed with patient at bedside.           Eyad Grace MD

## 2024-10-16 NOTE — THERAPY TREATMENT NOTE
Patient Name: Gay Vazquez  : 1945    MRN: [id number]                              Today's Date: 10/16/2024       Admit Date: 10/14/2024    Visit Dx:     ICD-10-CM ICD-9-CM   1. Cancer-related breakthrough pain  G89.3 338.3   2. Cholangiocarcinoma  C22.1 155.1     Patient Active Problem List   Diagnosis    Mixed hyperlipidemia    Diabetes mellitus without complication    Rheumatoid arthritis    Overweight (BMI 25.0-29.9)    Primary hypertension    Nicotine dependence, cigarettes, uncomplicated    Aneurysm of ascending aorta without rupture    Liver mass    Colonic mass    Cholangiocarcinoma    Encounter for long-term (current) use of other medications    Fitting and adjustment of vascular catheter    Thrombocytopenia    Cancer-related breakthrough pain    Chronic pleural effusion     Past Medical History:   Diagnosis Date    Cancer     Liver    Colon polyp     Diabetes mellitus     Hyperlipidemia     Hypertension     Liver cancer 2024    Liver disease     Postoperative wound infection     RA (rheumatoid arthritis)      Past Surgical History:   Procedure Laterality Date    ABDOMINAL SURGERY      BACK SURGERY      CHOLECYSTECTOMY      COLONOSCOPY      [facility]    COLONOSCOPY N/A 2024    Procedure: COLONOSCOPY INTO CECUM WITH POLYPECTOMIES (COLD SNARE);  Surgeon: Angel Ness MD;  Location: [facility] ENDOSCOPY;  Service: General;  Laterality: N/A;  PRE: COLON MASS, ABNORMAL CT  POST: DIVERTICULOSIS, POLYPS, POOR PREP    ENDOSCOPY N/A 2024    Procedure: ESOPHAGOGASTRODUODENOSCOPY;  Surgeon: Angel Ness MD;  Location: [facility] ENDOSCOPY;  Service: General;  Laterality: N/A;  PRE: LIVER MASS  POST: NORMAL EGD    EYE SURGERY      PERICARDIUM SURGERY      REPLACEMENT TOTAL KNEE BILATERAL      THORACOTOMY Bilateral 1997    VENOUS ACCESS DEVICE (PORT) INSERTION Right 2024    Procedure: INSERTION VENOUS ACCESS DEVICE;  Surgeon: Thi  Angel Rendon MD;  Location: [facility] MAIN OR;  Service: General;  Laterality: Right;      General Information       Row Name 10/16/24 1611          Physical Therapy Time and Intention    Document Type therapy note (daily note)  -EB     Mode of Treatment individual therapy;physical therapy  -EB       Row Name 10/16/24 1611          General Information    Patient Profile Reviewed yes  -EB     Existing Precautions/Restrictions fall  -EB       Row Name 10/16/24 1611          Cognition    Orientation Status (Cognition) oriented x 4  -EB       Row Name 10/16/24 1611          Safety Issues/Impairments Affecting Functional Mobility    Impairments Affecting Function (Mobility) endurance/activity tolerance;strength;balance  -EB               User Key  (r) = Recorded By, (t) = Taken By, (c) = Cosigned By      Initials Name Provider Type    My Noel PTA Physical Therapist Assistant                   Mobility       Row Name 10/16/24 1611          Bed Mobility    Bed Mobility supine-sit  -EB     Supine-Sit [place] (Bed Mobility) standby assist  -EB     Assistive Device (Bed Mobility) bed rails;head of bed elevated  -EB       Row Name 10/16/24 1611          Sit-Stand Transfer    Sit-Stand [place] (Transfers) standby assist  -EB       Row Name 10/16/24 1611          Gait/Stairs (Locomotion)    [place] Level (Gait) contact guard  -EB     Assistive Device (Gait) walker, front-wheeled  -EB     Distance in Feet (Gait) 150  -EB     Deviations/Abnormal Patterns (Gait) maricarmen decreased;gait speed decreased;stride length decreased  -EB     Bilateral Gait Deviations forward flexed posture;heel strike decreased  -EB               User Key  (r) = Recorded By, (t) = Taken By, (c) = Cosigned By      Initials Name Provider Type    My Noel PTA Physical Therapist Assistant                   Obj/Interventions    No documentation.                  Goals/Plan    No documentation.                  Clinical Impression        Row Name 10/16/24 1609          Pain    Pain Side/Orientation upper;medial  -EB     Pre/Posttreatment Pain Comment did not rate  -EB       Row Name 10/16/24 1609          Plan of Care Review    Plan of Care Reviewed With patient  -EB     Progress improving  -EB     Outcome Evaluation Pt seen for PT tx today. Pt agreeable to participate. Pt completed bed mobility and stands with SBA. Pt able to increase gait distance to 150ft with rwx, CGA. 2 rest breaks during gait. Cues for upright posture and maintaining a safe distance from walker. Will continue to follow pt for d/c needs.  -EB       Row Name 10/16/24 1609          Therapy Assessment/Plan (PT)    Therapy Frequency (PT) 5 times/wk  -EB       Row Name 10/16/24 1609          Positioning and Restraints    Pre-Treatment Position in bed  -EB     Post Treatment Position chair  -EB     In Chair reclined;call light within reach;encouraged to call for assist;exit alarm on  -EB               User Key  (r) = Recorded By, (t) = Taken By, (c) = Cosigned By      Initials Name Provider Type    My Noel PTA Physical Therapist Assistant                   Outcome Measures       Row Name 10/16/24 1610          How much help from another person do you currently need...    Turning from your back to your side while in flat bed without using bedrails? 3  -EB     Moving from lying on back to sitting on the side of a flat bed without bedrails? 3  -EB     Moving to and from a bed to a chair (including a wheelchair)? 3  -EB     Standing up from a chair using your arms (e.g., wheelchair, bedside chair)? 3  -EB     Climbing 3-5 steps with a railing? 3  -EB     To walk in hospital room? 3  -EB     AM-PAC 6 Clicks Score (PT) 18  -EB     Highest Level of Mobility Goal 6 --> Walk 10 steps or more  -EB               User Key  (r) = Recorded By, (t) = Taken By, (c) = Cosigned By      Initials Name Provider Type    My Noel PTA Physical Therapist Assistant                                  Physical Therapy Education       Title: PT OT SLP Therapies (In Progress)       Topic: Physical Therapy (In Progress)       Point: Mobility training (Done)       Learning Progress Summary            Patient Acceptance, E, VU by  at 10/16/2024 1611    Acceptance, E,TB, VU,DU,NR by  at 10/15/2024 1343                      Point: Home exercise program (Not Started)       Learner Progress:  Not documented in this visit.              Point: Body mechanics (Done)       Learning Progress Summary            Patient Acceptance, E, VU by  at 10/16/2024 1611    Acceptance, E,TB, VU,DU,NR by  at 10/15/2024 1343                      Point: Precautions (Done)       Learning Progress Summary            Patient Acceptance, E,TB, VU,DU,NR by  at 10/15/2024 1343                                      User Key       Initials Effective Dates Name Provider Type Discipline     02/14/23 -  My Palacios PTA Physical Therapist Assistant PT     09/22/22 -  Flori Kim, IZA Physical Therapist PT                  PT Recommendation and Plan     Progress: improving  Outcome Evaluation: Pt seen for PT tx today. Pt agreeable to participate. Pt completed bed mobility and stands with SBA. Pt able to increase gait distance to 150ft with rwx, CGA. 2 rest breaks during gait. Cues for upright posture and maintaining a safe distance from walker. Will continue to follow pt for d/c needs.     Time Calculation:         PT Charges       Row Name 10/16/24 1608             Time Calculation    Start Time 1524  -EB      Stop Time 1535  -      Time Calculation (min) 11 min  -      PT Received On 10/16/24  -      PT - Next Appointment 10/17/24  -         Time Calculation- PT    Total Timed Code Minutes- PT 11 minute(s)  -                User Key  (r) = Recorded By, (t) = Taken By, (c) = Cosigned By      Initials Name Provider Type    EB My Palacios PTA Physical Therapist Assistant                  Therapy Charges for  Today       Code Description Service Date Service Provider Modifiers Qty    [id number] HC GAIT TRAINING EA 15 MIN 10/16/2024 My Palacios, SANJUANITA GP 1            PT G-Codes  Outcome Measure Options: AM-PAC 6 Clicks Basic Mobility (PT)  AM-PAC 6 Clicks Score (PT): 18       My Palacios PTA  10/16/2024

## 2024-10-16 NOTE — PROGRESS NOTES
Name: Gay Vazquez ADMIT: 10/14/2024   : 1945  PCP: Gloria Post MD    MRN: [id number] LOS: 2 days   AGE/SEX: 79 y.o. male  ROOM: [facility]     Subjective   Subjective   Chief Complaint   Patient presents with    Pain     Abd pian      Alert.  Pain is better controlled today.  He is not reporting any chest pain palpitation shortness of breath.  No nausea or vomiting.  He spoke with oncology and we went over the pain regimen as currently prescribed.  He is going to monitor his symptoms on this.  He was asking if he would need Narcan at discharge and I did offer it to him.  We also discussed holding his lisinopril since blood pressure is still not grossly elevated off of it.     Objective   Objective   Vital Signs  Temp:  [98.2 °F (36.8 °C)-98.6 °F (37 °C)] 98.2 °F (36.8 °C)  Heart Rate:  [78-98] 78  Resp:  [16] 16  BP: (106-119)/(66-79) 119/66  SpO2:  [94 %-98 %] 98 %  on  Flow (L/min) (Oxygen Therapy):  [2] 2;   Device (Oxygen Therapy): room air  Body mass index is 25 kg/m².    Physical Exam  Vitals and nursing note reviewed.   Constitutional:       General: He is not in acute distress.     Appearance: He is not diaphoretic.   HENT:      Head: Atraumatic.   Eyes:      General: No scleral icterus.  Cardiovascular:      Rate and Rhythm: Normal rate and regular rhythm.      Pulses: Normal pulses.   Pulmonary:      Effort: Pulmonary effort is normal.      Breath sounds: No wheezing.   Abdominal:      General: There is no distension.      Palpations: Abdomen is soft.      Tenderness: There is abdominal tenderness. There is no guarding.   Musculoskeletal:         General: No tenderness.   Skin:     General: Skin is warm and dry.   Neurological:      Mental Status: He is alert. Mental status is at baseline.   Psychiatric:         Mood and Affect: Mood normal.         Behavior: Behavior normal.       Results Review  I reviewed the patient's new clinical results.    Results from last 7 days   Lab Units  10/16/24  0701 10/15/24  0806 10/14/24  2110 10/14/24  1130 10/13/24  1005   WBC 10*3/mm3 4.48 4.98  --  9.50 11.16*   HEMOGLOBIN g/dL 11.2* 11.4*  --  13.5 14.4   PLATELETS 10*3/mm3 76*  76* 74* 116* 109* 130*     Results from last 7 days   Lab Units 10/16/24  0701 10/15/24  0806 10/14/24  1130 10/13/24  1005   SODIUM mmol/L 131* 132* 134* 129*   POTASSIUM mmol/L 3.4* 3.4* 3.6 3.4*   CHLORIDE mmol/L 98 95* 93* 92*   CO2 mmol/L 27.6 28.3 27.0 26.2   BUN mg/dL 38* 46* 33* 20   CREATININE mg/dL 1.09 2.20* 1.18 0.90   GLUCOSE mg/dL 114* 98 144* 171*   EGFR mL/min/1.73 69.5 29.9* 63.2 87.4     Results from last 7 days   Lab Units 10/16/24  0701 10/15/24  0806 10/14/24  1130 10/13/24  1005   ALBUMIN g/dL 2.3* 2.4* 2.9* 3.2*   BILIRUBIN mg/dL 0.9 0.8 1.0 1.1   ALK PHOS U/L 64 67 84 101   AST (SGOT) U/L 32 33 45* 48*   ALT (SGPT) U/L 16 17 25 25     Results from last 7 days   Lab Units 10/16/24  0701 10/15/24  0806 10/14/24  1130 10/13/24  1005   CALCIUM mg/dL 7.8* 7.8* 8.2* 9.0   ALBUMIN g/dL 2.3* 2.4* 2.9* 3.2*   MAGNESIUM mg/dL  --  2.0  --   --    PHOSPHORUS mg/dL  --  4.4  --   --          Glucose   Date/Time Value Ref Range Status   10/16/2024 1120 124 70 - 130 mg/dL Final   10/16/2024 0611 103 70 - 130 mg/dL Final   10/15/2024 2048 129 70 - 130 mg/dL Final   10/15/2024 1611 122 70 - 130 mg/dL Final   10/15/2024 1109 104 70 - 130 mg/dL Final   10/15/2024 0740 101 70 - 130 mg/dL Final   10/14/2024 2027 149 (H) 70 - 130 mg/dL Final       XR Abdomen 2+ VW with Chest 1 VW    Result Date: 10/14/2024   As described.    This report was finalized on 10/14/2024 4:24 PM by Dr. Mando Parikh M.D on Workstation: [employer]       I have personally reviewed all medications:  Scheduled Medications  atenolol, 25 mg, Oral, Daily  gabapentin, 600 mg, Oral, Nightly  insulin lispro, 2-7 Units, Subcutaneous, 4x Daily AC & at Bedtime  Morphine, 15 mg, Oral, Q12H  OLANZapine, 5 mg, Oral, Nightly  pantoprazole, 40 mg, Oral, Q  AM  pilocarpine, 5 mg, Oral, TID  senna-docusate sodium, 2 tablet, Oral, BID   And  polyethylene glycol, 17 g, Oral, Daily  pravastatin, 80 mg, Oral, Daily  sodium chloride, 10 mL, Intravenous, Q12H  sodium chloride, 10 mL, Intravenous, Q12H  terazosin, 2 mg, Oral, Nightly      Infusions       Diet  Diet: Diabetic; Consistent Carbohydrate; Fluid Consistency: Thin (IDDSI 0)       Assessment/Plan     Active Hospital Problems    Diagnosis  POA    **Cancer-related breakthrough pain [G89.3]  Yes    Chronic pleural effusion [J90]  Yes    Cholangiocarcinoma [C22.1]  Yes    Diabetes mellitus without complication [E11.9]  Yes    Primary hypertension [I10]  Yes      Resolved Hospital Problems   No resolved problems to display.       79 y.o. male admitted with Cancer-related breakthrough pain.    Cancer related pain: MS Contin has been resumed.  Continue Roxicodone as needed. Monitoring mentation on this.  Cholangiocarcinoma: Oncology following.  Chronic pleural effusion  Hypertension: Beta-blocker with hold parameters in place.  MALI: Was hypotensive this admission.  Blood pressure is not grossly elevated off the losartan.  Metabolic encephalopathy: Secondary to increase in pain medication and MALI on evening after admission.  Resolved.  Diabetes: SSI  PPX: SCD  Disposition: Home/possibly tomorrow    Expected Discharge Date: 10/18/2024; Expected Discharge Time:      Mike Rivera MD  [facility]  10/16/24  13:31 EDT    Dictated portions of note using Dragon dictation software.  Copied text in this note has been reviewed by me and remains accurate as of 10/16/24

## 2024-10-17 ENCOUNTER — READMISSION MANAGEMENT (OUTPATIENT)
Dept: CALL CENTER | Facility: HOSPITAL | Age: 79
End: 2024-10-17
Payer: MEDICARE

## 2024-10-17 ENCOUNTER — TELEPHONE (OUTPATIENT)
Dept: ONCOLOGY | Facility: CLINIC | Age: 79
End: 2024-10-17
Payer: MEDICARE

## 2024-10-17 VITALS
OXYGEN SATURATION: 94 % | SYSTOLIC BLOOD PRESSURE: 111 MMHG | DIASTOLIC BLOOD PRESSURE: 64 MMHG | WEIGHT: 184.36 LBS | RESPIRATION RATE: 16 BRPM | HEIGHT: 72 IN | HEART RATE: 70 BPM | BODY MASS INDEX: 24.97 KG/M2 | TEMPERATURE: 98.7 F

## 2024-10-17 LAB
ALBUMIN SERPL-MCNC: 2.1 G/DL (ref 3.5–5.2)
ALBUMIN/GLOB SERPL: 0.6 G/DL
ALP SERPL-CCNC: 57 U/L (ref 39–117)
ALT SERPL W P-5'-P-CCNC: 14 U/L (ref 1–41)
ANION GAP SERPL CALCULATED.3IONS-SCNC: 1.7 MMOL/L (ref 5–15)
AST SERPL-CCNC: 32 U/L (ref 1–40)
BASOPHILS # BLD AUTO: 0.01 10*3/MM3 (ref 0–0.2)
BASOPHILS NFR BLD AUTO: 0.3 % (ref 0–1.5)
BILIRUB SERPL-MCNC: 0.7 MG/DL (ref 0–1.2)
BUN SERPL-MCNC: 27 MG/DL (ref 8–23)
BUN/CREAT SERPL: 29 (ref 7–25)
CALCIUM SPEC-SCNC: 7.9 MG/DL (ref 8.6–10.5)
CHLORIDE SERPL-SCNC: 105 MMOL/L (ref 98–107)
CO2 SERPL-SCNC: 27.3 MMOL/L (ref 22–29)
CREAT SERPL-MCNC: 0.93 MG/DL (ref 0.76–1.27)
CRP SERPL-MCNC: 6.42 MG/DL (ref 0–0.5)
DEPRECATED RDW RBC AUTO: 48 FL (ref 37–54)
EGFRCR SERPLBLD CKD-EPI 2021: 84 ML/MIN/1.73
EOSINOPHIL # BLD AUTO: 0.12 10*3/MM3 (ref 0–0.4)
EOSINOPHIL NFR BLD AUTO: 3.6 % (ref 0.3–6.2)
ERYTHROCYTE [DISTWIDTH] IN BLOOD BY AUTOMATED COUNT: 14.3 % (ref 12.3–15.4)
FERRITIN SERPL-MCNC: 490 NG/ML (ref 30–400)
GLOBULIN UR ELPH-MCNC: 3.4 GM/DL
GLUCOSE BLDC GLUCOMTR-MCNC: 103 MG/DL (ref 70–130)
GLUCOSE BLDC GLUCOMTR-MCNC: 132 MG/DL (ref 70–130)
GLUCOSE SERPL-MCNC: 96 MG/DL (ref 65–99)
HCT VFR BLD AUTO: 29.9 % (ref 37.5–51)
HGB BLD-MCNC: 9.9 G/DL (ref 13–17.7)
IMM GRANULOCYTES # BLD AUTO: 0.01 10*3/MM3 (ref 0–0.05)
IMM GRANULOCYTES NFR BLD AUTO: 0.3 % (ref 0–0.5)
IRON 24H UR-MRATE: 54 MCG/DL (ref 59–158)
IRON SATN MFR SERPL: 27 % (ref 20–50)
LYMPHOCYTES # BLD AUTO: 0.86 10*3/MM3 (ref 0.7–3.1)
LYMPHOCYTES NFR BLD AUTO: 25.7 % (ref 19.6–45.3)
MCH RBC QN AUTO: 30.7 PG (ref 26.6–33)
MCHC RBC AUTO-ENTMCNC: 33.1 G/DL (ref 31.5–35.7)
MCV RBC AUTO: 92.6 FL (ref 79–97)
MONOCYTES # BLD AUTO: 0.31 10*3/MM3 (ref 0.1–0.9)
MONOCYTES NFR BLD AUTO: 9.3 % (ref 5–12)
NEUTROPHILS NFR BLD AUTO: 2.03 10*3/MM3 (ref 1.7–7)
NEUTROPHILS NFR BLD AUTO: 60.8 % (ref 42.7–76)
NRBC BLD AUTO-RTO: 0 /100 WBC (ref 0–0.2)
PLATELET # BLD AUTO: 68 10*3/MM3 (ref 140–450)
PMV BLD AUTO: 10 FL (ref 6–12)
POTASSIUM SERPL-SCNC: 4.3 MMOL/L (ref 3.5–5.2)
PROT SERPL-MCNC: 5.5 G/DL (ref 6–8.5)
RBC # BLD AUTO: 3.23 10*6/MM3 (ref 4.14–5.8)
SODIUM SERPL-SCNC: 134 MMOL/L (ref 136–145)
TIBC SERPL-MCNC: 200 MCG/DL (ref 298–536)
TRANSFERRIN SERPL-MCNC: 134 MG/DL (ref 200–360)
WBC NRBC COR # BLD AUTO: 3.34 10*3/MM3 (ref 3.4–10.8)

## 2024-10-17 PROCEDURE — 86140 C-REACTIVE PROTEIN: CPT | Performed by: INTERNAL MEDICINE

## 2024-10-17 PROCEDURE — 82542 COL CHROMOTOGRAPHY QUAL/QUAN: CPT | Performed by: INTERNAL MEDICINE

## 2024-10-17 PROCEDURE — 85025 COMPLETE CBC W/AUTO DIFF WBC: CPT | Performed by: INTERNAL MEDICINE

## 2024-10-17 PROCEDURE — 99232 SBSQ HOSP IP/OBS MODERATE 35: CPT | Performed by: INTERNAL MEDICINE

## 2024-10-17 PROCEDURE — 86022 PLATELET ANTIBODIES: CPT | Performed by: INTERNAL MEDICINE

## 2024-10-17 PROCEDURE — 82948 REAGENT STRIP/BLOOD GLUCOSE: CPT

## 2024-10-17 PROCEDURE — 88184 FLOWCYTOMETRY/ TC 1 MARKER: CPT | Performed by: INTERNAL MEDICINE

## 2024-10-17 PROCEDURE — 83540 ASSAY OF IRON: CPT | Performed by: INTERNAL MEDICINE

## 2024-10-17 PROCEDURE — 80053 COMPREHEN METABOLIC PANEL: CPT | Performed by: INTERNAL MEDICINE

## 2024-10-17 PROCEDURE — 84466 ASSAY OF TRANSFERRIN: CPT | Performed by: INTERNAL MEDICINE

## 2024-10-17 PROCEDURE — 88182 CELL MARKER STUDY: CPT | Performed by: INTERNAL MEDICINE

## 2024-10-17 PROCEDURE — 82728 ASSAY OF FERRITIN: CPT | Performed by: INTERNAL MEDICINE

## 2024-10-17 PROCEDURE — 88185 FLOWCYTOMETRY/TC ADD-ON: CPT | Performed by: INTERNAL MEDICINE

## 2024-10-17 RX ORDER — OXYCODONE HYDROCHLORIDE 10 MG/1
10 TABLET ORAL EVERY 4 HOURS PRN
Qty: 100 EACH | Refills: 0 | Status: SHIPPED | OUTPATIENT
Start: 2024-10-17 | End: 2024-11-03

## 2024-10-17 RX ORDER — AMOXICILLIN 250 MG
2 CAPSULE ORAL 2 TIMES DAILY
Start: 2024-10-17

## 2024-10-17 RX ORDER — MORPHINE SULFATE 15 MG/1
15 TABLET, FILM COATED, EXTENDED RELEASE ORAL EVERY 12 HOURS SCHEDULED
Qty: 60 TABLET | Refills: 0 | Status: SHIPPED | OUTPATIENT
Start: 2024-10-17 | End: 2024-11-15 | Stop reason: SDUPTHER

## 2024-10-17 RX ORDER — LACTULOSE 10 G/15ML
20 SOLUTION ORAL 2 TIMES DAILY PRN
Qty: 240 ML | Refills: 0 | Status: SHIPPED | OUTPATIENT
Start: 2024-10-17

## 2024-10-17 RX ADMIN — Medication 10 ML: at 09:09

## 2024-10-17 RX ADMIN — MORPHINE SULFATE 15 MG: 15 TABLET, FILM COATED, EXTENDED RELEASE ORAL at 09:09

## 2024-10-17 RX ADMIN — SENNOSIDES AND DOCUSATE SODIUM 2 TABLET: 50; 8.6 TABLET ORAL at 09:09

## 2024-10-17 RX ADMIN — PILOCARPINE HYDRCHLORIDE 5 MG: 5 TABLET, FILM COATED ORAL at 09:09

## 2024-10-17 RX ADMIN — PRAVASTATIN SODIUM 80 MG: 40 TABLET ORAL at 09:09

## 2024-10-17 RX ADMIN — ATENOLOL 25 MG: 25 TABLET ORAL at 09:09

## 2024-10-17 RX ADMIN — POLYETHYLENE GLYCOL 3350 17 G: 17 POWDER, FOR SOLUTION ORAL at 09:09

## 2024-10-17 RX ADMIN — PANTOPRAZOLE SODIUM 40 MG: 40 TABLET, DELAYED RELEASE ORAL at 05:50

## 2024-10-17 NOTE — OUTREACH NOTE
Prep Survey      Flowsheet Row Responses   [facility] patient discharged from? [place]   Is LACE score < 7 ? No   Eligibility [facility]   Date of Admission 10/14/24   Date of Discharge 10/17/24   Discharge Disposition Home or Self Care   Discharge diagnosis Cancer-related breakthrough pain, cholangiocarcinoma   Does the patient have one of the following disease processes/diagnoses(primary or secondary)? Other   Does the patient have Home health ordered? No   Is there a DME ordered? No   Prep survey completed? Yes            Jodee GRIGGS - Registered Nurse

## 2024-10-17 NOTE — PROGRESS NOTES
[facility] INPATIENT PROGRESS NOTE    Length of Stay:  3 days    CHIEF COMPLAINT:  Cholangiocarcinoma, cancer related pain, constipation    SUBJECTIVE:   Patient reports the pain is under excellent control currently.  He is preparing for discharge home today.  His constipation was relieved with current bowel regimen.    ROS:  Review of Systems   Comprehensive review of systems was obtained with pertinent positive findings as noted in the interval history above.  All other systems negative.      OBJECTIVE:  Vitals:    10/16/24 1212 10/16/24 2009 10/16/24 2340 10/17/24 0340   BP: 119/66 99/59 (!) 83/60 102/72   BP Location: Left arm      Patient Position: Lying      Pulse: 78 66 66 77   Resp: 16 16 16 16   Temp: 98.2 °F (36.8 °C) 98.6 °F (37 °C) 98.1 °F (36.7 °C) 98.1 °F (36.7 °C)   TempSrc: Oral Oral Oral Oral   SpO2: 98% 95%  99%   Weight:       Height:             PHYSICAL EXAMINATION:  General: Alert and orient x 3 no distress  Chest/Lungs: Clear to auscultation bilaterally anteriorly  Heart: Regular rate and rhythm  Abdomen/GI: Soft, nontender, nondistended    Extremities: No edema    Patient was examined today, unchanged from above    DIAGNOSTIC DATA:  Results Review:     I reviewed the patient's new clinical results.    Results from last 7 days   Lab Units 10/17/24  0543 10/16/24  0701 10/15/24  0806   WBC 10*3/mm3 3.34* 4.48 4.98   HEMOGLOBIN g/dL 9.9* 11.2* 11.4*   HEMATOCRIT % 29.9* 35.3* 35.3*   PLATELETS 10*3/mm3 68* 76*  76* 74*      Results from last 7 days   Lab Units 10/17/24  0542 10/16/24  2318 10/16/24  0701 10/15/24  0806   SODIUM mmol/L 134*  --  131* 132*   POTASSIUM mmol/L 4.3 4.3 3.4* 3.4*   CHLORIDE mmol/L 105  --  98 95*   CO2 mmol/L 27.3  --  27.6 28.3   BUN mg/dL 27*  --  38* 46*   CREATININE mg/dL 0.93  --  1.09 2.20*   CALCIUM mg/dL 7.9*  --  7.8* 7.8*   BILIRUBIN mg/dL 0.7  --  0.9 0.8   ALK PHOS U/L 57  --  64 67   ALT (SGPT) U/L 14  --  16 17   AST (SGOT) U/L 32  --  32  33   GLUCOSE mg/dL 96  --  114* 98      Lab Results   Component Value Date    NEUTROABS 2.03 10/17/2024     Results from last 7 days   Lab Units 10/16/24  0700   INR  1.25*   APTT seconds 31.8     Results from last 7 days   Lab Units 10/15/24  0806   MAGNESIUM mg/dL 2.0           Assessment & Plan   ASSESSMENT/PLAN:  This is a 79 y.o. male with:     *Intrahepatic cholangiocarcinoma, multifocal:  7/5/24 MRI abd-heterogenous, large peripherally enhancing mass withinthe posterior aspect of the right hepatic dome which is grossly unchanged to minimally increased in size since 5/10/2024. small peripheral enhancing and solid enhancing lesions are located within the surrounding right hepatic lobe and spread of malignancy is likely  7/18/24 CT liver biopsy-adenocarcinoma consistent with cholangiocarcinoma by IHC  CA 19-9 133  8/2/2024-initiated palliative treatment with cisplatin/Gemzar plus nivolumab; day 8 Gemzar given at 50% because of thrombocytopenia (60)  Cycle 2 of treatment was given with a dose reduction of gemcitabine 750 mg/m² and cisplatin same dose 25 mg/m² along with durvalumab.  Despite dose reduction he had intractable nausea vomiting acute kidney injury requiring IV fluids.  He did not receive day 8 cycle 2 of treatment because of side effects.  9/6/2024 CT abdomen liver protocol-stable 7.6 x 6 cm mass in the right lobe of the liver  Patient was seen in emergency department on 9/20/2024 with CT abdomen and pelvis that showed stable right liver mass 7.5 x 6 cm, loculated right pleural effusion with surrounding pleural thickening and calcification unchanged, no acute findings.  CT abdomen and pelvis on 10/10/2024 showed no change.  Patient was referred to Dr. Hopper at [facility], was seen on 9/26/2024.  Patient was not felt to be a good candidate for surgical intervention.  Recommendation to proceed with chemoembolization.  Patient reports that chemoembolization currently scheduled for  10/21/2024.     *Cancer related pain  Patient with ongoing pain primarily located in the right upper quadrant region related to cholangiocarcinoma  Patient was receiving hydrocodone 7.5/325 prior to hospital admission, was taking this every 6 hours at home with no pain relief  Patient in hospital initially receiving Dilaudid 0.5 mg every 2 hours as needed in addition to oxycodone 10 mg p.o. every 4 hours as needed.  Patient currently scheduled for chemoembolization on 10/21/2024 which may ultimately help to improve his pain.    On 10/14/2024 added MS Contin 15 mg twice daily and increased oxycodone  to 20 mg every 4 hours as needed, increased breakthrough Dilaudid to 1 mg every 2 hours as needed.    On 10/15/2024 patient became lethargic, hypotensive, rapid response called and received IV fluid bolus as well as Narcan.  Patient responded immediately to Narcan.  Presume episode occurred due to oversedation from narcotics.  Discontinued MS Contin.  Dilaudid decreased to 0.5 mg IV every 2 hours as needed.  Oxycodone decreased to 10 mg every 4 hours as needed.  On 10/16/2024 resumed MS contin 15mg BID  Patient with significant improvement in pain control.  He is being discharged on current regimen of MS Contin 15 mg twice daily and oxycodone 10 mg every 4 hours as needed.     *Nausea  Patient with ongoing intermittent mild nausea  Continue Zyprexa 5 mg nightly  Continue Zofran 4 mg IV/ODT every 6 hours as needed  No current nausea     *Thrombocytopenia secondary to chemotherapy  Patient with previous thrombocytopenia intermittently possibly related to underlying rheumatoid arthritis  Patient developed severe thrombocytopenia from prior chemotherapy  Platelet count has been relatively stable recently in the low 100,000 range  Platelet count on 10/14/2024 was 109,000, IPF normal at 4.4%  Labs on 10/15/2024 with B12 844, folate 6.72  Platelet count on 10/15/2024 declined further to 74,000.  Additional labs with ,  B12 844, folate 6.72.  Labs on 10/16/2024 with platelet count stable at 76,000, IPF remains normal at 4.1%, therefore no evidence of ITP.  Additional labs with fibrinogen 278, INR 1.25, PTT 31.8 and therefore no evidence of DIC.  Unclear if this is related to the patient's rheumatoid arthritis, has had intermittent transient thrombocytopenia in the past.  Timeframe from Lovenox initiation would not be consistent with HIT, Lovenox was discontinued.   Platelet count today slightly lower at 68,000. Although clinical suspicion for HIT is low, will send heparin antiplatelet antibody and serotonin release assay as there is no other obvious cause for the thrombocytopenia. Recent CT abdomen/pelvis from 10/10/2024 showed no significant splenomegaly. Doubtful that prior chemo is producing current pancytopenia (last treatment on 8/23/2024 with cisplatin, gemcitabine, and durvalumab). Possible for autoimmune component or stimulation of RA from prior immunotherapy.  With new leukopenia and worsening anemia, question underlying marrow compromise. If persistent pancytopenia need to consider bone marrow biopsy.  We will follow-up his counts in the outpatient setting, will check platelet count tomorrow prior to upcoming procedure scheduled for 10/21/2024 and recheck again next week following procedure with NP visit.    *Anemia  Prior anemia presumed secondary to chemotherapy in August and early September 2024 with hemoglobin in 9-11 range.  Hemoglobin normalized at 13 on 10/10/2024.  Hemoglobin on admission 10/14/2024 was 13.5  Additional labs on 10/15/2024 with B12 844, folate 6.72  Since admission, hemoglobin has declined into the 11-12 range and subsequently down to 9.9 today.  This is in conjunction with declining WBC and platelet count.  Question whether there is any connection with prior chemotherapy or immunotherapy.  Suspect that there is relation to underlying malignancy.  Will check additional labs with CRP, iron panel,  ferritin.    *Leukopenia  Patient with declining WBC during current admission with WBC 9.5 on 10/14/2024, declined into the 4-5000 range and on 10/17/2024 down to 3.34.  Differential normal.    Patient with underlying rheumatoid arthritis, question whether this is a contributing factor.    Spleen is normal in size on most recent CT abdomen pelvis 10/10/2024.    Labs on 10/15/2024 with B12 844, folate 6.72.  We will send off peripheral blood flow cytometry, as above consider whether to proceed with bone marrow biopsy at some point if counts continue to decline.     *Restless leg syndrome, chronic chest/back pain  Patient with chronic pain after previous thoracotomy  Patient continues on long-term gabapentin 600 mg nightly     *RA  significant rheumatoid arthritis history with joint deviation, history of iritis, previous pericarditis requiring pericardial window  Patient previously treated with Simponi, held per rheumatology prior to initiation of chemotherapy/immunotherapy  Fortunately with recent immunotherapy patient did not develop flare of rheumatoid arthritis  As above, question whether pancytopenia could be related to underlying rheumatoid arthritis.     *History of pericarditis  Patient did have previous pericarditis that required a pericardial window.     *Additional comorbidities-diabetes mellitus, hyperlipidemia, hypertension, tobacco abuse, BPH     *Skin rash/pruritus secondary to chemotherapy versus immunotherapy  Previously required a steroid Dosepak-resolved     *DVT prophylaxis  With declining platelet count, discontinued Lovenox.     *GERD  Protonix 40 mg daily     *Constipation  Sarika-Colace 2 tablets twice daily and MiraLAX daily, lactulose 20 g twice daily as needed  Constipation was relieved overnight    *CODE STATUS   Patient DNR/DNI but maintain current level of medical support.    PLAN:   Additional labs today with heparin antiplatelet antibody, serotonin release assay, CRP, iron panel,  ferritin, peripheral blood flow cytometry  Consider need for bone marrow biopsy if patient's pancytopenia persists or worsens  Continue MS Contin 15 mg twice daily  Continue oxycodone 10 mg every 4 hours as needed  Patient has Dilaudid 0.5 mg every 2 hours as needed ordered however he will try not to use IV pain medication unless absolutely necessary  Continue to monitor for sedation with ongoing narcotics  Continue Zyprexa 5 mg nightly  Continue Zofran 4 mg ODT/IV every 6 hours for nausea  Continue Sarika-Colace 2 tablets twice daily  Change MiraLAX to daily scheduled  Continue lactulose 20 g twice daily as needed for constipation   Patient currently scheduled for chemoembolization right liver [facility] at [facility] on 10/21/2024  Patient is being discharged home today  We will arrange outpatient follow-up with CBC and RN review on 10/18/2024 (lab check prior to upcoming procedure on 10/21/2024) and NP visit in 1 week with CBC, CMP, IPF.  Schedule visit with Dr. Tejada in approximately 1 month with CBC, CMP.    Discussed with patient and wife at bedside.           Eyad Grace MD

## 2024-10-17 NOTE — PROGRESS NOTES
Nutrition Services    Patient Name:  Gay Vazquez  YOB: 1945  MRN: [id number]  Admit Date:  10/14/2024    Assessment Date:  10/17/24    Summary: 78 yo male adm with cancer related breakthrough pain , chronic pleural effusion, cholangiocarcinoma, DM, HTN.  Pt also reports h/o RA    Spoke with patient and wife.  Patient eager to go home.  Reports that he was eating very poorly on admission due to pain, also with taste changes.  States he has lost 17# since CA dx.  Reports appetite is greatly improved and taste changes are as well.        CLINICAL NUTRITION ASSESSMENT      Reason for Assessment MST score 2+     Diagnosis/Problem   As above, poor intake r/t pain   Medical/Surgical History Past Medical History:   Diagnosis Date    Cancer     Liver    Colon polyp     Diabetes mellitus     Hyperlipidemia     Hypertension     Liver cancer 07-    Liver disease     Postoperative wound infection     RA (rheumatoid arthritis)        Past Surgical History:   Procedure Laterality Date    ABDOMINAL SURGERY      BACK SURGERY      CHOLECYSTECTOMY      COLONOSCOPY  2017    [facility]    COLONOSCOPY N/A 06/04/2024    Procedure: COLONOSCOPY INTO CECUM WITH POLYPECTOMIES (COLD SNARE);  Surgeon: Angel Ness MD;  Location: [facility] ENDOSCOPY;  Service: General;  Laterality: N/A;  PRE: COLON MASS, ABNORMAL CT  POST: DIVERTICULOSIS, POLYPS, POOR PREP    ENDOSCOPY N/A 06/04/2024    Procedure: ESOPHAGOGASTRODUODENOSCOPY;  Surgeon: Angel Ness MD;  Location: [facility] ENDOSCOPY;  Service: General;  Laterality: N/A;  PRE: LIVER MASS  POST: NORMAL EGD    EYE SURGERY      PERICARDIUM SURGERY      REPLACEMENT TOTAL KNEE BILATERAL      THORACOTOMY Bilateral 1997    VENOUS ACCESS DEVICE (PORT) INSERTION Right 7/30/2024    Procedure: INSERTION VENOUS ACCESS DEVICE;  Surgeon: Angel Ness MD;  Location: [facility] OR;  Service: General;  Laterality: Right;     "    Anthropometrics        Current Height  Current Weight  BMI kg/m2 Height: 182.9 cm (72\")  Weight: 83.6 kg (184 lb 5.8 oz) (10/15/24 0931)  Body mass index is 25 kg/m².   Adjusted BMI (if applicable)    BMI Category Overweight (25 - 29.9)   Ideal Body Weight (IBW) 178 +/- 10%   Usual Body Weight (UBW) ~200#   Weight Trend Amount/Timeframe: 17# loss since April   Weight History Wt Readings from Last 30 Encounters:   10/15/24 0931 83.6 kg (184 lb 5.8 oz)   10/14/24 1426 85.4 kg (188 lb 4.4 oz)   10/14/24 1039 90.7 kg (200 lb)   10/13/24 0950 90.7 kg (200 lb)   10/11/24 1113 86.6 kg (191 lb)   10/10/24 1737 91.2 kg (201 lb)   10/08/24 1012 87.3 kg (192 lb 6.4 oz)   09/20/24 0828 93 kg (205 lb)   09/13/24 1011 91.9 kg (202 lb 8 oz)   09/08/24 0907 93 kg (205 lb)   09/06/24 1024 93.1 kg (205 lb 3.2 oz)   08/30/24 1410 90.8 kg (200 lb 1.6 oz)   08/23/24 1601 95.2 kg (209 lb 12.8 oz)   08/23/24 1031 93.3 kg (205 lb 11.2 oz)   08/09/24 0807 93.6 kg (206 lb 6.4 oz)   08/02/24 1504 95.5 kg (210 lb 9.6 oz)   08/02/24 0827 93 kg (205 lb)   07/31/24 1508 92.6 kg (204 lb 1.6 oz)   07/30/24 0739 93.2 kg (205 lb 6.4 oz)   07/26/24 1705 92.5 kg (204 lb)   07/29/24 0918 93.4 kg (205 lb 12.8 oz)   07/25/24 1311 92.5 kg (204 lb)   07/18/24 1120 92.1 kg (203 lb)   07/05/24 0741 92.5 kg (204 lb)   06/17/24 0947 92.5 kg (204 lb)   06/10/24 1511 92.5 kg (204 lb)   06/04/24 1026 92.4 kg (203 lb 12.8 oz)   05/30/24 0858 92.1 kg (203 lb)   05/16/24 0949 92 kg (202 lb 14.4 oz)   04/08/24 1332 94.1 kg (207 lb 6.4 oz)   10/02/23 0756 92.9 kg (204 lb 12.8 oz)   09/05/23 1032 90.2 kg (198 lb 12.8 oz)   09/01/23 0929 90.1 kg (198 lb 9.6 oz)   08/23/23 1558 90.4 kg (199 lb 6.4 oz)      --  Labs       Pertinent Labs    Results from last 7 days   Lab Units 10/17/24  0542 10/16/24  2318 10/16/24  0701 10/15/24  0806   SODIUM mmol/L 134*  --  131* 132*   POTASSIUM mmol/L 4.3 4.3 3.4* 3.4*   CHLORIDE mmol/L 105  --  98 95*   CO2 mmol/L 27.3  --  27.6 " 28.3   BUN mg/dL 27*  --  38* 46*   CREATININE mg/dL 0.93  --  1.09 2.20*   CALCIUM mg/dL 7.9*  --  7.8* 7.8*   BILIRUBIN mg/dL 0.7  --  0.9 0.8   ALK PHOS U/L 57  --  64 67   ALT (SGPT) U/L 14  --  16 17   AST (SGOT) U/L 32  --  32 33   GLUCOSE mg/dL 96  --  114* 98     Results from last 7 days   Lab Units 10/17/24  0543 10/17/24  0542 10/16/24  0701 10/15/24  0806   MAGNESIUM mg/dL  --   --   --  2.0   PHOSPHORUS mg/dL  --   --   --  4.4   HEMOGLOBIN g/dL 9.9*  --    < > 11.4*   HEMATOCRIT % 29.9*  --    < > 35.3*   WBC 10*3/mm3 3.34*  --    < > 4.98   ALBUMIN g/dL  --  2.1*   < > 2.4*    < > = values in this interval not displayed.     Results from last 7 days   Lab Units 10/17/24  0543 10/16/24  0701 10/16/24  0700 10/15/24  0806 10/14/24  2110 10/14/24  1130   INR   --   --  1.25*  --   --   --    APTT seconds  --   --  31.8  --   --   --    PLATELETS 10*3/mm3 68* 76*  76*  --  74* 116* 109*     COVID19   Date Value Ref Range Status   10/13/2024 Not Detected Not Detected - Ref. Range Final     Lab Results   Component Value Date    HGBA1C 6.80 (H) 10/08/2024          Medications           Scheduled Medications atenolol, 25 mg, Oral, Daily  gabapentin, 600 mg, Oral, Nightly  insulin lispro, 2-7 Units, Subcutaneous, 4x Daily AC & at Bedtime  Morphine, 15 mg, Oral, Q12H  OLANZapine, 5 mg, Oral, Nightly  pantoprazole, 40 mg, Oral, Q AM  pilocarpine, 5 mg, Oral, TID  senna-docusate sodium, 2 tablet, Oral, BID   And  polyethylene glycol, 17 g, Oral, Daily  pravastatin, 80 mg, Oral, Daily  sodium chloride, 10 mL, Intravenous, Q12H  sodium chloride, 10 mL, Intravenous, Q12H  terazosin, 2 mg, Oral, Nightly       Infusions     PRN Medications   acetaminophen **OR** acetaminophen **OR** acetaminophen    senna-docusate sodium **AND** polyethylene glycol **AND** bisacodyl **AND** bisacodyl    Calcium Replacement - Follow Nurse / BPA Driven Protocol    dextrose    dextrose    glucagon (human recombinant)    heparin     HYDROmorphone **AND** naloxone    influenza vaccine    lactulose    Magnesium Standard Dose Replacement - Follow Nurse / BPA Driven Protocol    nitroglycerin    ondansetron ODT **OR** ondansetron    oxyCODONE    Phosphorus Replacement - Follow Nurse / BPA Driven Protocol    Potassium Replacement - Follow Nurse / BPA Driven Protocol    Access VAD **AND** sodium chloride    sodium chloride    sodium chloride    sodium chloride     Physical Findings          General Findings alert   Oral/Mouth Cavity tooth or teeth missing   Edema  no edema, not assessed   Gastrointestinal WDL   Skin  skin intact   Tubes/Drains/Lines none   NFPE Other: Visually appeared well noursihed   --  Current Nutrition Orders & Evaluation of Intake       Oral Nutrition     Food Allergies NKFA   Current PO Diet Diet: Diabetic; Consistent Carbohydrate; Fluid Consistency: Thin (IDDSI 0)   Supplement n/a   PO Evaluation     % PO Intake 100%    Factors Affecting Intake: No factors at this time   --  PES STATEMENT / NUTRITION DIAGNOSIS      Nutrition Dx Problem  Problem: Unintentional Weight Loss  Etiology: Medical Diagnosis - cancer    Signs/Symptoms: Unintended Weight Change 8.5%/6 mo     NUTRITION INTERVENTION / PLAN OF CARE      Intervention Goal(s) Meet estimated needs and Maintain weight         RD Intervention/Action Encourage intake   --      Prescription/Orders:       PO Diet       Supplements       Enteral Nutrition       Parenteral Nutrition    New Prescription Ordered? No changes at this time   --      Monitor/Evaluation Per protocol   Discharge Plan/Needs No discharge needs identified at this time   --    RD to follow per protocol.      Electronically signed by:  Robert Washburn RD  10/17/24 11:52 EDT

## 2024-10-17 NOTE — TELEPHONE ENCOUNTER
----- Message from Ken LARSEN sent at 10/17/2024 12:44 PM EDT -----  Regarding: FW: Hospital follow-up (URGENT/TOMORROW)    ----- Message -----  From: Anu Beltran, RN  Sent: 10/17/2024  12:43 PM EDT  To: Ken Alonzo RN  Subject: FW: Hospital follow-up                             ----- Message -----  From: Eyad Grace Jr., MD  Sent: 10/17/2024  12:34 PM EDT  To: Anthony Tejada MD; Anu Beltran RN  Subject: Hospital follow-up                               Please schedule patient for CBC and RN review tomorrow.  Schedule NP visit in 1 week with CBC CMP, IPF.  Schedule follow-up visit 2 units with Dr. Tejada in approximately 1 month with KEHINDE SOLO.  Thanks!

## 2024-10-17 NOTE — PLAN OF CARE
Goal Outcome Evaluation: Patient being discharged to home with spouse.  PIV removed and discharge summary reviewed with the patient.  All questions answered and understanding verbalized.  Right chest port deaccessed. Heparin used as ordered.

## 2024-10-17 NOTE — DISCHARGE SUMMARY
Date of Discharge:  10/17/2024    PCP: Gloria Post MD    Discharge Diagnosis:   Active Hospital Problems    Diagnosis  POA    **Cancer-related breakthrough pain [G89.3]  Yes    Chronic pleural effusion [J90]  Yes    Cholangiocarcinoma [C22.1]  Yes    Diabetes mellitus without complication [E11.9]  Yes    Primary hypertension [I10]  Yes      Resolved Hospital Problems   No resolved problems to display.          Consults       Date and Time Order Name Status Description    10/14/2024  1:11 PM Hematology & Oncology Inpatient Consult Completed     10/14/2024 11:07 AM Hematology and Oncology (on-call MD unless specified) Completed     10/14/2024 11:07 AM [facility] (on-call MD unless specified) Details            Hospital Course  79 y.o. male who unfortunately suffers from cholangiocarcinoma and chronic right pleural effusion was admitted with cancer related pain.  He came to us only on hydrocodone 7.5 mg and was recently dispensed a good amount earlier this month but ultimately this is not giving him enough relief.  Oncology is on consultation and he claims he normally sees Dr. Tejada with [facility].  He is well-controlled at this point on MS Contin twice daily with oxycodone increased to 10 mg on a every 4 as needed basis.  With the above changes he has not required any additional IV narcotic over the last 24 hours.  Case discussed with patient as well as with supportive spouse at bedside and all questions have been answered to the best of my ability.  Given the improvement in his pain, he is proactive to when to go home today.  I have no objections to this at this point in time.  Vital signs are stable as well as afebrile.  There were previous issues with hypotension on this admission and losartan has been held.  At disposition I suggest they continue to hold this and monitor blood pressure and discuss things further with PCP versus oncology regarding reinstatement.  He is scheduled for chemoembolization of the right  liver mass with [facility] on 10/21/2024.  Physical therapy evaluation was reassuring and patient demonstrated 150 feet they deny any additional discharge needs.  He is otherwise tolerating p.o. with no acute issues and again is proactive for disposition given the fact that he has not required any additional IV pain medication, I am okay for his transition to outpatient management.  I supplied him #60 MS Contin and #100 on the oxycodone and I instructed them to secure previous hydrocodone away safely.      Temp:  [98.1 °F (36.7 °C)-98.7 °F (37.1 °C)] 98.7 °F (37.1 °C)  Heart Rate:  [66-77] 70  Resp:  [16] 16  BP: ()/(59-72) 111/64  Body mass index is 25 kg/m².    Physical Exam  Constitutional:       Comments: Resting in bed comfortably relaxed and conversational and pleasant.  Nontoxic.  Supportive spouse at bedside   HENT:      Head: Normocephalic.      Nose: Nose normal.      Mouth/Throat:      Mouth: Mucous membranes are moist.      Pharynx: Oropharynx is clear.   Cardiovascular:      Rate and Rhythm: Normal rate and regular rhythm.   Pulmonary:      Effort: Pulmonary effort is normal. No respiratory distress.      Breath sounds: Normal breath sounds.   Abdominal:      General: Bowel sounds are normal.      Palpations: Abdomen is soft.   Musculoskeletal:         General: No swelling.   Skin:     General: Skin is warm and dry.   Neurological:      Mental Status: He is alert and oriented to person, place, and time. Mental status is at baseline.       Disposition: Home or Self Care       Discharge Medications        New Medications        Instructions Start Date   lactulose 10 GM/15ML solution  Commonly known as: CHRONULAC   20 g, Oral, 2 Times Daily PRN      Morphine 15 MG 12 hr tablet  Commonly known as: MS CONTIN   15 mg, Oral, Every 12 Hours Scheduled      naloxone 4 MG/0.1ML nasal spray  Commonly known as: NARCAN   Call 911. Don't prime. [place] in 1 nostril for overdose. Repeat in 2-3 minutes in  other nostril if no or minimal breathing/responsiveness.      oxyCODONE 10 MG tablet  Commonly known as: ROXICODONE   10 mg, Oral, Every 4 Hours PRN      sennosides-docusate 8.6-50 MG per tablet  Commonly known as: PERICOLACE   2 tablets, Oral, 2 Times Daily             Continue These Medications        Instructions Start Date   atenolol 25 MG tablet  Commonly known as: TENORMIN   25 mg, Oral, Daily      docusate sodium 100 MG capsule  Commonly known as: COLACE   100 mg, Oral, 2 Times Daily PRN      doxazosin 2 MG tablet  Commonly known as: CARDURA   2 mg, Oral, Every Night at Bedtime      gabapentin 300 MG capsule  Commonly known as: NEURONTIN   600 mg, Oral, Every Night at Bedtime      hydrOXYzine 25 MG tablet  Commonly known as: ATARAX   25 mg, Oral, 3 Times Daily PRN      magnesium oxide 400 MG tablet  Commonly known as: MAG-OX   400 mg, Oral, Every Night at Bedtime      metFORMIN  MG 24 hr tablet  Commonly known as: GLUCOPHAGE-XR   1,000 mg, Oral, Daily      OLANZapine 5 MG tablet  Commonly known as: zyPREXA   5 mg, Oral, Nightly      ondansetron ODT 4 MG disintegrating tablet  Commonly known as: ZOFRAN-ODT   4 mg, Translingual, Every 6 Hours PRN      pantoprazole 40 MG EC tablet  Commonly known as: Protonix   40 mg, Oral, Daily      pilocarpine 5 MG tablet  Commonly known as: SALAGEN   1 tablet, 3 times daily      pravastatin 80 MG tablet  Commonly known as: PRAVACHOL   80 mg, Oral, Daily             Stop These Medications      HYDROcodone-acetaminophen 7.5-325 MG per tablet  Commonly known as: Norco     losartan 25 MG tablet  Commonly known as: COZAAR     meloxicam 15 MG tablet  Commonly known as: MOBIC               Additional Instructions for the Follow-ups that You Need to Schedule       Discharge Follow-up with PCP   As directed       Currently Documented PCP:    Gloria Post MD    PCP Phone Number:    [phone]     Follow Up Details: PCP 2 weeks.  Oncology per their recommendations keep all  other scheduled appointments               Follow-up Information       Gloria Post MD .    Specialty: Internal Medicine  Why: PCP 2 weeks.  Oncology per their recommendations keep all other scheduled appointments  Contact information:  [address] [address]  [phone]                            Future Appointments   Date Time Provider Department Center   2/14/2025  1:15 PM Gloria Post MD [facility]     Pending Labs       Order Current Status    Flow Cytometry, Blood In process    Heparin-induced Platelet Antibody In process    Serotonin Release Assay In process           Neymar Andres MD  [facility]  10/17/24    Discharge time spent greater than 30 minutes.

## 2024-10-17 NOTE — CASE MANAGEMENT/SOCIAL WORK
Case Management Discharge Note      Final Note: home no needs    Provided Post Acute Provider List?: N/A  N/A Provider List Comment: Denies need    Selected Continued Care - Discharged on 10/17/2024 Admission date: 10/14/2024 - Discharge disposition: Home or Self Care      Destination    No services have been selected for the patient.                Durable Medical Equipment    No services have been selected for the patient.                Dialysis/Infusion    No services have been selected for the patient.                Home Medical Care    No services have been selected for the patient.                Therapy    No services have been selected for the patient.                Community Resources    No services have been selected for the patient.                Community & DME    No services have been selected for the patient.                    Selected Continued Care - Episodes Includes continued care and service providers with selected services from the active episodes listed below      High Risk Care Management Episode start date: 10/11/2024 (Paused)   There are no active outsourced providers for this episode.                 Transportation Services  Private: Car    Final Discharge Disposition Code: 01 - home or self-care

## 2024-10-18 ENCOUNTER — LAB (OUTPATIENT)
Dept: LAB | Facility: HOSPITAL | Age: 79
End: 2024-10-18
Payer: MEDICARE

## 2024-10-18 ENCOUNTER — TRANSITIONAL CARE MANAGEMENT TELEPHONE ENCOUNTER (OUTPATIENT)
Dept: CALL CENTER | Facility: HOSPITAL | Age: 79
End: 2024-10-18
Payer: MEDICARE

## 2024-10-18 ENCOUNTER — TELEPHONE (OUTPATIENT)
Dept: ONCOLOGY | Facility: CLINIC | Age: 79
End: 2024-10-18
Payer: MEDICARE

## 2024-10-18 DIAGNOSIS — C22.1 CHOLANGIOCARCINOMA: Primary | ICD-10-CM

## 2024-10-18 DIAGNOSIS — C22.1 CHOLANGIOCARCINOMA: ICD-10-CM

## 2024-10-18 LAB
ALBUMIN SERPL-MCNC: 2.6 G/DL (ref 3.5–5.2)
ALBUMIN/GLOB SERPL: 0.6 G/DL
ALP SERPL-CCNC: 77 U/L (ref 39–117)
ALT SERPL W P-5'-P-CCNC: 23 U/L (ref 1–41)
ANION GAP SERPL CALCULATED.3IONS-SCNC: 7.4 MMOL/L (ref 5–15)
AST SERPL-CCNC: 53 U/L (ref 1–40)
BASOPHILS # BLD AUTO: 0.03 10*3/MM3 (ref 0–0.2)
BASOPHILS NFR BLD AUTO: 0.7 % (ref 0–1.5)
BILIRUB SERPL-MCNC: 0.5 MG/DL (ref 0–1.2)
BUN SERPL-MCNC: 21 MG/DL (ref 8–23)
BUN/CREAT SERPL: 23.9 (ref 7–25)
CALCIUM SPEC-SCNC: 8.2 MG/DL (ref 8.6–10.5)
CANCER AG19-9 SERPL-ACNC: 223 U/ML
CHLORIDE SERPL-SCNC: 102 MMOL/L (ref 98–107)
CO2 SERPL-SCNC: 27.6 MMOL/L (ref 22–29)
CREAT SERPL-MCNC: 0.88 MG/DL (ref 0.76–1.27)
DEPRECATED RDW RBC AUTO: 52.1 FL (ref 37–54)
EGFRCR SERPLBLD CKD-EPI 2021: 88 ML/MIN/1.73
EOSINOPHIL # BLD AUTO: 0.09 10*3/MM3 (ref 0–0.4)
EOSINOPHIL NFR BLD AUTO: 2 % (ref 0.3–6.2)
ERYTHROCYTE [DISTWIDTH] IN BLOOD BY AUTOMATED COUNT: 14.6 % (ref 12.3–15.4)
GLOBULIN UR ELPH-MCNC: 4.1 GM/DL
GLUCOSE SERPL-MCNC: 214 MG/DL (ref 65–99)
HCT VFR BLD AUTO: 33.1 % (ref 37.5–51)
HGB BLD-MCNC: 10.4 G/DL (ref 13–17.7)
IMM GRANULOCYTES # BLD AUTO: 0.01 10*3/MM3 (ref 0–0.05)
IMM GRANULOCYTES NFR BLD AUTO: 0.2 % (ref 0–0.5)
LYMPHOCYTES # BLD AUTO: 0.7 10*3/MM3 (ref 0.7–3.1)
LYMPHOCYTES NFR BLD AUTO: 15.9 % (ref 19.6–45.3)
MCH RBC QN AUTO: 30.1 PG (ref 26.6–33)
MCHC RBC AUTO-ENTMCNC: 31.4 G/DL (ref 31.5–35.7)
MCV RBC AUTO: 95.7 FL (ref 79–97)
MONOCYTES # BLD AUTO: 0.31 10*3/MM3 (ref 0.1–0.9)
MONOCYTES NFR BLD AUTO: 7 % (ref 5–12)
NEUTROPHILS NFR BLD AUTO: 3.27 10*3/MM3 (ref 1.7–7)
NEUTROPHILS NFR BLD AUTO: 74.2 % (ref 42.7–76)
NRBC BLD AUTO-RTO: 0 /100 WBC (ref 0–0.2)
PLATELET # BLD AUTO: 92 10*3/MM3 (ref 140–450)
PLATELET # BLD AUTO: 92 10*3/MM3 (ref 140–450)
PLATELETS.RETICULATED NFR BLD AUTO: 2.5 % (ref 0.9–6.5)
PMV BLD AUTO: 9.7 FL (ref 6–12)
POTASSIUM SERPL-SCNC: 4.3 MMOL/L (ref 3.5–5.2)
PROT SERPL-MCNC: 6.7 G/DL (ref 6–8.5)
RBC # BLD AUTO: 3.46 10*6/MM3 (ref 4.14–5.8)
REF LAB TEST METHOD: NORMAL
REF LAB TEST METHOD: NORMAL
SODIUM SERPL-SCNC: 137 MMOL/L (ref 136–145)
WBC NRBC COR # BLD AUTO: 4.41 10*3/MM3 (ref 3.4–10.8)

## 2024-10-18 PROCEDURE — 86301 IMMUNOASSAY TUMOR CA 19-9: CPT

## 2024-10-18 PROCEDURE — 85025 COMPLETE CBC W/AUTO DIFF WBC: CPT

## 2024-10-18 PROCEDURE — 80053 COMPREHEN METABOLIC PANEL: CPT

## 2024-10-18 PROCEDURE — 36415 COLL VENOUS BLD VENIPUNCTURE: CPT

## 2024-10-18 PROCEDURE — 85055 RETICULATED PLATELET ASSAY: CPT

## 2024-10-18 NOTE — TELEPHONE ENCOUNTER
----- Message from Anu NICHOLE sent at 10/17/2024 12:43 PM EDT -----  Regarding: FW: Hospital follow-up    ----- Message -----  From: Eyad Grace Jr., MD  Sent: 10/17/2024  12:34 PM EDT  To: Anthony Tejada MD; Anu Beltran RN  Subject: Hospital follow-up                               Please schedule patient for CBC and RN review tomorrow.  Schedule NP visit in 1 week with CBC, CMP, IPF.  Schedule follow-up visit 2 units with Dr. Tejada in approximately 1 month with CBC, CMP.  Thanks!

## 2024-10-18 NOTE — OUTREACH NOTE
Call Center TCM Note      Flowsheet Row Responses   [facility] patient discharged from? [place]   Does the patient have one of the following disease processes/diagnoses(primary or secondary)? Other   TCM attempt successful? Yes   Call start time 0919   Call end time 0921   Discharge diagnosis Cancer-related breakthrough pain, cholangiocarcinoma   Person spoke with today (if not patient) and relationship pt   Meds reviewed with patient/caregiver? Yes   Is the patient having any side effects they believe may be caused by any medication additions or changes? No   Does the patient have all medications ordered at discharge? Yes   Is the patient taking all medications as directed (includes completed medication regime)? Yes   Comments Oncology 10/25/24   Does the patient have an appointment with their PCP within 7-14 days of discharge? Yes  [10/23/2024 11:30 AM]   Psychosocial issues? No   Did the patient receive a copy of their discharge instructions? Yes   Nursing interventions Reviewed instructions with patient   What is the patient's perception of their health status since discharge? Improving   Is the patient/caregiver able to teach back signs and symptoms related to disease process for when to call PCP? Yes   Is the patient/caregiver able to teach back signs and symptoms related to disease process for when to call 911? Yes   Is the patient/caregiver able to teach back the hierarchy of who to call/visit for symptoms/problems? PCP, Specialist, Home health nurse, Urgent Care, ED, 911 Yes   If the patient is a current smoker, are they able to teach back resources for cessation? Not a smoker   TCM call completed? Yes   Wrap up additional comments Spouse states pt is doing much better. Reviewed AVS/meds with spouse. Spouse verified PCP/Oncology fu appts.   Call end time 0921   Would this patient benefit from a Referral to [facility] Social Work? No   Is the patient interested in additional calls from an ambulatory case  manager? No            Preeti Dawn RN    10/18/2024, 09:21 EDT

## 2024-10-21 RX ORDER — METFORMIN HCL 500 MG
1000 TABLET, EXTENDED RELEASE 24 HR ORAL DAILY
Qty: 90 TABLET | Refills: 0 | Status: SHIPPED | OUTPATIENT
Start: 2024-10-21

## 2024-10-22 LAB
SRA .2 IU/ML UFH SER-ACNC: <1 % (ref 0–20)
SRA 100IU/ML UFH SER-ACNC: <1 % (ref 0–20)
SRA UFH SER-IMP: NORMAL

## 2024-10-25 ENCOUNTER — OFFICE VISIT (OUTPATIENT)
Dept: ONCOLOGY | Facility: CLINIC | Age: 79
End: 2024-10-25
Payer: MEDICARE

## 2024-10-25 ENCOUNTER — LAB (OUTPATIENT)
Dept: OTHER | Facility: HOSPITAL | Age: 79
End: 2024-10-25
Payer: MEDICARE

## 2024-10-25 VITALS
DIASTOLIC BLOOD PRESSURE: 68 MMHG | WEIGHT: 186 LBS | SYSTOLIC BLOOD PRESSURE: 94 MMHG | OXYGEN SATURATION: 94 % | HEART RATE: 71 BPM | RESPIRATION RATE: 16 BRPM | HEIGHT: 72 IN | BODY MASS INDEX: 25.19 KG/M2 | TEMPERATURE: 98.1 F

## 2024-10-25 DIAGNOSIS — K59.03 OPIOID-INDUCED CONSTIPATION: ICD-10-CM

## 2024-10-25 DIAGNOSIS — D69.6 THROMBOCYTOPENIA: ICD-10-CM

## 2024-10-25 DIAGNOSIS — C22.1 CHOLANGIOCARCINOMA: ICD-10-CM

## 2024-10-25 DIAGNOSIS — T40.2X5A OPIOID-INDUCED CONSTIPATION: ICD-10-CM

## 2024-10-25 DIAGNOSIS — C22.1 CHOLANGIOCARCINOMA: Primary | ICD-10-CM

## 2024-10-25 DIAGNOSIS — G89.3 CANCER ASSOCIATED PAIN: ICD-10-CM

## 2024-10-25 LAB
ALBUMIN SERPL-MCNC: 2.8 G/DL (ref 3.5–5.2)
ALBUMIN/GLOB SERPL: 0.6 G/DL
ALP SERPL-CCNC: 86 U/L (ref 39–117)
ALT SERPL W P-5'-P-CCNC: 32 U/L (ref 1–41)
ANION GAP SERPL CALCULATED.3IONS-SCNC: 8.6 MMOL/L (ref 5–15)
AST SERPL-CCNC: 49 U/L (ref 1–40)
BASOPHILS # BLD AUTO: 0.03 10*3/MM3 (ref 0–0.2)
BASOPHILS NFR BLD AUTO: 0.4 % (ref 0–1.5)
BILIRUB SERPL-MCNC: 0.9 MG/DL (ref 0–1.2)
BUN SERPL-MCNC: 14 MG/DL (ref 8–23)
BUN/CREAT SERPL: 15.9 (ref 7–25)
CALCIUM SPEC-SCNC: 8.2 MG/DL (ref 8.6–10.5)
CANCER AG19-9 SERPL-ACNC: 256 U/ML
CHLORIDE SERPL-SCNC: 96 MMOL/L (ref 98–107)
CO2 SERPL-SCNC: 28.4 MMOL/L (ref 22–29)
CREAT SERPL-MCNC: 0.88 MG/DL (ref 0.76–1.27)
DEPRECATED RDW RBC AUTO: 48.8 FL (ref 37–54)
EGFRCR SERPLBLD CKD-EPI 2021: 88 ML/MIN/1.73
EOSINOPHIL # BLD AUTO: 0.11 10*3/MM3 (ref 0–0.4)
EOSINOPHIL NFR BLD AUTO: 1.6 % (ref 0.3–6.2)
ERYTHROCYTE [DISTWIDTH] IN BLOOD BY AUTOMATED COUNT: 13.8 % (ref 12.3–15.4)
GLOBULIN UR ELPH-MCNC: 4.4 GM/DL
GLUCOSE SERPL-MCNC: 154 MG/DL (ref 65–99)
HCT VFR BLD AUTO: 36.8 % (ref 37.5–51)
HGB BLD-MCNC: 11.8 G/DL (ref 13–17.7)
IMM GRANULOCYTES # BLD AUTO: 0.01 10*3/MM3 (ref 0–0.05)
IMM GRANULOCYTES NFR BLD AUTO: 0.1 % (ref 0–0.5)
LYMPHOCYTES # BLD AUTO: 0.82 10*3/MM3 (ref 0.7–3.1)
LYMPHOCYTES NFR BLD AUTO: 12.1 % (ref 19.6–45.3)
MCH RBC QN AUTO: 30.6 PG (ref 26.6–33)
MCHC RBC AUTO-ENTMCNC: 32.1 G/DL (ref 31.5–35.7)
MCV RBC AUTO: 95.6 FL (ref 79–97)
MONOCYTES # BLD AUTO: 0.48 10*3/MM3 (ref 0.1–0.9)
MONOCYTES NFR BLD AUTO: 7.1 % (ref 5–12)
NEUTROPHILS NFR BLD AUTO: 5.32 10*3/MM3 (ref 1.7–7)
NEUTROPHILS NFR BLD AUTO: 78.7 % (ref 42.7–76)
NRBC BLD AUTO-RTO: 0 /100 WBC (ref 0–0.2)
PLATELET # BLD AUTO: 138 10*3/MM3 (ref 140–450)
PLATELET # BLD AUTO: 138 10*3/MM3 (ref 140–450)
PLATELETS.RETICULATED NFR BLD AUTO: 3.8 % (ref 0.9–6.5)
PMV BLD AUTO: 10.8 FL (ref 6–12)
POTASSIUM SERPL-SCNC: 4.3 MMOL/L (ref 3.5–5.2)
PROT SERPL-MCNC: 7.2 G/DL (ref 6–8.5)
RBC # BLD AUTO: 3.85 10*6/MM3 (ref 4.14–5.8)
SODIUM SERPL-SCNC: 133 MMOL/L (ref 136–145)
WBC NRBC COR # BLD AUTO: 6.77 10*3/MM3 (ref 3.4–10.8)

## 2024-10-25 PROCEDURE — 85025 COMPLETE CBC W/AUTO DIFF WBC: CPT | Performed by: INTERNAL MEDICINE

## 2024-10-25 PROCEDURE — 80053 COMPREHEN METABOLIC PANEL: CPT | Performed by: INTERNAL MEDICINE

## 2024-10-25 PROCEDURE — 86301 IMMUNOASSAY TUMOR CA 19-9: CPT | Performed by: INTERNAL MEDICINE

## 2024-10-25 PROCEDURE — 85055 RETICULATED PLATELET ASSAY: CPT | Performed by: INTERNAL MEDICINE

## 2024-10-25 PROCEDURE — 36415 COLL VENOUS BLD VENIPUNCTURE: CPT

## 2024-10-25 RX ORDER — PANTOPRAZOLE SODIUM 40 MG/1
40 TABLET, DELAYED RELEASE ORAL DAILY
Qty: 30 TABLET | Refills: 3 | Status: SHIPPED | OUTPATIENT
Start: 2024-10-25

## 2024-10-25 RX ORDER — DOXAZOSIN 2 MG/1
2 TABLET ORAL
Qty: 30 TABLET | Refills: 0 | Status: SHIPPED | OUTPATIENT
Start: 2024-10-25

## 2024-10-25 NOTE — PROGRESS NOTES
Subjective     REASON FOR CONSULTATION:  cholangiocarcinoma  Provide an opinion on any further workup or treatment                             REQUESTING PHYSICIAN:  Thi    RECORDS OBTAINED:  Records of the patients history including those obtained from the referring provider were reviewed and summarized in detail.    HISTORY OF PRESENT ILLNESS:  The patient is a 79 y.o. year old male who is here for an opinion about the above issue.    History of Present Illness   The patient returns the office today, 10/25/2024 for hospital follow-up and review.  He was last evaluated by Dr. Tejada 9/13/2024 at which time we elected not to proceed with additional chemotherapy due to stable scans of the patient was referred to Dr. Hopper for consideration of liver targeted therapy.  He was then seen in the emergency department multiple times for intractable nausea and vomiting and abdominal pain.  He was ultimately admitted 10/14/2024 through 10/17/2024 for cancer related pain.  He was discharged on MS Contin twice daily with oxycodone 10 mg every 4 hours as needed for breakthrough pain.  At the time of discharge, he was scheduled for outpatient chemo embolism with Dr. Hopper 10/21/2024.  Additionally, during hospitalization his counts were monitored closely as he developed leukopenia and thrombocytopenia.    Return to the office today, 10/25/2024 the patient reports he tolerated embolism well.  He reports his pain is relatively well-controlled though it is exacerbated by constipation and gas currently.  He reports when he is able to pass gas he feels much better.  He is struggling with constipation, now utilizing lactulose twice daily.  He notes improvement in his symptoms continuing on Pepcid and [place] old.  He has not been taking pantoprazole however for unclear reasons.  He notes he is not eating and drinking great which we reviewed is likely secondary to constipation.  He does feel his pain is  well-controlled.    ONCOLOGY HISTORY:  This is a 79-year-old man with diabetes, hyperlipidemia, rheumatoid arthritis, tobacco abuse who underwent a CT angiogram of the chest to evaluate an aortic aneurysm and incidentally noted a mass in the right lobe of the liver.  A CT of the abdomen and pelvis was performed on 5/10/2024 showed a heterogeneously peripherally enhancing right hepatic lobe mass 7 x 5.7 cm, subtle 1.5 cm low-attenuation focus adjacently and a nonobstructing annular mass at the hepatic flexure.  There were several calcifications in the pancreas consistent with chronic pancreatitis, several small renal cysts and an enlarged prostate 6.5 cm.  There were extensive abdominal aortic atherosclerotic changes without aneurysm and extensive right pleural calcifications surrounding a small loculated right pleural effusion stable since 4/1/2024.    EGD and colonoscopy were performed on 6/4/2024 by Dr. Ness-normal EGD, poor bowel prep inadequate for colorectal screening, ascending colon polyp resected, rectosigmoid polyp resected.    A CT-guided liver biopsy was performed on 6/17/2024 which showed stent hepatitis with periportal bile duct reaction and associated fibrosis/inflammation no evidence of malignancy.  A repeat liver biopsy was performed on 7/18/2024 showing adenocarcinoma consistent with cholangiocarcinoma.  CA 19-9 was elevated 133 and AFP normal 3.81.    The patient has not had significant symptoms of abdominal pain, weight loss, nausea, vomiting or jaundice.    He has significant rheumatoid arthritis history with joint deviation, history of iritis, previous pericarditis requiring pericardial window.    The patient received 2 cycles of cisplatin/gemcitabine/nivolumab with need of dose reductions and significant side effects.  Follow-up CT abdomen on 9/6/2024 showed a stable mass in the right lobe of the liver 7.6 x 6 cm no new liver lesions seen.    Past Medical History:   Diagnosis Date    Cancer      Liver    Colon polyp     Diabetes mellitus     Hyperlipidemia     Hypertension     Liver cancer 07-    Liver disease     Postoperative wound infection     RA (rheumatoid arthritis)         Past Surgical History:   Procedure Laterality Date    ABDOMINAL SURGERY      BACK SURGERY      CHOLECYSTECTOMY      COLONOSCOPY  2017    [facility]    COLONOSCOPY N/A 06/04/2024    Procedure: COLONOSCOPY INTO CECUM WITH POLYPECTOMIES (COLD SNARE);  Surgeon: Angel Ness MD;  Location:  [facility];  Service: General;  Laterality: N/A;  PRE: COLON MASS, ABNORMAL CT  POST: DIVERTICULOSIS, POLYPS, POOR PREP    ENDOSCOPY N/A 06/04/2024    Procedure: ESOPHAGOGASTRODUODENOSCOPY;  Surgeon: Angel Ness MD;  Location:  [facility];  Service: General;  Laterality: N/A;  PRE: LIVER MASS  POST: NORMAL EGD    EYE SURGERY      PERICARDIUM SURGERY      REPLACEMENT TOTAL KNEE BILATERAL      THORACOTOMY Bilateral 1997    VENOUS ACCESS DEVICE (PORT) INSERTION Right 7/30/2024    Procedure: INSERTION VENOUS ACCESS DEVICE;  Surgeon: Angel Ness MD;  Location: [facility] MAIN OR;  Service: General;  Laterality: Right;        Current Outpatient Medications on File Prior to Visit   Medication Sig Dispense Refill    atenolol (TENORMIN) 25 MG tablet Take 1 tablet by mouth Daily. 90 tablet 0    gabapentin (NEURONTIN) 300 MG capsule Take 2 capsules by mouth every night at bedtime. 60 capsule 0    hydrOXYzine (ATARAX) 25 MG tablet Take 1 tablet by mouth 3 (Three) Times a Day As Needed for Itching. 60 tablet 1    lactulose (CHRONULAC) 10 GM/15ML solution Take 30 mL by mouth 2 (Two) Times a Day As Needed (constipation). 240 mL 0    magnesium oxide (MAG-OX) 400 MG tablet Take 1 tablet by mouth every night at bedtime. 30 tablet 2    metFORMIN ER (GLUCOPHAGE-XR) 500 MG 24 hr tablet Take 2 tablets by mouth once daily 90 tablet 0    Morphine (MS CONTIN) 15 MG 12 hr tablet Take 1  tablet by mouth Every 12 (Twelve) Hours for 7 doses. 60 tablet 0    naloxone (NARCAN) 4 MG/0.1ML nasal spray Call 911. Don't prime. [place] in 1 nostril for overdose. Repeat in 2-3 minutes in other nostril if no or minimal breathing/responsiveness. 2 each 0    OLANZapine (zyPREXA) 5 MG tablet Take 1 tablet by mouth Every Night. 30 tablet 3    ondansetron ODT (ZOFRAN-ODT) 4 MG disintegrating tablet Place 1 tablet on the tongue Every 6 (Six) Hours As Needed for Nausea or Vomiting. 12 tablet 0    oxyCODONE (ROXICODONE) 10 MG tablet Take 1 tablet by mouth Every 4 (Four) Hours As Needed for Moderate Pain for up to 2 days. 100 each 0    pilocarpine (SALAGEN) 5 MG tablet Take 1 tablet by mouth 3 times a day.      pravastatin (PRAVACHOL) 80 MG tablet Take 1 tablet by mouth Daily. 90 tablet 1    sennosides-docusate (PERICOLACE) 8.6-50 MG per tablet Take 2 tablets by mouth 2 (Two) Times a Day.      [DISCONTINUED] pantoprazole (Protonix) 40 MG EC tablet Take 1 tablet by mouth Daily. 30 tablet 0    [DISCONTINUED] doxazosin (CARDURA) 2 MG tablet TAKE 1 TABLET BY MOUTH EVERY DAY AT BEDTIME 30 tablet 0     Current Facility-Administered Medications on File Prior to Visit   Medication Dose Route Frequency Provider Last Rate Last Admin    heparin injection 500 Units  500 Units Intravenous PRN Anthony Tejada MD   500 Units at 24 1037    sodium chloride 0.9 % flush 10 mL  10 mL Intravenous PRN Anthony Tejada MD   10 mL at 24 1037        ALLERGIES:    Allergies   Allergen Reactions    Chlorhexidine Dermatitis     Please use alcohol to clean port site        Social History     Socioeconomic History    Marital status:    Tobacco Use    Smoking status: Some Days     Current packs/day: 0.00     Average packs/day: 1 pack/day for 53.7 years (53.7 ttl pk-yrs)     Types: Cigarettes     Start date: 1970     Last attempt to quit: 10/1/2023     Years since quittin.0     Passive exposure: Past    Smokeless tobacco:  "Never   Vaping Use    Vaping status: Never Used   Substance and Sexual Activity    Alcohol use: Not Currently    Drug use: Never    Sexual activity: Not Currently     Partners: Female     Birth control/protection: Vasectomy        Family History   Problem Relation Age of Onset    Hypertension Mother     Heart disease Mother     Arthritis Father     Diabetes Sister     Asthma Brother     Diabetes Brother     Malig Hyperthermia Neg Hx         Review of Systems   ROS as per HPI    Objective     Vitals:    10/25/24 1008   BP: 94/68   Pulse: 71   Resp: 16   Temp: 98.1 °F (36.7 °C)   TempSrc: Oral   SpO2: 94%   Weight: 84.4 kg (186 lb)   Height: 182.9 cm (72.01\")   PainSc:   6         10/25/2024    10:11 AM   Current Status   ECOG score 2       Physical Exam    CONSTITUTIONAL: pleasant well-developed adult man  HEENT: no icterus, no thrush, moist membranes  LYMPH: no cervical or supraclavicular lad  CV: RRR, S1S2, no murmur, sternotomy scar present  RESP: cta bilat, no wheezing, no rales  GI: soft, non-tender, no splenomegaly, +bs, multiple surgical scars on the abdomen with umbilical hernia.  MUSC: no edema, multiple deviated hand joints, slight limp from arthritis  NEURO: alert and oriented x3, normal strength  PSYCH: Improved mood and affect today  Skin: No current rash      RECENT LABS:  Results from last 7 days   Lab Units 10/25/24  1004 10/21/24  0854 10/18/24  1325   WBC 10*3/mm3 6.77  --  4.41   NEUTROS ABS 10*3/mm3 5.32 2.1 3.27   LYMPHS ABS x10(3)/ul  --  0.5*  --    HEMOGLOBIN g/dL 11.8*  --  10.4*   HEMATOCRIT % 36.8*  --  33.1*   PLATELETS 10*3/mm3 138*  138*  --  92*  92*     Results from last 7 days   Lab Units 10/25/24  1004 10/18/24  1325   SODIUM mmol/L 133* 137   POTASSIUM mmol/L 4.3 4.3   CHLORIDE mmol/L 96* 102   CO2 mmol/L 28.4 27.6   BUN mg/dL 14 21   CREATININE mg/dL 0.88 0.88   CALCIUM mg/dL 8.2* 8.2*   ALBUMIN g/dL 2.8* 2.6*   BILIRUBIN mg/dL 0.9 0.5   ALK PHOS U/L 86 77   ALT (SGPT) U/L 32 23 "   AST (SGOT) U/L 49* 53*   GLUCOSE mg/dL 154* 214*     Results from last 7 days   Lab Units 10/21/24  0854   INR  1.17   APTT Second 34.9           Assessment & Plan     *intrahepatic cholangiocarcinoma, multifocal:  7/5/24 MRI abd-heterogenous, large peripherally enhancing mass withinthe posterior aspect of the right hepatic dome which is grossly unchanged to minimally increased in size since 5/10/2024. small peripheral enhancing and solid enhancing lesions are located within the surrounding right hepatic lobe and spread of malignancy is likely  7/18/24 CT liver biopsy-adenocarcinoma consistent with cholangiocarcinoma by IHC  CA 19-9 133  8/2/2024-initiated palliative treatment with cisplatin/Gemzar plus nivolumab; day 8 Gemzar given at 50% because of thrombocytopenia (60)  Cycle 2 of treatment was given with a dose reduction of gemcitabine 750 mg/m² and cisplatin same dose 25 mg/m² along with durvalumab.  Despite dose reduction he had intractable nausea vomiting acute kidney injury requiring IV fluids.  He did not receive day 8 cycle 2 of treatment because of side effects.  9/6/2024 CT abdomen liver protocol-stable 7.6 x 6 cm mass in the right lobe of the liver  Patient was seen in emergency department on 9/20/2024 with CT abdomen and pelvis that showed stable right liver mass 7.5 x 6 cm, loculated right pleural effusion with surrounding pleural thickening and calcification unchanged, no acute findings.  CT abdomen and pelvis on 10/10/2024 showed no change.  Patient was referred to Dr. Hopper at [facility], was seen on 9/26/2024.  Patient was not felt to be a good candidate for surgical intervention.  Recommendation to proceed with chemoembolization.  10/21/2024 the patient was taken to IR for TACE with Dr. Hopper.  Procedure was uncomplicated and tolerated well.  Admitted postop care and ultimately discharged 10/22/2024.  He reports he tolerated taste very well    *Thrombocytopenia/anemia  secondary to chemotherapy  Labs on 10/16/2024 with platelet count stable at 76,000, IPF remains normal at 4.1%, therefore no evidence of ITP.  Additional labs with fibrinogen 278, INR 1.25, PTT 31.8 and therefore no evidence of DIC.  Unclear if this is related to the patient's rheumatoid arthritis, has had intermittent transient thrombocytopenia in the past.  Timeframe from Lovenox initiation would not be consistent with HIT, Lovenox was discontinued.   Platelet count 10/17/2024 slightly lower at 68,000. Although clinical suspicion for HIT is low, will send heparin antiplatelet antibody and serotonin release assay as there is no other obvious cause for the thrombocytopenia. Possible for autoimmune component or stimulation of RA from prior immunotherapy.  With new leukopenia and worsening anemia, question underlying marrow compromise.   Today, 10/25/2024 following TACE platelets are actually improved to the 138,000    *Anemia  Prior anemia presumed secondary to chemotherapy in August and early September 2024 with hemoglobin in 9-11 range.  Hemoglobin normalized at 13 on 10/10/2024.  Hemoglobin on admission 10/14/2024 was 13.5  Additional labs on 10/15/2024 with B12 844, folate 6.72  Since admission, hemoglobin has declined into the 11-12 range and subsequently down to 9.9 today.  This is in conjunction with declining WBC and platelet count.  Question whether there is any connection with prior chemotherapy or immunotherapy.  Suspect that there is relation to underlying malignancy.    Hemoglobin currently improved at 11.8     *Leukopenia  Patient with declining WBC during current admission with WBC 9.5 on 10/14/2024, declined into the 4-5000 range and on 10/17/2024 down to 3.34.  Differential normal.    Patient with underlying rheumatoid arthritis, question whether this is a contributing factor.    Spleen is normal in size on most recent CT abdomen pelvis 10/10/2024.    Labs on 10/15/2024 with B12 844, folate  6.72.  10/19/2024 flow cytometry negative  Today, 10/25/2024 improvement in leukopenia with WBC 6.77, ANC 5.32     *Restless leg syndrome, chronic chest/back pain  Patient with chronic pain after previous thoracotomy  Patient continues on long-term gabapentin 600 mg nightly     *RA  significant rheumatoid arthritis history with joint deviation, history of iritis, previous pericarditis requiring pericardial window  Patient previously treated with Simponi, held per rheumatology prior to initiation of chemotherapy/immunotherapy  Fortunately with recent immunotherapy patient did not develop flare of rheumatoid arthritis  As above, question whether pancytopenia could be related to underlying rheumatoid arthritis.     *History of pericarditis  Patient did have previous pericarditis that required a pericardial window.     *Additional comorbidities-diabetes mellitus, hyperlipidemia, hypertension, tobacco abuse, BPH     *Skin rash/pruritus secondary to chemotherapy versus immunotherapy  Previously required a steroid Dosepak-resolved     *GERD  Protonix 40 mg daily  This was discontinued following hospitalization for unknown reason.  We did discuss resuming today     *Cancer related pain  Continue MS Contin 15 mg twice daily with oxycodone 10 mg every 4 hours as needed for breakthrough pain  Reports his pain is adequately controlled.    *Constipation  Sarika-Colace 2 tablets twice daily and MiraLAX daily, lactulose 20 g twice daily as needed  Following discharge, the patient is struggling with constipation using only lactulose twice daily.  We discussed Senokot as twice daily along with lactulose     *CODE STATUS   Patient DNR/DNI but maintain current level of medical support.    Oncology plan/recommendations:  CA 19-9 pending today, the patient is only 3 days post TACE with Dr. Hopper  Continue lactulose twice daily, add Senokot S 1 to 2 tablets twice daily  Continue current pain regimen including MS Contin and oxycodone for  breakthrough pain  Resume Protonix 40 mg daily.  This was prescribed today  Begin Gas-X 3 times daily  Discussed increasing caloric intake with supplemental shakes such as boost or Ensure  Return in 1 month for CBC, CMP, MD follow-up with Dr. Tejada to determine timing of follow-up imaging following TACE    I spent 40 minutes caring for Gay on this date of service. This time includes time spent by me in the following activities: preparing for the visit, reviewing tests, obtaining and/or reviewing a separately obtained history, performing a medically appropriate examination and/or evaluation, counseling and educating the patient/family/caregiver, ordering medications, tests, or procedures, referring and communicating with other health care professionals, documenting information in the medical record, and care coordination.       Cait Tolliver, APRN  10/25/2024

## 2024-10-31 ENCOUNTER — OFFICE VISIT (OUTPATIENT)
Dept: FAMILY MEDICINE CLINIC | Facility: CLINIC | Age: 79
End: 2024-10-31
Payer: MEDICARE

## 2024-10-31 VITALS
WEIGHT: 188 LBS | SYSTOLIC BLOOD PRESSURE: 96 MMHG | OXYGEN SATURATION: 97 % | HEIGHT: 72 IN | DIASTOLIC BLOOD PRESSURE: 52 MMHG | BODY MASS INDEX: 25.47 KG/M2 | HEART RATE: 84 BPM | TEMPERATURE: 97.6 F

## 2024-10-31 DIAGNOSIS — M62.81 MUSCLE WEAKNESS: ICD-10-CM

## 2024-10-31 DIAGNOSIS — R26.89 LOSS OF BALANCE: ICD-10-CM

## 2024-10-31 DIAGNOSIS — G89.3 CANCER RELATED PAIN: Primary | ICD-10-CM

## 2024-10-31 NOTE — PROGRESS NOTES
"Transitional Care Follow Up Visit  Subjective     Gay Vazquez is a 79 y.o. male who presents for a transitional care management visit.    Within 48 business hours after discharge our office contacted him via telephone to coordinate his care and needs.      I reviewed and discussed the details of that call along with the discharge summary, hospital problems, inpatient lab results, inpatient diagnostic studies, and consultation reports with Gay.     Current outpatient and discharge medications have been reconciled for the patient.  Reviewed by: Gloria Post MD          10/17/2024     5:39 PM   Date of TCM Phone Call   [facility]   Date of Admission 10/14/2024   Date of Discharge 10/17/2024   Discharge Disposition Home or Self Care     Risk for Readmission (LACE) Score: 15 (10/17/2024  6:00 AM)      Abdominal Pain       Course During Hospital Stay: Patient was recently hospitalized for uncontrollable severe abdominal pain cancer-related.  Was treated with morphine and oxycodone in the hospital and then discharged home on oxycodone 10 mg as needed.      Today, he reports he is doing so much better and pain is well-controlled on oxycodone.  Denies any history of constipation and also taking stool softeners daily.  He reports generalized muscle weakness and balance issues since hospitalization till now.  No history of falls, lightheadedness/dizziness or syncope.  He is worried about falls due to balance issues. Currently uses a cane, walker and scooter around the home for ambulation.       The following portions of the patient's history were reviewed and updated as appropriate: allergies, current medications, past family history, past medical history, past social history, past surgical history, and problem list.    Review of Systems   Gastrointestinal:  Positive for abdominal pain.       Objective   BP 96/52   Pulse 84   Temp 97.6 °F (36.4 °C)   Ht 182.9 cm (72.01\")   Wt 85.3 kg (188 " lb)   SpO2 97%   BMI 25.49 kg/m²   Physical Exam  Constitutional:       Comments: Elderly man in a wheelchair, not in acute distress   HENT:      Head: Normocephalic and atraumatic.   Cardiovascular:      Heart sounds: Normal heart sounds.   Pulmonary:      Breath sounds: Normal breath sounds.   Abdominal:      General: Bowel sounds are normal.      Palpations: Abdomen is soft.   Neurological:      General: No focal deficit present.      Mental Status: He is alert and oriented to person, place, and time.      Sensory: Sensation is intact.      Motor: Weakness present.      Coordination: Romberg sign positive.         Assessment & Plan   Diagnoses and all orders for this visit:    1. Cancer related pain (Primary)  Assessment & Plan:  Significant clinical improvement since hospitalization  BP Soft but no respiratory distress  To continue on oxycodone 10 mg as needed for pain relief  Advice patient to use narcan as needed and continue with stool softeners daily      2. Muscle weakness  -     Ambulatory Referral to Home Health    3. Loss of balance  -     Ambulatory Referral to Home Health      Due to muscle weakness, balances problems and difficulty with ambulation, he will benefit from home PT/OT to help with strengthening, gait & balance and ambulation.

## 2024-10-31 NOTE — ASSESSMENT & PLAN NOTE
Significant clinical improvement since hospitalization  BP Soft but no respiratory distress  To continue on oxycodone 10 mg as needed for pain relief  Advice patient to use narcan as needed and continue with stool softeners daily

## 2024-11-01 ENCOUNTER — PATIENT OUTREACH (OUTPATIENT)
Dept: CASE MANAGEMENT | Facility: OTHER | Age: 79
End: 2024-11-01
Payer: MEDICARE

## 2024-11-01 RX ORDER — OMEPRAZOLE 40 MG/1
40 CAPSULE, DELAYED RELEASE ORAL DAILY
Qty: 30 CAPSULE | Refills: 0 | OUTPATIENT
Start: 2024-11-01

## 2024-11-01 NOTE — OUTREACH NOTE
"AMBULATORY CASE MANAGEMENT NOTE    Names and Relationships of Patient/Support Persons: Contact: Gay Vazquez; Relationship: Self -     Patient Outreach  RN-ACM outreach with patient. Discussed 10/14/24 to 10/17/24 hospitalization regarding cancer related pain. Patient states to have episodes of feeling tired and \"worn out\". Patient states improvement regarding appetite and no difficulty with fever; chest pain; SOB or sleeping. Patient states to be compliant with medications; medical appointments and monitoring of blood pressure with values WNL's. Patient states to have had 10/31/24 PCP appointment and scheduled to be receiving physical therapy home health services. Patient currently ambulating with cane and no difficulty with falls. Reviewed with patient education and verbalized understanding. Patient states to appreciate outreach and declines needs for further outreach at this time. No further questions voiced at this time.     Education Documentation  Unresolved/Worsening Symptoms, taught by Lana Tao RN at 11/1/2024  3:49 PM.  Learner: Patient  Readiness: Acceptance  Method: Explanation  Response: Verbalizes Understanding    Provider Follow-Up, taught by Lana Tao RN at 11/1/2024  3:49 PM.  Learner: Patient  Readiness: Acceptance  Method: Explanation  Response: Verbalizes Understanding    Blood Pressure Monitoring, taught by Lana Tao RN at 11/1/2024  3:49 PM.  Learner: Patient  Readiness: Acceptance  Method: Explanation  Response: Verbalizes Understanding    Unresolved/Worsening Symptoms, taught by Lana Tao RN at 11/1/2024  3:49 PM.  Learner: Patient  Readiness: Acceptance  Method: Explanation  Response: Verbalizes Understanding    Energy Conservation, taught by Lana Tao RN at 11/1/2024  3:49 PM.  Learner: Patient  Readiness: Acceptance  Method: Explanation  Response: Verbalizes Understanding    Assistive/Adaptive Devices, taught by Lana Tao RN at " 11/1/2024  3:49 PM.  Learner: Patient  Readiness: Acceptance  Method: Explanation  Response: Verbalizes Understanding    Unresolved/Worsening Symptoms, taught by Lana Tao RN at 11/1/2024  3:49 PM.  Learner: Patient  Readiness: Acceptance  Method: Explanation  Response: Verbalizes Understanding          Lana SAUCEDA  Ambulatory Case Management    11/1/2024, 15:49 EDT

## 2024-11-07 ENCOUNTER — TELEPHONE (OUTPATIENT)
Dept: ONCOLOGY | Facility: CLINIC | Age: 79
End: 2024-11-07
Payer: MEDICARE

## 2024-11-07 DIAGNOSIS — M06.9 RHEUMATOID ARTHRITIS INVOLVING BOTH HANDS, UNSPECIFIED WHETHER RHEUMATOID FACTOR PRESENT: ICD-10-CM

## 2024-11-07 DIAGNOSIS — C22.1 CHOLANGIOCARCINOMA: Primary | ICD-10-CM

## 2024-11-07 RX ORDER — GABAPENTIN 300 MG/1
CAPSULE ORAL
Qty: 60 CAPSULE | Refills: 0 | Status: SHIPPED | OUTPATIENT
Start: 2024-11-07

## 2024-11-07 NOTE — TELEPHONE ENCOUNTER
Called Gay, said that his stool is really katrina/fatemeh color stool for the last 3 days ago. Soft, no diarrhea and no constipation. Wants to know if this is fine or is this something concerning?

## 2024-11-07 NOTE — TELEPHONE ENCOUNTER
Provider: Terrell  Caller: patient  Relationship to Patient: self  Call Back Phone Number: [phone]  Reason for Call: he is calling about the color of his stool

## 2024-11-08 ENCOUNTER — TELEMEDICINE - AUDIO (OUTPATIENT)
Dept: OTHER | Facility: HOSPITAL | Age: 79
End: 2024-11-08
Payer: MEDICARE

## 2024-11-08 ENCOUNTER — LAB (OUTPATIENT)
Dept: OTHER | Facility: HOSPITAL | Age: 79
End: 2024-11-08
Payer: MEDICARE

## 2024-11-08 DIAGNOSIS — C22.1 CHOLANGIOCARCINOMA: ICD-10-CM

## 2024-11-08 LAB
ALBUMIN SERPL-MCNC: 2.6 G/DL (ref 3.5–5.2)
ALBUMIN/GLOB SERPL: 0.6 G/DL
ALP SERPL-CCNC: 92 U/L (ref 39–117)
ALT SERPL W P-5'-P-CCNC: 23 U/L (ref 1–41)
ANION GAP SERPL CALCULATED.3IONS-SCNC: 6.1 MMOL/L (ref 5–15)
AST SERPL-CCNC: 46 U/L (ref 1–40)
BILIRUB SERPL-MCNC: 0.3 MG/DL (ref 0–1.2)
BUN SERPL-MCNC: 12 MG/DL (ref 8–23)
BUN/CREAT SERPL: 15.8 (ref 7–25)
CALCIUM SPEC-SCNC: 8.2 MG/DL (ref 8.6–10.5)
CHLORIDE SERPL-SCNC: 102 MMOL/L (ref 98–107)
CO2 SERPL-SCNC: 28.9 MMOL/L (ref 22–29)
CREAT SERPL-MCNC: 0.76 MG/DL (ref 0.76–1.27)
EGFRCR SERPLBLD CKD-EPI 2021: 92 ML/MIN/1.73
GLOBULIN UR ELPH-MCNC: 4.5 GM/DL
GLUCOSE SERPL-MCNC: 181 MG/DL (ref 65–99)
POTASSIUM SERPL-SCNC: 4.5 MMOL/L (ref 3.5–5.2)
PROT SERPL-MCNC: 7.1 G/DL (ref 6–8.5)
SODIUM SERPL-SCNC: 137 MMOL/L (ref 136–145)

## 2024-11-08 PROCEDURE — 36415 COLL VENOUS BLD VENIPUNCTURE: CPT

## 2024-11-08 PROCEDURE — 80053 COMPREHEN METABOLIC PANEL: CPT | Performed by: INTERNAL MEDICINE

## 2024-11-08 NOTE — TELEPHONE ENCOUNTER
Called patient no answer, left vm. Told him that Dr. Tejada wanted the patient to come in and get a CMP drawn. Message sent to scheduling.

## 2024-11-08 NOTE — PROGRESS NOTES
OUTPATIENT ONCOLOGY NUTRITION ASSESSMENT    Patient Name: Gay Vazquez  YOB: 1945  MRN: [id number]  Assessment Date: 11/8/2024    COMMENTS:  Spoke to patient's wife on the phone due to nutrition risk screen.    She says he has been eating better. She purchased some Boost but he has not tried it yet. She will keep encouraging him to try it. Suggested she try adding ice cream or frozen/canned fruit to it as a milkshake. His weight is decreased but it has stabilized.   Will continue to be available as needed.          Reason for Assessment Follow up     Diagnosis/Problem   Cholangiocarcinoma   Treatment Plan Chemotherapy Cisplatin, gemzar and Durvalumab   Frequency Days 1, 8, 21 days cycle, darvalumab day 1   Goal of cancer treatment Disease control   Comments:        Encounter Information        Nutrition/Diet History:  Decreased appetite, improving per wife   Oral Nutrition Supplements: Has not tried   Factors/Symptoms Affecting Intake: Constipation, Early satiety, Taste changes, Weight loss   Comments:      Medical/Surgical History Past Medical History:   Diagnosis Date    Cancer     Liver    Colon polyp     Diabetes mellitus     Hyperlipidemia     Hypertension     Liver cancer 07-    Liver disease     Postoperative wound infection     RA (rheumatoid arthritis)        Past Surgical History:   Procedure Laterality Date    ABDOMINAL SURGERY      BACK SURGERY      CHOLECYSTECTOMY      COLONOSCOPY  2017    [facility]    COLONOSCOPY N/A 06/04/2024    Procedure: COLONOSCOPY INTO CECUM WITH POLYPECTOMIES (COLD SNARE);  Surgeon: Angel Ness MD;  Location: [facility] ENDOSCOPY;  Service: General;  Laterality: N/A;  PRE: COLON MASS, ABNORMAL CT  POST: DIVERTICULOSIS, POLYPS, POOR PREP    ENDOSCOPY N/A 06/04/2024    Procedure: ESOPHAGOGASTRODUODENOSCOPY;  Surgeon: Angel Ness MD;  Location: [facility] ENDOSCOPY;  Service: General;  Laterality: N/A;  PRE:  "LIVER MASS  POST: NORMAL EGD    EYE SURGERY      PERICARDIUM SURGERY      REPLACEMENT TOTAL KNEE BILATERAL      THORACOTOMY Bilateral 1997    VENOUS ACCESS DEVICE (PORT) INSERTION Right 7/30/2024    Procedure: INSERTION VENOUS ACCESS DEVICE;  Surgeon: Angel Ness MD;  Location: [facility] MAIN OR;  Service: General;  Laterality: Right;            Anthropometrics        Current Height Ht Readings from Last 1 Encounters:   10/31/24 182.9 cm (72.01\")      Current Weight Wt Readings from Last 1 Encounters:   10/31/24 85.3 kg (188 lb)      Weight History Wt Readings from Last 30 Encounters:   10/31/24 1017 85.3 kg (188 lb)   10/25/24 1008 84.4 kg (186 lb)   10/15/24 0931 83.6 kg (184 lb 5.8 oz)   10/14/24 1426 85.4 kg (188 lb 4.4 oz)   10/14/24 1039 90.7 kg (200 lb)   10/13/24 0950 90.7 kg (200 lb)   10/11/24 1113 86.6 kg (191 lb)   10/10/24 1737 91.2 kg (201 lb)   10/08/24 1012 87.3 kg (192 lb 6.4 oz)   09/20/24 0828 93 kg (205 lb)   09/13/24 1011 91.9 kg (202 lb 8 oz)   09/08/24 0907 93 kg (205 lb)   09/06/24 1024 93.1 kg (205 lb 3.2 oz)   08/30/24 1410 90.8 kg (200 lb 1.6 oz)   08/23/24 1601 95.2 kg (209 lb 12.8 oz)   08/23/24 1031 93.3 kg (205 lb 11.2 oz)   08/09/24 0807 93.6 kg (206 lb 6.4 oz)   08/02/24 1504 95.5 kg (210 lb 9.6 oz)   08/02/24 0827 93 kg (205 lb)   07/31/24 1508 92.6 kg (204 lb 1.6 oz)   07/30/24 0739 93.2 kg (205 lb 6.4 oz)   07/26/24 1705 92.5 kg (204 lb)   07/29/24 0918 93.4 kg (205 lb 12.8 oz)   07/25/24 1311 92.5 kg (204 lb)   07/18/24 1120 92.1 kg (203 lb)   07/05/24 0741 92.5 kg (204 lb)   06/17/24 0947 92.5 kg (204 lb)   06/10/24 1511 92.5 kg (204 lb)   06/04/24 1026 92.4 kg (203 lb 12.8 oz)   05/30/24 0858 92.1 kg (203 lb)   05/16/24 0949 92 kg (202 lb 14.4 oz)   04/08/24 1332 94.1 kg (207 lb 6.4 oz)   10/02/23 0756 92.9 kg (204 lb 12.8 oz)   09/05/23 1032 90.2 kg (198 lb 12.8 oz)          Medications           Current medications: Morphine, OLANZapine, atenolol, doxazosin, " "gabapentin, hydrOXYzine, lactulose, magnesium oxide, metFORMIN ER, naloxone, ondansetron ODT, pantoprazole, pilocarpine, pravastatin, and sennosides-docusate                 Tests/Procedures        Tests/Procedures Chemotherapy, Colonoscopy, CT, MRI     Labs       Pertinent Labs          Invalid input(s): \"LABALBU\", \"PROT\"                COVID19   Date Value Ref Range Status   10/13/2024 Not Detected Not Detected - Ref. Range Final     Lab Results   Component Value Date    HGBA1C 6.80 (H) 10/08/2024          Physical Findings        Physical Appearance alert     Edema  no edema   Gastrointestinal None   Tubes/Drains implantable port   Oral/Mouth Cavity WNL   Skin        Estimated/Assessed Needs        Energy Requirements    Height for Calculation      Weight for Calculation 93 kg   Method for Estimation  25 kcal/kg   EST Needs (kcal/day) 2325 kcals/d       Protein Requirements    Weight for Calculation 93 kg   EST Protein Needs (g/kg) 1.2 gm/kg   EST Daily Needs (g/day) 112 g/d       Fluid Requirements     Method for Estimation 1 mL/kcal    Estimated Needs (mL/day) 2325 ml/d           PES STATEMENT / NUTRITION DIAGNOSIS      Nutrition Dx Problem Problem:    NutritionDiagnosisProblem: Knowledge Deficit    Etiology:  Medical diagnosis: Hepatic cancer  Factors affecting nutrition: Reported/Observed By, Appetite    Signs/Symptoms:  Information Deficit    Comment:      NUTRITION INTERVENTION / PLAN OF CARE      Intervention Goal(s) Maintain nutrition status, Provide information, Meet estimated needs, Disease management/therapy, Tolerate PO , Maintain intake, Maintain weight, and No significant weight loss         RD Intervention/Action Follow Tx progress         Recommendations:       PO Diet Continue diet as tolerated, increase calories and protein      Supplements Try boost with ice cream or fruit as a shake      Snacks       Other    --      Monitor/Evaluation PO intake, Supplement intake, Pertinent labs, Weight, " Symptoms   Education Education provided   --    Electronically signed by:  Tiffany Lam RD, LD  11/08/24 13:36 EST

## 2024-11-11 ENCOUNTER — TELEPHONE (OUTPATIENT)
Dept: FAMILY MEDICINE CLINIC | Facility: CLINIC | Age: 79
End: 2024-11-11

## 2024-11-11 NOTE — TELEPHONE ENCOUNTER
Caller: DREW    Relationship: [facility]    Best call back number: [phone]- OKAY FOR OFFICE TO LEAVE VOICEMAIL    What orders are you requesting (i.e. lab or imaging): VERBAL ORDERS FOR PHYSICAL THERAPY    Additional notes: DREW FROM [facility] IS REQUESTING VERBAL ORDERS FOR PHYSICAL THERAPY STARTING THIS WEEK FOR TWICE A WEEK FOR 2 WEEKS THEN THE ONCE A WEEK FOR 6 WEEKS. DREW NOTED HE DID EVALUATION ON PATIENT ON 11/7/24.

## 2024-11-13 DIAGNOSIS — G89.3 CANCER-RELATED BREAKTHROUGH PAIN: ICD-10-CM

## 2024-11-13 RX ORDER — MORPHINE SULFATE 15 MG/1
15 TABLET, FILM COATED, EXTENDED RELEASE ORAL 2 TIMES DAILY
Qty: 60 TABLET | Refills: 0 | Status: CANCELLED | OUTPATIENT
Start: 2024-11-13

## 2024-11-13 NOTE — TELEPHONE ENCOUNTER
Caller: PHAN PULLIAMS    Relationship: Emergency Contact    Best call back number: [phone] Requested Prescriptions:   Requested Prescriptions     Pending Prescriptions Disp Refills    Morphine (MS CONTIN) 15 MG 12 hr tablet 60 tablet 0     Sig: Take 1 tablet by mouth Every 12 (Twelve) Hours for 7 doses.        Pharmacy where request should be sent: [facility] [address] [place] [address] [id number] [place] [address] [phone] [facility] [phone]      Last office visit with prescribing clinician: 9/13/2024   Last telemedicine visit with prescribing clinician: Visit date not found   Next office visit with prescribing clinician: 11/21/2024       Does the patient have less than a 3 day supply:  [x] Yes  [] No    Would you like a call back once the refill request has been completed: [] Yes [x] No    If the office needs to give you a call back, can they leave a voicemail: [] Yes [x] No    Deepak Haider Rep   11/13/24 13:36 EST

## 2024-11-15 ENCOUNTER — TELEPHONE (OUTPATIENT)
Dept: ONCOLOGY | Facility: CLINIC | Age: 79
End: 2024-11-15
Payer: MEDICARE

## 2024-11-15 DIAGNOSIS — G89.3 CANCER-RELATED BREAKTHROUGH PAIN: ICD-10-CM

## 2024-11-15 RX ORDER — MORPHINE SULFATE 15 MG/1
15 TABLET, FILM COATED, EXTENDED RELEASE ORAL EVERY 12 HOURS SCHEDULED
Qty: 60 TABLET | Refills: 0 | Status: SHIPPED | OUTPATIENT
Start: 2024-11-15 | End: 2024-12-15

## 2024-11-15 NOTE — PROGRESS NOTES
Subjective     REASON FOR CONSULTATION:  cholangiocarcinoma  Provide an opinion on any further workup or treatment                             REQUESTING PHYSICIAN:  Thi    RECORDS OBTAINED:  Records of the patients history including those obtained from the referring provider were reviewed and summarized in detail.    HISTORY OF PRESENT ILLNESS:  The patient is a 79 y.o. year old male who is here for an opinion about the above issue.    History of Present Illness   This is a pleasant 79-year-old man with intrahepatic cholangiocarcinoma with previous poor response/poor tolerance to cisplatin/Gemzar.  He underwent a TACE procedure to the dominant liver lesion in October.  He is doing relatively well with controlled pain on MS Contin, still has some occasional referred pain to the right shoulder.  Appetite seems to be picking up recently.  He complains of fatigue and arthralgias related to RA.    ONCOLOGY HISTORY:  This is a 79-year-old man with diabetes, hyperlipidemia, rheumatoid arthritis, tobacco abuse who underwent a CT angiogram of the chest to evaluate an aortic aneurysm and incidentally noted a mass in the right lobe of the liver.  A CT of the abdomen and pelvis was performed on 5/10/2024 showed a heterogeneously peripherally enhancing right hepatic lobe mass 7 x 5.7 cm, subtle 1.5 cm low-attenuation focus adjacently and a nonobstructing annular mass at the hepatic flexure.  There were several calcifications in the pancreas consistent with chronic pancreatitis, several small renal cysts and an enlarged prostate 6.5 cm.  There were extensive abdominal aortic atherosclerotic changes without aneurysm and extensive right pleural calcifications surrounding a small loculated right pleural effusion stable since 4/1/2024.    EGD and colonoscopy were performed on 6/4/2024 by Dr. Ness-normal EGD, poor bowel prep inadequate for colorectal screening, ascending colon polyp resected, rectosigmoid polyp  resected.    A CT-guided liver biopsy was performed on 6/17/2024 which showed stent hepatitis with periportal bile duct reaction and associated fibrosis/inflammation no evidence of malignancy.  A repeat liver biopsy was performed on 7/18/2024 showing adenocarcinoma consistent with cholangiocarcinoma.  CA 19-9 was elevated 133 and AFP normal 3.81.    The patient has not had significant symptoms of abdominal pain, weight loss, nausea, vomiting or jaundice.    He has significant rheumatoid arthritis history with joint deviation, history of iritis, previous pericarditis requiring pericardial window.    The patient received 2 cycles of cisplatin/gemcitabine/nivolumab with need of dose reductions and significant side effects.  Follow-up CT abdomen on 9/6/2024 showed a stable mass in the right lobe of the liver 7.6 x 6 cm no new liver lesions seen.    Past Medical History:   Diagnosis Date    Cancer     Liver    Colon polyp     Diabetes mellitus     Hyperlipidemia     Hypertension     Liver cancer 07-    Liver disease     Postoperative wound infection     RA (rheumatoid arthritis)         Past Surgical History:   Procedure Laterality Date    ABDOMINAL SURGERY      BACK SURGERY      CHOLECYSTECTOMY      COLONOSCOPY  2017    [facility]    COLONOSCOPY N/A 06/04/2024    Procedure: COLONOSCOPY INTO CECUM WITH POLYPECTOMIES (COLD SNARE);  Surgeon: Angel Ness MD;  Location: [facility] ENDOSCOPY;  Service: General;  Laterality: N/A;  PRE: COLON MASS, ABNORMAL CT  POST: DIVERTICULOSIS, POLYPS, POOR PREP    ENDOSCOPY N/A 06/04/2024    Procedure: ESOPHAGOGASTRODUODENOSCOPY;  Surgeon: Angel Ness MD;  Location: [facility] ENDOSCOPY;  Service: General;  Laterality: N/A;  PRE: LIVER MASS  POST: NORMAL EGD    EYE SURGERY      PERICARDIUM SURGERY      REPLACEMENT TOTAL KNEE BILATERAL      THORACOTOMY Bilateral 1997    VENOUS ACCESS DEVICE (PORT) INSERTION Right 7/30/2024     Procedure: INSERTION VENOUS ACCESS DEVICE;  Surgeon: Angel Ness MD;  Location: [facility] MAIN OR;  Service: General;  Laterality: Right;        Current Outpatient Medications on File Prior to Visit   Medication Sig Dispense Refill    atenolol (TENORMIN) 25 MG tablet Take 1 tablet by mouth Daily. 90 tablet 0    doxazosin (CARDURA) 2 MG tablet TAKE 1 TABLET BY MOUTH ONCE DAILY AT BEDTIME 30 tablet 0    gabapentin (NEURONTIN) 300 MG capsule TAKE 2 CAPSULES BY MOUTH ONCE DAILY AT NIGHT AT BEDTIME 60 capsule 0    hydrOXYzine (ATARAX) 25 MG tablet Take 1 tablet by mouth 3 (Three) Times a Day As Needed for Itching. 60 tablet 1    lactulose (CHRONULAC) 10 GM/15ML solution Take 30 mL by mouth 2 (Two) Times a Day As Needed (constipation). 240 mL 0    magnesium oxide (MAG-OX) 400 MG tablet Take 1 tablet by mouth every night at bedtime. 30 tablet 2    metFORMIN ER (GLUCOPHAGE-XR) 500 MG 24 hr tablet Take 2 tablets by mouth once daily 90 tablet 0    Morphine (MS CONTIN) 15 MG 12 hr tablet Take 1 tablet by mouth Every 12 (Twelve) Hours for 60 doses. 60 tablet 0    naloxone (NARCAN) 4 MG/0.1ML nasal spray Call 911. Don't prime. [place] in 1 nostril for overdose. Repeat in 2-3 minutes in other nostril if no or minimal breathing/responsiveness. 2 each 0    OLANZapine (zyPREXA) 5 MG tablet Take 1 tablet by mouth Every Night. 30 tablet 3    ondansetron ODT (ZOFRAN-ODT) 4 MG disintegrating tablet Place 1 tablet on the tongue Every 6 (Six) Hours As Needed for Nausea or Vomiting. 12 tablet 0    pantoprazole (Protonix) 40 MG EC tablet Take 1 tablet by mouth Daily. 30 tablet 3    pilocarpine (SALAGEN) 5 MG tablet Take 1 tablet by mouth 3 times a day.      pravastatin (PRAVACHOL) 80 MG tablet Take 1 tablet by mouth Daily. 90 tablet 1    sennosides-docusate (PERICOLACE) 8.6-50 MG per tablet Take 2 tablets by mouth 2 (Two) Times a Day.       Current Facility-Administered Medications on File Prior to Visit   Medication Dose Route  Frequency Provider Last Rate Last Admin    heparin injection 500 Units  500 Units Intravenous PRN Anthony Tejada MD   500 Units at 24 1037    sodium chloride 0.9 % flush 10 mL  10 mL Intravenous PRN Anthony Tejada MD   10 mL at 24 1037        ALLERGIES:    Allergies   Allergen Reactions    Chlorhexidine Dermatitis     Please use alcohol to clean port site        Social History     Socioeconomic History    Marital status:    Tobacco Use    Smoking status: Some Days     Current packs/day: 0.00     Average packs/day: 1 pack/day for 53.7 years (53.7 ttl pk-yrs)     Types: Cigarettes     Start date: 1970     Last attempt to quit: 10/1/2023     Years since quittin.1     Passive exposure: Past    Smokeless tobacco: Never   Vaping Use    Vaping status: Never Used   Substance and Sexual Activity    Alcohol use: Not Currently    Drug use: Never    Sexual activity: Not Currently     Partners: Female     Birth control/protection: Vasectomy        Family History   Problem Relation Age of Onset    Hypertension Mother     Heart disease Mother     Arthritis Father     Diabetes Sister     Asthma Brother     Diabetes Brother     Malig Hyperthermia Neg Hx         Review of Systems   Constitutional:  Positive for appetite change, fatigue and unexpected weight change. Negative for activity change.   HENT: Negative.     Respiratory: Negative.     Cardiovascular: Negative.    Gastrointestinal:  Positive for abdominal pain. Negative for nausea.   Genitourinary: Negative.    Musculoskeletal:  Positive for arthralgias and joint swelling.   Skin:  Negative for rash.   Neurological:  Negative for weakness.   Hematological: Negative.    Psychiatric/Behavioral:  Negative for dysphoric mood. The patient is nervous/anxious.           Objective     Vitals:    24 1137   BP: 117/75   Pulse: 77   Resp: 16   Temp: 98 °F (36.7 °C)   TempSrc: Infrared   SpO2: 97%   Weight: 86.3 kg (190 lb 3.2 oz)   Height: 182.9 cm  "(72.01\")   PainSc:   6   PainLoc: Generalized  Comment: Shoulders, RA                 11/21/2024    11:41 AM   Current Status   ECOG score 1       Physical Exam    CONSTITUTIONAL: pleasant well-developed adult man  HEENT: no icterus, no thrush, moist membranes  LYMPH: no cervical or supraclavicular lad  CV: RRR, S1S2, no murmur, sternotomy scar present  RESP: cta bilat, no wheezing, no rales  GI: soft, non-tender, no splenomegaly, +bs, multiple surgical scars on the abdomen  MUSC: no edema, multiple deviated hand joints, slight limp from arthritis  NEURO: alert and oriented x3, normal strength  PSYCH: Improved mood and affect today  Skin: No current rash  Exam unchanged 11/21/2024    RECENT LABS:  Hematology WBC   Date Value Ref Range Status   11/21/2024 3.98 3.40 - 10.80 10*3/mm3 Final     RBC   Date Value Ref Range Status   11/21/2024 4.13 (L) 4.14 - 5.80 10*6/mm3 Final     Hemoglobin   Date Value Ref Range Status   11/21/2024 12.5 (L) 13.0 - 17.7 g/dL Final     Hematocrit   Date Value Ref Range Status   11/21/2024 39.6 37.5 - 51.0 % Final     Platelets   Date Value Ref Range Status   11/21/2024 78 (L) 140 - 450 10*3/mm3 Final        Lab Results   Component Value Date    GLUCOSE 240 (H) 11/21/2024    BUN 13 11/21/2024    CREATININE 0.87 11/21/2024    EGFRRESULT 58.2 (L) 04/10/2024    EGFR 88.3 11/21/2024    BCR 14.9 11/21/2024    K 4.8 11/21/2024    CO2 26.7 11/21/2024    CALCIUM 8.6 11/21/2024    PROTENTOTREF 8.0 04/08/2024    ALBUMIN 2.9 (L) 11/21/2024    BILITOT 0.4 11/21/2024    AST 58 (H) 11/21/2024    ALT 21 11/21/2024     MRI ABD 7/5/24:  FINDINGS:  Asymmetric small right pleural effusion with associated pleural  thickening is present. Of note, a small right pleural effusion was  present on chest radiograph 6/27/2015 and 5/15/2023 and pleural  thickening and calcification was seen in this area on 8/14/2023. No  intra or extrahepatic bile duct dilation is present. A 0.6 cm T2  hyperintense lesion is present " in the inferior aspect of the pancreatic  head.     There is significant hepatic steatosis. There is a large heterogenous  mass within the right hepatic dome. It demonstrates restricted  diffusion; however less than that of the spleen. It measures 7.3 x 6.1 x  7.5 cm (previously 7.3 x 5.8 x 6 cm on 5/10/2024). It demonstrates  largely peripheral enhancement there is a few scattered smaller  peripherally enhancing and solid enhancing lesions within the  surrounding right hepatic lobe measuring up to approximate 0.9 cm.     Subcentimeter renal lesions are too small to characterize. Larger T2  hyperintense lesions within the left kidney do not demonstrate  significant enhancement and favored be benign. The main portal vein is  patent.     IMPRESSION  1.  There is a heterogenous, large peripherally enhancing mass within  the posterior aspect of the right hepatic dome which is grossly  unchanged to minimally increased in size since 5/10/2024. Overall  findings are indeterminate and findings remain most concerning for  malignancy until proven otherwise. Other differential consideration  includes an atypical abscess although this considered less likely. A few  small peripheral enhancing and solid enhancing lesions are located  within the surrounding right hepatic lobe and spread of malignancy  cannot be excluded.  2.  Chronic right pleural effusion and pleural thickening, as before.  3.  Significant hepatic steatosis.  4.  T2 hyperintense lesions within the pancreas. Follow-up with  abdominal MRI in 2 years is recommended per ACR criteria.  5.  Other findings as above    CT Abdomen With & Without Contrast (09/06/2024 09:55)       Assessment & Plan     *intrahepatic cholangiocarcinoma, multifocal:  7/5/24 MRI abd-heterogenous, large peripherally enhancing mass withinthe posterior aspect of the right hepatic dome which is grossly unchanged to minimally increased in size since 5/10/2024. small peripheral enhancing and solid  enhancing lesions are located within the surrounding right hepatic lobe and spread of malignancy is likely  7/18/24 CT liver biopsy-adenocarcinoma consistent with cholangiocarcinoma by IHC  CA 19-9 133  8/2/2024-initiated palliative treatment with cisplatin/Gemzar plus nivolumab; day 8 Gemzar given at 50% because of thrombocytopenia (60)  Cycle 2 of treatment was given with a dose reduction of gemcitabine 750 mg/m² and cisplatin same dose 25 mg/m² along with durvalumab.  Despite dose reduction he had intractable nausea vomiting acute kidney injury requiring IV fluids.  He did not receive day 8 cycle 2 of treatment because of side effects.  9/6/2024 CT abdomen liver protocol-stable 7.6 x 6 cm mass in the right lobe of the liver  Status post TACE procedure to the dominant liver lesion October 2024    *Thrombocytopenia/anemia secondary to chemotherapy  Platelet count 78    *Pain of malignancy  Controlled with MS Contin 15 mg every 12 hours    *Depression/adjustment disorder    *RA  significant rheumatoid arthritis history with joint deviation, history of iritis, previous pericarditis requiring pericardial window  Patient has been treated with Simponi currently on hold per rheumatology    *Additional comorbidities-diabetes mellitus, hyperlipidemia, hypertension, tobacco abuse, BPH    *Skin rash/pruritus secondary to chemotherapy versus immunotherapy  Previously required a steroid Dosepak-resolved    Oncology plan/recommendations:  Continue MS Contin 15 mg every 12 hours  The patient will have follow-up liver imaging with Dr. Hopper January 2025 and I will see him back after that exam with CBC CMP CA 19-9-day of visit

## 2024-11-15 NOTE — TELEPHONE ENCOUNTER
Caller: TUCKER PULLIAM    Relationship: Emergency Contact    Best call back number: [phone]    What is the best time to reach you: ANYTIME    Who are you requesting to speak with (clinical staff, provider,  specific staff member): CLINICAL        What was the call regarding: HAD CALLED ABOUT SELDONS MORPHINE 15 MG ON 11/13 TO REQUEST REFILL IF NEEDED, HAD NOT HEARD BACK, CALLING TO CHECK IF DR HINES WILL WANT HIM TO CONTINUE ON THIS OR NOT. HE WILL RUN OUT ON SUNDAY       ADDITIONAL NOTE: SEE SIGNED ENCOUNTER 11/13 REFILL REQUEST.

## 2024-11-19 ENCOUNTER — TELEPHONE (OUTPATIENT)
Dept: FAMILY MEDICINE CLINIC | Facility: CLINIC | Age: 79
End: 2024-11-19
Payer: MEDICARE

## 2024-11-21 ENCOUNTER — LAB (OUTPATIENT)
Dept: LAB | Facility: HOSPITAL | Age: 79
End: 2024-11-21
Payer: MEDICARE

## 2024-11-21 ENCOUNTER — OFFICE VISIT (OUTPATIENT)
Dept: ONCOLOGY | Facility: CLINIC | Age: 79
End: 2024-11-21
Payer: MEDICARE

## 2024-11-21 VITALS
SYSTOLIC BLOOD PRESSURE: 117 MMHG | HEART RATE: 77 BPM | DIASTOLIC BLOOD PRESSURE: 75 MMHG | TEMPERATURE: 98 F | RESPIRATION RATE: 16 BRPM | HEIGHT: 72 IN | BODY MASS INDEX: 25.76 KG/M2 | OXYGEN SATURATION: 97 % | WEIGHT: 190.2 LBS

## 2024-11-21 DIAGNOSIS — C22.1 CHOLANGIOCARCINOMA: ICD-10-CM

## 2024-11-21 DIAGNOSIS — C22.0 HEPATOCELLULAR CARCINOMA: Primary | ICD-10-CM

## 2024-11-21 DIAGNOSIS — R97.8 OTHER ABNORMAL TUMOR MARKERS: ICD-10-CM

## 2024-11-21 LAB
ALBUMIN SERPL-MCNC: 2.9 G/DL (ref 3.5–5.2)
ALBUMIN/GLOB SERPL: 0.6 G/DL
ALP SERPL-CCNC: 97 U/L (ref 39–117)
ALT SERPL W P-5'-P-CCNC: 21 U/L (ref 1–41)
ANION GAP SERPL CALCULATED.3IONS-SCNC: 8.3 MMOL/L (ref 5–15)
AST SERPL-CCNC: 58 U/L (ref 1–40)
BASOPHILS # BLD AUTO: 0.01 10*3/MM3 (ref 0–0.2)
BASOPHILS NFR BLD AUTO: 0.3 % (ref 0–1.5)
BILIRUB SERPL-MCNC: 0.4 MG/DL (ref 0–1.2)
BUN SERPL-MCNC: 13 MG/DL (ref 8–23)
BUN/CREAT SERPL: 14.9 (ref 7–25)
CALCIUM SPEC-SCNC: 8.6 MG/DL (ref 8.6–10.5)
CHLORIDE SERPL-SCNC: 101 MMOL/L (ref 98–107)
CO2 SERPL-SCNC: 26.7 MMOL/L (ref 22–29)
CREAT SERPL-MCNC: 0.87 MG/DL (ref 0.76–1.27)
DEPRECATED RDW RBC AUTO: 46.6 FL (ref 37–54)
EGFRCR SERPLBLD CKD-EPI 2021: 88.3 ML/MIN/1.73
EOSINOPHIL # BLD AUTO: 0.2 10*3/MM3 (ref 0–0.4)
EOSINOPHIL NFR BLD AUTO: 5 % (ref 0.3–6.2)
ERYTHROCYTE [DISTWIDTH] IN BLOOD BY AUTOMATED COUNT: 13 % (ref 12.3–15.4)
GLOBULIN UR ELPH-MCNC: 4.5 GM/DL
GLUCOSE SERPL-MCNC: 240 MG/DL (ref 65–99)
HCT VFR BLD AUTO: 39.6 % (ref 37.5–51)
HGB BLD-MCNC: 12.5 G/DL (ref 13–17.7)
IMM GRANULOCYTES # BLD AUTO: 0.01 10*3/MM3 (ref 0–0.05)
IMM GRANULOCYTES NFR BLD AUTO: 0.3 % (ref 0–0.5)
LYMPHOCYTES # BLD AUTO: 0.88 10*3/MM3 (ref 0.7–3.1)
LYMPHOCYTES NFR BLD AUTO: 22.1 % (ref 19.6–45.3)
MCH RBC QN AUTO: 30.3 PG (ref 26.6–33)
MCHC RBC AUTO-ENTMCNC: 31.6 G/DL (ref 31.5–35.7)
MCV RBC AUTO: 95.9 FL (ref 79–97)
MONOCYTES # BLD AUTO: 0.36 10*3/MM3 (ref 0.1–0.9)
MONOCYTES NFR BLD AUTO: 9 % (ref 5–12)
NEUTROPHILS NFR BLD AUTO: 2.52 10*3/MM3 (ref 1.7–7)
NEUTROPHILS NFR BLD AUTO: 63.3 % (ref 42.7–76)
PLATELET # BLD AUTO: 78 10*3/MM3 (ref 140–450)
PMV BLD AUTO: 10.8 FL (ref 6–12)
POTASSIUM SERPL-SCNC: 4.8 MMOL/L (ref 3.5–5.2)
PROT SERPL-MCNC: 7.4 G/DL (ref 6–8.5)
RBC # BLD AUTO: 4.13 10*6/MM3 (ref 4.14–5.8)
SODIUM SERPL-SCNC: 136 MMOL/L (ref 136–145)
WBC NRBC COR # BLD AUTO: 3.98 10*3/MM3 (ref 3.4–10.8)

## 2024-11-21 PROCEDURE — 80053 COMPREHEN METABOLIC PANEL: CPT | Performed by: INTERNAL MEDICINE

## 2024-11-21 PROCEDURE — 85025 COMPLETE CBC W/AUTO DIFF WBC: CPT | Performed by: INTERNAL MEDICINE

## 2024-11-21 PROCEDURE — 36415 COLL VENOUS BLD VENIPUNCTURE: CPT

## 2024-11-26 RX ORDER — DOXAZOSIN 2 MG/1
2 TABLET ORAL
Qty: 30 TABLET | Refills: 0 | Status: SHIPPED | OUTPATIENT
Start: 2024-11-26

## 2024-12-02 RX ORDER — METFORMIN HYDROCHLORIDE 500 MG/1
1000 TABLET, EXTENDED RELEASE ORAL DAILY
Qty: 90 TABLET | Refills: 0 | Status: SHIPPED | OUTPATIENT
Start: 2024-12-02

## 2024-12-03 ENCOUNTER — DOCUMENTATION (OUTPATIENT)
Dept: FAMILY MEDICINE CLINIC | Facility: CLINIC | Age: 79
End: 2024-12-03
Payer: MEDICARE

## 2024-12-09 DIAGNOSIS — M06.9 RHEUMATOID ARTHRITIS INVOLVING BOTH HANDS, UNSPECIFIED WHETHER RHEUMATOID FACTOR PRESENT: ICD-10-CM

## 2024-12-09 NOTE — TELEPHONE ENCOUNTER
Rx Refill Note  Requested Prescriptions     Pending Prescriptions Disp Refills    gabapentin (NEURONTIN) 300 MG capsule [Pharmacy Med Name: Gabapentin 300 MG Oral Capsule] 60 capsule 0     Sig: TAKE 2 CAPSULES BY MOUTH ONCE DAILY AT NIGHT AT BEDTIME      Last office visit with prescribing clinician: 10/31/2024   Last telemedicine visit with prescribing clinician: Visit date not found   Next office visit with prescribing clinician: 2/14/2025                         Would you like a call back once the refill request has been completed: [] Yes [] No    If the office needs to give you a call back, can they leave a voicemail: [] Yes [] No    Eliana Pena MA  12/09/24, 14:01 EST   [facility] Quality Flow/Interdisciplinary Rounds Progress Note        Quality Flow Rounds held on July 4, 2023    Disciplines Attending:  Bedside Nurse, , , and Nursing Unit Leadership    Ely Tao was admitted on 7/1/2023 11:28 PM    Anticipated Discharge Date:       Disposition:    Boy Score:  Boy Scale Score: 15    Readmission Risk              Risk of Unplanned Readmission:  10           Discussed patient goal for the day, patient clinical progression, and barriers to discharge.   The following Goal(s) of the Day/Commitment(s) have been identified:  Report labs/diagnostics      Adina Rizo RN  July 4, 2023

## 2024-12-10 RX ORDER — GABAPENTIN 300 MG/1
CAPSULE ORAL
Qty: 60 CAPSULE | Refills: 0 | Status: SHIPPED | OUTPATIENT
Start: 2024-12-10

## 2024-12-11 RX ORDER — HYDROXYZINE HYDROCHLORIDE 25 MG/1
25 TABLET, FILM COATED ORAL 3 TIMES DAILY PRN
Qty: 60 TABLET | Refills: 0 | OUTPATIENT
Start: 2024-12-11

## 2024-12-11 RX ORDER — ATENOLOL 25 MG/1
25 TABLET ORAL DAILY
Qty: 90 TABLET | Refills: 0 | Status: SHIPPED | OUTPATIENT
Start: 2024-12-11

## 2024-12-12 ENCOUNTER — TELEPHONE (OUTPATIENT)
Dept: ONCOLOGY | Facility: CLINIC | Age: 79
End: 2024-12-12
Payer: MEDICARE

## 2024-12-12 NOTE — TELEPHONE ENCOUNTER
Hub staff attempted to follow warm transfer process and was unsuccessful     Caller: TUCKER PULLIAM    Relationship to patient: Emergency Contact    Best call back number: [phone]    Patient is needing: WAS RETURNING ZEESHAN'S CALL.

## 2024-12-12 NOTE — TELEPHONE ENCOUNTER
Caller: TUCKER PULLIAM    Relationship: Emergency Contact    Best call back number: [phone]    What is the best time to reach you: ANYTIME    Who are you requesting to speak with (clinical staff, provider,  specific staff member): CLINICAL    What was the call regarding: REQUESTING A PRESCRIPTION FOR OXYCODONE 10MG CALLED INTO THE PHARMACY LAST PRESCRIBED WHILE IN THE HOSPITAL ON 10-17    [facility] [address] [place] [address] [id number] [place] [address] [phone] [facility] [phone]

## 2024-12-12 NOTE — TELEPHONE ENCOUNTER
Called Paula, said patient is having intermittent pain every once in a while since they got out of the hospital and the hospitalist prescribed them oxycodone 10mg and wanted to know id Dr. Tejada would refill this prescription. States some days he take 1 and some days he doesn't but he is out of the medication and recently he has been having some intermittent pain.

## 2024-12-13 RX ORDER — OXYCODONE HYDROCHLORIDE 10 MG/1
10 TABLET ORAL EVERY 4 HOURS PRN
Qty: 100 TABLET | Refills: 0 | Status: SHIPPED | OUTPATIENT
Start: 2024-12-13

## 2024-12-17 ENCOUNTER — TRANSCRIBE ORDERS (OUTPATIENT)
Dept: ADMINISTRATIVE | Facility: HOSPITAL | Age: 79
End: 2024-12-17
Payer: MEDICARE

## 2024-12-17 DIAGNOSIS — C22.1 CHOLANGIOCARCINOMA: Primary | ICD-10-CM

## 2024-12-17 RX ORDER — MAGNESIUM OXIDE TAB 400 MG (241.3 MG ELEMENTAL MG) 400 (241.3 MG) MG
1 TAB ORAL
Qty: 30 TABLET | Refills: 2 | Status: SHIPPED | OUTPATIENT
Start: 2024-12-17

## 2024-12-18 ENCOUNTER — TELEPHONE (OUTPATIENT)
Dept: ONCOLOGY | Facility: CLINIC | Age: 79
End: 2024-12-18
Payer: MEDICARE

## 2024-12-18 RX ORDER — HYDROXYZINE HYDROCHLORIDE 25 MG/1
25 TABLET, FILM COATED ORAL 3 TIMES DAILY PRN
Qty: 60 TABLET | Refills: 3 | Status: SHIPPED | OUTPATIENT
Start: 2024-12-18

## 2024-12-18 NOTE — TELEPHONE ENCOUNTER
Caller: TUCKER PULLIAM    Relationship: Emergency Contact    Best call back number: [phone]     What is the best time to reach you: ASAP    Who are you requesting to speak with (clinical staff, provider,  specific staff member): CLINICAL        What was the call regarding: PT'S WIFE SAYS THE PHARMACY HAS ADVISED THE PT THAT THE HYDROXYZINE RX WAS DECLINED BY DR HINES AND SHE NEEDS TO FIND OUT WHY.  PLEASE CALL TO ADVISE.  PT ONLY HAS 1 LEFT.

## 2024-12-18 NOTE — TELEPHONE ENCOUNTER
I spoke to paula, she is wanting to know why the atarax wasn't refilled. I told paula that the atarax was sent in for itching and a rash from Aug. I asked if the patient was still experiencing a rash and itching and she said that the rash is gone but the patient is still itching. Paula believes that the itching is caused from the morphine but doesn't want to stop the morphine because it controls his pain well. I told paula I would ask Dr. Tejada if he wanted to continue with the atarax or switch the pain medication. Paula rodgers/u

## 2024-12-18 NOTE — TELEPHONE ENCOUNTER
I called Paula back to let her know that Dr. Tejada is ok with refilling the atarax if this helps the patient. I let her know the refill was sent in for the patient. Paula rodgers/dahlia

## 2024-12-19 ENCOUNTER — TELEPHONE (OUTPATIENT)
Dept: ONCOLOGY | Facility: CLINIC | Age: 79
End: 2024-12-19
Payer: MEDICARE

## 2024-12-19 ENCOUNTER — OFFICE VISIT (OUTPATIENT)
Dept: ORTHOPEDIC SURGERY | Facility: CLINIC | Age: 79
End: 2024-12-19
Payer: MEDICARE

## 2024-12-19 VITALS — BODY MASS INDEX: 25.95 KG/M2 | TEMPERATURE: 98.3 F | HEIGHT: 72 IN | WEIGHT: 191.6 LBS

## 2024-12-19 DIAGNOSIS — R52 PAIN: ICD-10-CM

## 2024-12-19 DIAGNOSIS — M25.361 INSTABILITY OF RIGHT KNEE JOINT: Primary | ICD-10-CM

## 2024-12-19 DIAGNOSIS — R29.898 WEAKNESS OF EXTREMITY: ICD-10-CM

## 2024-12-19 DIAGNOSIS — Z96.651 HISTORY OF TOTAL RIGHT KNEE REPLACEMENT: ICD-10-CM

## 2024-12-19 RX ORDER — MORPHINE SULFATE 15 MG/1
15 TABLET, FILM COATED, EXTENDED RELEASE ORAL 2 TIMES DAILY
Qty: 60 TABLET | Refills: 0 | Status: SHIPPED | OUTPATIENT
Start: 2024-12-19 | End: 2024-12-19

## 2024-12-19 RX ORDER — MORPHINE SULFATE 30 MG/1
30 TABLET, FILM COATED, EXTENDED RELEASE ORAL 2 TIMES DAILY
COMMUNITY

## 2024-12-19 RX ORDER — MORPHINE SULFATE 15 MG/1
15 TABLET, FILM COATED, EXTENDED RELEASE ORAL 2 TIMES DAILY
Qty: 60 TABLET | Refills: 0 | Status: SHIPPED | OUTPATIENT
Start: 2024-12-19

## 2024-12-19 NOTE — TELEPHONE ENCOUNTER
Caller: TUCKER PULLIAM    Relationship: Emergency Contact    Best call back number: [phone]    What was the call regarding: PT NEEDING REFILL ON MORPHINE 15MG, NOT LISTED IN MED LIST.  PT HAS 1 DOSE REMAINING       [facility] [address] [place] [id number] [facility] [phone] [facility] [phone] FX

## 2024-12-19 NOTE — TELEPHONE ENCOUNTER
Caller: TUCKER PULLIAM    Relationship: Emergency Contact    Best call back number: [phone]    What is the best time to reach you: ANYTIME    Who are you requesting to speak with (clinical staff, provider,  specific staff member): CLINICAL      What was the call regarding:     HAS REQUESTED REFILL ON MORPHINE 15MG TODAY AND IT WAS SENT TO [facility] ,     THEY DO NOT HAVE IT IN STOCK AND IT WILL BE 1-2 DAYS BEFORE CAN GET IT, [facility] ONLY HAS ONE LEFT FOR [facility]    WANTED TO SEE WHAT COULD BE DONE OR IF THERE WAS ANOTHER PHARMACY CAN SEND IT TO THAT HAS IT IN STOCK

## 2024-12-19 NOTE — PROGRESS NOTES
General Exam    Patient: Gay Vazquez    YOB: 1945    Medical Record Number: [id number]    Chief Complaints: Right knee pain    History of Present Illness:     79 y.o. male patient who presents for evaluation of right knee pain and feelings of instability.  Patient states he had his knee replaced 1520 years ago.  He states he was doing fine but about a month ago he noticed some more discomfort some swelling in the knee.  Patient states he also feels that there is some instability that gives some.  He does have a diagnosis of liver cancer he had undergone chemotherapy treatment and also had some seeds implanted he is to follow-up in January they stated current prognosis was without treatment less than a year and hopefully extending that now that he is undergoing treatment.  Does report that he is gotten weaker over the time.  Denies any hip pain.  Uses a cane to help with ambulation.    Denies any numbness or tingling.  Denies any fevers, cough or shortness of breath.    Allergies:   Allergies   Allergen Reactions    Chlorhexidine Dermatitis     Please use alcohol to clean port site       Home Medications:      Current Outpatient Medications:     atenolol (TENORMIN) 25 MG tablet, Take 1 tablet by mouth once daily, Disp: 90 tablet, Rfl: 0    doxazosin (CARDURA) 2 MG tablet, TAKE 1 TABLET BY MOUTH ONCE DAILY AT BEDTIME, Disp: 30 tablet, Rfl: 0    gabapentin (NEURONTIN) 300 MG capsule, TAKE 2 CAPSULES BY MOUTH ONCE DAILY AT NIGHT AT BEDTIME, Disp: 60 capsule, Rfl: 0    hydrOXYzine (ATARAX) 25 MG tablet, Take 1 tablet by mouth 3 (Three) Times a Day As Needed for Itching., Disp: 60 tablet, Rfl: 3    Magnesium Oxide -Mg Supplement (MAGnesium-Oxide) 400 (240 Mg) MG tablet, TAKE 1 TABLET BY MOUTH EVERY DAY AT BEDTIME, Disp: 30 tablet, Rfl: 2    metFORMIN ER (GLUCOPHAGE-XR) 500 MG 24 hr tablet, Take 2 tablets by mouth once daily, Disp: 90 tablet, Rfl: 0    Morphine (MS CONTIN) 30 MG 12 hr tablet, Take 1 tablet  by mouth 2 (Two) Times a Day., Disp: , Rfl:     naloxone (NARCAN) 4 MG/0.1ML nasal spray, Call 911. Don't prime. [place] in 1 nostril for overdose. Repeat in 2-3 minutes in other nostril if no or minimal breathing/responsiveness., Disp: 2 each, Rfl: 0    OLANZapine (zyPREXA) 5 MG tablet, Take 1 tablet by mouth Every Night., Disp: 30 tablet, Rfl: 3    ondansetron ODT (ZOFRAN-ODT) 4 MG disintegrating tablet, Place 1 tablet on the tongue Every 6 (Six) Hours As Needed for Nausea or Vomiting., Disp: 12 tablet, Rfl: 0    oxyCODONE (ROXICODONE) 10 MG tablet, Take 1 tablet by mouth Every 4 (Four) Hours As Needed for Moderate Pain., Disp: 100 tablet, Rfl: 0    pantoprazole (Protonix) 40 MG EC tablet, Take 1 tablet by mouth Daily., Disp: 30 tablet, Rfl: 3    pilocarpine (SALAGEN) 5 MG tablet, Take 1 tablet by mouth 3 times a day., Disp: , Rfl:     pravastatin (PRAVACHOL) 80 MG tablet, Take 1 tablet by mouth Daily., Disp: 90 tablet, Rfl: 1    sennosides-docusate (PERICOLACE) 8.6-50 MG per tablet, Take 2 tablets by mouth 2 (Two) Times a Day., Disp: , Rfl:     lactulose (CHRONULAC) 10 GM/15ML solution, Take 30 mL by mouth 2 (Two) Times a Day As Needed (constipation). (Patient not taking: Reported on 12/19/2024), Disp: 240 mL, Rfl: 0  No current facility-administered medications for this visit.    Facility-Administered Medications Ordered in Other Visits:     heparin injection 500 Units, 500 Units, Intravenous, PRN, Anthony Tejada MD, 500 Units at 09/13/24 1037    sodium chloride 0.9 % flush 10 mL, 10 mL, Intravenous, PRN, Anthony Tejada MD, 10 mL at 09/13/24 1037    Past Medical History:   Diagnosis Date    Cancer     Liver    Colon polyp     Diabetes mellitus     Hyperlipidemia     Hypertension     Liver cancer 07-    Liver disease     Postoperative wound infection     RA (rheumatoid arthritis)        Past Surgical History:   Procedure Laterality Date    ABDOMINAL SURGERY      BACK SURGERY       CHOLECYSTECTOMY      COLONOSCOPY  2017    [facility]    COLONOSCOPY N/A 2024    Procedure: COLONOSCOPY INTO CECUM WITH POLYPECTOMIES (COLD SNARE);  Surgeon: Angel Ness MD;  [facility];  Service: General;  Laterality: N/A;  PRE: COLON MASS, ABNORMAL CT  POST: DIVERTICULOSIS, POLYPS, POOR PREP    ENDOSCOPY N/A 2024    Procedure: ESOPHAGOGASTRODUODENOSCOPY;  Surgeon: Angel Ness MD;  Location:  [facility];  Service: General;  Laterality: N/A;  PRE: LIVER MASS  POST: NORMAL EGD    EYE SURGERY      PERICARDIUM SURGERY      REPLACEMENT TOTAL KNEE BILATERAL      THORACOTOMY Bilateral 1997    VENOUS ACCESS DEVICE (PORT) INSERTION Right 2024    Procedure: INSERTION VENOUS ACCESS DEVICE;  Surgeon: Angel Ness MD;  [facility] MA[facility];  Service: General;  Laterality: Right;       Social History     Occupational History     Employer: RETIRED   Tobacco Use    Smoking status: Some Days     Current packs/day: 0.00     Average packs/day: 1 pack/day for 53.7 years (53.7 ttl pk-yrs)     Types: Cigarettes     Start date: 1970     Last attempt to quit: 10/1/2023     Years since quittin.2     Passive exposure: Past    Smokeless tobacco: Never   Vaping Use    Vaping status: Never Used   Substance and Sexual Activity    Alcohol use: Not Currently    Drug use: Never    Sexual activity: Not Currently     Partners: Female     Birth control/protection: Vasectomy      Social History     Social History Narrative    Not on file       Family History   Problem Relation Age of Onset    Hypertension Mother     Heart disease Mother     Arthritis Father     Diabetes Sister     Asthma Brother     Diabetes Brother     Malig Hyperthermia Neg Hx        Review of Systems:      Constitutional: Denies fever, shaking or chills         All other pertinent positives and negatives as noted above in HPI.    Physical Exam: 79 y.o. male    Vitals:    24 0927  "  Temp: 98.3 °F (36.8 °C)   TempSrc: Temporal   Weight: 86.9 kg (191 lb 9.6 oz)   Height: 182.9 cm (72\")       General:  Patient is awake and alert.  Appears in no acute distress or discomfort.      Musculoskeletal/Extremities:    Right knee exam incisions healed.  No significant tenderness or swelling noted.  No redness.  Knee range of motion 4 degrees to 120 degrees.  There is 1-2+ laxity with varus stress 1+ laxity with valgus stress.  Some slight flexion laxity/instability noted.  Motor and sensory intact distally.         Radiology:       AP pelvis and 4 views of the right knee including AP, lateral, PA flexion and sunrise taken reviewed to evaluate the patient's complaint/s.    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    Knee imaging shows status post total knee replacements overall position is satisfactory there is no evidence of loosening or subsidence.  No bony lesions appreciated, no acute fractures noted.     No imaging for comparison.    Assessment: Right knee instability, extremity weakness, history of right total knee replacement      Plan:      Discussed the findings with the patient and his family.  He does have some laxity is noted on exam which I do think is contributing to his symptoms.  Also there could be some polyethylene wear with some particle disease causing some inflammatory response however I do not see any evidence of loosening or subsidence of his prosthesis.  Patient does not want any kind of surgical intervention.  At this time I recommend some formal therapy to just work on some strengthening as well as we can try a brace he can wear just when up and about.  He can try some over-the-counter anti-inflammatory cream if okay by his doctor.  Also recommended possibly using a walker to help with stability and function.  He expressed understanding and is agreeable to this plan.  Plan to " follow-up in 3 months if any change in interim he may follow-up as needed.           We will plan for follow up as above.    All questions were answered.  Patient understands and agrees with the plan.    Lane Vergara MD    12/19/2024    CC to Gloria Post MD

## 2024-12-19 NOTE — TELEPHONE ENCOUNTER
I Called Paula, states [facility] is out and requested I sent the Ms Contin to jimmy. I called [facility] to cancel order and resent Rx to MD to refill.

## 2025-01-03 ENCOUNTER — TELEPHONE (OUTPATIENT)
Dept: ORTHOPEDIC SURGERY | Facility: CLINIC | Age: 80
End: 2025-01-03
Payer: MEDICARE

## 2025-01-03 NOTE — TELEPHONE ENCOUNTER
Have attempted to contact the patient regarding his home health PT.  I was unable to reach him but I did leave a message for him to contact me the office

## 2025-01-03 NOTE — TELEPHONE ENCOUNTER
Patient's wife returned my call.  Apparently the reason he is not holding off on any home health is because he is currently sick with a upper respiratory or viral infection.  Have asked home health to contact him next week to set up his home PT

## 2025-01-03 NOTE — TELEPHONE ENCOUNTER
Home Health follow up  Received: Today  Destini Bay Annette, RN  Miguel received a referral mid/late December on Mr. Vazquez.  It was noted on the referral that he wanted to wait to first of the year.  We attempted to see him yesterday, but he states he needs more time to decide on start of care.    Thank you,  Destini

## 2025-01-09 ENCOUNTER — TELEPHONE (OUTPATIENT)
Dept: ONCOLOGY | Facility: CLINIC | Age: 80
End: 2025-01-09
Payer: MEDICARE

## 2025-01-09 DIAGNOSIS — M06.9 RHEUMATOID ARTHRITIS INVOLVING BOTH HANDS, UNSPECIFIED WHETHER RHEUMATOID FACTOR PRESENT: ICD-10-CM

## 2025-01-09 RX ORDER — GABAPENTIN 300 MG/1
CAPSULE ORAL
Qty: 60 CAPSULE | Refills: 0 | Status: SHIPPED | OUTPATIENT
Start: 2025-01-09

## 2025-01-09 RX ORDER — METFORMIN HYDROCHLORIDE 500 MG/1
1000 TABLET, EXTENDED RELEASE ORAL DAILY
Qty: 90 TABLET | Refills: 0 | Status: SHIPPED | OUTPATIENT
Start: 2025-01-09

## 2025-01-09 NOTE — TELEPHONE ENCOUNTER
Caller: TUCKER PULLIAM    Relationship: Emergency Contact    Best call back number: [phone]    Who are you requesting to speak with (clinical staff, provider,  specific staff member): DR HINES / CLINICAL    What was the call regarding: PT'S APPT WITH DR SOTO HAS BEEN RESCHEDULED TO 1-30, WANTING TO KNOW IF HE SHOULD RESCHEDULE WITH DR HINES     PLEASE ADVISE

## 2025-01-09 NOTE — TELEPHONE ENCOUNTER
I called Paula, she states the scans are still being done on the 16th. I let her know per Dr. Tejada that the appts can stay the same. Paula v/u

## 2025-01-14 NOTE — PROGRESS NOTES
Subjective     REASON FOR CONSULTATION:  cholangiocarcinoma  Provide an opinion on any further workup or treatment                             REQUESTING PHYSICIAN:  Thi    RECORDS OBTAINED:  Records of the patients history including those obtained from the referring provider were reviewed and summarized in detail.    HISTORY OF PRESENT ILLNESS:  The patient is a 79 y.o. year old male who is here for an opinion about the above issue.    History of Present Illness   This is a pleasant 79-year-old man with intrahepatic cholangiocarcinoma with previous poor response/poor tolerance to cisplatin/Gemzar.  He underwent a TACE procedure to the dominant liver lesion in October.  He is doing relatively well with controlled pain on MS Contin 15 mg twice daily and breakthrough oxycodone 10 mg.  He reports pain in the mid right femur.  Appetite is poor but weight stable.    ONCOLOGY HISTORY:  This is a 79-year-old man with diabetes, hyperlipidemia, rheumatoid arthritis, tobacco abuse who underwent a CT angiogram of the chest to evaluate an aortic aneurysm and incidentally noted a mass in the right lobe of the liver.  A CT of the abdomen and pelvis was performed on 5/10/2024 showed a heterogeneously peripherally enhancing right hepatic lobe mass 7 x 5.7 cm, subtle 1.5 cm low-attenuation focus adjacently and a nonobstructing annular mass at the hepatic flexure.  There were several calcifications in the pancreas consistent with chronic pancreatitis, several small renal cysts and an enlarged prostate 6.5 cm.  There were extensive abdominal aortic atherosclerotic changes without aneurysm and extensive right pleural calcifications surrounding a small loculated right pleural effusion stable since 4/1/2024.    EGD and colonoscopy were performed on 6/4/2024 by Dr. Ness-normal EGD, poor bowel prep inadequate for colorectal screening, ascending colon polyp resected, rectosigmoid polyp resected.    A CT-guided liver biopsy was  performed on 6/17/2024 which showed stent hepatitis with periportal bile duct reaction and associated fibrosis/inflammation no evidence of malignancy.  A repeat liver biopsy was performed on 7/18/2024 showing adenocarcinoma consistent with cholangiocarcinoma.  CA 19-9 was elevated 133 and AFP normal 3.81.    The patient has not had significant symptoms of abdominal pain, weight loss, nausea, vomiting or jaundice.    He has significant rheumatoid arthritis history with joint deviation, history of iritis, previous pericarditis requiring pericardial window.    The patient received 2 cycles of cisplatin/gemcitabine/nivolumab with need of dose reductions and significant side effects.  Follow-up CT abdomen on 9/6/2024 showed a stable mass in the right lobe of the liver 7.6 x 6 cm no new liver lesions seen.  He was subsequently treated with a TACE procedure.    Past Medical History:   Diagnosis Date    Cancer     Liver    Colon polyp     Diabetes mellitus     Hyperlipidemia     Hypertension     Liver cancer 07-    Liver disease     Postoperative wound infection     RA (rheumatoid arthritis)         Past Surgical History:   Procedure Laterality Date    ABDOMINAL SURGERY      BACK SURGERY      CHOLECYSTECTOMY      COLONOSCOPY  2017    [facility]    COLONOSCOPY N/A 06/04/2024    Procedure: COLONOSCOPY INTO CECUM WITH POLYPECTOMIES (COLD SNARE);  Surgeon: Angel Ness MD;  Location: [facility] ENDOSCOPY;  Service: General;  Laterality: N/A;  PRE: COLON MASS, ABNORMAL CT  POST: DIVERTICULOSIS, POLYPS, POOR PREP    ENDOSCOPY N/A 06/04/2024    Procedure: ESOPHAGOGASTRODUODENOSCOPY;  Surgeon: Angel Ness MD;  Location: [facility] ENDOSCOPY;  Service: General;  Laterality: N/A;  PRE: LIVER MASS  POST: NORMAL EGD    EYE SURGERY      PERICARDIUM SURGERY      REPLACEMENT TOTAL KNEE BILATERAL      THORACOTOMY Bilateral 1997    VENOUS ACCESS DEVICE (PORT) INSERTION Right 7/30/2024     Procedure: INSERTION VENOUS ACCESS DEVICE;  Surgeon: Angel Ness MD;  Location: [facility] MAIN OR;  Service: General;  Laterality: Right;        Current Outpatient Medications on File Prior to Visit   Medication Sig Dispense Refill    atenolol (TENORMIN) 25 MG tablet Take 1 tablet by mouth once daily 90 tablet 0    doxazosin (CARDURA) 2 MG tablet TAKE 1 TABLET BY MOUTH ONCE DAILY AT BEDTIME 30 tablet 0    gabapentin (NEURONTIN) 300 MG capsule TAKE 2 CAPSULES BY MOUTH ONCE DAILY AT NIGHT AT BEDTIME 60 capsule 0    hydrOXYzine (ATARAX) 25 MG tablet Take 1 tablet by mouth 3 (Three) Times a Day As Needed for Itching. 60 tablet 3    lactulose (CHRONULAC) 10 GM/15ML solution Take 30 mL by mouth 2 (Two) Times a Day As Needed (constipation). (Patient not taking: Reported on 12/19/2024) 240 mL 0    Magnesium Oxide -Mg Supplement (MAGnesium-Oxide) 400 (240 Mg) MG tablet TAKE 1 TABLET BY MOUTH EVERY DAY AT BEDTIME 30 tablet 2    metFORMIN ER (GLUCOPHAGE-XR) 500 MG 24 hr tablet Take 2 tablets by mouth once daily 90 tablet 0    Morphine (MS CONTIN) 15 MG 12 hr tablet Take 1 tablet by mouth 2 (Two) Times a Day. 60 tablet 0    naloxone (NARCAN) 4 MG/0.1ML nasal spray Call 911. Don't prime. [place] in 1 nostril for overdose. Repeat in 2-3 minutes in other nostril if no or minimal breathing/responsiveness. 2 each 0    OLANZapine (zyPREXA) 5 MG tablet Take 1 tablet by mouth Every Night. 30 tablet 3    ondansetron ODT (ZOFRAN-ODT) 4 MG disintegrating tablet Place 1 tablet on the tongue Every 6 (Six) Hours As Needed for Nausea or Vomiting. 12 tablet 0    oxyCODONE (ROXICODONE) 10 MG tablet Take 1 tablet by mouth Every 4 (Four) Hours As Needed for Moderate Pain. 100 tablet 0    pantoprazole (Protonix) 40 MG EC tablet Take 1 tablet by mouth Daily. 30 tablet 3    pilocarpine (SALAGEN) 5 MG tablet Take 1 tablet by mouth 3 times a day.      pravastatin (PRAVACHOL) 80 MG tablet Take 1 tablet by mouth Daily. 90 tablet 1     sennosides-docusate (PERICOLACE) 8.6-50 MG per tablet Take 2 tablets by mouth 2 (Two) Times a Day.      [DISCONTINUED] Morphine (MS CONTIN) 30 MG 12 hr tablet Take 1 tablet by mouth 2 (Two) Times a Day.       Current Facility-Administered Medications on File Prior to Visit   Medication Dose Route Frequency Provider Last Rate Last Admin    heparin injection 500 Units  500 Units Intravenous PRN Anthony Tejada MD   500 Units at 24 1037    sodium chloride 0.9 % flush 10 mL  10 mL Intravenous PRN Anthony Tejada MD   10 mL at 24 1037        ALLERGIES:    Allergies   Allergen Reactions    Chlorhexidine Dermatitis     Please use alcohol to clean port site        Social History     Socioeconomic History    Marital status:    Tobacco Use    Smoking status: Some Days     Current packs/day: 0.00     Average packs/day: 1 pack/day for 53.7 years (53.7 ttl pk-yrs)     Types: Cigarettes     Start date: 1970     Last attempt to quit: 10/1/2023     Years since quittin.3     Passive exposure: Past    Smokeless tobacco: Never   Vaping Use    Vaping status: Never Used   Substance and Sexual Activity    Alcohol use: Not Currently    Drug use: Never    Sexual activity: Not Currently     Partners: Female     Birth control/protection: Vasectomy        Family History   Problem Relation Age of Onset    Hypertension Mother     Heart disease Mother     Arthritis Father     Diabetes Sister     Asthma Brother     Diabetes Brother     Malig Hyperthermia Neg Hx         Review of Systems   Constitutional:  Positive for appetite change, fatigue and unexpected weight change. Negative for activity change.   HENT: Negative.     Respiratory: Negative.     Cardiovascular: Negative.    Gastrointestinal:  Positive for abdominal pain. Negative for nausea.   Genitourinary: Negative.    Musculoskeletal:  Positive for arthralgias and joint swelling.   Skin:  Negative for rash.   Neurological:  Negative for weakness.  "  Hematological: Negative.    Psychiatric/Behavioral:  Negative for dysphoric mood. The patient is nervous/anxious.           Objective     Vitals:    01/20/25 1248   BP: 112/70   Pulse: 68   Resp: 16   Temp: 98.5 °F (36.9 °C)   TempSrc: Oral   SpO2: 94%   Weight: 86.3 kg (190 lb 3.2 oz)   Height: 182.9 cm (72.01\")   PainSc:   6   PainLoc: Leg  Comment: pain in right leg, both wrists, right hip and shoulder                   1/20/2025     1:21 PM   Current Status   ECOG score 1       Physical Exam    CONSTITUTIONAL: pleasant well-developed adult man  HEENT: no icterus, no thrush, moist membranes  LYMPH: no cervical or supraclavicular lad  CV: RRR, S1S2, no murmur, sternotomy scar present  RESP: cta bilat, no wheezing, no rales  GI: soft, non-tender, no splenomegaly, +bs, multiple surgical scars on the abdomen  MUSC: no edema, multiple deviated hand joints, slight limp from arthritis  NEURO: alert and oriented x3, normal strength  PSYCH: Improved mood and affect today  Skin: No current rash  Exam unchanged 1/20/2025    RECENT LABS:  Hematology WBC   Date Value Ref Range Status   01/20/2025 4.76 3.40 - 10.80 10*3/mm3 Final     RBC   Date Value Ref Range Status   01/20/2025 4.28 4.14 - 5.80 10*6/mm3 Final     Hemoglobin   Date Value Ref Range Status   01/20/2025 12.2 (L) 13.0 - 17.7 g/dL Final     Hematocrit   Date Value Ref Range Status   01/20/2025 38.5 37.5 - 51.0 % Final     Platelets   Date Value Ref Range Status   01/20/2025 75 (L) 140 - 450 10*3/mm3 Final        Lab Results   Component Value Date    GLUCOSE 322 (H) 01/16/2025    BUN 18 01/16/2025    CREATININE 1.00 01/16/2025    EGFRRESULT 58.2 (L) 04/10/2024    EGFR 75.2 01/16/2025    BCR 17.6 01/16/2025    K 5.0 01/16/2025    CO2 25.0 01/16/2025    CALCIUM 9.0 01/16/2025    PROTENTOTREF 8.0 04/08/2024    ALBUMIN 2.8 (L) 01/16/2025    BILITOT 0.7 01/16/2025    AST 63 (H) 01/16/2025    ALT 27 01/16/2025     CT CAP 1/16/25:  MPRESSION:  Impression:  1.  The " patient's known cholangiocarcinoma appears to have minimally  increased in size but does not demonstrate significant Central  enhancement, in keeping with interval chemoembolization. However, there  are a few nodular areas of hypodensity extending from the superior and  inferior aspects of the lesion, most suspicious along its inferior  aspect which has a rounded appearance and was not definitively seen on  preoperative imaging, measuring 0.9 cm. Differential considerations  include postembolization changes versus residual malignancy. Further  evaluation with MRI may be helpful. At least continued close attention  on follow-up is recommended.  2.  Scattered sub-6 mm pulmonary nodules. A right paratracheal node  appears to have minimally increased in size. These findings are  indeterminant and follow-up chest CT in 3 months is recommended to  ensure stability and exclude metastatic disease.  3.  Findings of presumed background interstitial lung disease, as  before.  4.  Cirrhosis with portosystemic shunting including splenomegaly and  small to moderate epigastric varices.  5.  Other findings as above.       Assessment & Plan     *intrahepatic cholangiocarcinoma, multifocal:  7/5/24 MRI abd-heterogenous, large peripherally enhancing mass withinthe posterior aspect of the right hepatic dome which is grossly unchanged to minimally increased in size since 5/10/2024. small peripheral enhancing and solid enhancing lesions are located within the surrounding right hepatic lobe and spread of malignancy is likely  7/18/24 CT liver biopsy-adenocarcinoma consistent with cholangiocarcinoma by IHC  CA 19-9 133  8/2/2024-initiated palliative treatment with cisplatin/Gemzar plus nivolumab; day 8 Gemzar given at 50% because of thrombocytopenia (60)  Cycle 2 of treatment was given with a dose reduction of gemcitabine 750 mg/m² and cisplatin same dose 25 mg/m² along with durvalumab.  Despite dose reduction he had intractable nausea  vomiting acute kidney injury requiring IV fluids.  He did not receive day 8 cycle 2 of treatment because of side effects.  9/6/2024 CT abdomen liver protocol-stable 7.6 x 6 cm mass in the right lobe of the liver  Status post TACE procedure to the dominant liver lesion October 2024  CT chest abdomen pelvis 1/16/2025-stable hilar lymphadenopathy, loculated small to moderate right pleural effusion, scattered sub-6 mm pulmonary nodule slightly increased in size, large heterogeneous mass in the right hepatic dome with internal hyperdensity keeping with prior chemoembolization measuring 7.8 x 5.7 x 7.1 cm (previous 6 x 7.5 x 6.5 cm) no significant internal enhancement, few discrete somewhat nodular hypoenhancing lesions inferior and superior aspect not definitively seen on previous imaging; spleen enlarged 14 cm; CA 19-9 is 78.2 from previous to 56    *Thrombocytopenia/anemia secondary to chemotherapy  Platelet count 75    *Pain of malignancy  Controlled with MS Contin 15 mg every 12 hours and oxycodone 10 mg every 4 hours as needed    *Depression/adjustment disorder  Taking Zyprexa 5 mg nightly    *RA  significant rheumatoid arthritis history with joint deviation, history of iritis, previous pericarditis requiring pericardial window  Patient has been treated with Simponi currently on hold per rheumatology    *Additional comorbidities-diabetes mellitus, hyperlipidemia, hypertension, tobacco abuse, BPH    *Skin rash/pruritus secondary to chemotherapy versus immunotherapy  Previously required a steroid Dosepak-resolved    Oncology plan/recommendations:  Continue MS Contin 15 mg every 12 hours; continue oxycodone 10 mg 1 p.o. every 4 hours as needed  Plain film of the right femur to evaluate pain  Port flush every 6 weeks  MD follow-up 3 months CBC CMP CA 19-9 CT chest abdomen pelvis with IV/no p.o. contrast

## 2025-01-16 ENCOUNTER — HOSPITAL ENCOUNTER (OUTPATIENT)
Dept: CT IMAGING | Facility: HOSPITAL | Age: 80
Discharge: HOME | End: 2025-01-16
Admitting: SURGERY
Payer: MEDICARE

## 2025-01-16 DIAGNOSIS — C22.1 CHOLANGIOCARCINOMA: ICD-10-CM

## 2025-01-16 LAB
ALBUMIN SERPL-MCNC: 2.8 G/DL (ref 3.5–5.2)
ALBUMIN/GLOB SERPL: 0.6 G/DL
ALP SERPL-CCNC: 120 U/L (ref 39–117)
ALT SERPL W P-5'-P-CCNC: 27 U/L (ref 1–41)
ANION GAP SERPL CALCULATED.3IONS-SCNC: 10 MMOL/L (ref 5–15)
AST SERPL-CCNC: 63 U/L (ref 1–40)
BASOPHILS # BLD AUTO: 0.03 10*3/MM3 (ref 0–0.2)
BASOPHILS NFR BLD AUTO: 0.6 % (ref 0–1.5)
BILIRUB SERPL-MCNC: 0.7 MG/DL (ref 0–1.2)
BUN SERPL-MCNC: 18 MG/DL (ref 8–23)
BUN/CREAT SERPL: 17.6 (ref 7–25)
CALCIUM SPEC-SCNC: 9 MG/DL (ref 8.6–10.5)
CANCER AG19-9 SERPL-ACNC: 78.2 U/ML
CHLORIDE SERPL-SCNC: 99 MMOL/L (ref 98–107)
CO2 SERPL-SCNC: 25 MMOL/L (ref 22–29)
CREAT BLDA-MCNC: 1 MG/DL (ref 0.6–1.3)
CREAT SERPL-MCNC: 1.02 MG/DL (ref 0.76–1.27)
DEPRECATED RDW RBC AUTO: 41.4 FL (ref 37–54)
EGFRCR SERPLBLD CKD-EPI 2021: 75.2 ML/MIN/1.73
EOSINOPHIL # BLD AUTO: 0.05 10*3/MM3 (ref 0–0.4)
EOSINOPHIL NFR BLD AUTO: 1.1 % (ref 0.3–6.2)
ERYTHROCYTE [DISTWIDTH] IN BLOOD BY AUTOMATED COUNT: 12.8 % (ref 12.3–15.4)
GLOBULIN UR ELPH-MCNC: 5 GM/DL
GLUCOSE SERPL-MCNC: 322 MG/DL (ref 65–99)
HCT VFR BLD AUTO: 44.3 % (ref 37.5–51)
HGB BLD-MCNC: 14 G/DL (ref 13–17.7)
IMM GRANULOCYTES # BLD AUTO: 0.02 10*3/MM3 (ref 0–0.05)
IMM GRANULOCYTES NFR BLD AUTO: 0.4 % (ref 0–0.5)
LYMPHOCYTES # BLD AUTO: 0.83 10*3/MM3 (ref 0.7–3.1)
LYMPHOCYTES NFR BLD AUTO: 17.7 % (ref 19.6–45.3)
MCH RBC QN AUTO: 28.3 PG (ref 26.6–33)
MCHC RBC AUTO-ENTMCNC: 31.6 G/DL (ref 31.5–35.7)
MCV RBC AUTO: 89.7 FL (ref 79–97)
MONOCYTES # BLD AUTO: 0.34 10*3/MM3 (ref 0.1–0.9)
MONOCYTES NFR BLD AUTO: 7.3 % (ref 5–12)
NEUTROPHILS NFR BLD AUTO: 3.41 10*3/MM3 (ref 1.7–7)
NEUTROPHILS NFR BLD AUTO: 72.9 % (ref 42.7–76)
PLATELET # BLD AUTO: 84 10*3/MM3 (ref 140–450)
PMV BLD AUTO: 10.8 FL (ref 6–12)
POTASSIUM SERPL-SCNC: 5 MMOL/L (ref 3.5–5.2)
PROT SERPL-MCNC: 7.8 G/DL (ref 6–8.5)
RBC # BLD AUTO: 4.94 10*6/MM3 (ref 4.14–5.8)
SODIUM SERPL-SCNC: 134 MMOL/L (ref 136–145)
WBC NRBC COR # BLD AUTO: 4.68 10*3/MM3 (ref 3.4–10.8)

## 2025-01-16 PROCEDURE — 86301 IMMUNOASSAY TUMOR CA 19-9: CPT | Performed by: SURGERY

## 2025-01-16 PROCEDURE — 80053 COMPREHEN METABOLIC PANEL: CPT | Performed by: SURGERY

## 2025-01-16 PROCEDURE — 82565 ASSAY OF CREATININE: CPT

## 2025-01-16 PROCEDURE — 74178 CT ABD&PLV WO CNTR FLWD CNTR: CPT

## 2025-01-16 PROCEDURE — 85025 COMPLETE CBC W/AUTO DIFF WBC: CPT | Performed by: SURGERY

## 2025-01-16 PROCEDURE — 71260 CT THORAX DX C+: CPT

## 2025-01-16 PROCEDURE — 25510000001 IOPAMIDOL 61 % SOLUTION: Performed by: SURGERY

## 2025-01-16 RX ORDER — IOPAMIDOL 612 MG/ML
100 INJECTION, SOLUTION INTRAVASCULAR
Status: COMPLETED | OUTPATIENT
Start: 2025-01-16 | End: 2025-01-16

## 2025-01-16 RX ORDER — MORPHINE SULFATE 15 MG/1
15 TABLET, FILM COATED, EXTENDED RELEASE ORAL 2 TIMES DAILY
Qty: 60 TABLET | Refills: 0 | Status: SHIPPED | OUTPATIENT
Start: 2025-01-16

## 2025-01-16 RX ADMIN — IOPAMIDOL 85 ML: 612 INJECTION, SOLUTION INTRAVENOUS at 15:37

## 2025-01-16 NOTE — TELEPHONE ENCOUNTER
Caller: NEENA PULLIAMLIS    Relationship: Emergency Contact    Best call back number: [phone]    Requested Prescriptions:   Requested Prescriptions     Pending Prescriptions Disp Refills    Morphine (MS CONTIN) 15 MG 12 hr tablet 60 tablet 0     Sig: Take 1 tablet by mouth 2 (Two) Times a Day.      Pharmacy where request should be sent: [facility] [address] [place] [address] [id number] [place] [address] [phone] [facility] [phone]      Last office visit with prescribing clinician: 11/21/2024   Last telemedicine visit with prescribing clinician: Visit date not found   Next office visit with prescribing clinician: 1/20/2025     Does the patient have less than a 3 day supply:  [x] Yes  [] No    Would you like a call back once the refill request has been completed: [] Yes [x] No    If the office needs to give you a call back, can they leave a voicemail: [] Yes [x] No

## 2025-01-20 ENCOUNTER — INFUSION (OUTPATIENT)
Dept: ONCOLOGY | Facility: HOSPITAL | Age: 80
End: 2025-01-20
Payer: MEDICARE

## 2025-01-20 ENCOUNTER — TELEPHONE (OUTPATIENT)
Dept: ORTHOPEDIC SURGERY | Facility: CLINIC | Age: 80
End: 2025-01-20

## 2025-01-20 ENCOUNTER — OFFICE VISIT (OUTPATIENT)
Dept: ONCOLOGY | Facility: CLINIC | Age: 80
End: 2025-01-20
Payer: MEDICARE

## 2025-01-20 VITALS
TEMPERATURE: 98.5 F | DIASTOLIC BLOOD PRESSURE: 70 MMHG | HEART RATE: 68 BPM | WEIGHT: 190.2 LBS | SYSTOLIC BLOOD PRESSURE: 112 MMHG | RESPIRATION RATE: 16 BRPM | BODY MASS INDEX: 25.76 KG/M2 | HEIGHT: 72 IN | OXYGEN SATURATION: 94 %

## 2025-01-20 DIAGNOSIS — C22.1 CHOLANGIOCARCINOMA: Primary | ICD-10-CM

## 2025-01-20 DIAGNOSIS — Z45.2 FITTING AND ADJUSTMENT OF VASCULAR CATHETER: Primary | ICD-10-CM

## 2025-01-20 DIAGNOSIS — R97.8 OTHER ABNORMAL TUMOR MARKERS: ICD-10-CM

## 2025-01-20 DIAGNOSIS — M89.8X5 PAIN IN RIGHT FEMUR: ICD-10-CM

## 2025-01-20 DIAGNOSIS — C22.0 HEPATOCELLULAR CARCINOMA: ICD-10-CM

## 2025-01-20 LAB
BASOPHILS # BLD AUTO: 0.03 10*3/MM3 (ref 0–0.2)
BASOPHILS NFR BLD AUTO: 0.6 % (ref 0–1.5)
DEPRECATED RDW RBC AUTO: 47.1 FL (ref 37–54)
EOSINOPHIL # BLD AUTO: 0.12 10*3/MM3 (ref 0–0.4)
EOSINOPHIL NFR BLD AUTO: 2.5 % (ref 0.3–6.2)
ERYTHROCYTE [DISTWIDTH] IN BLOOD BY AUTOMATED COUNT: 14.5 % (ref 12.3–15.4)
HCT VFR BLD AUTO: 38.5 % (ref 37.5–51)
HGB BLD-MCNC: 12.2 G/DL (ref 13–17.7)
IMM GRANULOCYTES # BLD AUTO: 0 10*3/MM3 (ref 0–0.05)
IMM GRANULOCYTES NFR BLD AUTO: 0 % (ref 0–0.5)
LYMPHOCYTES # BLD AUTO: 0.81 10*3/MM3 (ref 0.7–3.1)
LYMPHOCYTES NFR BLD AUTO: 17 % (ref 19.6–45.3)
MCH RBC QN AUTO: 28.5 PG (ref 26.6–33)
MCHC RBC AUTO-ENTMCNC: 31.7 G/DL (ref 31.5–35.7)
MCV RBC AUTO: 90 FL (ref 79–97)
MONOCYTES # BLD AUTO: 0.39 10*3/MM3 (ref 0.1–0.9)
MONOCYTES NFR BLD AUTO: 8.2 % (ref 5–12)
NEUTROPHILS NFR BLD AUTO: 3.41 10*3/MM3 (ref 1.7–7)
NEUTROPHILS NFR BLD AUTO: 71.7 % (ref 42.7–76)
NRBC BLD AUTO-RTO: 0 /100 WBC (ref 0–0.2)
PLATELET # BLD AUTO: 75 10*3/MM3 (ref 140–450)
PMV BLD AUTO: 10.7 FL (ref 6–12)
RBC # BLD AUTO: 4.28 10*6/MM3 (ref 4.14–5.8)
WBC NRBC COR # BLD AUTO: 4.76 10*3/MM3 (ref 3.4–10.8)

## 2025-01-20 PROCEDURE — 3078F DIAST BP <80 MM HG: CPT | Performed by: INTERNAL MEDICINE

## 2025-01-20 PROCEDURE — 25010000002 HEPARIN LOCK FLUSH PER 10 UNITS: Performed by: INTERNAL MEDICINE

## 2025-01-20 PROCEDURE — 36591 DRAW BLOOD OFF VENOUS DEVICE: CPT

## 2025-01-20 PROCEDURE — 3074F SYST BP LT 130 MM HG: CPT | Performed by: INTERNAL MEDICINE

## 2025-01-20 PROCEDURE — 1125F AMNT PAIN NOTED PAIN PRSNT: CPT | Performed by: INTERNAL MEDICINE

## 2025-01-20 PROCEDURE — G2211 COMPLEX E/M VISIT ADD ON: HCPCS | Performed by: INTERNAL MEDICINE

## 2025-01-20 PROCEDURE — 99214 OFFICE O/P EST MOD 30 MIN: CPT | Performed by: INTERNAL MEDICINE

## 2025-01-20 PROCEDURE — 85025 COMPLETE CBC W/AUTO DIFF WBC: CPT

## 2025-01-20 RX ORDER — HEPARIN SODIUM (PORCINE) LOCK FLUSH IV SOLN 100 UNIT/ML 100 UNIT/ML
500 SOLUTION INTRAVENOUS AS NEEDED
OUTPATIENT
Start: 2025-01-20

## 2025-01-20 RX ORDER — SODIUM CHLORIDE 0.9 % (FLUSH) 0.9 %
10 SYRINGE (ML) INJECTION AS NEEDED
Status: DISCONTINUED | OUTPATIENT
Start: 2025-01-20 | End: 2025-01-20 | Stop reason: HOSPADM

## 2025-01-20 RX ORDER — SODIUM CHLORIDE 0.9 % (FLUSH) 0.9 %
10 SYRINGE (ML) INJECTION AS NEEDED
OUTPATIENT
Start: 2025-01-20

## 2025-01-20 RX ORDER — HEPARIN SODIUM (PORCINE) LOCK FLUSH IV SOLN 100 UNIT/ML 100 UNIT/ML
500 SOLUTION INTRAVENOUS AS NEEDED
Status: DISCONTINUED | OUTPATIENT
Start: 2025-01-20 | End: 2025-01-20 | Stop reason: HOSPADM

## 2025-01-20 RX ADMIN — Medication 10 ML: at 12:50

## 2025-01-20 RX ADMIN — Medication 500 UNITS: at 12:51

## 2025-01-20 NOTE — TELEPHONE ENCOUNTER
Caller: MED ASSIST    Relationship to patient: Other    Patient is needing: YSABEL WITH MED ASSIST CALLED TO LET DR QUARLES KNOW THAT PATIENT IS STARTING HOME HEALTH TOMORROW.   SHE STATES THAT CARE WAS DELAYED BECAUSE PATIENT WAS PUTTING THEM OFF BUT FINALLY SET UP AN APPT.

## 2025-01-22 RX ORDER — OLANZAPINE 5 MG/1
5 TABLET ORAL NIGHTLY
Qty: 30 TABLET | Refills: 0 | OUTPATIENT
Start: 2025-01-22

## 2025-02-05 RX ORDER — MORPHINE SULFATE 15 MG/1
15 TABLET, FILM COATED, EXTENDED RELEASE ORAL 2 TIMES DAILY
Qty: 60 TABLET | Refills: 0 | Status: SHIPPED | OUTPATIENT
Start: 2025-02-05 | End: 2025-02-06 | Stop reason: SDUPTHER

## 2025-02-05 NOTE — TELEPHONE ENCOUNTER
Caller: TUCKER PULLIAM    Relationship: Emergency Contact    Best call back number: [phone]    Requested Prescriptions:   Requested Prescriptions     Pending Prescriptions Disp Refills    Morphine (MS CONTIN) 15 MG 12 hr tablet 60 tablet 0     Sig: Take 1 tablet by mouth 2 (Two) Times a Day.        Pharmacy where request should be sent: [facility] [address] [place] [address] [id number] [place] [address] [phone] [facility] [phone] FX     Last office visit with prescribing clinician: 1/20/2025   Last telemedicine visit with prescribing clinician: Visit date not found   Next office visit with prescribing clinician: 5/12/2025     Additional details provided by patient: PT ONLY HAS 4 TABLETS REMAINING, HE WAS ONLY DISPENSED 40 LAST FILL    Does the patient have less than a 3 day supply:  [x] Yes  [] No    Deepak Rutledge Rep   02/05/25 12:01 EST

## 2025-02-06 ENCOUNTER — TELEPHONE (OUTPATIENT)
Dept: ONCOLOGY | Facility: CLINIC | Age: 80
End: 2025-02-06
Payer: COMMERCIAL

## 2025-02-06 DIAGNOSIS — C22.0 HEPATOCELLULAR CARCINOMA: Primary | ICD-10-CM

## 2025-02-06 RX ORDER — MORPHINE SULFATE 15 MG/1
15 TABLET, FILM COATED, EXTENDED RELEASE ORAL 2 TIMES DAILY
Qty: 60 TABLET | Refills: 0 | Status: SHIPPED | OUTPATIENT
Start: 2025-02-06

## 2025-02-06 NOTE — TELEPHONE ENCOUNTER
[facility] called at 10:43 am to let me know that they're unable to fill the patient's morphine due to no stock and back order issues. They report that [facility] has it in stock. The patient was made aware of this and he is agreeable to it being sent there. Will route to MD #2 for signature since Dr. Tejada is out of office today.

## 2025-02-10 DIAGNOSIS — M06.9 RHEUMATOID ARTHRITIS INVOLVING BOTH HANDS, UNSPECIFIED WHETHER RHEUMATOID FACTOR PRESENT: ICD-10-CM

## 2025-02-10 RX ORDER — GABAPENTIN 300 MG/1
CAPSULE ORAL
Qty: 60 CAPSULE | Refills: 0 | Status: SHIPPED | OUTPATIENT
Start: 2025-02-10

## 2025-02-12 ENCOUNTER — INFUSION (OUTPATIENT)
Dept: ONCOLOGY | Facility: HOSPITAL | Age: 80
End: 2025-02-12
Payer: MEDICARE

## 2025-02-12 DIAGNOSIS — Z45.2 FITTING AND ADJUSTMENT OF VASCULAR CATHETER: Primary | ICD-10-CM

## 2025-02-12 PROCEDURE — 96523 IRRIG DRUG DELIVERY DEVICE: CPT

## 2025-02-12 PROCEDURE — 25010000002 HEPARIN LOCK FLUSH PER 10 UNITS: Performed by: INTERNAL MEDICINE

## 2025-02-12 RX ORDER — HEPARIN SODIUM (PORCINE) LOCK FLUSH IV SOLN 100 UNIT/ML 100 UNIT/ML
500 SOLUTION INTRAVENOUS AS NEEDED
OUTPATIENT
Start: 2025-02-12

## 2025-02-12 RX ORDER — HEPARIN SODIUM (PORCINE) LOCK FLUSH IV SOLN 100 UNIT/ML 100 UNIT/ML
500 SOLUTION INTRAVENOUS AS NEEDED
Status: DISCONTINUED | OUTPATIENT
Start: 2025-02-12 | End: 2025-02-12 | Stop reason: HOSPADM

## 2025-02-12 RX ORDER — SODIUM CHLORIDE 0.9 % (FLUSH) 0.9 %
10 SYRINGE (ML) INJECTION AS NEEDED
Status: DISCONTINUED | OUTPATIENT
Start: 2025-02-12 | End: 2025-02-12 | Stop reason: HOSPADM

## 2025-02-12 RX ORDER — SODIUM CHLORIDE 0.9 % (FLUSH) 0.9 %
10 SYRINGE (ML) INJECTION AS NEEDED
OUTPATIENT
Start: 2025-02-12

## 2025-02-12 RX ADMIN — Medication 10 ML: at 14:00

## 2025-02-12 RX ADMIN — Medication 500 UNITS: at 14:01

## 2025-02-14 ENCOUNTER — TELEPHONE (OUTPATIENT)
Dept: FAMILY MEDICINE CLINIC | Facility: CLINIC | Age: 80
End: 2025-02-14

## 2025-02-14 ENCOUNTER — OFFICE VISIT (OUTPATIENT)
Dept: FAMILY MEDICINE CLINIC | Facility: CLINIC | Age: 80
End: 2025-02-14
Payer: MEDICARE

## 2025-02-14 VITALS
TEMPERATURE: 97.7 F | RESPIRATION RATE: 16 BRPM | DIASTOLIC BLOOD PRESSURE: 64 MMHG | HEART RATE: 64 BPM | WEIGHT: 193 LBS | HEIGHT: 72 IN | SYSTOLIC BLOOD PRESSURE: 94 MMHG | OXYGEN SATURATION: 99 % | BODY MASS INDEX: 26.14 KG/M2

## 2025-02-14 DIAGNOSIS — I10 PRIMARY HYPERTENSION: ICD-10-CM

## 2025-02-14 DIAGNOSIS — Z23 IMMUNIZATION DUE: ICD-10-CM

## 2025-02-14 DIAGNOSIS — Z91.81 AT MODERATE RISK FOR FALL: ICD-10-CM

## 2025-02-14 DIAGNOSIS — Z00.00 MEDICARE ANNUAL WELLNESS VISIT, SUBSEQUENT: Primary | ICD-10-CM

## 2025-02-14 DIAGNOSIS — E78.2 MIXED HYPERLIPIDEMIA: ICD-10-CM

## 2025-02-14 DIAGNOSIS — E11.9 DIABETES MELLITUS WITHOUT COMPLICATION: ICD-10-CM

## 2025-02-14 NOTE — PROGRESS NOTES
Subjective   The ABCs of the Annual Wellness Visit  Medicare Wellness Visit      Gay Vazquez is a 79 y.o. patient who presents for a Medicare Wellness Visit.    The following portions of the patient's history were reviewed and   updated as appropriate: allergies, current medications, past family history, past medical history, past social history, past surgical history, and problem list.    Compared to one year ago, the patient's physical   health is worse.  Compared to one year ago, the patient's mental   health is worse.    Recent Hospitalizations:  This patient has had a [facility] admission record on file within the last 365 days.  Current Medical Providers:  Patient Care Team:  Gloria Post MD as PCP - General (Internal Medicine)  Angel Ness MD as Referring Physician (Colon and Rectal Surgery)  Demetrius Long MD as Consulting Physician (Hematology and Oncology)  Anthony Tejada MD as Consulting Physician (Hematology and Oncology)    Outpatient Medications Prior to Visit   Medication Sig Dispense Refill    atenolol (TENORMIN) 25 MG tablet Take 1 tablet by mouth once daily 90 tablet 0    doxazosin (CARDURA) 2 MG tablet TAKE 1 TABLET BY MOUTH ONCE DAILY AT BEDTIME 30 tablet 0    gabapentin (NEURONTIN) 300 MG capsule TAKE 2 CAPSULES BY MOUTH ONCE DAILY AT NIGHT AT BEDTIME 60 capsule 0    hydrOXYzine (ATARAX) 25 MG tablet Take 1 tablet by mouth 3 (Three) Times a Day As Needed for Itching. 60 tablet 3    lactulose (CHRONULAC) 10 GM/15ML solution Take 30 mL by mouth 2 (Two) Times a Day As Needed (constipation). 240 mL 0    Magnesium Oxide -Mg Supplement (MAGnesium-Oxide) 400 (240 Mg) MG tablet TAKE 1 TABLET BY MOUTH EVERY DAY AT BEDTIME 30 tablet 2    metFORMIN ER (GLUCOPHAGE-XR) 500 MG 24 hr tablet Take 2 tablets by mouth once daily 90 tablet 0    Morphine (MS CONTIN) 15 MG 12 hr tablet Take 1 tablet by mouth 2 (Two) Times a Day. 60 tablet 0    naloxone (NARCAN) 4 MG/0.1ML nasal spray  Call 911. Don't prime. Arbuckle in 1 nostril for overdose. Repeat in 2-3 minutes in other nostril if no or minimal breathing/responsiveness. 2 each 0    OLANZapine (zyPREXA) 5 MG tablet Take 1 tablet by mouth Every Night. 30 tablet 3    ondansetron ODT (ZOFRAN-ODT) 4 MG disintegrating tablet Place 1 tablet on the tongue Every 6 (Six) Hours As Needed for Nausea or Vomiting. 12 tablet 0    oxyCODONE (ROXICODONE) 10 MG tablet Take 1 tablet by mouth Every 4 (Four) Hours As Needed for Moderate Pain. 100 tablet 0    pantoprazole (Protonix) 40 MG EC tablet Take 1 tablet by mouth Daily. 30 tablet 3    pilocarpine (SALAGEN) 5 MG tablet Take 1 tablet by mouth 3 times a day.      pravastatin (PRAVACHOL) 80 MG tablet Take 1 tablet by mouth Daily. 90 tablet 1    sennosides-docusate (PERICOLACE) 8.6-50 MG per tablet Take 2 tablets by mouth 2 (Two) Times a Day.       Facility-Administered Medications Prior to Visit   Medication Dose Route Frequency Provider Last Rate Last Admin    heparin injection 500 Units  500 Units Intravenous PRN Anthony Tejada MD   500 Units at 09/13/24 1037    sodium chloride 0.9 % flush 10 mL  10 mL Intravenous PRN Anthony Tejada MD   10 mL at 09/13/24 1037     Opioid medication/s are on active medication list.  and I have evaluated his active treatment plan and pain score trends (see table).  Vitals:    02/14/25 1303   PainSc:   6   PainLoc: Generalized     I have reviewed the chart for potential of high risk medication and harmful drug interactions in the elderly.        Aspirin is not on active medication list.  Aspirin use is not indicated based on review of current medical condition/s. Risk of harm outweighs potential benefits.  .    Patient Active Problem List   Diagnosis    Mixed hyperlipidemia    Diabetes mellitus without complication    Rheumatoid arthritis    Overweight (BMI 25.0-29.9)    Primary hypertension    Nicotine dependence, cigarettes, uncomplicated    Aneurysm of ascending aorta  "without rupture    Liver mass    Colonic mass    Cholangiocarcinoma    Encounter for long-term (current) use of other medications    Fitting and adjustment of vascular catheter    Thrombocytopenia    Cancer related pain    Chronic pleural effusion     Advance Care Planning Advance Directive is not on file.  ACP discussion was held with the patient during this visit. Patient does not have an advance directive, information provided.            Objective   Vitals:    25 1303   BP: 94/64   BP Location: Left arm   Patient Position: Sitting   Cuff Size: Adult   Pulse: 64   Resp: 16   Temp: 97.7 °F (36.5 °C)   TempSrc: Oral   SpO2: 99%   Weight: 87.5 kg (193 lb)   Height: 182.9 cm (72\")   PainSc:   6   PainLoc: Generalized       Estimated body mass index is 26.18 kg/m² as calculated from the following:    Height as of this encounter: 182.9 cm (72\").    Weight as of this encounter: 87.5 kg (193 lb).                Does the patient have evidence of cognitive impairment? Yes Mild cognitive impairment.  Age related                                                                                                Health  Risk Assessment    Smoking Status:  Social History     Tobacco Use   Smoking Status Some Days    Current packs/day: 0.00    Average packs/day: 1 pack/day for 53.7 years (53.7 ttl pk-yrs)    Types: Cigarettes    Start date: 1970    Last attempt to quit: 10/1/2023    Years since quittin.3    Passive exposure: Past   Smokeless Tobacco Never     Alcohol Consumption:  Social History     Substance and Sexual Activity   Alcohol Use Not Currently       Fall Risk Screen  STEADI Fall Risk Assessment was completed, and patient is at MODERATE risk for falls. Assessment completed on:2025    Depression Screening   Little interest or pleasure in doing things? Several days   Feeling down, depressed, or hopeless? Several days   PHQ-2 Total Score 2   Trouble falling or staying asleep, or sleeping too much? Several " days   Feeling tired or having little energy? Several days   Poor appetite or overeating? Several days   Feeling bad about yourself - or that you are a failure or have let yourself or your family down? Not at all   Trouble concentrating on things, such as reading the newspaper or watching television? Over half   Moving or speaking so slowly that other people could have noticed? Or the opposite - being so fidgety or restless that you have been moving around a lot more than usual? Over half   Thoughts that you would be better off dead, or of hurting yourself in some way? Over half   PHQ-9 Total Score 11   If you checked off any problems, how difficult have these problems made it for you to do your work, take care of things at home, or get along with other people? Somewhat difficult      Health Habits and Functional and Cognitive Screenin/14/2025     1:08 PM   Functional & Cognitive Status   Do you have difficulty preparing food and eating? No   Do you have difficulty bathing yourself, getting dressed or grooming yourself? Yes   Do you have difficulty using the toilet? No   Do you have difficulty moving around from place to place? Yes   Do you have trouble with steps or getting out of a bed or a chair? Yes   Current Diet Other   Dental Exam Up to date   Eye Exam Up to date   Current Exercises Include No Regular Exercise   Do you need help using the phone?  No   Are you deaf or do you have serious difficulty hearing?  No   Do you need help to go to places out of walking distance? Yes   Do you need help shopping? Yes   Do you need help preparing meals?  No   Do you need help with housework?  No   Do you need help with laundry? No   Do you need help taking your medications? No   Do you need help managing money? No   Do you ever drive or ride in a car without wearing a seat belt? No   Have you felt unusual stress, anger or loneliness in the last month? No   Who do you live with? Spouse   If you need help, do you  have trouble finding someone available to you? No   Have you been bothered in the last four weeks by sexual problems? No   Do you have difficulty concentrating, remembering or making decisions? No           Age-appropriate Screening Schedule:  Refer to the list below for future screening recommendations based on patient's age, sex and/or medical conditions. Orders for these recommended tests are listed in the plan section. The patient has been provided with a written plan.    Health Maintenance List  Health Maintenance   Topic Date Due    DIABETIC FOOT EXAM  Never done    ZOSTER VACCINE (1 of 2) Never done    RSV Vaccine - Adults (1 - 1-dose 75+ series) Never done    DIABETIC EYE EXAM  03/30/2022    ANNUAL WELLNESS VISIT  06/08/2023    INFLUENZA VACCINE  07/01/2024    COVID-19 Vaccine (4 - 2024-25 season) 09/01/2024    Pneumococcal Vaccine 50+ (3 of 3 - PCV20 or PCV21) 09/16/2024    HEMOGLOBIN A1C  04/08/2025    BMI FOLLOWUP  07/25/2025    LIPID PANEL  10/08/2025    URINE MICROALBUMIN-CREATININE RATIO (uACR)  10/08/2025    LUNG CANCER SCREENING  01/16/2026    TDAP/TD VACCINES (3 - Td or Tdap) 06/02/2033    HEPATITIS C SCREENING  Completed    COLORECTAL CANCER SCREENING  Discontinued                                                                                                                                                CMS Preventative Services Quick Reference  Risk Factors Identified During Encounter  Immunizations Discussed/Encouraged: Influenza, Shingrix, COVID19, and RSV (Respiratory Syncytial Virus)    The above risks/problems have been discussed with the patient.  Pertinent information has been shared with the patient in the After Visit Summary.  An After Visit Summary and PPPS were made available to the patient.    Follow Up:   Next Medicare Wellness visit to be scheduled in 1 year.         Additional E&M Note during same encounter follows:  Patient has additional, significant, and separately identifiable  "condition(s)/problem(s) that require work above and beyond the Medicare Wellness Visit     Chief Complaint  Medicare Wellness-subsequent    Subjective   HPI  Gay is also being seen today for additional medical problem/s.                Objective   Vital Signs:  BP 94/64 (BP Location: Left arm, Patient Position: Sitting, Cuff Size: Adult)   Pulse 64   Temp 97.7 °F (36.5 °C) (Oral)   Resp 16   Ht 182.9 cm (72\")   Wt 87.5 kg (193 lb)   SpO2 99%   BMI 26.18 kg/m²   Physical Exam  Constitutional:       Appearance: Normal appearance.   HENT:      Head: Normocephalic and atraumatic.   Cardiovascular:      Heart sounds: Normal heart sounds.   Pulmonary:      Breath sounds: Normal breath sounds.   Abdominal:      General: Bowel sounds are normal. There is no distension.      Palpations: Abdomen is soft.      Tenderness: There is no abdominal tenderness.   Neurological:      Mental Status: He is alert and oriented to person, place, and time.         The following data was reviewed by: Gloria Post MD on 02/14/2025:    Common labs          11/21/2024    11:38 1/16/2025    15:18 1/16/2025    15:19 1/20/2025    12:47   Common Labs   Glucose 240  322      BUN 13  18      Creatinine 0.87  1.02  1.00     Sodium 136  134      Potassium 4.8  5.0      Chloride 101  99      Calcium 8.6  9.0      Albumin 2.9  2.8      Total Bilirubin 0.4  0.7      Alkaline Phosphatase 97  120      AST (SGOT) 58  63      ALT (SGPT) 21  27      WBC 3.98  4.68   4.76    Hemoglobin 12.5  14.0   12.2    Hematocrit 39.6  44.3   38.5    Platelets 78  84   75              Assessment and Plan Additional age appropriate preventative wellness advice topics were discussed during today's preventative wellness exam(some topics already addressed during AWV portion of the note above):    Physical Activity: Advised cardiovascular activity 150 minutes per week as tolerated. (example brisk walk for 30 minutes, 5 days a week).     Nutrition: Discussed " nutrition plan with patient. Information shared in after visit summary. Goal is for a well balanced diet to enhance overall health.     Healthy Weight: Discussed current and goal BMI with patient. Steps to attain this goal discussed. Information shared in after visit summary.           Medicare annual wellness visit, subsequent  Discussed the importance of maintaining a healthy weight and getting regular exercise.  Educated patient on the benefits of healthy diet.  Advise follow-up annually for wellness exams.       At moderate risk for fall  At home fall prevention instructions given  Prescription for 3 leg gait cane given       Immunization due  Reported to have received flu vaccination in the hospital  Recommended COVID, RSV and shingles vaccine at his pharmacy       Diabetes mellitus without complication  Diabetes is stable.   Continue current treatment regimen.  Discussed foot care.  Reminded to get yearly retinal exam.  Diabetes will be reassessed in 3 months    Orders:    Basic Metabolic Panel; Future    Hemoglobin A1c; Future    Microalbumin / Creatinine Urine Ratio - Urine, Clean Catch; Future    Mixed hyperlipidemia   Lipid abnormalities are stable    Plan:  Continue same medication/s without change.      Patient Treatment Goals:   LDL goal is less than 55    Followup in 3 months.    Orders:    Lipid Panel With / Chol / HDL Ratio; Future    Primary hypertension  Hypertension is stable but soft  Discontinue atenolol for now and continue BP monitoring at home  Medication changes per orders.  Ambulatory blood pressure monitoring.  Blood pressure will be reassessed in 3 months.    Orders:    Basic Metabolic Panel; Future            Follow Up   No follow-ups on file.  Patient was given instructions and counseling regarding his condition or for health maintenance advice. Please see specific information pulled into the AVS if appropriate.

## 2025-02-14 NOTE — PATIENT INSTRUCTIONS
Advance Care Planning and Advance Directives     You make decisions on a daily basis - decisions about where you want to live, your career, your home, your life. Perhaps one of the most important decisions you face is your choice for future medical care. Take time to talk with your family and your healthcare team and start planning today.  Advance Care Planning is a process that can help you:  Understand possible future healthcare decisions in light of your own experiences  Reflect on those decision in light of your goals and values  Discuss your decisions with those closest to you and the healthcare professionals that care for you  Make a plan by creating a document that reflects your wishes    Surrogate Decision Maker  In the event of a medical emergency, which has left you unable to communicate or to make your own decisions, you would need someone to make decisions for you.  It is important to discuss your preferences for medical treatment with this person while you are in good health.     Qualities of a surrogate decision maker:  Willing to take on this role and responsibility  Knows what you want for future medical care  Willing to follow your wishes even if they don't agree with them  Able to make difficult medical decisions under stressful circumstances    Advance Directives  These are legal documents you can create that will guide your healthcare team and decision maker(s) when needed. These documents can be stored in the electronic medical record.    Living Will - a legal document to guide your care if you have a terminal condition or a serious illness and are unable to communicate. States vary by statute in document names/types, but most forms may include one or more of the following:        -  Directions regarding life-prolonging treatments        -  Directions regarding artificially provided nutrition/hydration        -  Choosing a healthcare decision maker        -  Direction regarding organ/tissue  donation    Durable Power of  for Healthcare - this document names an -in-fact to make medical decisions for you, but it may also allow this person to make personal and financial decisions for you. Please seek the advice of an  if you need this type of document.    **Advance Directives are not required and no one may discriminate against you if you do not sign one.    Medical Orders  Many states allow specific forms/orders signed by your physician to record your wishes for medical treatment in your current state of health. This form, signed in personal communication with your physician, addresses resuscitation and other medical interventions that you may or may not want.      For more information or to schedule a time with a [facility] Advance Care Planning Facilitator contact: [facility] or call [phone] and someone will contact you directly.  Fall Prevention in the Home, Adult  Falls can cause injuries and affect people of all ages. There are many simple things that you can do to make your home safe and to help prevent falls.  If you need it, ask for help making these changes.  What actions can I take to prevent falls?  General information  Use good lighting in all rooms. Make sure to:  Replace any light bulbs that burn out.  Turn on lights if it is dark and use night-lights.  Keep items that you use often in easy-to-reach places. Lower the shelves around your home if needed.  Move furniture so that there are clear paths around it.  Do not keep throw rugs or other things on the floor that can make you trip.  If any of your floors are uneven, fix them.  Add color or contrast paint or tape to clearly jef and help you see:  Grab bars or handrails.  First and last steps of staircases.  Where the edge of each step is.  If you use a ladder or stepladder:  Make sure that it is fully opened. Do not climb a closed ladder.  Make sure the sides of the ladder are locked in  place.  Have someone hold the ladder while you use it.  Know where your pets are as you move through your home.  What can I do in the bathroom?         Keep the floor dry. Clean up any water that is on the floor right away.  Remove soap buildup in the bathtub or shower. Buildup makes bathtubs and showers slippery.  Use non-skid mats or decals on the floor of the bathtub or shower.  Attach bath mats securely with double-sided, non-slip rug tape.  If you need to sit down while you are in the shower, use a non-slip stool.  Install grab bars by the toilet and in the bathtub and shower. Do not use towel bars as grab bars.  What can I do in the bedroom?  Make sure that you have a light by your bed that is easy to reach.  Do not use any sheets or blankets on your bed that hang to the floor.  Have a firm bench or chair with side arms that you can use for support when you get dressed.  What can I do in the kitchen?  Clean up any spills right away.  If you need to reach something above you, use a sturdy step stool that has a grab bar.  Keep electrical cables out of the way.  Do not use floor polish or wax that makes floors slippery.  What can I do with my stairs?  Do not leave anything on the stairs.  Make sure that you have a light switch at the top and the bottom of the stairs. Have them installed if you do not have them.  Make sure that there are handrails on both sides of the stairs. Fix handrails that are broken or loose. Make sure that handrails are as long as the staircases.  Install non-slip stair treads on all stairs in your home if they do not have carpet.  Avoid having throw rugs at the top or bottom of stairs, or secure the rugs with carpet tape to prevent them from moving.  Choose a carpet design that does not hide the edge of steps on the stairs. Make sure that carpet is firmly attached to the stairs. Fix any carpet that is loose or worn.  What can I do on the outside of my home?  Use bright outdoor  lighting.  Repair the edges of walkways and driveways and fix any cracks. Clear paths of anything that can make you trip, such as tools or rocks.  Add color or contrast paint or tape to clearly jef and help you see high doorway thresholds.  Trim any bushes or trees on the main path into your home.  Check that handrails are securely fastened and in good repair. Both sides of all steps should have handrails.  Install guardrails along the edges of any raised decks or porches.  Have leaves, snow, and ice cleared regularly. Use sand, salt, or ice melt on walkways during winter months if you live where there is ice and snow.  In the garage, clean up any spills right away, including grease or oil spills.  What other actions can I take?  Review your medicines with your health care provider. Some medicines can make you confused or feel dizzy. This can increase your chance of falling.  Wear closed-toe shoes that fit well and support your feet. Wear shoes that have rubber soles and low heels.  Use a cane, walker, scooter, or crutches that help you move around if needed.  Talk with your provider about other ways that you can decrease your risk of falls. This may include seeing a physical therapist to learn to do exercises to improve movement and strength.  Where to find more information  Centers for Disease Control and PreventionJOSE G: cdc.gov  National [place] on Aging: isauro.nih.gov  National [place] on Aging: isauro.nih.gov  Contact a health care provider if:  You are afraid of falling at home.  You feel weak, drowsy, or dizzy at home.  You fall at home.  Get help right away if you:  Lose consciousness or have trouble moving after a fall.  Have a fall that causes a head injury.  These symptoms may be an emergency. Get help right away. Call 911.  Do not wait to see if the symptoms will go away.  Do not drive yourself to the hospital.  This information is not intended to replace advice given to you by your health care  provider. Make sure you discuss any questions you have with your health care provider.  Document Revised: 08/21/2023 Document Reviewed: 08/21/2023  Elsevier Patient Education © 2024 [employer]  Sit-to-Stand Exercise    The sit-to-stand exercise (also known as the chair stand or chair rise exercise) strengthens your lower body and helps you maintain or improve your mobility and independence. The end goal is to do the sit-to-stand exercise without using your hands. This will be easier as you become stronger. You should always talk with your health care provider before starting any exercise program, especially if you have had recent surgery.  Do the exercise exactly as told by your health care provider and adjust it as directed. It is normal to feel mild stretching, pulling, tightness, or discomfort as you do this exercise, but you should stop right away if you feel sudden pain or your pain gets worse. Do not begin doing this exercise until told by your health care provider.  What the sit-to-stand exercise does  The sit-to-stand exercise helps to strengthen the muscles in your thighs and the muscles in the center of your body that give you stability (core muscles). This exercise is especially helpful if:  You have had knee or hip surgery.  You have trouble getting up from a chair, out of a car, or off the toilet due to muscle weakness.  How to do the sit-to-stand exercise  Sit toward the front edge of a sturdy chair without armrests. Your knees should be bent and your feet should be flat on the floor and shoulder-width apart and underneath your hips.  Place your hands lightly on each side of the seat. Keep your back and neck as straight as possible, with your chest slightly forward.  Breathe in slowly. Lean forward and slightly shift your weight to the front of your feet.  Breathe out as you slowly stand up. Try not to support any weight with your hands.  Stand and pause for a full breath in and out.  Breathe in as  you sit down slowly. Tighten your core and abdominal muscles to control your lowering as much as possible. You should lower yourself back to the chair slowly, not just drop back into the seat.  Breathe out slowly.  Do this exercise 10-15 times. If needed, do it fewer times until you build up strength.  Rest for 1 minute, then do another set of 10-15 repetitions.  To change the difficulty of the sit-to-stand exercise  If the exercise is too difficult, use a chair with sturdy armrests, and push off the armrests to help you come to the standing position. You can also use the armrests to help slowly lower yourself back to sitting. As this gets easier, try to use your arms less. You can also place a firm cushion or pillow on the chair to make the surface higher.  If this exercise is too easy, do not use your arms to help raise or lower yourself. You can also wear a weighted vest, use hand weights, increase your repetitions, or try a lower chair.  General tips  You may feel tired when starting an exercise routine. This is normal.  You may have muscle soreness that lasts a few days. This is normal. As you get stronger, you may not feel muscle soreness.  Use smooth, steady movements.  Do not  hold your breath during strength exercises. This can cause unsafe changes in your blood pressure.  Breathe in slowly through your nose, and breathe out slowly through your mouth.  Summary  Strengthening your lower body is an important step to help you move safely and independently.  The sit-to-stand exercise helps strengthen the muscles in your thighs and core.  You should always talk with your health care provider before starting any exercise program, especially if you have had recent surgery.  This information is not intended to replace advice given to you by your health care provider. Make sure you discuss any questions you have with your health care provider.  Document Revised: 04/10/2022 Document Reviewed: 04/10/2022  [employer]  Patient Education ©  [employer]  You are due for Shingrix vaccination series ( the newest shingles vaccine).  It is a two shot series spaced 2-6 months apart. Please get this vaccine series started at your earliest convenience at your local pharmacy to help avoid shingles outbreak. It is more effective than the old Zostavax vaccine and is recommended even if you have had the Zostavax vaccine in the past.  Once the Shingrix series is completed, it does not need to be repeated.   For more information, please look at the website below:  https://www.cdc.gov/vaccines/vpd/shingles/public/shingrix/index.html    You are due for RSV vaccination. (provides protection against Respiratory Syncytial Virus Infection) Please  get the immunization at your local pharmacy at your earliest convenience. This immunization is currently a one time vaccine only. Please click on the link for more information about this vaccine.   https://www.cdc.gov/rsv/vaccines/older-adults.html    You are due for a Covid 19 vaccination. (provides protection against Covid 19 Viral Infection) Please  talk to your pharmacist and get the immunization at your local pharmacy at your earliest convenience. Please click on the link for more information about this vaccine.   https://www.cdc.gov/coronavirus/2019-ncov/vaccines/stay-up-to-date.html        Medicare Wellness  Personal Prevention Plan of Service     Date of Office Visit:    Encounter Provider:  Gloria Post MD  Place of Service:  [facility]  Patient Name: Gay Vazquez  :  1945    As part of the Medicare Wellness portion of your visit today, we are providing you with this personalized preventive plan of services (PPPS). This plan is based upon recommendations of the [place] Preventive Services Task Force (USPSTF) and the Advisory Committee on Immunization Practices (ACIP).    This lists the preventive care services that should be considered, and  provides dates of when you are due. Items listed as completed are up-to-date and do not require any further intervention.    Health Maintenance   Topic Date Due   • DIABETIC FOOT EXAM  Never done   • ZOSTER VACCINE (1 of 2) Never done   • RSV Vaccine - Adults (1 - 1-dose 75+ series) Never done   • DIABETIC EYE EXAM  03/30/2022   • ANNUAL WELLNESS VISIT  06/08/2023   • INFLUENZA VACCINE  07/01/2024   • COVID-19 Vaccine (4 - 2024-25 season) 09/01/2024   • Pneumococcal Vaccine 50+ (3 of 3 - PCV20 or PCV21) 09/16/2024   • HEMOGLOBIN A1C  04/08/2025   • BMI FOLLOWUP  07/25/2025   • LIPID PANEL  10/08/2025   • URINE MICROALBUMIN-CREATININE RATIO (uACR)  10/08/2025   • LUNG CANCER SCREENING  01/16/2026   • TDAP/TD VACCINES (3 - Td or Tdap) 06/02/2033   • HEPATITIS C SCREENING  Completed   • COLORECTAL CANCER SCREENING  Discontinued       No orders of the defined types were placed in this encounter.      No follow-ups on file.

## 2025-02-14 NOTE — ASSESSMENT & PLAN NOTE
Diabetes is stable.   Continue current treatment regimen.  Discussed foot care.  Reminded to get yearly retinal exam.  Diabetes will be reassessed in 3 months    Orders:    Basic Metabolic Panel; Future    Hemoglobin A1c; Future    Microalbumin / Creatinine Urine Ratio - Urine, Clean Catch; Future

## 2025-02-14 NOTE — TELEPHONE ENCOUNTER
HUB TO RELAY:    M FOR TUCKER, PT'S WIFE..AGATA WANTED TO INFORM PT THAT DR SIMMONS HAS WRITTEN THE ORDER THEY WERE WAITING ON FOR THE THREE LEGGED CANE. THEY CAN COME PICK THIS UP TODAY IF THEY'D LIKE BUT NEEDS TO BE BEFORE 4:30 OTHERWISE IT WILL HAVE TO BE NEXT WEEK.

## 2025-02-14 NOTE — ASSESSMENT & PLAN NOTE
Hypertension is stable but soft  Discontinue atenolol for now and continue BP monitoring at home  Medication changes per orders.  Ambulatory blood pressure monitoring.  Blood pressure will be reassessed in 3 months.    Orders:    Basic Metabolic Panel; Future

## 2025-02-14 NOTE — ASSESSMENT & PLAN NOTE
Lipid abnormalities are stable    Plan:  Continue same medication/s without change.      Patient Treatment Goals:   LDL goal is less than 55    Followup in 3 months.    Orders:    Lipid Panel With / Chol / HDL Ratio; Future

## 2025-02-24 ENCOUNTER — TELEPHONE (OUTPATIENT)
Dept: ONCOLOGY | Facility: CLINIC | Age: 80
End: 2025-02-24
Payer: MEDICARE

## 2025-02-24 NOTE — TELEPHONE ENCOUNTER
Provider: Terrell  Caller: Paula  Relationship to Patient: wife  Call Back Phone Number: [phone]  Reason for Call: she is calling to see if he can restart any of his medication for Arthritis

## 2025-03-04 DIAGNOSIS — C22.0 HEPATOCELLULAR CARCINOMA: ICD-10-CM

## 2025-03-04 RX ORDER — OXYCODONE HYDROCHLORIDE 10 MG/1
10 TABLET ORAL EVERY 4 HOURS PRN
Qty: 100 TABLET | Refills: 0 | Status: SHIPPED | OUTPATIENT
Start: 2025-03-04

## 2025-03-04 RX ORDER — MORPHINE SULFATE 15 MG/1
15 TABLET, FILM COATED, EXTENDED RELEASE ORAL 2 TIMES DAILY
Qty: 60 TABLET | Refills: 0 | Status: SHIPPED | OUTPATIENT
Start: 2025-03-04 | End: 2025-03-05 | Stop reason: SDUPTHER

## 2025-03-04 NOTE — TELEPHONE ENCOUNTER
Caller: PHAN PULLIAMS    Relationship: Emergency Contact    Best call back number: [phone]    Requested Prescriptions:   Requested Prescriptions     Pending Prescriptions Disp Refills    Morphine (MS CONTIN) 15 MG 12 hr tablet 60 tablet 0     Sig: Take 1 tablet by mouth 2 (Two) Times a Day.    oxyCODONE (ROXICODONE) 10 MG tablet 100 tablet 0     Sig: Take 1 tablet by mouth Every 4 (Four) Hours As Needed for Moderate Pain.        Pharmacy where request should be sent: [facility] [address] [place] [address] [address] - [phone]  - [phone] FX     Last office visit with prescribing clinician: 1/20/2025   Last telemedicine visit with prescribing clinician: Visit date not found   Next office visit with prescribing clinician: 5/12/2025       Does the patient have less than a 3 day supply:  [x] Yes  [] No    Deepak Herrera Rep   03/04/25 10:10 EST

## 2025-03-05 DIAGNOSIS — C22.0 HEPATOCELLULAR CARCINOMA: ICD-10-CM

## 2025-03-05 RX ORDER — MORPHINE SULFATE 15 MG/1
15 TABLET, FILM COATED, EXTENDED RELEASE ORAL 2 TIMES DAILY
Qty: 40 TABLET | Refills: 0 | Status: SHIPPED | OUTPATIENT
Start: 2025-03-05

## 2025-03-05 RX ORDER — PANTOPRAZOLE SODIUM 40 MG/1
40 TABLET, DELAYED RELEASE ORAL DAILY
Qty: 90 TABLET | Refills: 1 | Status: SHIPPED | OUTPATIENT
Start: 2025-03-05

## 2025-03-05 NOTE — TELEPHONE ENCOUNTER
Caller: TUCKER PULLIAM    Relationship: Emergency Contact    Best call back number: [phone]    What is the best time to reach you: ANY    Who are you requesting to speak with (clinical staff, provider,  specific staff member): CLINICAL       What was the call regarding: TUCKER IS CALLING STATES THAT [facility] ONLY HAS 20 PILLS OF THE Morphine (MS CONTIN) 15 MG 12 hr tablet   SHE IS WANTING TO KNOW IF SHE FILLS IT FOR 20 PILLS WOULD HE BE ABLE TO GET ANOTHER REFILL ONCE THAT MEDICATION IS GONE      PLEASE ADVISE

## 2025-03-09 DIAGNOSIS — M06.9 RHEUMATOID ARTHRITIS INVOLVING BOTH HANDS, UNSPECIFIED WHETHER RHEUMATOID FACTOR PRESENT: ICD-10-CM

## 2025-03-09 DIAGNOSIS — E78.2 MIXED HYPERLIPIDEMIA: ICD-10-CM

## 2025-03-10 RX ORDER — MAGNESIUM OXIDE TAB 400 MG (241.3 MG ELEMENTAL MG) 400 (241.3 MG) MG
1 TAB ORAL
Qty: 30 TABLET | Refills: 0 | Status: SHIPPED | OUTPATIENT
Start: 2025-03-10

## 2025-03-10 RX ORDER — GABAPENTIN 300 MG/1
CAPSULE ORAL
Qty: 60 CAPSULE | Refills: 0 | Status: SHIPPED | OUTPATIENT
Start: 2025-03-10

## 2025-03-10 RX ORDER — PRAVASTATIN SODIUM 80 MG/1
80 TABLET ORAL DAILY
Qty: 90 TABLET | Refills: 0 | Status: SHIPPED | OUTPATIENT
Start: 2025-03-10

## 2025-03-10 RX ORDER — METFORMIN HYDROCHLORIDE 500 MG/1
1000 TABLET, EXTENDED RELEASE ORAL DAILY
Qty: 90 TABLET | Refills: 0 | Status: SHIPPED | OUTPATIENT
Start: 2025-03-10

## 2025-03-11 ENCOUNTER — OFFICE VISIT (OUTPATIENT)
Dept: ORTHOPEDIC SURGERY | Facility: CLINIC | Age: 80
End: 2025-03-11
Payer: MEDICARE

## 2025-03-11 VITALS — TEMPERATURE: 98.6 F | WEIGHT: 193 LBS | BODY MASS INDEX: 26.14 KG/M2 | HEIGHT: 72 IN

## 2025-03-11 DIAGNOSIS — M25.551 RIGHT HIP PAIN: ICD-10-CM

## 2025-03-11 DIAGNOSIS — M16.11 PRIMARY OSTEOARTHRITIS OF RIGHT HIP: Primary | ICD-10-CM

## 2025-03-11 PROCEDURE — 99214 OFFICE O/P EST MOD 30 MIN: CPT | Performed by: ORTHOPAEDIC SURGERY

## 2025-03-11 PROCEDURE — 1159F MED LIST DOCD IN RCRD: CPT | Performed by: ORTHOPAEDIC SURGERY

## 2025-03-11 PROCEDURE — 1160F RVW MEDS BY RX/DR IN RCRD: CPT | Performed by: ORTHOPAEDIC SURGERY

## 2025-03-11 NOTE — PROGRESS NOTES
General Exam    Patient: Gay Vazquez    YOB: 1945    Medical Record Number: [id number]    Chief Complaints: Right hip pain    History of Present Illness:     79 y.o. male patient who presents for evaluation right hip pain.  Patient states he has been having hip issues for some time.  He states it is laterally and deep in the buttock area.  Hard to mobilize although he does with a walking aid.  Does have a significant medical history and does not feel he would be able to tolerate any surgery at this time.    Denies any numbness or tingling.  Denies any fevers, cough or shortness of breath.    Allergies:   Allergies   Allergen Reactions    Chlorhexidine Dermatitis     Please use alcohol to clean port site       Home Medications:      Current Outpatient Medications:     gabapentin (NEURONTIN) 300 MG capsule, TAKE 2 CAPSULES BY MOUTH ONCE DAILY AT NIGHT AT BEDTIME, Disp: 60 capsule, Rfl: 0    hydrOXYzine (ATARAX) 25 MG tablet, Take 1 tablet by mouth 3 (Three) Times a Day As Needed for Itching., Disp: 60 tablet, Rfl: 3    MAGnesium-Oxide 400 (240 Mg) MG tablet, TAKE 1 TABLET BY MOUTH ONCE DAILY AT BEDTIME, Disp: 30 tablet, Rfl: 0    metFORMIN ER (GLUCOPHAGE-XR) 500 MG 24 hr tablet, Take 2 tablets by mouth once daily, Disp: 90 tablet, Rfl: 0    Morphine (MS CONTIN) 15 MG 12 hr tablet, Take 1 tablet by mouth 2 (Two) Times a Day., Disp: 40 tablet, Rfl: 0    naloxone (NARCAN) 4 MG/0.1ML nasal spray, Call 911. Don't prime. [place] in 1 nostril for overdose. Repeat in 2-3 minutes in other nostril if no or minimal breathing/responsiveness., Disp: 2 each, Rfl: 0    OLANZapine (zyPREXA) 5 MG tablet, Take 1 tablet by mouth Every Night., Disp: 30 tablet, Rfl: 3    oxyCODONE (ROXICODONE) 10 MG tablet, Take 1 tablet by mouth Every 4 (Four) Hours As Needed for Moderate Pain., Disp: 100 tablet, Rfl: 0    pantoprazole (PROTONIX) 40 MG EC tablet, Take 1 tablet by mouth once daily, Disp: 90 tablet, Rfl: 1    pilocarpine  (SALAGEN) 5 MG tablet, Take 1 tablet by mouth 3 times a day., Disp: , Rfl:     pravastatin (PRAVACHOL) 80 MG tablet, Take 1 tablet by mouth once daily, Disp: 90 tablet, Rfl: 0    sennosides-docusate (PERICOLACE) 8.6-50 MG per tablet, Take 2 tablets by mouth 2 (Two) Times a Day., Disp: , Rfl:     doxazosin (CARDURA) 2 MG tablet, TAKE 1 TABLET BY MOUTH ONCE DAILY AT BEDTIME, Disp: 30 tablet, Rfl: 0    lactulose (CHRONULAC) 10 GM/15ML solution, Take 30 mL by mouth 2 (Two) Times a Day As Needed (constipation)., Disp: 240 mL, Rfl: 0    ondansetron ODT (ZOFRAN-ODT) 4 MG disintegrating tablet, Place 1 tablet on the tongue Every 6 (Six) Hours As Needed for Nausea or Vomiting., Disp: 12 tablet, Rfl: 0  No current facility-administered medications for this visit.    Facility-Administered Medications Ordered in Other Visits:     heparin injection 500 Units, 500 Units, Intravenous, PRN, Anthony Tejada MD, 500 Units at 09/13/24 1037    sodium chloride 0.9 % flush 10 mL, 10 mL, Intravenous, PRN, Anthony Tejada MD, 10 mL at 09/13/24 1037    Past Medical History:   Diagnosis Date    Cancer     Liver    Colon polyp     Diabetes mellitus     Erectile dysfunction 2/22    GERD (gastroesophageal reflux disease) 1/14    Hyperlipidemia     Hypertension     Kidney stone     Liver cancer 07-    Liver disease     Low back pain     Postoperative wound infection     RA (rheumatoid arthritis)     Urinary tract infection        Past Surgical History:   Procedure Laterality Date    ABDOMINAL SURGERY      BACK SURGERY      CARDIAC CATHETERIZATION      CHOLECYSTECTOMY      COLONOSCOPY  2017    [facility]    COLONOSCOPY N/A 06/04/2024    Procedure: COLONOSCOPY INTO CECUM WITH POLYPECTOMIES (COLD SNARE);  Surgeon: Angel Ness MD;  Location: [facility] ENDOSCOPY;  Service: General;  Laterality: N/A;  PRE: COLON MASS, ABNORMAL CT  POST: DIVERTICULOSIS, POLYPS, POOR PREP    ENDOSCOPY N/A 06/04/2024  "   Procedure: ESOPHAGOGASTRODUODENOSCOPY;  Surgeon: Angel Ness MD;  Location: [facility] ENDOSCOPY;  Service: General;  Laterality: N/A;  PRE: LIVER MASS  POST: NORMAL EGD    EYE SURGERY      JOINT REPLACEMENT      PERICARDIUM SURGERY      REPLACEMENT TOTAL KNEE BILATERAL      THORACOTOMY Bilateral     VASECTOMY      VENOUS ACCESS DEVICE (PORT) INSERTION Right 2024    Procedure: INSERTION VENOUS ACCESS DEVICE;  Surgeon: Angel Ness MD;  Location: [facility] MAIN OR;  Service: General;  Laterality: Right;       Social History     Occupational History     Employer: RETIRED   Tobacco Use    Smoking status: Some Days     Current packs/day: 0.00     Average packs/day: 1 pack/day for 53.7 years (53.7 ttl pk-yrs)     Types: Cigarettes     Start date: 1970     Last attempt to quit: 10/1/2023     Years since quittin.4     Passive exposure: Past    Smokeless tobacco: Never   Vaping Use    Vaping status: Never Used   Substance and Sexual Activity    Alcohol use: Not Currently    Drug use: Never    Sexual activity: Not Currently     Partners: Female     Birth control/protection: Vasectomy      Social History     Social History Narrative    Not on file       Family History   Problem Relation Age of Onset    Hypertension Mother     Heart disease Mother     Arthritis Father     Diabetes Sister     Asthma Brother     Diabetes Brother     Malig Hyperthermia Neg Hx        Review of Systems:      Constitutional: Denies fever, shaking or chills         All other pertinent positives and negatives as noted above in HPI.    Physical Exam: 79 y.o. male    Vitals:    25 0858   Temp: 98.6 °F (37 °C)   TempSrc: Temporal   Weight: 87.5 kg (193 lb)   Height: 182.9 cm (72\")       General:  Patient is awake and alert.  Appears in no acute distress or discomfort.      Musculoskeletal/Extremities:    Right hip examined decreased range of motion due to discomfort.  Strength 4 out of 5 motor groups tested.  " Some tenderness laterally.         Radiology:       Previous films were personally reviewed as well as previous CT scan to evaluate the patient's complaint/s.    Previous films do show severe degenerative changes complete joint space loss bone sclerosis osteophyte formation and subchondral cyst.  This is also present on CT scan that was personally reviewed discussed with the patient.     No imaging for comparison.    Assessment: Right hip osteoarthritis, bursitis      Plan:      I discussed findings with the patient he has severe degenerative changes right hip.  I feel that this is the driving factor here starting at the hip causing the groin discomfort and soft tissue's to be started up.  He does not feel he is a candidate for surgery given his current medical status we will plan to proceed with conservative treatment at this time.  We will make referral to sports medicine for ultrasound-guided hip injection.  Patient to follow-up in the next 3 to 6 months.  All questions answered.           We will plan for follow up as above.    All questions were answered.  Patient understands and agrees with the plan.    Lane Vergara MD    03/11/2025    CC to Gloria Post MD

## 2025-03-17 ENCOUNTER — TELEPHONE (OUTPATIENT)
Dept: ONCOLOGY | Facility: CLINIC | Age: 80
End: 2025-03-17
Payer: MEDICARE

## 2025-03-17 NOTE — TELEPHONE ENCOUNTER
Caller: TUCKER PULLIAM - WIFE    Relationship to patient: Emergency Contact    Best call back number: [phone]    Chief complaint: CANC. - R/S     Type of visit: PORT FLUSH     Requested date: ANY DAY IN THE NEXT TWO WEEKS EXCEPT 3/18 & 3/24     If rescheduling, when is the original appointment: 3/24/25

## 2025-03-18 ENCOUNTER — OFFICE VISIT (OUTPATIENT)
Dept: SPORTS MEDICINE | Facility: CLINIC | Age: 80
End: 2025-03-18
Payer: MEDICARE

## 2025-03-18 VITALS
DIASTOLIC BLOOD PRESSURE: 78 MMHG | TEMPERATURE: 97.6 F | HEART RATE: 96 BPM | OXYGEN SATURATION: 97 % | HEIGHT: 72 IN | BODY MASS INDEX: 26.14 KG/M2 | SYSTOLIC BLOOD PRESSURE: 122 MMHG | WEIGHT: 193 LBS

## 2025-03-18 DIAGNOSIS — M16.11 PRIMARY OSTEOARTHRITIS OF RIGHT HIP: Primary | ICD-10-CM

## 2025-03-18 RX ORDER — METHYLPREDNISOLONE ACETATE 80 MG/ML
80 INJECTION, SUSPENSION INTRA-ARTICULAR; INTRALESIONAL; INTRAMUSCULAR; SOFT TISSUE
Status: COMPLETED | OUTPATIENT
Start: 2025-03-18 | End: 2025-03-18

## 2025-03-18 RX ADMIN — METHYLPREDNISOLONE ACETATE 80 MG: 80 INJECTION, SUSPENSION INTRA-ARTICULAR; INTRALESIONAL; INTRAMUSCULAR; SOFT TISSUE at 08:28

## 2025-03-18 NOTE — PROGRESS NOTES
Here today for right hip injection requested by Dr. Vergara for OA    - Large Joint Arthrocentesis: R hip joint on 3/18/2025 8:28 AM  Indications: pain  Details: 22 G needle, ultrasound-guided anterior approach  Medications: 80 mg methylPREDNISolone acetate 80 MG/ML  Outcome: tolerated well, no immediate complications  Procedure, treatment alternatives, risks and benefits explained, specific risks discussed. Immediately prior to procedure a time out was called to verify the correct patient, procedure, equipment, support staff and site/side marked as required. Patient was prepped and draped in the usual sterile fashion.           Diagnoses and all orders for this visit:    Primary osteoarthritis of right hip  -     - Large Joint Arthrocentesis       Injection tolerated well.  Follow-up as needed for repeat injection as soon as 3 months.

## 2025-03-21 ENCOUNTER — INFUSION (OUTPATIENT)
Dept: ONCOLOGY | Facility: HOSPITAL | Age: 80
End: 2025-03-21
Payer: MEDICARE

## 2025-03-21 DIAGNOSIS — Z45.2 FITTING AND ADJUSTMENT OF VASCULAR CATHETER: Primary | ICD-10-CM

## 2025-03-21 PROCEDURE — 96523 IRRIG DRUG DELIVERY DEVICE: CPT

## 2025-03-21 PROCEDURE — 25010000002 HEPARIN LOCK FLUSH PER 10 UNITS: Performed by: INTERNAL MEDICINE

## 2025-03-21 RX ORDER — SODIUM CHLORIDE 0.9 % (FLUSH) 0.9 %
10 SYRINGE (ML) INJECTION AS NEEDED
OUTPATIENT
Start: 2025-03-21

## 2025-03-21 RX ORDER — HEPARIN SODIUM (PORCINE) LOCK FLUSH IV SOLN 100 UNIT/ML 100 UNIT/ML
500 SOLUTION INTRAVENOUS AS NEEDED
OUTPATIENT
Start: 2025-03-21

## 2025-03-21 RX ORDER — HEPARIN SODIUM (PORCINE) LOCK FLUSH IV SOLN 100 UNIT/ML 100 UNIT/ML
500 SOLUTION INTRAVENOUS AS NEEDED
Status: DISCONTINUED | OUTPATIENT
Start: 2025-03-21 | End: 2025-03-21 | Stop reason: HOSPADM

## 2025-03-21 RX ADMIN — Medication 500 UNITS: at 14:25

## 2025-03-24 DIAGNOSIS — C22.0 HEPATOCELLULAR CARCINOMA: ICD-10-CM

## 2025-03-24 RX ORDER — MORPHINE SULFATE 15 MG/1
15 TABLET, FILM COATED, EXTENDED RELEASE ORAL 2 TIMES DAILY
Qty: 60 TABLET | Refills: 0 | Status: SHIPPED | OUTPATIENT
Start: 2025-03-24 | End: 2025-03-25 | Stop reason: SDUPTHER

## 2025-03-24 NOTE — TELEPHONE ENCOUNTER
Caller: Gay Vazquez    Relationship: Self    Best call back number: [phone]     Requested Prescriptions:   Requested Prescriptions     Pending Prescriptions Disp Refills    Morphine (MS CONTIN) 15 MG 12 hr tablet 40 tablet 0     Sig: Take 1 tablet by mouth 2 (Two) Times a Day.        Pharmacy where request should be sent: [facility] [address] [place] [address] [id number] [place] [address] [phone] [facility] [phone]      Last office visit with prescribing clinician: 1/20/2025   Last telemedicine visit with prescribing clinician: Visit date not found   Next office visit with prescribing clinician: 5/12/2025     STATES PATIENT ONLY RECEIVED A QUANTITY OF 40 ON LAST REFILL      Does the patient have less than a 3 day supply:  [x] Yes  [] No    Would you like a call back once the refill request has been completed: [x] Yes [] No    If the office needs to give you a call back, can they leave a voicemail: [x] Yes [] No    Deepak Haider Rep   03/24/25 09:17 EDT

## 2025-03-25 RX ORDER — MORPHINE SULFATE 15 MG/1
15 TABLET, FILM COATED, EXTENDED RELEASE ORAL 2 TIMES DAILY
Qty: 60 TABLET | Refills: 0 | Status: SHIPPED | OUTPATIENT
Start: 2025-03-25

## 2025-03-25 NOTE — TELEPHONE ENCOUNTER
Caller: TUCKER PULLIAM    Relationship: Emergency Contact    Best call back number: [phone]     Requested Prescriptions:   Requested Prescriptions     Pending Prescriptions Disp Refills    Morphine (MS CONTIN) 15 MG 12 hr tablet 60 tablet 0     Sig: Take 1 tablet by mouth 2 (Two) Times a Day.     Signed Prescriptions Disp Refills    Morphine (MS CONTIN) 15 MG 12 hr tablet 60 tablet 0     Sig: Take 1 tablet by mouth 2 (Two) Times a Day.     Authorizing Provider: TIFFANY HINES        [facility] be [id number] [id number] [place] [address] AT [facility] [address] - [phone] [facility] [phone]      Last office visit with prescribing clinician: 1/20/2025   Last telemedicine visit with prescribing clinician: Visit date not found   Next office visit with prescribing clinician: 5/12/2025     Additional details provided by patient: PREVIOUS PHARMACY DID NOT HAVE IN STOCK    Does the patient have less than a 3 day supply:  [x] Yes  [] No    Would you like a call back once the refill request has been completed: [x] Yes [] No    If the office needs to give you a call back, can they leave a voicemail: [x] Yes [] No    Deepak Haider Rep   03/25/25 11:07 EDT

## 2025-04-08 DIAGNOSIS — M06.9 RHEUMATOID ARTHRITIS INVOLVING BOTH HANDS, UNSPECIFIED WHETHER RHEUMATOID FACTOR PRESENT: ICD-10-CM

## 2025-04-08 RX ORDER — MAGNESIUM OXIDE TAB 400 MG (241.3 MG ELEMENTAL MG) 400 (241.3 MG) MG
1 TAB ORAL
Qty: 30 TABLET | Refills: 0 | Status: SHIPPED | OUTPATIENT
Start: 2025-04-08

## 2025-04-09 RX ORDER — GABAPENTIN 300 MG/1
CAPSULE ORAL
Qty: 60 CAPSULE | Refills: 0 | Status: SHIPPED | OUTPATIENT
Start: 2025-04-09

## 2025-04-21 ENCOUNTER — LAB (OUTPATIENT)
Dept: LAB | Facility: HOSPITAL | Age: 80
End: 2025-04-21
Payer: MEDICARE

## 2025-04-21 ENCOUNTER — HOSPITAL ENCOUNTER (OUTPATIENT)
Dept: CT IMAGING | Facility: HOSPITAL | Age: 80
Discharge: HOME | End: 2025-04-21
Payer: MEDICARE

## 2025-04-21 DIAGNOSIS — C22.1 CHOLANGIOCARCINOMA: ICD-10-CM

## 2025-04-21 LAB
ALBUMIN SERPL-MCNC: 3 G/DL (ref 3.5–5.2)
ALBUMIN/GLOB SERPL: 0.7 G/DL
ALP SERPL-CCNC: 100 U/L (ref 39–117)
ALT SERPL W P-5'-P-CCNC: 19 U/L (ref 1–41)
ANION GAP SERPL CALCULATED.3IONS-SCNC: 12 MMOL/L (ref 5–15)
AST SERPL-CCNC: 42 U/L (ref 1–40)
BASOPHILS # BLD AUTO: 0.03 10*3/MM3 (ref 0–0.2)
BASOPHILS NFR BLD AUTO: 0.7 % (ref 0–1.5)
BILIRUB SERPL-MCNC: 0.8 MG/DL (ref 0–1.2)
BUN SERPL-MCNC: 12 MG/DL (ref 8–23)
BUN/CREAT SERPL: 13.2 (ref 7–25)
CALCIUM SPEC-SCNC: 8.8 MG/DL (ref 8.6–10.5)
CANCER AG19-9 SERPL-ACNC: 51.1 U/ML
CHLORIDE SERPL-SCNC: 98 MMOL/L (ref 98–107)
CO2 SERPL-SCNC: 26 MMOL/L (ref 22–29)
CREAT BLDA-MCNC: 0.8 MG/DL (ref 0.6–1.3)
CREAT SERPL-MCNC: 0.91 MG/DL (ref 0.76–1.27)
DEPRECATED RDW RBC AUTO: 44.2 FL (ref 37–54)
EGFRCR SERPLBLD CKD-EPI 2021: 86.3 ML/MIN/1.73
EOSINOPHIL # BLD AUTO: 0.06 10*3/MM3 (ref 0–0.4)
EOSINOPHIL NFR BLD AUTO: 1.5 % (ref 0.3–6.2)
ERYTHROCYTE [DISTWIDTH] IN BLOOD BY AUTOMATED COUNT: 13.3 % (ref 12.3–15.4)
GLOBULIN UR ELPH-MCNC: 4.6 GM/DL
GLUCOSE SERPL-MCNC: 284 MG/DL (ref 65–99)
HCT VFR BLD AUTO: 41.2 % (ref 37.5–51)
HGB BLD-MCNC: 13.8 G/DL (ref 13–17.7)
IMM GRANULOCYTES # BLD AUTO: 0.01 10*3/MM3 (ref 0–0.05)
IMM GRANULOCYTES NFR BLD AUTO: 0.2 % (ref 0–0.5)
LYMPHOCYTES # BLD AUTO: 0.87 10*3/MM3 (ref 0.7–3.1)
LYMPHOCYTES NFR BLD AUTO: 21.5 % (ref 19.6–45.3)
MCH RBC QN AUTO: 30.3 PG (ref 26.6–33)
MCHC RBC AUTO-ENTMCNC: 33.5 G/DL (ref 31.5–35.7)
MCV RBC AUTO: 90.4 FL (ref 79–97)
MONOCYTES # BLD AUTO: 0.36 10*3/MM3 (ref 0.1–0.9)
MONOCYTES NFR BLD AUTO: 8.9 % (ref 5–12)
NEUTROPHILS NFR BLD AUTO: 2.72 10*3/MM3 (ref 1.7–7)
NEUTROPHILS NFR BLD AUTO: 67.2 % (ref 42.7–76)
NRBC BLD AUTO-RTO: 0 /100 WBC (ref 0–0.2)
PLATELET # BLD AUTO: 72 10*3/MM3 (ref 140–450)
PMV BLD AUTO: 11.3 FL (ref 6–12)
POTASSIUM SERPL-SCNC: 4.3 MMOL/L (ref 3.5–5.2)
PROT SERPL-MCNC: 7.6 G/DL (ref 6–8.5)
RBC # BLD AUTO: 4.56 10*6/MM3 (ref 4.14–5.8)
SODIUM SERPL-SCNC: 136 MMOL/L (ref 136–145)
WBC NRBC COR # BLD AUTO: 4.05 10*3/MM3 (ref 3.4–10.8)

## 2025-04-21 PROCEDURE — 25510000001 IOPAMIDOL 61 % SOLUTION: Performed by: INTERNAL MEDICINE

## 2025-04-21 PROCEDURE — 74177 CT ABD & PELVIS W/CONTRAST: CPT

## 2025-04-21 PROCEDURE — 85025 COMPLETE CBC W/AUTO DIFF WBC: CPT

## 2025-04-21 PROCEDURE — 86301 IMMUNOASSAY TUMOR CA 19-9: CPT

## 2025-04-21 PROCEDURE — 82565 ASSAY OF CREATININE: CPT

## 2025-04-21 PROCEDURE — 80053 COMPREHEN METABOLIC PANEL: CPT

## 2025-04-21 PROCEDURE — 36415 COLL VENOUS BLD VENIPUNCTURE: CPT

## 2025-04-21 PROCEDURE — 71260 CT THORAX DX C+: CPT

## 2025-04-21 RX ORDER — IOPAMIDOL 612 MG/ML
100 INJECTION, SOLUTION INTRAVASCULAR
Status: COMPLETED | OUTPATIENT
Start: 2025-04-21 | End: 2025-04-21

## 2025-04-21 RX ADMIN — IOPAMIDOL 85 ML: 612 INJECTION, SOLUTION INTRAVENOUS at 14:05

## 2025-04-24 DIAGNOSIS — E78.2 MIXED HYPERLIPIDEMIA: ICD-10-CM

## 2025-04-24 RX ORDER — PRAVASTATIN SODIUM 80 MG/1
80 TABLET ORAL DAILY
Qty: 90 TABLET | Refills: 0 | Status: SHIPPED | OUTPATIENT
Start: 2025-04-24

## 2025-04-24 RX ORDER — METFORMIN HYDROCHLORIDE 500 MG/1
1000 TABLET, EXTENDED RELEASE ORAL DAILY
Qty: 90 TABLET | Refills: 0 | Status: SHIPPED | OUTPATIENT
Start: 2025-04-24

## 2025-05-06 RX ORDER — MAGNESIUM OXIDE TAB 400 MG (241.3 MG ELEMENTAL MG) 400 (241.3 MG) MG
1 TAB ORAL
Qty: 30 TABLET | Refills: 3 | Status: SHIPPED | OUTPATIENT
Start: 2025-05-06

## 2025-05-06 NOTE — PROGRESS NOTES
"  Subjective     REASON FOR CONSULTATION:  cholangiocarcinoma  Provide an opinion on any further workup or treatment                             REQUESTING PHYSICIAN:  Thi    RECORDS OBTAINED:  Records of the patients history including those obtained from the referring provider were reviewed and summarized in detail.    HISTORY OF PRESENT ILLNESS:  The patient is a 79 y.o. year old male who is here for an opinion about the above issue.    History of Present Illness   This is a pleasant 79-year-old man with intrahepatic cholangiocarcinoma with previous poor response/poor tolerance to cisplatin/Gemzar durvalumab.  He underwent a TACE procedure to the dominant liver lesion in October.  He is doing relatively well with controlled pain on MS Contin 15 mg twice daily and breakthrough oxycodone 10 mg.  He would like to taper off MS Contin and use just oxycodone if possible.  He complains of generalized weakness with falls.  Wife states his appetite is \"hit or miss\".  His son reports poor memory.    ONCOLOGY HISTORY:  This is a 79-year-old man with diabetes, hyperlipidemia, rheumatoid arthritis, tobacco abuse who underwent a CT angiogram of the chest to evaluate an aortic aneurysm and incidentally noted a mass in the right lobe of the liver.  A CT of the abdomen and pelvis was performed on 5/10/2024 showed a heterogeneously peripherally enhancing right hepatic lobe mass 7 x 5.7 cm, subtle 1.5 cm low-attenuation focus adjacently and a nonobstructing annular mass at the hepatic flexure.  There were several calcifications in the pancreas consistent with chronic pancreatitis, several small renal cysts and an enlarged prostate 6.5 cm.  There were extensive abdominal aortic atherosclerotic changes without aneurysm and extensive right pleural calcifications surrounding a small loculated right pleural effusion stable since 4/1/2024.    EGD and colonoscopy were performed on 6/4/2024 by Dr. Ness-normal EGD, poor bowel prep " inadequate for colorectal screening, ascending colon polyp resected, rectosigmoid polyp resected.    A CT-guided liver biopsy was performed on 6/17/2024 which showed stent hepatitis with periportal bile duct reaction and associated fibrosis/inflammation no evidence of malignancy.  A repeat liver biopsy was performed on 7/18/2024 showing adenocarcinoma consistent with cholangiocarcinoma.  CA 19-9 was elevated 133 and AFP normal 3.81.    The patient has not had significant symptoms of abdominal pain, weight loss, nausea, vomiting or jaundice.    He has significant rheumatoid arthritis history with joint deviation, history of iritis, previous pericarditis requiring pericardial window.    The patient received 2 cycles of cisplatin/gemcitabine/nivolumab with need of dose reductions and significant side effects.  Follow-up CT abdomen on 9/6/2024 showed a stable mass in the right lobe of the liver 7.6 x 6 cm no new liver lesions seen.  He was subsequently treated with a TACE procedure.    Past Medical History:   Diagnosis Date    Arthritis of neck     Cancer     Liver    Colon polyp     Diabetes mellitus     Erectile dysfunction 2/22    GERD (gastroesophageal reflux disease) 1/14    Hip arthrosis     Hyperlipidemia     Hypertension     Kidney stone     Liver cancer 07-    Liver disease     Low back pain     Postoperative wound infection     RA (rheumatoid arthritis)     Urinary tract infection     Visual impairment         Past Surgical History:   Procedure Laterality Date    ABDOMINAL SURGERY      BACK SURGERY      CARDIAC CATHETERIZATION      CARDIAC SURGERY  1995    CHOLECYSTECTOMY      COLONOSCOPY  2017    [facility]    COLONOSCOPY N/A 06/04/2024    Procedure: COLONOSCOPY INTO CECUM WITH POLYPECTOMIES (COLD SNARE);  Surgeon: Angel Ness MD;  Location: [facility] ENDOSCOPY;  Service: General;  Laterality: N/A;  PRE: COLON MASS, ABNORMAL CT  POST: DIVERTICULOSIS, POLYPS,  POOR PREP    ENDOSCOPY N/A 06/04/2024    Procedure: ESOPHAGOGASTRODUODENOSCOPY;  Surgeon: Angel Ness MD;  Location: [facility] ENDOSCOPY;  Service: General;  Laterality: N/A;  PRE: LIVER MASS  POST: NORMAL EGD    EYE SURGERY      JOINT REPLACEMENT      PERICARDIUM SURGERY      REPLACEMENT TOTAL KNEE BILATERAL      SPINE SURGERY      THORACOTOMY Bilateral 1997    TRIGGER POINT INJECTION      Back    VASECTOMY      VENOUS ACCESS DEVICE (PORT) INSERTION Right 07/30/2024    Procedure: INSERTION VENOUS ACCESS DEVICE;  Surgeon: Angel Nses MD;  Location: [facility] MAIN OR;  Service: General;  Laterality: Right;        Current Outpatient Medications on File Prior to Visit   Medication Sig Dispense Refill    gabapentin (NEURONTIN) 300 MG capsule TAKE 2 CAPSULES BY MOUTH ONCE DAILY AT NIGHT AT BEDTIME 60 capsule 0    hydrOXYzine (ATARAX) 25 MG tablet Take 1 tablet by mouth 3 (Three) Times a Day As Needed for Itching. 60 tablet 3    Magnesium Oxide -Mg Supplement (MAGnesium-Oxide) 400 (240 Mg) MG tablet TAKE 1 TABLET BY MOUTH ONCE DAILY AT BEDTIME 30 tablet 3    metFORMIN ER (GLUCOPHAGE-XR) 500 MG 24 hr tablet Take 2 tablets by mouth once daily 90 tablet 0    Morphine (MS CONTIN) 15 MG 12 hr tablet Take 1 tablet by mouth 2 (Two) Times a Day. 60 tablet 0    naloxone (NARCAN) 4 MG/0.1ML nasal spray Call 911. Don't prime. [place] in 1 nostril for overdose. Repeat in 2-3 minutes in other nostril if no or minimal breathing/responsiveness. 2 each 0    OLANZapine (zyPREXA) 5 MG tablet Take 1 tablet by mouth Every Night. 30 tablet 3    oxyCODONE (ROXICODONE) 10 MG tablet Take 1 tablet by mouth Every 4 (Four) Hours As Needed for Moderate Pain. 100 tablet 0    pantoprazole (PROTONIX) 40 MG EC tablet Take 1 tablet by mouth once daily 90 tablet 1    pilocarpine (SALAGEN) 5 MG tablet Take 1 tablet by mouth 3 times a day.      pravastatin (PRAVACHOL) 80 MG tablet Take 1 tablet by mouth once daily 90 tablet 0     sennosides-docusate (PERICOLACE) 8.6-50 MG per tablet Take 2 tablets by mouth 2 (Two) Times a Day.      doxazosin (CARDURA) 2 MG tablet TAKE 1 TABLET BY MOUTH ONCE DAILY AT BEDTIME 30 tablet 0    lactulose (CHRONULAC) 10 GM/15ML solution Take 30 mL by mouth 2 (Two) Times a Day As Needed (constipation). 240 mL 0    ondansetron ODT (ZOFRAN-ODT) 4 MG disintegrating tablet Place 1 tablet on the tongue Every 6 (Six) Hours As Needed for Nausea or Vomiting. 12 tablet 0     Current Facility-Administered Medications on File Prior to Visit   Medication Dose Route Frequency Provider Last Rate Last Admin    heparin injection 500 Units  500 Units Intravenous PRN Anthony Tejada MD   500 Units at 24 1037    heparin injection 500 Units  500 Units Intravenous PRN Anthony Tejada MD   500 Units at 25 1415    sodium chloride 0.9 % flush 10 mL  10 mL Intravenous PRN Anthony Tejada MD   10 mL at 24 1037    sodium chloride 0.9 % flush 10 mL  10 mL Intravenous PRN Anthony Tejada MD   10 mL at 25 1414        ALLERGIES:    Allergies   Allergen Reactions    Chlorhexidine Dermatitis     Please use alcohol to clean port site        Social History     Socioeconomic History    Marital status:      Spouse name: Paula   Tobacco Use    Smoking status: Every Day     Current packs/day: 0.00     Average packs/day: 1 pack/day for 53.7 years (53.7 ttl pk-yrs)     Types: Cigarettes     Start date: 1970     Last attempt to quit: 10/1/2023     Years since quittin.6     Passive exposure: Past    Smokeless tobacco: Never    Tobacco comments:     Had stopped completely until last summer when cancer diagnosis.  Then started smoking 3 or 4 a day.   Vaping Use    Vaping status: Never Used   Substance and Sexual Activity    Alcohol use: Not Currently    Drug use: Never    Sexual activity: Not Currently     Partners: Female     Birth control/protection: Vasectomy        Family History   Problem Relation Age of  "Onset    Hypertension Mother     Heart disease Mother     Arthritis Father     Diabetes Sister     Asthma Brother     Diabetes Brother     Malig Hyperthermia Neg Hx         Review of Systems   Constitutional:  Positive for appetite change, fatigue and unexpected weight change. Negative for activity change.   HENT: Negative.     Respiratory: Negative.     Cardiovascular: Negative.    Gastrointestinal:  Positive for abdominal pain. Negative for nausea.   Genitourinary: Negative.    Musculoskeletal:  Positive for arthralgias, gait problem and joint swelling.   Skin:  Negative for rash.   Neurological:  Positive for weakness.   Hematological: Negative.    Psychiatric/Behavioral:  Negative for dysphoric mood. The patient is nervous/anxious.           Objective     Vitals:    05/12/25 1405   BP: 129/86   Pulse: 101   Resp: 16   Temp: 98.3 °F (36.8 °C)   TempSrc: Oral   SpO2: 98%   Weight: 83.1 kg (183 lb 3.2 oz)   Height: 182.9 cm (72.01\")   PainSc: 6    PainLoc: Leg  Comment: both legs, had a fall yesterday                     5/12/2025     2:19 PM   Current Status   ECOG score 2       Physical Exam    CONSTITUTIONAL: pleasant well-developed adult man  HEENT: no icterus, no thrush, moist membranes  LYMPH: no cervical or supraclavicular lad  CV: RRR, S1S2, no murmur, sternotomy scar present  RESP: cta bilat, no wheezing, no rales  GI: soft, non-tender, no splenomegaly, +bs, multiple surgical scars on the abdomen  MUSC: no edema, multiple deviated hand joints, patient is in a wheelchair today  NEURO: alert and oriented x3, normal strength  PSYCH: Improved mood and affect today  Skin: No current rash      RECENT LABS:  Hematology WBC   Date Value Ref Range Status   05/12/2025 4.78 3.40 - 10.80 10*3/mm3 Final     RBC   Date Value Ref Range Status   05/12/2025 4.39 4.14 - 5.80 10*6/mm3 Final     Hemoglobin   Date Value Ref Range Status   05/12/2025 13.2 13.0 - 17.7 g/dL Final     Hematocrit   Date Value Ref Range Status "   05/12/2025 39.9 37.5 - 51.0 % Final     Platelets   Date Value Ref Range Status   05/12/2025 87 (L) 140 - 450 10*3/mm3 Final        Lab Results   Component Value Date    GLUCOSE 186 (H) 05/09/2025    BUN 11 05/09/2025    CREATININE 0.81 05/09/2025    EGFR 90.2 05/09/2025    BCR 13.6 05/09/2025    K 4.5 05/09/2025    CO2 23.8 05/09/2025    CALCIUM 8.9 05/09/2025    ALBUMIN 3.0 (L) 04/21/2025    BILITOT 0.8 04/21/2025    AST 42 (H) 04/21/2025    ALT 19 04/21/2025     CT CAP 1/16/25:  MPRESSION:  Impression:  1.  The patient's known cholangiocarcinoma appears to have minimally  increased in size but does not demonstrate significant Central  enhancement, in keeping with interval chemoembolization. However, there  are a few nodular areas of hypodensity extending from the superior and  inferior aspects of the lesion, most suspicious along its inferior  aspect which has a rounded appearance and was not definitively seen on  preoperative imaging, measuring 0.9 cm. Differential considerations  include postembolization changes versus residual malignancy. Further  evaluation with MRI may be helpful. At least continued close attention  on follow-up is recommended.  2.  Scattered sub-6 mm pulmonary nodules. A right paratracheal node  appears to have minimally increased in size. These findings are  indeterminant and follow-up chest CT in 3 months is recommended to  ensure stability and exclude metastatic disease.  3.  Findings of presumed background interstitial lung disease, as  before.  4.  Cirrhosis with portosystemic shunting including splenomegaly and  small to moderate epigastric varices.  5.  Other findings as above.    CT Chest With Contrast Diagnostic (04/21/2025 2:05 PM)  CT Abdomen Pelvis With Contrast (04/21/2025 2:05 PM)  IMPRESSION:  1. Mass within the right lobe of the liver measures mildly increased in  size when compared to the previous CT 01/16/2025. There has developed a new hepatic lesion that is not clearly  contiguous just below this mass measuring 1.3 cm. Mildly enlarged portacaval node measures 14 mm without change.  2. No other evidence for change. Mildly enlarged right hilar lymph node  without change. Pericardial calcifications and right pleural  calcifications with right basilar chronic pleural effusion with pleural  thickening.  3. Hepatic cirrhosis with portal venous hypertension, splenomegaly.  Radiation dose reduction techniques were utilized, including automated  exposure control and exposure modulation based on body size.       Assessment & Plan     *intrahepatic cholangiocarcinoma, multifocal:  7/5/24 MRI abd-heterogenous, large peripherally enhancing mass withinthe posterior aspect of the right hepatic dome which is grossly unchanged to minimally increased in size since 5/10/2024. small peripheral enhancing and solid enhancing lesions are located within the surrounding right hepatic lobe and spread of malignancy is likely  7/18/24 CT liver biopsy-adenocarcinoma consistent with cholangiocarcinoma by IHC  CA 19-9 133  8/2/2024-initiated palliative treatment with cisplatin/Gemzar plus nivolumab; day 8 Gemzar given at 50% because of thrombocytopenia (60)  Cycle 2 of treatment was given with a dose reduction of gemcitabine 750 mg/m² and cisplatin same dose 25 mg/m² along with durvalumab.  Despite dose reduction he had intractable nausea vomiting acute kidney injury requiring IV fluids.  He did not receive day 8 cycle 2 of treatment because of side effects.  9/6/2024 CT abdomen liver protocol-stable 7.6 x 6 cm mass in the right lobe of the liver  Status post TACE procedure to the dominant liver lesion October 2024  CT chest abdomen pelvis 1/16/2025-stable hilar lymphadenopathy, loculated small to moderate right pleural effusion, scattered sub-6 mm pulmonary nodule slightly increased in size, large heterogeneous mass in the right hepatic dome with internal hyperdensity keeping with prior chemoembolization  measuring 7.8 x 5.7 x 7.1 cm (previous 6 x 7.5 x 6.5 cm) no significant internal enhancement, few discrete somewhat nodular hypoenhancing lesions inferior and superior aspect not definitively seen on previous imaging; spleen enlarged 14 cm; CA 19-9 is 78.2 from previous to 56  CT chest abdomen pelvis 4/21/2025-increased size of dominant liver lesion 9.0 x 5.5 x 6.7 cm and a new low-density nodule adjacent measuring 1.3 cm; CA 19-9 however decreased 51.1    *Thrombocytopenia/anemia secondary to chemotherapy/splenomegaly  Platelet count 87    *Pain of malignancy  Controlled with MS Contin 15 mg every 12 hours and oxycodone 10 mg every 4 hours as needed    *Depression/adjustment disorder  Taking Zyprexa 5 mg nightly    *RA  significant rheumatoid arthritis history with joint deviation, history of iritis, previous pericarditis requiring pericardial window  Patient has been treated with Simponi currently on hold per rheumatology    *Additional comorbidities-diabetes mellitus, hyperlipidemia, hypertension, tobacco abuse, BPH    *Skin rash/pruritus secondary to chemotherapy versus immunotherapy  Previously required a steroid Dosepak-resolved    Oncology plan/recommendations:  Continue MS Contin 15 mg every 12 hours; continue oxycodone 10 mg 1 p.o. every 4 hours as needed; okay to taper MS Contin if tolerated-I recommended to space dosing additional 2 hours/day until discontinued  Repeat CT liver protocol CBC CMP CA 19-9 3 months  Port flush every 6 weeks  Social work consult to assist with DNR paperwork

## 2025-05-08 DIAGNOSIS — M06.9 RHEUMATOID ARTHRITIS INVOLVING BOTH HANDS, UNSPECIFIED WHETHER RHEUMATOID FACTOR PRESENT: ICD-10-CM

## 2025-05-09 RX ORDER — GABAPENTIN 300 MG/1
CAPSULE ORAL
Qty: 60 CAPSULE | Refills: 0 | Status: SHIPPED | OUTPATIENT
Start: 2025-05-09

## 2025-05-12 ENCOUNTER — OFFICE VISIT (OUTPATIENT)
Dept: ONCOLOGY | Facility: CLINIC | Age: 80
End: 2025-05-12
Payer: MEDICARE

## 2025-05-12 ENCOUNTER — INFUSION (OUTPATIENT)
Dept: ONCOLOGY | Facility: HOSPITAL | Age: 80
End: 2025-05-12
Payer: MEDICARE

## 2025-05-12 VITALS
DIASTOLIC BLOOD PRESSURE: 86 MMHG | HEART RATE: 101 BPM | SYSTOLIC BLOOD PRESSURE: 129 MMHG | WEIGHT: 183.2 LBS | HEIGHT: 72 IN | OXYGEN SATURATION: 98 % | TEMPERATURE: 98.3 F | RESPIRATION RATE: 16 BRPM | BODY MASS INDEX: 24.81 KG/M2

## 2025-05-12 DIAGNOSIS — D69.6 THROMBOCYTOPENIA: ICD-10-CM

## 2025-05-12 DIAGNOSIS — G89.3 CANCER RELATED PAIN: ICD-10-CM

## 2025-05-12 DIAGNOSIS — Z45.2 FITTING AND ADJUSTMENT OF VASCULAR CATHETER: Primary | ICD-10-CM

## 2025-05-12 DIAGNOSIS — C22.1 CHOLANGIOCARCINOMA: Primary | ICD-10-CM

## 2025-05-12 LAB
ALBUMIN SERPL-MCNC: 3.1 G/DL (ref 3.5–5.2)
ALBUMIN/GLOB SERPL: 0.7 G/DL
ALP SERPL-CCNC: 98 U/L (ref 39–117)
ALT SERPL W P-5'-P-CCNC: 15 U/L (ref 1–41)
ANION GAP SERPL CALCULATED.3IONS-SCNC: 13.7 MMOL/L (ref 5–15)
AST SERPL-CCNC: 35 U/L (ref 1–40)
BASOPHILS # BLD AUTO: 0.03 10*3/MM3 (ref 0–0.2)
BASOPHILS NFR BLD AUTO: 0.6 % (ref 0–1.5)
BILIRUB SERPL-MCNC: 0.8 MG/DL (ref 0–1.2)
BUN SERPL-MCNC: 15 MG/DL (ref 8–23)
BUN/CREAT SERPL: 17.9 (ref 7–25)
CALCIUM SPEC-SCNC: 9.1 MG/DL (ref 8.6–10.5)
CHLORIDE SERPL-SCNC: 94 MMOL/L (ref 98–107)
CO2 SERPL-SCNC: 22.3 MMOL/L (ref 22–29)
CREAT SERPL-MCNC: 0.84 MG/DL (ref 0.76–1.27)
DEPRECATED RDW RBC AUTO: 48.3 FL (ref 37–54)
EGFRCR SERPLBLD CKD-EPI 2021: 89.3 ML/MIN/1.73
EOSINOPHIL # BLD AUTO: 0.02 10*3/MM3 (ref 0–0.4)
EOSINOPHIL NFR BLD AUTO: 0.4 % (ref 0.3–6.2)
ERYTHROCYTE [DISTWIDTH] IN BLOOD BY AUTOMATED COUNT: 14.3 % (ref 12.3–15.4)
GLOBULIN UR ELPH-MCNC: 4.5 GM/DL
GLUCOSE SERPL-MCNC: 281 MG/DL (ref 65–99)
HCT VFR BLD AUTO: 39.9 % (ref 37.5–51)
HGB BLD-MCNC: 13.2 G/DL (ref 13–17.7)
IMM GRANULOCYTES # BLD AUTO: 0.01 10*3/MM3 (ref 0–0.05)
IMM GRANULOCYTES NFR BLD AUTO: 0.2 % (ref 0–0.5)
LYMPHOCYTES # BLD AUTO: 0.74 10*3/MM3 (ref 0.7–3.1)
LYMPHOCYTES NFR BLD AUTO: 15.5 % (ref 19.6–45.3)
MCH RBC QN AUTO: 30.1 PG (ref 26.6–33)
MCHC RBC AUTO-ENTMCNC: 33.1 G/DL (ref 31.5–35.7)
MCV RBC AUTO: 90.9 FL (ref 79–97)
MONOCYTES # BLD AUTO: 0.51 10*3/MM3 (ref 0.1–0.9)
MONOCYTES NFR BLD AUTO: 10.7 % (ref 5–12)
NEUTROPHILS NFR BLD AUTO: 3.47 10*3/MM3 (ref 1.7–7)
NEUTROPHILS NFR BLD AUTO: 72.6 % (ref 42.7–76)
NRBC BLD AUTO-RTO: 0 /100 WBC (ref 0–0.2)
PLATELET # BLD AUTO: 87 10*3/MM3 (ref 140–450)
PMV BLD AUTO: 10.3 FL (ref 6–12)
POTASSIUM SERPL-SCNC: 4.1 MMOL/L (ref 3.5–5.2)
PROT SERPL-MCNC: 7.6 G/DL (ref 6–8.5)
RBC # BLD AUTO: 4.39 10*6/MM3 (ref 4.14–5.8)
SODIUM SERPL-SCNC: 130 MMOL/L (ref 136–145)
WBC NRBC COR # BLD AUTO: 4.78 10*3/MM3 (ref 3.4–10.8)

## 2025-05-12 PROCEDURE — 85025 COMPLETE CBC W/AUTO DIFF WBC: CPT | Performed by: INTERNAL MEDICINE

## 2025-05-12 PROCEDURE — 1125F AMNT PAIN NOTED PAIN PRSNT: CPT | Performed by: INTERNAL MEDICINE

## 2025-05-12 PROCEDURE — 99214 OFFICE O/P EST MOD 30 MIN: CPT | Performed by: INTERNAL MEDICINE

## 2025-05-12 PROCEDURE — 3079F DIAST BP 80-89 MM HG: CPT | Performed by: INTERNAL MEDICINE

## 2025-05-12 PROCEDURE — 25010000002 HEPARIN LOCK FLUSH PER 10 UNITS: Performed by: INTERNAL MEDICINE

## 2025-05-12 PROCEDURE — 36591 DRAW BLOOD OFF VENOUS DEVICE: CPT

## 2025-05-12 PROCEDURE — 3074F SYST BP LT 130 MM HG: CPT | Performed by: INTERNAL MEDICINE

## 2025-05-12 PROCEDURE — G2211 COMPLEX E/M VISIT ADD ON: HCPCS | Performed by: INTERNAL MEDICINE

## 2025-05-12 PROCEDURE — 80053 COMPREHEN METABOLIC PANEL: CPT | Performed by: INTERNAL MEDICINE

## 2025-05-12 RX ORDER — SODIUM CHLORIDE 0.9 % (FLUSH) 0.9 %
10 SYRINGE (ML) INJECTION AS NEEDED
Status: DISCONTINUED | OUTPATIENT
Start: 2025-05-12 | End: 2025-05-12 | Stop reason: HOSPADM

## 2025-05-12 RX ORDER — SODIUM CHLORIDE 0.9 % (FLUSH) 0.9 %
10 SYRINGE (ML) INJECTION AS NEEDED
OUTPATIENT
Start: 2025-05-12

## 2025-05-12 RX ORDER — HEPARIN SODIUM (PORCINE) LOCK FLUSH IV SOLN 100 UNIT/ML 100 UNIT/ML
500 SOLUTION INTRAVENOUS AS NEEDED
Status: DISCONTINUED | OUTPATIENT
Start: 2025-05-12 | End: 2025-05-12 | Stop reason: HOSPADM

## 2025-05-12 RX ORDER — HEPARIN SODIUM (PORCINE) LOCK FLUSH IV SOLN 100 UNIT/ML 100 UNIT/ML
500 SOLUTION INTRAVENOUS AS NEEDED
OUTPATIENT
Start: 2025-05-12

## 2025-05-12 RX ADMIN — Medication 10 ML: at 14:14

## 2025-05-12 RX ADMIN — Medication 500 UNITS: at 14:15

## 2025-05-16 ENCOUNTER — OFFICE VISIT (OUTPATIENT)
Dept: FAMILY MEDICINE CLINIC | Facility: CLINIC | Age: 80
End: 2025-05-16
Payer: MEDICARE

## 2025-05-16 VITALS
RESPIRATION RATE: 16 BRPM | HEART RATE: 82 BPM | HEIGHT: 72 IN | DIASTOLIC BLOOD PRESSURE: 72 MMHG | OXYGEN SATURATION: 97 % | SYSTOLIC BLOOD PRESSURE: 116 MMHG | BODY MASS INDEX: 24.79 KG/M2 | WEIGHT: 183 LBS

## 2025-05-16 DIAGNOSIS — E11.9 DIABETES MELLITUS WITHOUT COMPLICATION: ICD-10-CM

## 2025-05-16 DIAGNOSIS — R53.81 PHYSICAL DECONDITIONING: ICD-10-CM

## 2025-05-16 DIAGNOSIS — M62.81 MUSCLE WEAKNESS: ICD-10-CM

## 2025-05-16 DIAGNOSIS — G89.3 CANCER RELATED PAIN: ICD-10-CM

## 2025-05-16 DIAGNOSIS — I10 PRIMARY HYPERTENSION: Primary | ICD-10-CM

## 2025-05-16 DIAGNOSIS — Z91.81 HISTORY OF FALL: ICD-10-CM

## 2025-05-16 DIAGNOSIS — C22.0 HEPATOCELLULAR CARCINOMA: ICD-10-CM

## 2025-05-16 DIAGNOSIS — E78.2 MIXED HYPERLIPIDEMIA: ICD-10-CM

## 2025-05-16 DIAGNOSIS — R63.4 WEIGHT LOSS: ICD-10-CM

## 2025-05-16 RX ORDER — OXYCODONE HYDROCHLORIDE 10 MG/1
10 TABLET ORAL EVERY 4 HOURS PRN
Qty: 100 TABLET | Refills: 0 | Status: SHIPPED | OUTPATIENT
Start: 2025-05-16

## 2025-05-16 NOTE — TELEPHONE ENCOUNTER
Caller: TUCKER PULLIAM    Relationship: Emergency Contact    Best call back number: [phone]    Requested Prescriptions:   Requested Prescriptions     Pending Prescriptions Disp Refills    oxyCODONE (ROXICODONE) 10 MG tablet 100 tablet 0     Sig: Take 1 tablet by mouth Every 4 (Four) Hours As Needed for Moderate Pain.        Pharmacy where request should be sent: [facility] [address] [place] [address] [id number] [place] [address] [phone] [facility] [phone]      Last office visit with prescribing clinician: 5/12/2025   Last telemedicine visit with prescribing clinician: Visit date not found   Next office visit with prescribing clinician: 8/11/2025     Does the patient have less than a 3 day supply:  [] Yes  [x] No      Deepak Rutledge Rep   05/16/25 09:44 EDT

## 2025-05-16 NOTE — PROGRESS NOTES
Chief Complaint   Patient presents with    Follow-up    back and leg pain       History of Present Illness:    History of Present Illness  The patient is a 79-year-old male who presents for a routine follow-up of HTN, HLD & T2DM.    He has been experiencing significant back and leg issues, culminating in a fall on Mother's Day due to his leg giving way. He did not seek immediate medical attention at the emergency room but instead managed the resulting wounds at home with antibiotic ointment and dressings. He also used hydrogen peroxide for wound cleaning. He reports no loss of consciousness or lightheadedness associated with the fall. He has been using a walker for mobility within the house and a scooter for outdoor activities. A lift chair has been purchased to assist him in standing up without exerting excessive strength. He has previously received home health services and physical therapy for his hip joint. He has received steroid injections from an orthopedic specialist for his right hip, which have not provided relief. He has been informed that both knees require replacement.    He reports a general feeling of weakness and a loss of appetite, which has resulted in a weight loss of 12 pounds. His eating habits are inconsistent, with some days marked by hunger and others by a lack of interest in food.    He has a history of cholangiocarcinoma and underwent a follow-up CT scan in 04/2025. He follows with oncologist which he recently saw on 05/12/2025. He discontinued chemotherapy due to adverse effects and is currently in a waiting period of 3 months before resuming treatment.    His blood pressure was low during his last visit in 02/2025, leading to the discontinuation of atenolol.  Chart review showed BP has been stable ever since.  His blood pressure today is 116/72. He does not monitor his blood pressure at home.    He is currently on MS Contin, oxycodone, and gabapentin, which effectively manages his  pain.      PMH:   Outpatient Medications Prior to Visit   Medication Sig Dispense Refill    gabapentin (NEURONTIN) 300 MG capsule TAKE 2 CAPSULES BY MOUTH ONCE DAILY AT NIGHT AT BEDTIME 60 capsule 0    Magnesium Oxide -Mg Supplement (MAGnesium-Oxide) 400 (240 Mg) MG tablet TAKE 1 TABLET BY MOUTH ONCE DAILY AT BEDTIME 30 tablet 3    metFORMIN ER (GLUCOPHAGE-XR) 500 MG 24 hr tablet Take 2 tablets by mouth once daily 90 tablet 0    Morphine (MS CONTIN) 15 MG 12 hr tablet Take 1 tablet by mouth 2 (Two) Times a Day. 60 tablet 0    naloxone (NARCAN) 4 MG/0.1ML nasal spray Call 911. Don't prime. [place] in 1 nostril for overdose. Repeat in 2-3 minutes in other nostril if no or minimal breathing/responsiveness. 2 each 0    OLANZapine (zyPREXA) 5 MG tablet Take 1 tablet by mouth Every Night. 30 tablet 3    oxyCODONE (ROXICODONE) 10 MG tablet Take 1 tablet by mouth Every 4 (Four) Hours As Needed for Moderate Pain. 100 tablet 0    pantoprazole (PROTONIX) 40 MG EC tablet Take 1 tablet by mouth once daily 90 tablet 1    pilocarpine (SALAGEN) 5 MG tablet Take 1 tablet by mouth 3 times a day.      pravastatin (PRAVACHOL) 80 MG tablet Take 1 tablet by mouth once daily 90 tablet 0    sennosides-docusate (PERICOLACE) 8.6-50 MG per tablet Take 2 tablets by mouth 2 (Two) Times a Day.      ondansetron ODT (ZOFRAN-ODT) 4 MG disintegrating tablet Place 1 tablet on the tongue Every 6 (Six) Hours As Needed for Nausea or Vomiting. (Patient not taking: Reported on 5/16/2025) 12 tablet 0    doxazosin (CARDURA) 2 MG tablet TAKE 1 TABLET BY MOUTH ONCE DAILY AT BEDTIME 30 tablet 0    hydrOXYzine (ATARAX) 25 MG tablet Take 1 tablet by mouth 3 (Three) Times a Day As Needed for Itching. 60 tablet 3    lactulose (CHRONULAC) 10 GM/15ML solution Take 30 mL by mouth 2 (Two) Times a Day As Needed (constipation). 240 mL 0     Facility-Administered Medications Prior to Visit   Medication Dose Route Frequency Provider Last Rate Last Admin    heparin injection  500 Units  500 Units Intravenous PRN Anthony Tejada MD   500 Units at 09/13/24 1037    sodium chloride 0.9 % flush 10 mL  10 mL Intravenous PRN Anthony Tejada MD   10 mL at 09/13/24 1037      Allergies   Allergen Reactions    Chlorhexidine Dermatitis     Please use alcohol to clean port site     Past Surgical History:   Procedure Laterality Date    ABDOMINAL SURGERY      BACK SURGERY      CARDIAC CATHETERIZATION      CARDIAC SURGERY  1995    CHOLECYSTECTOMY      COLONOSCOPY  2017    [facility]    COLONOSCOPY N/A 06/04/2024    Procedure: COLONOSCOPY INTO CECUM WITH POLYPECTOMIES (COLD SNARE);  Surgeon: Angel Ness MD;  Location: [facility] ENDOSCOPY;  Service: General;  Laterality: N/A;  PRE: COLON MASS, ABNORMAL CT  POST: DIVERTICULOSIS, POLYPS, POOR PREP    ENDOSCOPY N/A 06/04/2024    Procedure: ESOPHAGOGASTRODUODENOSCOPY;  Surgeon: Angel Ness MD;  Location: [facility] ENDOSCOPY;  Service: General;  Laterality: N/A;  PRE: LIVER MASS  POST: NORMAL EGD    EYE SURGERY      JOINT REPLACEMENT      PERICARDIUM SURGERY      REPLACEMENT TOTAL KNEE BILATERAL      SPINE SURGERY      THORACOTOMY Bilateral 1997    TRIGGER POINT INJECTION      Back    VASECTOMY      VENOUS ACCESS DEVICE (PORT) INSERTION Right 07/30/2024    Procedure: INSERTION VENOUS ACCESS DEVICE;  Surgeon: Angel Ness MD;  Location: [facility] OR;  Service: General;  Laterality: Right;     family history includes Arthritis in his father; Asthma in his brother; Diabetes in his brother and sister; Heart disease in his mother; Hypertension in his mother.   reports that he has been smoking cigarettes. He started smoking about 55 years ago. He has a 53.7 pack-year smoking history. He has been exposed to tobacco smoke. He has never used smokeless tobacco. He reports that he does not currently use alcohol. He reports that he does not use drugs.     /72 (BP Location: Right arm, Patient Position: Sitting, Cuff  "Size: Adult)   Pulse 82   Resp 16   Ht 182.9 cm (72.01\")   Wt 83 kg (183 lb)   SpO2 97%   BMI 24.81 kg/m²   Physical Exam  Constitutional:       Appearance: Normal appearance.   HENT:      Head: Normocephalic and atraumatic.   Skin:     Comments: Multiple healing scab wounds on the left lower extremity and upper extremity.  No signs of infection or inflammation.   Neurological:      Mental Status: He is alert and oriented to person, place, and time.   Psychiatric:         Mood and Affect: Mood normal.                  The following data was reviewed by: Gloria Post MD on 05/16/2025:  Common labs          4/21/2025    13:55 4/21/2025    13:56 5/9/2025    10:28 5/12/2025    14:07   Common Labs   Glucose 284   186  281    BUN 12   11  15    Creatinine 0.91  0.80  0.81  0.84    Sodium 136   134  130    Potassium 4.3   4.5  4.1    Chloride 98   98  94    Calcium 8.8   8.9  9.1    Albumin 3.0    3.1    Total Bilirubin 0.8    0.8    Alkaline Phosphatase 100    98    AST (SGOT) 42    35    ALT (SGPT) 19    15    WBC 4.05    4.78    Hemoglobin 13.8    13.2    Hematocrit 41.2    39.9    Platelets 72    87    Total Cholesterol   101     Triglycerides   60     HDL Cholesterol   31     LDL Cholesterol    56     Hemoglobin A1C   8.50            Diagnoses and all orders for this visit:    1. Primary hypertension (Primary)    2. Mixed hyperlipidemia    3. Diabetes mellitus without complication    4. Muscle weakness  -     Ambulatory Referral to Physical Therapy for Evaluation & Treatment    5. History of fall  -     Ambulatory Referral to Physical Therapy for Evaluation & Treatment    6. Physical deconditioning  -     Ambulatory Referral to Physical Therapy for Evaluation & Treatment         Assessment & Plan  1. Muscle weakness.  - Reports significant muscle weakness and instability, leading to a recent fall on Mother's Day.  - Physical examination reveals bruises that are healing, with no signs of infection in the " wounds.  - Discussed the importance of therapy exercises to enhance strength; referral for home physical therapy services initiated.  - Advised to continue using assistive devices such as a walker and lift chair to prevent further falls.    2.  Hypertension.  - Blood pressure readings have been within the normal range: 122/78 mmHg, 129/86 mmHg, and today's reading at 116/72 mmHg.  - No adjustments to the current medication regimen are necessary at this time.  - Blood pressure will continue to be monitored.  - Patient advised to maintain a healthy diet and lifestyle to support blood pressure control.    3. Weight loss.  - Experienced a weight loss of 12 pounds, likely due to cancer diagnosis.  - Advised to incorporate protein shakes and dietary supplements containing protein and fiber to stimulate appetite.  - Recommended a balanced diet rich in fruits and vegetables to boost the immune system.  - Discussed the impact of cancer on weight loss and the importance of nutritional support.    4.  Cholangiocarcinoma.  - Reviewed CT scan from 04/2025 showing an increase in size of the liver lesion and a new low density nodule measuring 1.3 cm.  - Oncologist recommended repeating liver CT protocol and blood work in 3 months to monitor disease progression.  - Discussed the importance of follow-up and monitoring for changes in the condition.    5.  Cancer-related pain.  - Currently taking MS Contin, oxycodone, and gabapentin for pain management.  - Medications have been effective in controlling pain.  - No changes to the pain management regimen are necessary at this time.  - PDMP checked and is as expected.    6.  Diabetes.  - A1c increased above target goal(<7).  Most likely as a result of recent steroid injections.  - Encouraged patient to continue on current medication [metformin 1000 mg daily] and advised to ensure healthy balanced diet with less sweets/sugar to facilitate glycemic control.  - Will continue to monitor with  repeat labs at next visit.    7.  Hyperlipidemia.  - LDL-C stable and within target goal.  - Continue on current statin therapy [pravastatin 80 mg]  - Discussed the importance of medication adherence and monitoring for any side effects.    Follow-up  - Patient will follow up in 6 months to reassess blood sugar levels and overall health status.        Patient or patient representative verbalized consent for the use of Ambient Listening during the visit with  Gloria Post MD for chart documentation. 5/16/2025  13:55 EDT

## 2025-05-23 DIAGNOSIS — C22.0 HEPATOCELLULAR CARCINOMA: ICD-10-CM

## 2025-05-23 RX ORDER — MORPHINE SULFATE 15 MG/1
15 TABLET, FILM COATED, EXTENDED RELEASE ORAL 2 TIMES DAILY
Qty: 60 TABLET | Refills: 0 | Status: SHIPPED | OUTPATIENT
Start: 2025-05-23

## 2025-05-23 NOTE — TELEPHONE ENCOUNTER
Caller: TUCKER PULLIAM - WIFE    Relationship: Emergency Contact    Best call back number: [phone]    Requested Prescriptions:   Requested Prescriptions     Pending Prescriptions Disp Refills    Morphine (MS CONTIN) 15 MG 12 hr tablet 60 tablet 0     Sig: Take 1 tablet by mouth 2 (Two) Times a Day.        Pharmacy where request should be sent: [facility] [address] [place] [address] [id number] [place] [address] [phone] [facility] [phone]      Last office visit with prescribing clinician: 5/12/2025   Last telemedicine visit with prescribing clinician: Visit date not found   Next office visit with prescribing clinician: 8/11/2025     Does the patient have less than a 3 day supply:  [x] Yes  [] No    Would you like a call back once the refill request has been completed: [] Yes [x] No    If the office needs to give you a call back, can they leave a voicemail: [] Yes [x] No

## 2025-06-03 ENCOUNTER — TREATMENT (OUTPATIENT)
Dept: PHYSICAL THERAPY | Facility: CLINIC | Age: 80
End: 2025-06-03
Payer: MEDICARE

## 2025-06-03 DIAGNOSIS — Z74.09 IMPAIRED FUNCTIONAL MOBILITY, BALANCE, AND ENDURANCE: ICD-10-CM

## 2025-06-03 DIAGNOSIS — M25.661 DECREASED RANGE OF MOTION (ROM) OF RIGHT KNEE: ICD-10-CM

## 2025-06-03 DIAGNOSIS — R29.898 BILATERAL LEG WEAKNESS: Primary | ICD-10-CM

## 2025-06-03 NOTE — PROGRESS NOTES
Physical Therapy Initial Evaluation and Plan of Care    [facility]  [address] [place] [address]  [phone] (phone)  [phone] (fax)      Patient: Gay Vazquez   : 1945  Diagnosis/ICD-10 Code:  Bilateral leg weakness [R29.898]  Referring practitioner: Gloria Post MD  Date of Initial Visit: 6/3/2025  Today's Date: 6/3/2025  Patient seen for 1 sessions    Visit Diagnoses:    ICD-10-CM ICD-9-CM   1. Bilateral leg weakness  R29.898 729.89   2. Impaired functional mobility, balance, and endurance  Z74.09 V49.89   3. Decreased range of motion (ROM) of right knee  M25.661 719.56              Subjective Evaluation    History of Present Illness  Date of onset: 2025    Subjective comment: Pt states he fell on Mother's Day when his legs gave out.  He states he has progressively gotten weaker and had more trouble getting around.  Pt has significant medical history of DM, HTN, RA, and Choleangiosarcoma.  Pt states he has a history of B knee replacements and back surgery.  He states he needs B knee revisions and a THR but they will not perform due to cancer diagnosis.  Pt is on a three month break from cancer treatments at this time.  Patient Occupation: Retired Pain  Current pain ratin  At best pain ratin  At worst pain ratin  Location: B shoulders,  R knee and hip  Quality: throbbing  Relieving factors: medications  Aggravating factors: ambulation, standing, movement, overhead activity and outstretched reach  Progression: worsening    Social Support  Lives in: one-story house  Lives with: spouse    Treatments  Previous treatment: injection treatment, medication and physical therapy  Current treatment: medication  Patient Goals  Patient goals for therapy: increased strength, improved balance, decreased pain and increased motion  Patient goal: To be able to walk without rollator walker again         Objective          Static Posture      Head  Forward.    Shoulders  Rounded.    Comments  Pt with flexed knees and hips, has difficulty extending hips and back due to pain    Active Range of Motion   Left Knee   Flexion: 125 degrees   Extension: 2 degrees   Extensor la degrees     Right Knee   Flexion: 128 degrees   Extension: 44 degrees with pain  Extensor la degrees with pain    Additional Active Range of Motion Details  Tight Hamstrings B, tight piriformis on L    Strength/Myotome Testing     Left Hip   Planes of Motion   Flexion: 4+  Extension: 4-  Abduction: 4-  Adduction: 4-    Right Hip   Planes of Motion   Flexion: 4+  Extension: 4-  Abduction: 4-  Adduction: 4-    Left Knee   Flexion: 4  Extension: 4+    Right Knee   Flexion: 4-  Extension: 4+    Tests     Left Hip   Lane: Positive.     Additional Tests Details  Tight hip flexors    Ambulation   Weight-Bearing Status   Distance in feet: 30  Assistive device used: rollator walker with seat    Ambulation: Level Surfaces     Additional Level Surfaces Ambulation Details  SBA of 1    Observational Gait   Gait: crouched   Decreased walking speed, stride length, left step length and right step length.   Left foot contact pattern: foot flat  Right foot contact pattern: foot flat  Base of support: increased        See Exercise, Manual, and Modality Logs for complete treatment.     Pt ambulated with rollator walker with SBA of 1 x 40 feet with cueing to increase step length B, decrease distance to the walker, increase hip extension.  Pt fatigued quickly and required multiple rest breaks.        Functional Outcome Score: Lower Extremity Functional Index (LEFS) score is 11 ability that is 86% or severe disability with highest reported disability for walking, standing, housework, transfers.    Pt requires min A of 1 to move supine to sit.        Assessment & Plan       Assessment  Impairments: abnormal gait, abnormal or restricted ROM, activity intolerance, impaired balance, impaired physical  strength, lacks appropriate home exercise program, pain with function and safety issue   Functional limitations: carrying objects, lifting, walking, pulling, pushing, uncomfortable because of pain, moving in bed, sitting, standing, stooping, reaching overhead and unable to perform repetitive tasks   Assessment details: Gay Vazquez is a 79 y.o. male referred to physical therapy for recent fall, muscle weakness, and deconditioning. He presents with a evolving clinical presentation.  He demonstrates weakness in B LE, limited mobility in R knee and hips, assistance needed with supine to sit transfers, poor posture, and limited ambulation with rollator walker.He has comorbidities of cholecangiosarcoma, DM, RA, HTN, and past medical history of B TKR, and back surgery,  and personal factors of difficulty getting out of the house that may affect his progress in the plan of care.  Signs and symptoms are consistent with physical therapy diagnosis of B leg weakness, decreased functional mobility, balance, and endurance and decreased ROM R knee.    Prognosis: fair    Goals  Plan Goals: STG - 4 weeks  1. Pt will be I in progressed HEP for strength/stabilization to facilitate return to desired activity level.   2.  Pt will demonstrate hip flexion length to WFL in order to improve pt's ability to extend hips completely when standing.  3. Pt will have B knee strength >/= 4+/5 for greater ease/tolerance with sit to stand, standing, and walking.    4.  Pt will ambulate 100 feet with rollator walker with independence with increased step length B in order to decrease the risk of falls.    LTG - 12 weeks  1. Pt will increase walking tolerance from 5 to 15 min/miles or greater to allow for pt to walk for leisure/exercise.   2.  Patient will report decreased functional disability on LEFS score from 86 % to 75 %   3.  Patient will demonstrate increased right  knee AROM from 44 degrees extension lag to 30 degrees  for improved functional  mobility  4.  Pt will demonstrate 5/5 strength in B LE in order for pt to perform sit to stand, and bed mobility I.        Plan  Therapy options: will be seen for skilled therapy services  Planned modality interventions: dry needling  Planned therapy interventions: abdominal trunk stabilization, balance/weight-bearing training, body mechanics training, flexibility, functional ROM exercises, gait training, home exercise program, joint mobilization, manual therapy, motor coordination training, neuromuscular re-education, postural training, soft tissue mobilization, spinal/joint mobilization, strengthening, stretching, therapeutic activities and transfer training  Frequency: 2x week  Duration in weeks: 12  Treatment plan discussed with: patient and caregiver      Timed:         Manual Therapy:         mins  62783;     Therapeutic Exercise:    17     mins  34504;     Neuromuscular Jemal:        mins  08467;    Therapeutic Activity:          mins  32510;     Gait Trainin     mins  42659;     Ultrasound:          mins  99061;    Self Care                            mins   50399      Un-Timed:  Electrical Stimulation:         mins  79496 ( );  Traction          mins 89538  Low Eval     30     Mins  11974  Mod Eval          Mins  25524  High Eval                            Mins  52017    Timed Treatment:   25   mins   Total Treatment:     55   mins    PT SIGNATURE: Zina Grace PT     [place] License Number: [id number]    Electronically signed by Zina Grace PT, 25, 1:34 PM EDT    DATE TREATMENT INITIATED: 6/3/2025    Medicare Initial Certification  Certification Period: 2025  I certify that the therapy services are furnished while this patient is under my care.  The services outlined above are required by this patient, and will be reviewed every 90 days.     PHYSICIAN: Gloria Post MD   NPI: [id number]                                         DATE:     Please sign and return via fax to [phone]  Thank you, [facility].

## 2025-06-05 ENCOUNTER — TELEPHONE (OUTPATIENT)
Dept: FAMILY MEDICINE CLINIC | Facility: CLINIC | Age: 80
End: 2025-06-05

## 2025-06-05 NOTE — TELEPHONE ENCOUNTER
Caller: TUCKER PULLIAM    Relationship: Emergency Contact    Best call back number: [phone]     What was the call regarding: PATIENT WIFE TUCKER CALLED STATED THAT PATIENT FOOT DOCTOR HAD SENT THE PAPERWORK FOR PATIENT DIABETIC SHOE AND DR SIMMONS HAD RECEIVED IT. PATIENT WAS LAST SEEN ON 5/16/25 AND DR SIMMONS SHOULD HAVE THE PAPERWORK FILLED OUT.  PLEASE CALL HER BACK WITH THE STATUS OF THE PAPERWORK FOR THE DIABETIC SHOE.

## 2025-06-06 ENCOUNTER — TELEMEDICINE (OUTPATIENT)
Dept: FAMILY MEDICINE CLINIC | Facility: CLINIC | Age: 80
End: 2025-06-06
Payer: MEDICARE

## 2025-06-06 DIAGNOSIS — E11.40 TYPE 2 DIABETES MELLITUS WITH DIABETIC NEUROPATHY, WITHOUT LONG-TERM CURRENT USE OF INSULIN: Primary | ICD-10-CM

## 2025-06-06 PROCEDURE — 1125F AMNT PAIN NOTED PAIN PRSNT: CPT | Performed by: INTERNAL MEDICINE

## 2025-06-06 PROCEDURE — 99213 OFFICE O/P EST LOW 20 MIN: CPT | Performed by: INTERNAL MEDICINE

## 2025-06-06 RX ORDER — METFORMIN HYDROCHLORIDE 500 MG/1
1000 TABLET, EXTENDED RELEASE ORAL DAILY
Qty: 90 TABLET | Refills: 0 | Status: SHIPPED | OUTPATIENT
Start: 2025-06-06

## 2025-06-06 NOTE — PROGRESS NOTES
"Chief Complaint  Primary Care Follow-Up, Hypertension, Diabetes, and Hyperlipidemia    Subjective        Gay Vazquez presents to [facility]  History of Present Illness    History of Present Illness  The patient presents via virtual visit for evaluation of diabetic neuropathy.    He has history of diabetes and is currently being managed on metformin.  He was previously evaluated by a podiatrist, few months ago, who recommended the use of specialized footwear following a comprehensive diabetic foot examination. The podiatrist also diagnosed diabetic neuropathy. He continues to manage neuropathy with gabapentin. He is scheduled to receive the prescribed shoes from the podiatrist.     Additionally, he reported a recent fall that occurred on 06/05/2025, which resulted in an injury to his foot. However, he did not seek immediate medical attention as he did not perceive the injury to be severe.      Objective   Vital Signs:  There were no vitals taken for this visit.  Estimated body mass index is 24.81 kg/m² as calculated from the following:    Height as of 5/16/25: 182.9 cm (72.01\").    Weight as of 5/16/25: 83 kg (183 lb).    BMI is within normal parameters. No other follow-up for BMI required.      Physical Exam   Result Review :                Assessment and Plan   Diagnoses and all orders for this visit:    1. Type 2 diabetes mellitus with diabetic neuropathy, without long-term current use of insulin (Primary)        Assessment & Plan  1. Diabetic neuropathy.  - Currently taking gabapentin for neuropathy.  - Continues to experience symptoms of diabetic neuropathy.  - Paperwork for therapeutic shoes completed and will be faxed to the podiatrist. Follow-up with the podiatrist for proper fitting of the shoes.  - Advised to have annual diabetic foot exams with the foot doctor.           Follow Up   No follow-ups on file.  Patient was given instructions and counseling regarding his condition " or for health maintenance advice. Please see specific information pulled into the AVS if appropriate.     Patient or patient representative verbalized consent for the use of Ambient Listening during the visit with  Gloria Post MD for chart documentation. 6/6/2025  13:32 EDT      Mode of Visit: Video  Location of patient: -HOME-  Location of provider: +[facility]+  You have chosen to receive care through a telehealth visit.  The patient has signed the video visit consent form.  The visit included audio and video interaction. No technical issues occurred during this visit.

## 2025-06-08 NOTE — PROGRESS NOTES
Physical Therapy Daily Treatment Note  6/9/2025  Visit Diagnoses:    ICD-10-CM ICD-9-CM   1. Bilateral leg weakness  R29.898 729.89   2. Impaired functional mobility, balance, and endurance  Z74.09 V49.89   3. Decreased range of motion (ROM) of right knee  M25.661 719.56       VISIT#: 2      Gay Vazquez reports: He fell on Saturday when getting out of bed. He had on sock and he slipped on the floor.  Current Pain Level:    5/10  Affected Area: B shoulders, R knee and hip   Quality of Pain: throbbing   Functional Deficits/Irritating Factors: ambulation, standing, movement, overhead activity and outstretched reach   Progression: no changes  Compliance with HEP Reported: did not do because of fall    Objective  Presents: forward head, rounded shoulders, trunk in forward flexed position. Ambulating with rollator, very slow gait. Knees and hips in flexion    Added to Program: scifit, supine marching, SB leg press and HS curls     Needed to use hands to get his feet into foot paddles on Scifit.     See Exercise, Manual, and Modality Logs for complete treatment.     Patient Education: Pt was educated on exercise biomechanical correctness, intensity, and speed.     Assessment:  Gay will benefit from biomechanical correction in posture in order to have maximal benefit from strengthening. Posture is quite forward flexed in trunk and cervical spine. Gait is labored even with rollator. Also needs core stabilization. Progress will be challenging based on his presentation today.  Pt will continue to benefit from skilled PT interventions to address current functional deficits and impairments.     Plan Goals: STG - 4 weeks  1. Pt will be I in progressed HEP for strength/stabilization to facilitate return to desired activity level.   2.  Pt will demonstrate hip flexion length to WFL in order to improve pt's ability to extend hips completely when standing.  3. Pt will have B knee strength >/= 4+/5 for greater ease/tolerance with  sit to stand, standing, and walking.    4.  Pt will ambulate 100 feet with rollator walker with independence with increased step length B in order to decrease the risk of falls.     LTG - 12 weeks  1. Pt will increase walking tolerance from 5 to 15 min/miles or greater to allow for pt to walk for leisure/exercise.   2.  Patient will report decreased functional disability on LEFS score from 86 % to 75 %   3.  Patient will demonstrate increased right  knee AROM from 44 degrees extension lag to 30 degrees  for improved functional mobility  4.  Pt will demonstrate 5/5 strength in B LE in order for pt to perform sit to stand, and bed mobility I.         Plan: Progress to/Continue with current program.       Timed:  Manual Therapy:            0_    mins  34579;  Ultrasound:                     0      mins  90062;   Therapeutic Exercise:    30     mins  87797;     Neuromuscular Jemal:   15     mins  55277;    Therapeutic Activity:      10     mins  29215;      Iontophoresis              _0__   mins  Dry Needling               _0____   mins         Untimed:  Work Conditioning: __0__ mins 52997  Electrical Stimulation:    0    mins  71435 ( );  Mechanical Traction:       0        mins  17313;   Paraffin                       __0___  mins   Ice/Heat: __0__ mins      Timed Treatment:   55   mins   Total Treatment:     55   mins          SANJUANITA Ramos License # [id number]  Physical Therapist Assistant

## 2025-06-09 ENCOUNTER — TREATMENT (OUTPATIENT)
Dept: PHYSICAL THERAPY | Facility: CLINIC | Age: 80
End: 2025-06-09
Payer: MEDICARE

## 2025-06-09 DIAGNOSIS — R29.898 BILATERAL LEG WEAKNESS: Primary | ICD-10-CM

## 2025-06-09 DIAGNOSIS — M25.661 DECREASED RANGE OF MOTION (ROM) OF RIGHT KNEE: ICD-10-CM

## 2025-06-09 DIAGNOSIS — Z74.09 IMPAIRED FUNCTIONAL MOBILITY, BALANCE, AND ENDURANCE: ICD-10-CM

## 2025-06-09 DIAGNOSIS — M06.9 RHEUMATOID ARTHRITIS INVOLVING BOTH HANDS, UNSPECIFIED WHETHER RHEUMATOID FACTOR PRESENT: ICD-10-CM

## 2025-06-09 PROCEDURE — 97110 THERAPEUTIC EXERCISES: CPT | Performed by: PHYSICAL THERAPIST

## 2025-06-09 PROCEDURE — 97530 THERAPEUTIC ACTIVITIES: CPT | Performed by: PHYSICAL THERAPIST

## 2025-06-09 PROCEDURE — 97112 NEUROMUSCULAR REEDUCATION: CPT | Performed by: PHYSICAL THERAPIST

## 2025-06-09 RX ORDER — GABAPENTIN 300 MG/1
CAPSULE ORAL
Qty: 60 CAPSULE | Refills: 0 | Status: SHIPPED | OUTPATIENT
Start: 2025-06-09

## 2025-06-12 ENCOUNTER — TREATMENT (OUTPATIENT)
Dept: PHYSICAL THERAPY | Facility: CLINIC | Age: 80
End: 2025-06-12
Payer: MEDICARE

## 2025-06-12 DIAGNOSIS — Z74.09 IMPAIRED FUNCTIONAL MOBILITY, BALANCE, AND ENDURANCE: ICD-10-CM

## 2025-06-12 DIAGNOSIS — R53.81 PHYSICAL DECONDITIONING: ICD-10-CM

## 2025-06-12 DIAGNOSIS — M25.661 DECREASED RANGE OF MOTION (ROM) OF RIGHT KNEE: ICD-10-CM

## 2025-06-12 DIAGNOSIS — R29.898 BILATERAL LEG WEAKNESS: Primary | ICD-10-CM

## 2025-06-12 NOTE — PROGRESS NOTES
Physical Therapy Daily Treatment Note  [facility] [place]   [address] [place] [address]  P: [phone] F: [phone]    Patient: Gay Vazquez   : 1945  Referring practitioner: Gloria Post MD  Date of Initial Visit: Type: THERAPY  Noted: 6/3/2025  Today's Date: 2025  Patient seen for 3 sessions       Visit Diagnoses:    ICD-10-CM ICD-9-CM   1. Bilateral leg weakness  R29.898 729.89   2. Physical deconditioning  R53.81 799.3   3. Impaired functional mobility, balance, and endurance  Z74.09 V49.89   4. Decreased range of motion (ROM) of right knee  M25.661 719.56         Subjective:  Gay Vazquez reports:     Pt states He is just ok today and had a typical amount min/mod pain in his knees and back since previous session. Overall symptoms seem to be about the same with ExRx and pt is pleased with physical therapy progress so far.      Objective     See Exercise, Manual, and Modality Logs for complete treatment.       Assessment:  Pt was very tired today and almost falling asleep between exercises. His posture is severely compromised with hip and lumbar flexion. He had a good outlook despite being tired today.        Plan:   Continue to monitor and progress ROM / strengthening / stabilization / functional activity as tolerated        Timed Codes:  Manual Therapy -    0   mins  09147;  Therapeutic Exercise: -   15   mins  39136;     Neuromuscular Jemal -   15   mins  16778;    Therapeutic Activity -    15   mins  93080;     Ultrasound -                     0   mins  98754  Iontophoresis -                 0   mins  89916  _____________________________________  Timed Treatment -    45   mins      Untimed Codes:  Electrical Stimulation -   0  mins  46464 ([id number])  Traction -                0  mins  06560    Total Treatment Time:  45  mins       Jose Altman PT  [place] License #: [id number]    Physical Therapist

## 2025-06-15 NOTE — PROGRESS NOTES
Physical Therapy Daily Treatment Note  6/16/2025  Visit Diagnoses:    ICD-10-CM ICD-9-CM   1. Physical deconditioning  R53.81 799.3   2. Bilateral leg weakness  R29.898 729.89   3. Impaired functional mobility, balance, and endurance  Z74.09 V49.89   4. Decreased range of motion (ROM) of right knee  M25.661 719.56       VISIT#: 4      Gay Vazquez reports: Today is a terrible day. His brother passed away last night and he is still hurting a lot in his hip, knee and shoulders. He admits not do any of his exercises at home.   Current Pain Level:    5/10  Affected Area: B shoulders, R knee and hip   Quality of Pain: throbbing   Functional Deficits/Irritating Factors: ambulation, standing, movement, overhead activity and outstretched reach   Progression: none  Compliance with HEP Reported: no     Objective  Presents: forward head, rounded shoulders, trunk in forward flexed position. Ambulating with rollator, very slow gait. Knees and hips in flexion  Increased sets/reps of:  SB curls        Supine to sit with mod A +1    See Exercise, Manual, and Modality Logs for complete treatment.     Patient Education: Pt was educated on exercise biomechanical correctness, intensity, and speed.     Assessment:  Gait was significantly slower today and Gay was not feeling well due to the loss of his brother and musculoskeletal issues. Not able to get a lot of movement even on the Scifit. Lacks strength in Upper and Lower extremities for pushing and pulling.  Pt will continue to benefit from skilled PT interventions to address current functional deficits and impairments.     Plan Goals: STG - 4 weeks  1. Pt will be I in progressed HEP for strength/stabilization to facilitate return to desired activity level.   2.  Pt will demonstrate hip flexion length to WFL in order to improve pt's ability to extend hips completely when standing.  3. Pt will have B knee strength >/= 4+/5 for greater ease/tolerance with sit to stand, standing, and  walking.    4.  Pt will ambulate 100 feet with rollator walker with independence with increased step length B in order to decrease the risk of falls.     LTG - 12 weeks  1. Pt will increase walking tolerance from 5 to 15 min/miles or greater to allow for pt to walk for leisure/exercise.   2.  Patient will report decreased functional disability on LEFS score from 86 % to 75 %   3.  Patient will demonstrate increased right  knee AROM from 44 degrees extension lag to 30 degrees  for improved functional mobility  4.  Pt will demonstrate 5/5 strength in B LE in order for pt to perform sit to stand, and bed mobility     Plan: Progress to/Continue with current program. Core stabilizaton      Timed:  Manual Therapy:            0_    mins  79046;  Ultrasound:                     0      mins  04028;   Therapeutic Exercise:    15     mins  32126;     Neuromuscular Jemal:   10     mins  76856;    Therapeutic Activity:      15     mins  66408;      Iontophoresis              _0__   mins  Dry Needling               _0____   mins         Untimed:  Work Conditioning: __0__ mins 84527  Electrical Stimulation:    0    mins  63688 ( );  Mechanical Traction:       0        mins  83221;   Paraffin                       __0___  mins   Ice/Heat: __0__ mins      Timed Treatment:   40   mins   Total Treatment:     40   mins          SANJUANITA Ramos License # [id number]  Physical Therapist Assistant

## 2025-06-16 ENCOUNTER — TELEPHONE (OUTPATIENT)
Dept: OTHER | Facility: HOSPITAL | Age: 80
End: 2025-06-16
Payer: COMMERCIAL

## 2025-06-16 ENCOUNTER — TREATMENT (OUTPATIENT)
Dept: PHYSICAL THERAPY | Facility: CLINIC | Age: 80
End: 2025-06-16
Payer: MEDICARE

## 2025-06-16 DIAGNOSIS — R53.81 PHYSICAL DECONDITIONING: Primary | ICD-10-CM

## 2025-06-16 DIAGNOSIS — M25.661 DECREASED RANGE OF MOTION (ROM) OF RIGHT KNEE: ICD-10-CM

## 2025-06-16 DIAGNOSIS — Z74.09 IMPAIRED FUNCTIONAL MOBILITY, BALANCE, AND ENDURANCE: ICD-10-CM

## 2025-06-16 DIAGNOSIS — R29.898 BILATERAL LEG WEAKNESS: ICD-10-CM

## 2025-06-16 PROCEDURE — 97112 NEUROMUSCULAR REEDUCATION: CPT | Performed by: PHYSICAL THERAPIST

## 2025-06-16 PROCEDURE — 97530 THERAPEUTIC ACTIVITIES: CPT | Performed by: PHYSICAL THERAPIST

## 2025-06-16 PROCEDURE — 97110 THERAPEUTIC EXERCISES: CPT | Performed by: PHYSICAL THERAPIST

## 2025-06-16 NOTE — TELEPHONE ENCOUNTER
Oncology Social Work  Voicemail message was left for the patient to return call to OSW. BHUPINDER Sharpe

## 2025-06-17 ENCOUNTER — TELEPHONE (OUTPATIENT)
Dept: OTHER | Facility: HOSPITAL | Age: 80
End: 2025-06-17
Payer: COMMERCIAL

## 2025-06-17 NOTE — TELEPHONE ENCOUNTER
Oncology Social Work  Social work referral submitted to assist the patient with Advanced Directives.  OSW spoke to the patient via telephone and he was agreeable to meeting with OSW on 6/23/25 at 3pm to discuss.  BHUPINDER Sharpe

## 2025-06-18 ENCOUNTER — TELEPHONE (OUTPATIENT)
Dept: ONCOLOGY | Facility: CLINIC | Age: 80
End: 2025-06-18
Payer: COMMERCIAL

## 2025-06-18 NOTE — TELEPHONE ENCOUNTER
Message sent to [facility] to check for any possible interactions. Idalmis Rosales RN

## 2025-06-18 NOTE — TELEPHONE ENCOUNTER
Returned call to patient's wife and let her know there were no interactions found between the medications listed and treatment. Paula rodgers/dahlia. Idalmis Rosales RN

## 2025-06-18 NOTE — TELEPHONE ENCOUNTER
Provider: Terrell   Caller: Paula  Relationship to Patient: wife  Call Back Phone Number: [phone]   Reason for Call: Pt wife calling to see if pt ok to take Xdemvy eye drops,Tobradex,Doxycycline?

## 2025-06-19 ENCOUNTER — TELEPHONE (OUTPATIENT)
Dept: PHYSICAL THERAPY | Facility: CLINIC | Age: 80
End: 2025-06-19

## 2025-06-19 NOTE — TELEPHONE ENCOUNTER
Caller: TUCKER PULLIAM    Relationship: Emergency Contact      What was the call regarding: SICK NOT FEELING WELL.

## 2025-06-23 ENCOUNTER — INFUSION (OUTPATIENT)
Dept: ONCOLOGY | Facility: HOSPITAL | Age: 80
End: 2025-06-23
Payer: MEDICARE

## 2025-06-23 ENCOUNTER — DOCUMENTATION (OUTPATIENT)
Dept: OTHER | Facility: HOSPITAL | Age: 80
End: 2025-06-23
Payer: COMMERCIAL

## 2025-06-23 DIAGNOSIS — Z45.2 FITTING AND ADJUSTMENT OF VASCULAR CATHETER: Primary | ICD-10-CM

## 2025-06-23 PROCEDURE — 96523 IRRIG DRUG DELIVERY DEVICE: CPT

## 2025-06-23 PROCEDURE — 25010000002 HEPARIN LOCK FLUSH PER 10 UNITS: Performed by: INTERNAL MEDICINE

## 2025-06-23 RX ORDER — HEPARIN SODIUM (PORCINE) LOCK FLUSH IV SOLN 100 UNIT/ML 100 UNIT/ML
500 SOLUTION INTRAVENOUS AS NEEDED
OUTPATIENT
Start: 2025-06-23

## 2025-06-23 RX ORDER — HEPARIN SODIUM (PORCINE) LOCK FLUSH IV SOLN 100 UNIT/ML 100 UNIT/ML
500 SOLUTION INTRAVENOUS AS NEEDED
Status: DISCONTINUED | OUTPATIENT
Start: 2025-06-23 | End: 2025-06-23 | Stop reason: HOSPADM

## 2025-06-23 RX ORDER — SODIUM CHLORIDE 0.9 % (FLUSH) 0.9 %
10 SYRINGE (ML) INJECTION AS NEEDED
Status: DISCONTINUED | OUTPATIENT
Start: 2025-06-23 | End: 2025-06-23 | Stop reason: HOSPADM

## 2025-06-23 RX ORDER — SODIUM CHLORIDE 0.9 % (FLUSH) 0.9 %
10 SYRINGE (ML) INJECTION AS NEEDED
OUTPATIENT
Start: 2025-06-23

## 2025-06-23 RX ADMIN — Medication 500 UNITS: at 14:34

## 2025-06-23 RX ADMIN — Medication 10 ML: at 14:34

## 2025-06-23 NOTE — PROGRESS NOTES
"Oncology Social Work  OSW met with the patient, his wife and son and discussed Advance Care Planning and Living esteves.  OSW reviewed \"Conversations that matter\" with the patient and family and answered questions.  The patient will return the document once it is notarized or signed by 2 witnesses.  The patient and family were also informed about the Advanced Care Planning class that is offered in case they need more information or for the document to be notarized.  OSW will continue to follow up to assist as needed.  BHUPINDER Sharpe  "

## 2025-06-24 DIAGNOSIS — C22.0 HEPATOCELLULAR CARCINOMA: ICD-10-CM

## 2025-06-24 RX ORDER — MORPHINE SULFATE 15 MG/1
15 TABLET, FILM COATED, EXTENDED RELEASE ORAL 2 TIMES DAILY
Qty: 60 TABLET | Refills: 0 | Status: SHIPPED | OUTPATIENT
Start: 2025-06-24

## 2025-06-24 NOTE — TELEPHONE ENCOUNTER
Caller: SHASHATUCKER    Relationship: Emergency Contact    Best call back number: [phone]     Requested Prescriptions:   Requested Prescriptions     Pending Prescriptions Disp Refills    Morphine (MS CONTIN) 15 MG 12 hr tablet 60 tablet 0     Sig: Take 1 tablet by mouth 2 (Two) Times a Day.        Pharmacy where request should be sent: [facility] [address] [place] [address] [id number] [place] [address] [phone] [facility] [phone]      Last office visit with prescribing clinician: 5/12/2025   Last telemedicine visit with prescribing clinician: Visit date not found   Next office visit with prescribing clinician: 8/11/2025     Additional details provided by patient:     Does the patient have less than a 3 day supply:  [x] Yes  [] No    Would you like a call back once the refill request has been completed: [] Yes [x] No    If the office needs to give you a call back, can they leave a voicemail: [] Yes [x] No

## 2025-07-06 ENCOUNTER — HOSPITAL ENCOUNTER (INPATIENT)
Facility: HOSPITAL | Age: 80
LOS: 9 days | Discharge: HOSPICE | End: 2025-07-15
Attending: EMERGENCY MEDICINE | Admitting: INTERNAL MEDICINE
Payer: MEDICARE

## 2025-07-06 ENCOUNTER — APPOINTMENT (OUTPATIENT)
Dept: CT IMAGING | Facility: HOSPITAL | Age: 80
End: 2025-07-06
Payer: MEDICARE

## 2025-07-06 DIAGNOSIS — E87.1 HYPONATREMIA: ICD-10-CM

## 2025-07-06 DIAGNOSIS — C22.1 CHOLANGIOCARCINOMA: ICD-10-CM

## 2025-07-06 DIAGNOSIS — R18.0 MALIGNANT ASCITES: ICD-10-CM

## 2025-07-06 DIAGNOSIS — C22.0 HEPATOCELLULAR CARCINOMA: ICD-10-CM

## 2025-07-06 DIAGNOSIS — G89.3 CANCER RELATED PAIN: ICD-10-CM

## 2025-07-06 DIAGNOSIS — M06.9 RHEUMATOID ARTHRITIS INVOLVING BOTH HANDS, UNSPECIFIED WHETHER RHEUMATOID FACTOR PRESENT: ICD-10-CM

## 2025-07-06 DIAGNOSIS — K76.82 ENCEPHALOPATHY, HEPATIC: Primary | ICD-10-CM

## 2025-07-06 LAB
ALBUMIN SERPL-MCNC: 2.5 G/DL (ref 3.5–5.2)
ALBUMIN/GLOB SERPL: 0.5 G/DL
ALP SERPL-CCNC: 101 U/L (ref 39–117)
ALT SERPL W P-5'-P-CCNC: 7 U/L (ref 1–41)
AMMONIA BLD-SCNC: 85 UMOL/L (ref 16–60)
ANION GAP SERPL CALCULATED.3IONS-SCNC: 9 MMOL/L (ref 5–15)
APTT PPP: 39.4 SECONDS (ref 22.7–35.4)
AST SERPL-CCNC: 28 U/L (ref 1–40)
BASOPHILS # BLD AUTO: 0.05 10*3/MM3 (ref 0–0.2)
BASOPHILS NFR BLD AUTO: 0.8 % (ref 0–1.5)
BILIRUB SERPL-MCNC: 0.8 MG/DL (ref 0–1.2)
BUN SERPL-MCNC: 13 MG/DL (ref 8–23)
BUN/CREAT SERPL: 12.5 (ref 7–25)
CALCIUM SPEC-SCNC: 8.3 MG/DL (ref 8.6–10.5)
CHLORIDE SERPL-SCNC: 95 MMOL/L (ref 98–107)
CO2 SERPL-SCNC: 25 MMOL/L (ref 22–29)
CREAT SERPL-MCNC: 1.04 MG/DL (ref 0.76–1.27)
DEPRECATED RDW RBC AUTO: 46.1 FL (ref 37–54)
EGFRCR SERPLBLD CKD-EPI 2021: 73.5 ML/MIN/1.73
EOSINOPHIL # BLD AUTO: 0.07 10*3/MM3 (ref 0–0.4)
EOSINOPHIL NFR BLD AUTO: 1.1 % (ref 0.3–6.2)
ERYTHROCYTE [DISTWIDTH] IN BLOOD BY AUTOMATED COUNT: 13.1 % (ref 12.3–15.4)
GLOBULIN UR ELPH-MCNC: 4.8 GM/DL
GLUCOSE SERPL-MCNC: 159 MG/DL (ref 65–99)
HCT VFR BLD AUTO: 38.7 % (ref 37.5–51)
HGB BLD-MCNC: 12.7 G/DL (ref 13–17.7)
IMM GRANULOCYTES # BLD AUTO: 0.02 10*3/MM3 (ref 0–0.05)
IMM GRANULOCYTES NFR BLD AUTO: 0.3 % (ref 0–0.5)
INR PPP: 1.43 (ref 0.9–1.1)
LYMPHOCYTES # BLD AUTO: 1.45 10*3/MM3 (ref 0.7–3.1)
LYMPHOCYTES NFR BLD AUTO: 23.1 % (ref 19.6–45.3)
MCH RBC QN AUTO: 31.2 PG (ref 26.6–33)
MCHC RBC AUTO-ENTMCNC: 32.8 G/DL (ref 31.5–35.7)
MCV RBC AUTO: 95.1 FL (ref 79–97)
MONOCYTES # BLD AUTO: 0.68 10*3/MM3 (ref 0.1–0.9)
MONOCYTES NFR BLD AUTO: 10.8 % (ref 5–12)
NEUTROPHILS NFR BLD AUTO: 4.01 10*3/MM3 (ref 1.7–7)
NEUTROPHILS NFR BLD AUTO: 63.9 % (ref 42.7–76)
PLATELET # BLD AUTO: 107 10*3/MM3 (ref 140–450)
PMV BLD AUTO: 10.5 FL (ref 6–12)
POTASSIUM SERPL-SCNC: 4.2 MMOL/L (ref 3.5–5.2)
PROT SERPL-MCNC: 7.3 G/DL (ref 6–8.5)
PROTHROMBIN TIME: 17.4 SECONDS (ref 11.7–14.2)
QT INTERVAL: 351 MS
QTC INTERVAL: 442 MS
RBC # BLD AUTO: 4.07 10*6/MM3 (ref 4.14–5.8)
SODIUM SERPL-SCNC: 129 MMOL/L (ref 136–145)
WBC NRBC COR # BLD AUTO: 6.28 10*3/MM3 (ref 3.4–10.8)

## 2025-07-06 PROCEDURE — 99291 CRITICAL CARE FIRST HOUR: CPT

## 2025-07-06 PROCEDURE — 70450 CT HEAD/BRAIN W/O DYE: CPT

## 2025-07-06 PROCEDURE — 85730 THROMBOPLASTIN TIME PARTIAL: CPT | Performed by: EMERGENCY MEDICINE

## 2025-07-06 PROCEDURE — 82140 ASSAY OF AMMONIA: CPT | Performed by: EMERGENCY MEDICINE

## 2025-07-06 PROCEDURE — 74176 CT ABD & PELVIS W/O CONTRAST: CPT

## 2025-07-06 PROCEDURE — 85610 PROTHROMBIN TIME: CPT | Performed by: EMERGENCY MEDICINE

## 2025-07-06 PROCEDURE — 80053 COMPREHEN METABOLIC PANEL: CPT | Performed by: EMERGENCY MEDICINE

## 2025-07-06 PROCEDURE — 85025 COMPLETE CBC W/AUTO DIFF WBC: CPT | Performed by: EMERGENCY MEDICINE

## 2025-07-06 PROCEDURE — 93010 ELECTROCARDIOGRAM REPORT: CPT | Performed by: INTERNAL MEDICINE

## 2025-07-06 PROCEDURE — 93005 ELECTROCARDIOGRAM TRACING: CPT | Performed by: EMERGENCY MEDICINE

## 2025-07-06 RX ORDER — LACTULOSE 10 G/15ML
20 SOLUTION ORAL 3 TIMES DAILY
Status: DISCONTINUED | OUTPATIENT
Start: 2025-07-06 | End: 2025-07-15 | Stop reason: HOSPADM

## 2025-07-06 RX ORDER — ONDANSETRON 4 MG/1
4 TABLET, ORALLY DISINTEGRATING ORAL EVERY 6 HOURS PRN
Status: DISCONTINUED | OUTPATIENT
Start: 2025-07-06 | End: 2025-07-15 | Stop reason: HOSPADM

## 2025-07-06 RX ORDER — OXYCODONE HYDROCHLORIDE 10 MG/1
10 TABLET ORAL EVERY 4 HOURS PRN
Refills: 0 | Status: DISCONTINUED | OUTPATIENT
Start: 2025-07-06 | End: 2025-07-15 | Stop reason: HOSPADM

## 2025-07-06 RX ORDER — MORPHINE SULFATE 15 MG/1
15 TABLET, FILM COATED, EXTENDED RELEASE ORAL 2 TIMES DAILY
Refills: 0 | Status: DISCONTINUED | OUTPATIENT
Start: 2025-07-06 | End: 2025-07-10

## 2025-07-06 RX ORDER — BISACODYL 5 MG/1
5 TABLET, DELAYED RELEASE ORAL DAILY PRN
Status: DISCONTINUED | OUTPATIENT
Start: 2025-07-06 | End: 2025-07-15 | Stop reason: HOSPADM

## 2025-07-06 RX ORDER — SODIUM CHLORIDE 0.9 % (FLUSH) 0.9 %
10 SYRINGE (ML) INJECTION AS NEEDED
Status: DISCONTINUED | OUTPATIENT
Start: 2025-07-06 | End: 2025-07-15 | Stop reason: HOSPADM

## 2025-07-06 RX ORDER — PILOCARPINE HYDROCHLORIDE 5 MG/1
5 TABLET, FILM COATED ORAL EVERY 8 HOURS SCHEDULED
Status: DISCONTINUED | OUTPATIENT
Start: 2025-07-06 | End: 2025-07-15 | Stop reason: HOSPADM

## 2025-07-06 RX ORDER — BISACODYL 10 MG
10 SUPPOSITORY, RECTAL RECTAL DAILY PRN
Status: DISCONTINUED | OUTPATIENT
Start: 2025-07-06 | End: 2025-07-15 | Stop reason: HOSPADM

## 2025-07-06 RX ORDER — LACTULOSE 10 G/15ML
20 SOLUTION ORAL ONCE
Status: COMPLETED | OUTPATIENT
Start: 2025-07-06 | End: 2025-07-06

## 2025-07-06 RX ORDER — ONDANSETRON 2 MG/ML
4 INJECTION INTRAMUSCULAR; INTRAVENOUS EVERY 6 HOURS PRN
Status: DISCONTINUED | OUTPATIENT
Start: 2025-07-06 | End: 2025-07-15 | Stop reason: HOSPADM

## 2025-07-06 RX ORDER — POLYETHYLENE GLYCOL 3350 17 G/17G
17 POWDER, FOR SOLUTION ORAL DAILY PRN
Status: DISCONTINUED | OUTPATIENT
Start: 2025-07-06 | End: 2025-07-15 | Stop reason: HOSPADM

## 2025-07-06 RX ORDER — OLANZAPINE 5 MG/1
5 TABLET, FILM COATED ORAL NIGHTLY
Status: DISCONTINUED | OUTPATIENT
Start: 2025-07-06 | End: 2025-07-15 | Stop reason: HOSPADM

## 2025-07-06 RX ORDER — PANTOPRAZOLE SODIUM 40 MG/1
40 TABLET, DELAYED RELEASE ORAL
Status: DISCONTINUED | OUTPATIENT
Start: 2025-07-07 | End: 2025-07-15 | Stop reason: HOSPADM

## 2025-07-06 RX ORDER — AMOXICILLIN 250 MG
2 CAPSULE ORAL 2 TIMES DAILY PRN
Status: DISCONTINUED | OUTPATIENT
Start: 2025-07-06 | End: 2025-07-15 | Stop reason: HOSPADM

## 2025-07-06 RX ORDER — AMOXICILLIN 250 MG
2 CAPSULE ORAL 2 TIMES DAILY
Status: DISCONTINUED | OUTPATIENT
Start: 2025-07-06 | End: 2025-07-15 | Stop reason: HOSPADM

## 2025-07-06 RX ADMIN — Medication 2.5 MG: at 20:37

## 2025-07-06 RX ADMIN — LACTULOSE 20 G: 20 SOLUTION ORAL at 17:32

## 2025-07-06 NOTE — ED PROVIDER NOTES
EMERGENCY DEPARTMENT ENCOUNTER  Room Number:  N626/1  PCP: Gloria Post MD  Independent Historians: Patient and Family      HPI:  Chief Complaint: had concerns including Altered Mental Status and Abdominal Pain.   A complete HPI/ROS/PMH/PSH/SH/FH are unobtainable due to: None    Context: Gay Vazquez is a 79 y.o. male with a medical history of hypertension, diabetes, rheumatoid arthritis and liver disease who presents to the ED c/o acute abdominal distention and tenderness, decreased urination and increasing confusion problem over the weekend.  Patient lives with his wife.  She says patient has not been urinating as often as he normally would.  He has also been showing signs of increasing confusion at home.  She says that he insisted he had dropped a fork on the ground and was trying to pick it up but in fact he was not even holding a fork prior to that time.  The patient himself at this time does not complain of anything.  He denies chest pain or difficulties breathing.  Denies abdominal pain or nausea.  He insists that he was not confused.  He denies any recent fevers or flulike symptoms.  Family members had called the oncology office today to express concerns about the patient's symptoms at home.  They were advised to come here for further evaluation.      Review of prior external notes (non-ED) -and- Review of prior external test results outside of this encounter: I independently reviewed the oncology office progress note from May 12, 2025.  Assessment and plan indicated management of intrahepatic cholangiocarcinoma, multifocal.  Plan was to continue MS Contin and oxycodone for cancer-related pain.  Plan was to repeat CT liver protocol in 3 months.    Prescription drug monitoring program review: MELINDA reviewed by Ana Malin MD, Michael Sherwood MD KASPER: N/A and MELINDA query complete and reviewed. Patient receives regular prescriptions for controlled substances.    PAST MEDICAL  HISTORY  Active Ambulatory Problems     Diagnosis Date Noted    Mixed hyperlipidemia 06/08/2023    Diabetes mellitus without complication 06/08/2023    Rheumatoid arthritis 06/08/2023    Overweight (BMI 25.0-29.9) 06/08/2023    Primary hypertension 06/08/2023    Nicotine dependence, cigarettes, uncomplicated 07/10/2023    Aneurysm of ascending aorta without rupture 08/23/2023    Liver mass 05/30/2024    Cholangiocarcinoma 07/25/2024    Encounter for long-term (current) use of other medications 07/29/2024    Fitting and adjustment of vascular catheter 08/02/2024    Thrombocytopenia 08/09/2024    Cancer related pain 10/14/2024    Chronic pleural effusion 10/14/2024     Resolved Ambulatory Problems     Diagnosis Date Noted    Cellulitis 06/08/2023    Abdominal aortic aneurysm (AAA) 08/14/2023     Past Medical History:   Diagnosis Date    Arthritis of neck     Cancer     Colon polyp     Diabetes mellitus     Erectile dysfunction 2/22    GERD (gastroesophageal reflux disease) 1/14    Hip arthrosis     Hyperlipidemia     Hypertension     Kidney stone     Liver cancer 07-    Liver disease     Low back pain     Postoperative wound infection     RA (rheumatoid arthritis)     Urinary tract infection     Visual impairment          PAST SURGICAL HISTORY  Past Surgical History:   Procedure Laterality Date    ABDOMINAL SURGERY      BACK SURGERY      CARDIAC CATHETERIZATION      CARDIAC SURGERY  1995    CHOLECYSTECTOMY      COLONOSCOPY  2017    [facility]    COLONOSCOPY N/A 06/04/2024    Procedure: COLONOSCOPY INTO CECUM WITH POLYPECTOMIES (COLD SNARE);  Surgeon: Angel Ness MD;  Location: [facility] ENDOSCOPY;  Service: General;  Laterality: N/A;  PRE: COLON MASS, ABNORMAL CT  POST: DIVERTICULOSIS, POLYPS, POOR PREP    ENDOSCOPY N/A 06/04/2024    Procedure: ESOPHAGOGASTRODUODENOSCOPY;  Surgeon: Angel Ness MD;  Location: [facility] ENDOSCOPY;  Service: General;  Laterality:  N/A;  PRE: LIVER MASS  POST: NORMAL EGD    EYE SURGERY      JOINT REPLACEMENT      PERICARDIUM SURGERY      REPLACEMENT TOTAL KNEE BILATERAL      SPINE SURGERY      THORACOTOMY Bilateral     TRIGGER POINT INJECTION      Back    VASECTOMY      VENOUS ACCESS DEVICE (PORT) INSERTION Right 2024    Procedure: INSERTION VENOUS ACCESS DEVICE;  Surgeon: Angel Ness MD;  Location: [facility] MAIN OR;  Service: General;  Laterality: Right;         FAMILY HISTORY  Family History   Problem Relation Age of Onset    Hypertension Mother     Heart disease Mother     Arthritis Father     Diabetes Sister     Asthma Brother     Diabetes Brother     Malig Hyperthermia Neg Hx          SOCIAL HISTORY  Social History     Socioeconomic History    Marital status:      Spouse name: Paula   Tobacco Use    Smoking status: Some Days     Current packs/day: 0.00     Average packs/day: 1 pack/day for 53.7 years (53.7 ttl pk-yrs)     Types: Cigarettes     Start date: 1970     Last attempt to quit: 10/1/2023     Years since quittin.7     Passive exposure: Past    Smokeless tobacco: Never    Tobacco comments:     Had stopped completely until last summer when cancer diagnosis.  Then started smoking 3 or 4 a day.   Vaping Use    Vaping status: Never Used   Substance and Sexual Activity    Alcohol use: Not Currently    Drug use: Never    Sexual activity: Not Currently     Partners: Female     Birth control/protection: Vasectomy       Chronic or social conditions impacting patient care (Social Determinants of Health):  Social Drivers of Health     Tobacco Use: High Risk (2025)    Patient History     Smoking Tobacco Use: Some Days     Smokeless Tobacco Use: Never     Passive Exposure: Past   Alcohol Use: Not At Risk (2025)    AUDIT-C     Frequency of Alcohol Consumption: Never     Average Number of Drinks: Patient does not drink     Frequency of Binge Drinking: Never   Financial Resource Strain: Low Risk   (10/16/2024)    Overall Financial Resource Strain (CARDIA)     Difficulty of Paying Living Expenses: Not hard at all   Food Insecurity: No Food Insecurity (10/16/2024)    Hunger Vital Sign     Worried About Running Out of Food in the Last Year: Never true     Ran Out of Food in the Last Year: Never true   Transportation Needs: No Transportation Needs (10/16/2024)    PRAPARE - Transportation     Lack of Transportation (Medical): No     Lack of Transportation (Non-Medical): No   Physical Activity: Sufficiently Active (10/16/2024)    Exercise Vital Sign     Days of Exercise per Week: 5 days     Minutes of Exercise per Session: 30 min   Stress: No Stress Concern Present (10/16/2024)    [employer] - Occupational Stress Questionnaire     Feeling of Stress : Not at all   Social Connections: Not At Risk (10/16/2024)    Family and Community Support     Help with Day-to-Day Activities: I get all the help I need     Lonely or Isolated: Never   Interpersonal Safety: Not At Risk (7/6/2025)    Abuse Screen     Unsafe at Home or Work/School: no     Feels Threatened by Someone?: no     Does Anyone Keep You from Contacting Others or Doint Things Outside the Home?: no     Physical Sign of Abuse Present: no   Depression: Not at risk (5/12/2025)    PHQ-2     PHQ-2 Score: 0   Recent Concern: Depression - At risk (2/14/2025)    PHQ-2     PHQ-2 Score: 11   Housing Stability: Not At Risk (10/16/2024)    Housing Stability     Current Living Arrangements: home     Potentially Unsafe Housing Conditions: none   Utilities: Not At Risk (10/16/2024)    [facility] Utilities     Threatened with loss of utilities: No   Health Literacy: Not At Risk (10/16/2024)    Education     Help with school or training?: No     Preferred Language: English   Employment: Not At Risk (10/16/2024)    Employment     Do you want help finding or keeping work or a job?: I do not need or want help   Disabilities: Not At Risk (7/6/2025)     Disabilities     Concentrating, Remembering, or Making Decisions Difficulty: no     Doing Errands Independently Difficulty: no       ALLERGIES  Chlorhexidine      REVIEW OF SYSTEMS  Review of Systems  Included in HPI  All systems reviewed and negative except for those discussed in HPI.      PHYSICAL EXAM    I have reviewed the triage vital signs and nursing notes.    ED Triage Vitals   Temp Heart Rate Resp BP SpO2   07/06/25 1459 07/06/25 1459 07/06/25 1459 07/06/25 1503 07/06/25 1459   98.4 °F (36.9 °C) 99 18 121/78 94 %      Temp src Heart Rate Source Patient Position BP Location FiO2 (%)   -- -- 07/06/25 1503 07/06/25 1503 --     Sitting Right arm        Physical Exam  GENERAL: alert, no acute distress  SKIN: Warm, dry, few scattered bruises  HENT: Normocephalic, atraumatic  EYES: no scleral icterus, pupils equally round and reactive  CV: regular rhythm, regular rate, normal pulses  RESPIRATORY: normal effort, lungs clear  ABDOMEN: soft, nontender, mild distention  MUSCULOSKELETAL: no deformity, trace edema to the bilateral lower extremity  NEURO: alert, moves all extremities, follows commands, no facial droop      LAB RESULTS  Recent Results (from the past 24 hours)   Comprehensive Metabolic Panel    Collection Time: 07/06/25  3:20 PM    Specimen: Blood   Result Value Ref Range    Glucose 159 (H) 65 - 99 mg/dL    BUN 13.0 8.0 - 23.0 mg/dL    Creatinine 1.04 0.76 - 1.27 mg/dL    Sodium 129 (L) 136 - 145 mmol/L    Potassium 4.2 3.5 - 5.2 mmol/L    Chloride 95 (L) 98 - 107 mmol/L    CO2 25.0 22.0 - 29.0 mmol/L    Calcium 8.3 (L) 8.6 - 10.5 mg/dL    Total Protein 7.3 6.0 - 8.5 g/dL    Albumin 2.5 (L) 3.5 - 5.2 g/dL    ALT (SGPT) 7 1 - 41 U/L    AST (SGOT) 28 1 - 40 U/L    Alkaline Phosphatase 101 39 - 117 U/L    Total Bilirubin 0.8 0.0 - 1.2 mg/dL    Globulin 4.8 gm/dL    A/G Ratio 0.5 g/dL    BUN/Creatinine Ratio 12.5 7.0 - 25.0    Anion Gap 9.0 5.0 - 15.0 mmol/L    eGFR 73.5 >60.0 mL/min/1.73   Protime-INR     Collection Time: 07/06/25  3:20 PM    Specimen: Blood   Result Value Ref Range    Protime 17.4 (H) 11.7 - 14.2 Seconds    INR 1.43 (H) 0.90 - 1.10   aPTT    Collection Time: 07/06/25  3:20 PM    Specimen: Blood   Result Value Ref Range    PTT 39.4 (H) 22.7 - 35.4 seconds   Ammonia    Collection Time: 07/06/25  3:20 PM    Specimen: Blood   Result Value Ref Range    Ammonia 85 (H) 16 - 60 umol/L   CBC Auto Differential    Collection Time: 07/06/25  3:20 PM    Specimen: Blood   Result Value Ref Range    WBC 6.28 3.40 - 10.80 10*3/mm3    RBC 4.07 (L) 4.14 - 5.80 10*6/mm3    Hemoglobin 12.7 (L) 13.0 - 17.7 g/dL    Hematocrit 38.7 37.5 - 51.0 %    MCV 95.1 79.0 - 97.0 fL    MCH 31.2 26.6 - 33.0 pg    MCHC 32.8 31.5 - 35.7 g/dL    RDW 13.1 12.3 - 15.4 %    RDW-SD 46.1 37.0 - 54.0 fl    MPV 10.5 6.0 - 12.0 fL    Platelets 107 (L) 140 - 450 10*3/mm3    Neutrophil % 63.9 42.7 - 76.0 %    Lymphocyte % 23.1 19.6 - 45.3 %    Monocyte % 10.8 5.0 - 12.0 %    Eosinophil % 1.1 0.3 - 6.2 %    Basophil % 0.8 0.0 - 1.5 %    Immature Grans % 0.3 0.0 - 0.5 %    Neutrophils, Absolute 4.01 1.70 - 7.00 10*3/mm3    Lymphocytes, Absolute 1.45 0.70 - 3.10 10*3/mm3    Monocytes, Absolute 0.68 0.10 - 0.90 10*3/mm3    Eosinophils, Absolute 0.07 0.00 - 0.40 10*3/mm3    Basophils, Absolute 0.05 0.00 - 0.20 10*3/mm3    Immature Grans, Absolute 0.02 0.00 - 0.05 10*3/mm3   ECG 12 Lead Altered Mental Status    Collection Time: 07/06/25  3:27 PM   Result Value Ref Range    QT Interval 351 ms    QTC Interval 442 ms         RADIOLOGY  CT Abdomen Pelvis Without Contrast  Result Date: 7/6/2025  CT ABDOMEN AND PELVIS WITHOUT IV CONTRAST  HISTORY: Abdominal distention  TECHNIQUE: Radiation dose reduction techniques were utilized, including automated exposure control and exposure modulation based on body size. 3 mm images were obtained through the abdomen and pelvis without IV contrast.  COMPARISON: CT abdomen and pelvis dating back to 1/16/2025      FINDINGS  AND IMPRESSION: Please note evaluation is suboptimal due to lack of intravenous contrast. Small to moderate partially loculated appearing right pleural effusion is present, as before. Questionable adenopathy within the visualized aspect of the mid mediastinum better seen on recent CT chest 4/21/2025. Please refer to that dictation for further information. Significant desiccation of the pericardium, as before.  No free intraperitoneal air is seen. There is colonic diverticulosis. The appendix is not seen.  Scattered hepatic masses are present, as before, not well evaluated demonstrates contrast. The largest masslike agglomerate appears to measure approximately 9.5 x 7.1 cm (previously 8.3 x 5.8 cm on 1/16/2025 when measured in similar fashion.  Gallbladder surgically absent. The pancreas, spleen and adrenal glands have an unremarkable noncontrast CT appearance. Subcentimeter renal lesions are too small to characterize. Larger hypodense lesions demonstrate density less than 15 Hounsfield units likely benign per Bosniak 2019 criteria. No hydronephrosis.  There is a moderate to large amount of ascites. Prominent perisplenic and splenorenal varices are present. While the ascites may be related to portosystemic shunting, a malignant etiology cannot be excluded given patient history and findings in the liver. This can bladders underdistended cannot be evaluated. Prostate is at least moderately enlarged.  Asymmetric advanced degenerative changes within the right hip. No suspicious lytic or blastic osseous lesions.      CT Head Without Contrast  Result Date: 7/6/2025  CT OF THE BRAIN WITHOUT CONTRAST 7/6/2025  HISTORY: Altered mental status. Confusion.  Axial images were obtained through the brain without intravenous contrast.  There is mild to moderate diffuse atrophy. There is mild decreased attenuation of the periventricular white matter bilaterally consistent with mild small vessel white matter ischemic disease. There is  no evidence of acute infarction, hemorrhage, midline shift or mass effect.  No bony abnormalities are seen.      1. No acute process identified.  Radiation dose reduction techniques were utilized, including automated exposure control and exposure modulation based on body size.   This report was finalized on 7/6/2025 5:08 PM by Dr. Roby Johnson M.D on Workstation: [id number]          MEDICATIONS GIVEN IN ER  Medications   sodium chloride 0.9 % flush 10 mL (has no administration in time range)   ondansetron ODT (ZOFRAN-ODT) disintegrating tablet 4 mg (has no administration in time range)     Or   ondansetron (ZOFRAN) injection 4 mg (has no administration in time range)   melatonin tablet 2.5 mg (has no administration in time range)   sennosides-docusate (PERICOLACE) 8.6-50 MG per tablet 2 tablet (has no administration in time range)     And   polyethylene glycol (MIRALAX) packet 17 g (has no administration in time range)     And   bisacodyl (DULCOLAX) EC tablet 5 mg (has no administration in time range)     And   bisacodyl (DULCOLAX) suppository 10 mg (has no administration in time range)   lactulose (CHRONULAC) 10 GM/15ML solution 20 g (20 g Oral Given 7/6/25 1732)         ORDERS PLACED DURING THIS VISIT:  Orders Placed This Encounter   Procedures    CT Head Without Contrast    CT Abdomen Pelvis Without Contrast    Comprehensive Metabolic Panel    Protime-INR    aPTT    Urinalysis With Culture If Indicated - Urine, Clean Catch    Ammonia    CBC Auto Differential    Basic Metabolic Panel    CBC (No Diff)    Diet: Cardiac; Healthy Heart (2-3 Na+); Fluid Consistency: Thin (IDDSI 0)    Monitor Blood Pressure    Continuous Pulse Oximetry    Vital Signs    Up with assistance    Daily Weights    Strict Intake & Output    Oral Care    Place Sequential Compression Device    Maintain Sequential Compression Device    Code Status and Medical Interventions: CPR (Attempt to Resuscitate); Full    [facility] (on-call MD unless  specified) Details    ECG 12 Lead Altered Mental Status    Insert Peripheral IV    Inpatient Admission    CBC & Differential         OUTPATIENT MEDICATION MANAGEMENT:  Current Facility-Administered Medications Ordered in Epic   Medication Dose Route Frequency Provider Last Rate Last Admin    sennosides-docusate (PERICOLACE) 8.6-50 MG per tablet 2 tablet  2 tablet Oral BID PRN Ana Malin MD        And    polyethylene glycol (MIRALAX) packet 17 g  17 g Oral Daily PRN Ana Malin MD        And    bisacodyl (DULCOLAX) EC tablet 5 mg  5 mg Oral Daily PRN Ana Malin MD        And    bisacodyl (DULCOLAX) suppository 10 mg  10 mg Rectal Daily PRN Ana Malin MD        heparin injection 500 Units  500 Units Intravenous PRN Anthony Tejada MD   500 Units at 09/13/24 1037    melatonin tablet 2.5 mg  2.5 mg Oral Nightly Ana Malin MD        ondansetron ODT (ZOFRAN-ODT) disintegrating tablet 4 mg  4 mg Oral Q6H PRN Ana aMlin MD        Or    ondansetron (ZOFRAN) injection 4 mg  4 mg Intravenous Q6H PRN Ana Malin MD        sodium chloride 0.9 % flush 10 mL  10 mL Intravenous PRN Atnhony Tejada MD   10 mL at 09/13/24 1037    sodium chloride 0.9 % flush 10 mL  10 mL Intravenous PRN Michael Sherwood MD         No current [facility]-ordered outpatient medications on file.         PROCEDURES  Procedures      Critical Care Note: Critical care provider statement:    Critical care time (minutes): 33.   Critical care time was exclusive of:  Separately billable procedures and treating other patients   Critical care was necessary to treat or prevent imminent or life-threatening deterioration of the following conditions:  CNS Failure and Metabolic Failure   Critical care was time spent personally by me on the following activities:  Development of treatment plan with patient or surrogate, discussions with consultants, evaluation of patient's response to  treatment, examination of patient, obtaining history from patient or surrogate, ordering and performing treatments and interventions, ordering and review of laboratory studies, ordering and review of radiographic studies, pulse oximetry, re-evaluation of patient's condition and review of old charts. Critical Care indicators: Altered Mental Status, Elderly, Delirium       PROGRESS, DATA ANALYSIS, CONSULTS, AND MEDICAL DECISION MAKING  All labs have been independently interpreted by me.  All radiology studies have been reviewed by me. All EKG's have been independently viewed and interpreted by me.  Discussion below represents my analysis of pertinent findings related to patient's condition, differential diagnosis, treatment plan and final disposition.    Differential diagnosis includes but is not limited to hepatic encephalopathy, metabolic encephalopathy, hypoglycemia, stroke, UTI, medication side effect, ascites, spontaneous bacterial peritonitis.    Clinical Scores:                                   ED Course as of 07/06/25 1846   Sun Jul 06, 2025   1539 EKG         EKG time/Interp time: 1527/1530  Rhythm/Rate: Sinus rhythm, 95 bpm  P waves and VT: Present, prolonged interval at 206 ms  QRS, axis: Narrow complex, normal axis  ST and T waves: Nonspecific T wave flattening changes in multiple leads.  No ST segment elevations present.  Independently interpreted by me contemporaneously with treatment   [LAURA]   1641 Ammonia(!): 85 [LAURA]   1641 ALT (SGPT): 7 [LAURA]   1641 AST (SGOT): 28 [LAURA]   1641 Alkaline Phosphatase: 101 [LAURA]   1641 Total Bilirubin: 0.8 [LAURA]   1712 I independently interpreted the Head CT w/o Contrast and my findings are: No acute hemorrhage, no midline shift   [LAURA]   1712 Sodium(!): 129 [LAURA]   1736 Discussed with Dr. Malin from [facility] about this patient.  She agrees to admit him to the hospitalist service for further care needs tonight. [LAURA]      ED Course User Index  [LAURA] Michael Sherwood MD           AS  OF 18:46 EDT VITALS:    BP - 115/83  HR - 95  TEMP - 98.6 °F (37 °C) (Oral)  O2 SATS - 97%    COMPLEXITY OF CARE  The patient requires admission.      DIAGNOSIS  Final diagnoses:   Encephalopathy, hepatic   Malignant ascites   Hyponatremia         DISPOSITION  ED Disposition       ED Disposition   Decision to Admit    Condition   --    Comment   Level of Care: Telemetry [5]   Diagnosis: Hepatic encephalopathy [572.2.ICD-9-CM]   Admitting Physician: WHITNEY FRAGA [[id number]]   Attending Physician: WHITNEY FRAGA [[id number]]   Certification: I Certify That Inpatient Hospital Services Are Medically Necessary For Greater Than 2 Midnights                  Please note that portions of this document were completed with a voice recognition program.    Note Disclaimer: At [facility], we believe that sharing information builds trust and better relationships. You are receiving this note because you recently visited [facility]. It is possible you will see health information before a provider has talked with you about it. This kind of information can be easy to misunderstand. To help you fully understand what it means for your health, we urge you to discuss this note with your provider.         Michael Sherwood MD  07/06/25 2747

## 2025-07-06 NOTE — ED NOTES
Nursing report ED to floor  Gay Vazquez  79 y.o.  male    HPI :  HPI  Stated Reason for Visit: hx liver cancer, now abd distention with decreased urine output, family also reports confusion    Chief Complaint  Chief Complaint   Patient presents with    Altered Mental Status    Abdominal Pain       Admitting doctor:   Ana Malin MD    Admitting diagnosis:   There were no encounter diagnoses.    Code status:   Current Code Status       Date Active Code Status Order ID Comments User Context       Prior            Allergies:   Chlorhexidine    Isolation:   No active isolations    Intake and Output  No intake or output data in the 24 hours ending 07/06/25 1741    Weight:   There were no vitals filed for this visit.    Most recent vitals:   Vitals:    07/06/25 1459 07/06/25 1503 07/06/25 1513 07/06/25 1700   BP:  121/78 109/70 111/85   BP Location:  Right arm  Right arm   Patient Position:  Sitting  Sitting   Pulse: 99  94 88   Resp: 18      Temp: 98.4 °F (36.9 °C)      SpO2: 94%  93% 96%       Active LDAs/IV Access:   Lines, Drains & Airways       Active LDAs       Name Placement date Placement time Site Days    Peripheral IV 07/06/25 1519 20 G Posterior;Right Forearm 07/06/25  1519  Forearm  less than 1    Single Lumen Implantable Port 07/01/24 Right Chest 07/01/24  1250  Chest  370                    Labs (abnormal labs have a star):   Labs Reviewed   COMPREHENSIVE METABOLIC PANEL - Abnormal; Notable for the following components:       Result Value    Glucose 159 (*)     Sodium 129 (*)     Chloride 95 (*)     Calcium 8.3 (*)     Albumin 2.5 (*)     All other components within normal limits    Narrative:     GFR Categories in Chronic Kidney Disease (CKD)              GFR Category          GFR (mL/min/1.73)    Interpretation  G1                    90 or greater        Normal or high (1)  G2                    60-89                Mild decrease (1)  G3a                   45-59                Mild to moderate  decrease  G3b                   30-44                Moderate to severe decrease  G4                    15-29                Severe decrease  G5                    14 or less           Kidney failure    (1)In the absence of evidence of kidney disease, neither GFR category G1 or G2 fulfill the criteria for CKD.    eGFR calculation 2021 CKD-EPI creatinine equation, which does not include race as a factor   PROTIME-INR - Abnormal; Notable for the following components:    Protime 17.4 (*)     INR 1.43 (*)     All other components within normal limits   APTT - Abnormal; Notable for the following components:    PTT 39.4 (*)     All other components within normal limits   AMMONIA - Abnormal; Notable for the following components:    Ammonia 85 (*)     All other components within normal limits   CBC WITH AUTO DIFFERENTIAL - Abnormal; Notable for the following components:    RBC 4.07 (*)     Hemoglobin 12.7 (*)     Platelets 107 (*)     All other components within normal limits   URINALYSIS W/ CULTURE IF INDICATED   CBC AND DIFFERENTIAL    Narrative:     The following orders were created for panel order CBC & Differential.  Procedure                               Abnormality         Status                     ---------                               -----------         ------                     CBC Auto Differential[[id number]]        Abnormal            Final result                 Please view results for these tests on the individual orders.       EKG:   ECG 12 Lead Altered Mental Status   Preliminary Result   HEART RATE=95  bpm   RR Hkltmctt=322  ms   OH Lhoolqvi=604  ms   P Horizontal Axis=  deg   P Front Axis=-49  deg   QRSD Interval=88  ms   QT Obqidhtl=055  ms   YRhW=259  ms   QRS Axis=65  deg   T Wave Axis=-57  deg   - BORDERLINE ECG -   Sinus or ectopic atrial rhythm   Borderline T abnormalities, diffuse leads   Date and Time of Study:2025-07-06 15:27:13          Meds given in ED:   Medications   sodium chloride 0.9 %  flush 10 mL (has no administration in time range)   lactulose (CHRONULAC) 10 GM/15ML solution 20 g (20 g Oral Given 25 1732)       Imaging results:  CT Head Without Contrast  Result Date: 2025  1. No acute process identified.  Radiation dose reduction techniques were utilized, including automated exposure control and exposure modulation based on body size.   This report was finalized on 2025 5:08 PM by Dr. Roby Johnson M.D on Workstation: [id number]        Ambulatory status:   - x1    Social issues:   Social History     Socioeconomic History    Marital status:      Spouse name: Paula   Tobacco Use    Smoking status: Some Days     Current packs/day: 0.00     Average packs/day: 1 pack/day for 53.7 years (53.7 ttl pk-yrs)     Types: Cigarettes     Start date: 1970     Last attempt to quit: 10/1/2023     Years since quittin.7     Passive exposure: Past    Smokeless tobacco: Never    Tobacco comments:     Had stopped completely until last summer when cancer diagnosis.  Then started smoking 3 or 4 a day.   Vaping Use    Vaping status: Never Used   Substance and Sexual Activity    Alcohol use: Not Currently    Drug use: Never    Sexual activity: Not Currently     Partners: Female     Birth control/protection: Vasectomy       Peripheral Neurovascular  Peripheral Neurovascular (Adult)  Peripheral Neurovascular WDL: WDL, neurovascular assessment upper, neurovascular assessment lower  LUE Neurovascular Assessment  Temperature LUE: warm  Color LUE: no discoloration  Sensation LUE: no tenderness, no tingling, no numbness  RUE Neurovascular Assessment  Temperature RUE: warm  Color RUE: no discoloration  Sensation RUE: no numbness, no tenderness, no tingling  LLE Neurovascular Assessment  Temperature LLE: warm  Color LLE: no discoloration  Sensation LLE: no numbness, no tenderness, no tingling  RLE Neurovascular Assessment  Temperature RLE: warm  Color RLE: no discoloration  Sensation RLE: no  numbness, no tenderness, no tingling    Neuro Cognitive  Neuro Cognitive (Adult)  Cognitive/Neuro/Behavioral WDL: orientation, arousability  Arousal Level: arouses to voice  Orientation: disoriented to, situation, time    Learning  Learning Assessment  Learning Readiness and Ability: cognitive limitation noted  Education Provided  Person Taught: patient  Teaching Method: verbal instruction  Teaching Focus: symptom/problem overview  Education Outcome Evaluation: eager to learn, acceptance expressed, verbalizes understanding, needs reinforcement    Respiratory  Respiratory  Airway WDL: WDL  Respiratory WDL  Respiratory WDL: WDL, rhythm/pattern  Rhythm/Pattern, Respiratory: no shortness of breath reported, depth regular, pattern regular, unlabored    Abdominal Pain       Pain Assessments  Pain (Adult)  (0-10) Pain Rating: Rest: 6  (0-10) Pain Rating: Activity: 6  Pain Location: abdomen    NIH Stroke Scale       Jeremie Merrill RN  07/06/25 17:41 EDT

## 2025-07-07 ENCOUNTER — APPOINTMENT (OUTPATIENT)
Dept: ULTRASOUND IMAGING | Facility: HOSPITAL | Age: 80
End: 2025-07-07
Payer: MEDICARE

## 2025-07-07 LAB
ALBUMIN FLD-MCNC: 0.5 G/DL
AMMONIA BLD-SCNC: 82 UMOL/L (ref 16–60)
ANION GAP SERPL CALCULATED.3IONS-SCNC: 7.3 MMOL/L (ref 5–15)
APPEARANCE FLD: ABNORMAL
BACTERIA UR QL AUTO: ABNORMAL /HPF
BILIRUB UR QL STRIP: NEGATIVE
BUN SERPL-MCNC: 11 MG/DL (ref 8–23)
BUN/CREAT SERPL: 12.6 (ref 7–25)
CALCIUM SPEC-SCNC: 8.5 MG/DL (ref 8.6–10.5)
CHLORIDE SERPL-SCNC: 101 MMOL/L (ref 98–107)
CLARITY UR: ABNORMAL
CO2 SERPL-SCNC: 27.7 MMOL/L (ref 22–29)
COLOR FLD: ABNORMAL
COLOR UR: ABNORMAL
CREAT SERPL-MCNC: 0.87 MG/DL (ref 0.76–1.27)
DEPRECATED RDW RBC AUTO: 44.7 FL (ref 37–54)
EGFRCR SERPLBLD CKD-EPI 2021: 88.3 ML/MIN/1.73
ERYTHROCYTE [DISTWIDTH] IN BLOOD BY AUTOMATED COUNT: 13.1 % (ref 12.3–15.4)
GLUCOSE FLD-MCNC: 186 MG/DL
GLUCOSE SERPL-MCNC: 128 MG/DL (ref 65–99)
GLUCOSE UR STRIP-MCNC: NEGATIVE MG/DL
HCT VFR BLD AUTO: 36.5 % (ref 37.5–51)
HGB BLD-MCNC: 11.9 G/DL (ref 13–17.7)
HGB UR QL STRIP.AUTO: NEGATIVE
HYALINE CASTS UR QL AUTO: ABNORMAL /LPF
KETONES UR QL STRIP: ABNORMAL
LDH FLD-CCNC: 42 U/L
LEUKOCYTE ESTERASE UR QL STRIP.AUTO: ABNORMAL
LYMPHOCYTES NFR FLD MANUAL: 35 %
MCH RBC QN AUTO: 30.3 PG (ref 26.6–33)
MCHC RBC AUTO-ENTMCNC: 32.6 G/DL (ref 31.5–35.7)
MCV RBC AUTO: 92.9 FL (ref 79–97)
METHOD: ABNORMAL
MONOCYTES NFR FLD: 25 %
MONOS+MACROS NFR FLD: 10 %
NEUTROPHILS NFR FLD MANUAL: 30 %
NITRITE UR QL STRIP: NEGATIVE
NUC CELL # FLD: 484 /MM3
PH UR STRIP.AUTO: 5.5 [PH] (ref 5–8)
PLATELET # BLD AUTO: 84 10*3/MM3 (ref 140–450)
PMV BLD AUTO: 10 FL (ref 6–12)
POTASSIUM SERPL-SCNC: 4 MMOL/L (ref 3.5–5.2)
PROT FLD-MCNC: 1.5 G/DL
PROT UR QL STRIP: ABNORMAL
RBC # BLD AUTO: 3.93 10*6/MM3 (ref 4.14–5.8)
RBC # FLD AUTO: 4928 /MM3
RBC # UR STRIP: ABNORMAL /HPF
REF LAB TEST METHOD: ABNORMAL
SODIUM SERPL-SCNC: 136 MMOL/L (ref 136–145)
SP GR UR STRIP: >1.03 (ref 1–1.03)
SQUAMOUS #/AREA URNS HPF: ABNORMAL /HPF
UROBILINOGEN UR QL STRIP: ABNORMAL
WBC # UR STRIP: ABNORMAL /HPF
WBC NRBC COR # BLD AUTO: 4.46 10*3/MM3 (ref 3.4–10.8)

## 2025-07-07 PROCEDURE — 25010000002 LIDOCAINE 1 % SOLUTION: Performed by: RADIOLOGY

## 2025-07-07 PROCEDURE — 88112 CYTOPATH CELL ENHANCE TECH: CPT | Performed by: HOSPITALIST

## 2025-07-07 PROCEDURE — 85027 COMPLETE CBC AUTOMATED: CPT | Performed by: INTERNAL MEDICINE

## 2025-07-07 PROCEDURE — 83615 LACTATE (LD) (LDH) ENZYME: CPT | Performed by: HOSPITALIST

## 2025-07-07 PROCEDURE — 84157 ASSAY OF PROTEIN OTHER: CPT | Performed by: HOSPITALIST

## 2025-07-07 PROCEDURE — 87070 CULTURE OTHR SPECIMN AEROBIC: CPT | Performed by: HOSPITALIST

## 2025-07-07 PROCEDURE — 0W9G3ZX DRAINAGE OF PERITONEAL CAVITY, PERCUTANEOUS APPROACH, DIAGNOSTIC: ICD-10-PCS | Performed by: RADIOLOGY

## 2025-07-07 PROCEDURE — 82945 GLUCOSE OTHER FLUID: CPT | Performed by: HOSPITALIST

## 2025-07-07 PROCEDURE — 76942 ECHO GUIDE FOR BIOPSY: CPT

## 2025-07-07 PROCEDURE — 82042 OTHER SOURCE ALBUMIN QUAN EA: CPT | Performed by: HOSPITALIST

## 2025-07-07 PROCEDURE — 80048 BASIC METABOLIC PNL TOTAL CA: CPT | Performed by: INTERNAL MEDICINE

## 2025-07-07 PROCEDURE — 82140 ASSAY OF AMMONIA: CPT | Performed by: INTERNAL MEDICINE

## 2025-07-07 PROCEDURE — 81001 URINALYSIS AUTO W/SCOPE: CPT | Performed by: EMERGENCY MEDICINE

## 2025-07-07 PROCEDURE — 87075 CULTR BACTERIA EXCEPT BLOOD: CPT | Performed by: HOSPITALIST

## 2025-07-07 PROCEDURE — 88305 TISSUE EXAM BY PATHOLOGIST: CPT | Performed by: HOSPITALIST

## 2025-07-07 PROCEDURE — 36415 COLL VENOUS BLD VENIPUNCTURE: CPT | Performed by: INTERNAL MEDICINE

## 2025-07-07 PROCEDURE — 87205 SMEAR GRAM STAIN: CPT | Performed by: HOSPITALIST

## 2025-07-07 PROCEDURE — 89051 BODY FLUID CELL COUNT: CPT | Performed by: HOSPITALIST

## 2025-07-07 PROCEDURE — 99223 1ST HOSP IP/OBS HIGH 75: CPT | Performed by: INTERNAL MEDICINE

## 2025-07-07 RX ORDER — GABAPENTIN 300 MG/1
CAPSULE ORAL
Qty: 60 CAPSULE | Refills: 0 | Status: SHIPPED | OUTPATIENT
Start: 2025-07-07

## 2025-07-07 RX ORDER — LIDOCAINE HYDROCHLORIDE 10 MG/ML
10 INJECTION, SOLUTION INFILTRATION; PERINEURAL ONCE
Status: COMPLETED | OUTPATIENT
Start: 2025-07-07 | End: 2025-07-07

## 2025-07-07 RX ADMIN — LIDOCAINE HYDROCHLORIDE 7 ML: 10 INJECTION, SOLUTION INFILTRATION; PERINEURAL at 16:40

## 2025-07-07 RX ADMIN — LACTULOSE SOLUTION USP, 10 G/15 ML 20 G: 10 SOLUTION ORAL; RECTAL at 08:38

## 2025-07-07 RX ADMIN — RIFAXIMIN 550 MG: 550 TABLET ORAL at 22:04

## 2025-07-07 RX ADMIN — Medication 2.5 MG: at 22:04

## 2025-07-07 RX ADMIN — MORPHINE SULFATE 15 MG: 15 TABLET, FILM COATED, EXTENDED RELEASE ORAL at 22:04

## 2025-07-07 RX ADMIN — MORPHINE SULFATE 15 MG: 15 TABLET, FILM COATED, EXTENDED RELEASE ORAL at 08:39

## 2025-07-07 RX ADMIN — OLANZAPINE 5 MG: 5 TABLET, FILM COATED ORAL at 02:36

## 2025-07-07 RX ADMIN — RIFAXIMIN 550 MG: 550 TABLET ORAL at 02:36

## 2025-07-07 RX ADMIN — RIFAXIMIN 550 MG: 550 TABLET ORAL at 08:39

## 2025-07-07 RX ADMIN — SENNOSIDES AND DOCUSATE SODIUM 2 TABLET: 50; 8.6 TABLET ORAL at 22:04

## 2025-07-07 RX ADMIN — LACTULOSE SOLUTION USP, 10 G/15 ML 20 G: 10 SOLUTION ORAL; RECTAL at 02:37

## 2025-07-07 RX ADMIN — OLANZAPINE 5 MG: 5 TABLET, FILM COATED ORAL at 22:04

## 2025-07-07 RX ADMIN — PANTOPRAZOLE SODIUM 40 MG: 40 TABLET, DELAYED RELEASE ORAL at 06:53

## 2025-07-07 RX ADMIN — PILOCARPINE HYROCHLORIDE 5 MG: 5 TABLET, FILM COATED ORAL at 08:39

## 2025-07-07 RX ADMIN — PILOCARPINE HYROCHLORIDE 5 MG: 5 TABLET, FILM COATED ORAL at 02:40

## 2025-07-07 RX ADMIN — MORPHINE SULFATE 15 MG: 15 TABLET, FILM COATED, EXTENDED RELEASE ORAL at 02:36

## 2025-07-07 RX ADMIN — SENNOSIDES AND DOCUSATE SODIUM 2 TABLET: 50; 8.6 TABLET ORAL at 08:39

## 2025-07-07 RX ADMIN — LACTULOSE SOLUTION USP, 10 G/15 ML 20 G: 10 SOLUTION ORAL; RECTAL at 22:06

## 2025-07-07 RX ADMIN — PILOCARPINE HYROCHLORIDE 5 MG: 5 TABLET, FILM COATED ORAL at 22:07

## 2025-07-07 RX ADMIN — SENNOSIDES AND DOCUSATE SODIUM 2 TABLET: 50; 8.6 TABLET ORAL at 02:40

## 2025-07-07 RX ADMIN — PILOCARPINE HYROCHLORIDE 5 MG: 5 TABLET, FILM COATED ORAL at 17:26

## 2025-07-07 NOTE — PLAN OF CARE
Goal Outcome Evaluation:  Plan of Care Reviewed With: patient        Progress: no change        Aox3 with episodes of confusion to place and time. None productive cough present occasionally. O2 sat staying at 88% and 2L oxygen placed and his O2 sat increased to 92%. U/A collected and sent to lab. No c/o pain or discomfort at this time. Patient in bed with bed alarm on and call light within reach.

## 2025-07-07 NOTE — CASE MANAGEMENT/SOCIAL WORK
Discharge Planning Assessment  [facility]     Patient Name: Gay Vazquez  MRN: [id number]  Today's Date: 7/7/2025    Admit Date: 7/6/2025    Plan: Home with wife   Discharge Needs Assessment       Row Name 07/07/25 1619       Living Environment    People in Home spouse    Name(s) of People in Home Paula    Current Living Arrangements home    Potentially Unsafe Housing Conditions none    Primary Care Provided by self;spouse/significant other    Family Caregiver if Needed spouse    Quality of Family Relationships involved;helpful    Able to Return to Prior Arrangements yes       Resource/Environmental Concerns    Resource/Environmental Concerns none    Transportation Concerns none       Transition Planning    Patient/Family Anticipates Transition to home with family    Patient/Family Anticipated Services at Transition none    Transportation Anticipated family or friend will provide       Discharge Needs Assessment    Readmission Within the Last 30 Days no previous admission in last 30 days    Equipment Currently Used at Home rollator;shower chair;grab bar    Concerns to be Addressed no discharge needs identified    Equipment Needed After Discharge none                   Discharge Plan       Row Name 07/07/25 1621       Plan    Plan Home with wife    Patient/Family in Agreement with Plan yes    Plan Comments Spoke with the patient's wife at bedside, introduced self and explained CCP role, verified the face sheet and pharmacy information. Pt lives with wife in 1 level home with no ANTHONY, he normally needs assist with most ADL's, uses rollator, gb and s/c, she cannot recall HH agency o the past and he has no STR history. He plans home with wife to transport and denies dc needs. CCP will follow -Elen BOSTON                  Continued Care and Services - Admitted Since 7/6/2025    No active coordination exists.       Expected Discharge Date and Time       Expected Discharge Date Expected Discharge Time    Jul 9, 2025             Demographic Summary       Row Name 07/07/25 1618       General Information    Admission Type inpatient                   Functional Status       Row Name 07/07/25 1619       Functional Status    Usual Activity Tolerance good    Current Activity Tolerance moderate       Assessment of Health Literacy    Health Literacy Good       Functional Status, IADL    Medications assistive person    Meal Preparation assistive person    Housekeeping assistive person    Shopping assistive person       Mental Status    General Appearance WDL WDL       Mental Status Summary    Recent Changes in Mental Status/Cognitive Functioning no changes                   Psychosocial    No documentation.                  Abuse/Neglect    No documentation.                  Legal       Row Name 07/07/25 1619       Financial/Legal    Who Manages Finances if Patient Unable wife                   Substance Abuse    No documentation.                  Patient Forms    No documentation.                     Elen Jeffers RN

## 2025-07-07 NOTE — PLAN OF CARE
Goal Outcome Evaluation:              Outcome Evaluation: VSS, paracentesis done today, no acute distress noted, oncology consulted, up with walker to bathroom, wife and son at bedside bed alarm on , poor appetite, will continue plan of care

## 2025-07-07 NOTE — H&P
HISTORY AND PHYSICAL   [facility]        Date of Admission: 2025  Patient Identification:  Name: Gay Vazquez  Age: 79 y.o.  Sex: male  :  1945  MRN: [id number]                     Primary Care Physician: Gloria Post MD    Chief Complaint:  79 year old gentleman presented to the emergency room with abdominal distension, weakness, confusion and decreased urine output; he denies pain or shortness of breath; he has a history of cholangiocarcinoma; he is followed by dr galo; no fever or chills; no nausea or vomiting; the patient remains confused and is not able to give much history; it was obtained from the patient and ed provider    History of Present Illness:   As above    Past Medical History:  Past Medical History:   Diagnosis Date    Arthritis of neck     Cancer     Liver    Colon polyp     Diabetes mellitus     Erectile dysfunction     GERD (gastroesophageal reflux disease)     Hip arthrosis     Hyperlipidemia     Hypertension     Kidney stone     Liver cancer 2024    Liver disease     Low back pain     Postoperative wound infection     RA (rheumatoid arthritis)     Urinary tract infection     Visual impairment      Past Surgical History:  Past Surgical History:   Procedure Laterality Date    ABDOMINAL SURGERY      BACK SURGERY      CARDIAC CATHETERIZATION      CARDIAC SURGERY      CHOLECYSTECTOMY      COLONOSCOPY      [facility]    COLONOSCOPY N/A 2024    Procedure: COLONOSCOPY INTO CECUM WITH POLYPECTOMIES (COLD SNARE);  Surgeon: Angel Ness MD;  Location: [facility] ENDOSCOPY;  Service: General;  Laterality: N/A;  PRE: COLON MASS, ABNORMAL CT  POST: DIVERTICULOSIS, POLYPS, POOR PREP    ENDOSCOPY N/A 2024    Procedure: ESOPHAGOGASTRODUODENOSCOPY;  Surgeon: Angel Ness MD;  Location: [facility] ENDOSCOPY;  Service: General;  Laterality: N/A;  PRE: LIVER MASS  POST: NORMAL EGD    EYE SURGERY       JOINT REPLACEMENT      PERICARDIUM SURGERY      REPLACEMENT TOTAL KNEE BILATERAL      SPINE SURGERY      THORACOTOMY Bilateral 1997    TRIGGER POINT INJECTION      Back    VASECTOMY      VENOUS ACCESS DEVICE (PORT) INSERTION Right 07/30/2024    Procedure: INSERTION VENOUS ACCESS DEVICE;  Surgeon: Angel Ness MD;  Location: [facility] MAIN OR;  Service: General;  Laterality: Right;      Home Meds:  Medications Prior to Admission   Medication Sig Dispense Refill Last Dose/Taking    gabapentin (NEURONTIN) 300 MG capsule TAKE 2 CAPSULES BY MOUTH ONCE DAILY AT NIGHT AT BEDTIME 60 capsule 0     Magnesium Oxide -Mg Supplement (MAGnesium-Oxide) 400 (240 Mg) MG tablet TAKE 1 TABLET BY MOUTH ONCE DAILY AT BEDTIME 30 tablet 3     metFORMIN ER (GLUCOPHAGE-XR) 500 MG 24 hr tablet Take 2 tablets by mouth once daily 90 tablet 0     Morphine (MS CONTIN) 15 MG 12 hr tablet Take 1 tablet by mouth 2 (Two) Times a Day. 60 tablet 0     naloxone (NARCAN) 4 MG/0.1ML nasal spray Call 911. Don't prime. [place] in 1 nostril for overdose. Repeat in 2-3 minutes in other nostril if no or minimal breathing/responsiveness. 2 each 0     OLANZapine (zyPREXA) 5 MG tablet Take 1 tablet by mouth Every Night. 30 tablet 3     ondansetron ODT (ZOFRAN-ODT) 4 MG disintegrating tablet Place 1 tablet on the tongue Every 6 (Six) Hours As Needed for Nausea or Vomiting. (Patient not taking: Reported on 5/16/2025) 12 tablet 0     oxyCODONE (ROXICODONE) 10 MG tablet Take 1 tablet by mouth Every 4 (Four) Hours As Needed for Moderate Pain. 100 tablet 0     pantoprazole (PROTONIX) 40 MG EC tablet Take 1 tablet by mouth once daily 90 tablet 1     pilocarpine (SALAGEN) 5 MG tablet Take 1 tablet by mouth 3 times a day.       pravastatin (PRAVACHOL) 80 MG tablet Take 1 tablet by mouth once daily 90 tablet 0     sennosides-docusate (PERICOLACE) 8.6-50 MG per tablet Take 2 tablets by mouth 2 (Two) Times a Day.          Allergies:  Allergies   Allergen Reactions     Chlorhexidine Dermatitis     Please use alcohol to clean port site     Immunizations:  Immunization History   Administered Date(s) Administered    ANTHRAX IG 09/15/2016    COVID-19 (MODERNA) 1st,2nd,3rd Dose Monovalent 01/15/2021, 2021    COVID-19 (MODERNA) Monovalent Original Booster 2021    Fluzone High-Dose 65+YRS 10/19/2017, 10/19/2018, 10/29/2019    Fluzone High-Dose 65+yrs 10/02/2023    Influenza Seasonal Injectable 2013, 2014, 2016, 10/01/2019, 2020    Influenza, Unspecified 10/17/1994, 1995, 10/03/1996, 10/03/1997, 10/08/1998, 1999, 11/15/2000, 10/15/2001, 10/03/2002, 10/10/2003    Pneumococcal Conjugate 13-Valent (PCV13) 2019    Pneumococcal Polysaccharide (PPSV23) 2005    TST, UF 2013    Td (TDVAX) 2000    Tdap 2023     Social History:   Social History     Social History Narrative    Not on file     Social History     Socioeconomic History    Marital status:      Spouse name: Paula   Tobacco Use    Smoking status: Some Days     Current packs/day: 0.00     Average packs/day: 1 pack/day for 53.7 years (53.7 ttl pk-yrs)     Types: Cigarettes     Start date: 1970     Last attempt to quit: 10/1/2023     Years since quittin.7     Passive exposure: Past    Smokeless tobacco: Never    Tobacco comments:     Had stopped completely until last summer when cancer diagnosis.  Then started smoking 3 or 4 a day.   Vaping Use    Vaping status: Never Used   Substance and Sexual Activity    Alcohol use: Not Currently    Drug use: Never    Sexual activity: Not Currently     Partners: Female     Birth control/protection: Vasectomy       Family History:  Family History   Problem Relation Age of Onset    Hypertension Mother     Heart disease Mother     Arthritis Father     Diabetes Sister     Asthma Brother     Diabetes Brother     Malig Hyperthermia Neg Hx         Review of Systems  Not obtainable from the patient    Objective:  T Max  24 hrs: Temp (24hrs), Av.5 °F (36.9 °C), Min:98.4 °F (36.9 °C), Max:98.6 °F (37 °C)    Vitals Ranges:   Temp:  [98.4 °F (36.9 °C)-98.6 °F (37 °C)] 98.6 °F (37 °C)  Heart Rate:  [88-99] 95  Resp:  [18] 18  BP: (100-121)/(62-85) 115/83      Exam:  /83 (BP Location: Right arm, Patient Position: Lying)   Pulse 95   Temp 98.6 °F (37 °C) (Oral)   Resp 18   SpO2 97%     General Appearance:    Alert, cooperative, no distress, appears stated age; chronically ill appearing, jaundiced   Head:    Normocephalic, without obvious abnormality, atraumatic   Eyes:    PERRL, conjunctivae/corneas clear, EOM's intact, both eyes   Ears:    Normal external ear canals, both ears   Nose:   Nares normal, septum midline, mucosa normal, no drainage    or sinus tenderness   Throat:   Lips, mucosa, and tongue normal   Neck:   Supple, symmetrical, trachea midline, no adenopathy;     thyroid:  no enlargement/tenderness/nodules; no carotid    bruit or JVD   Back:     Symmetric, no curvature, ROM normal, no CVA tenderness   Lungs:     Clear to auscultation bilaterally, respirations unlabored   Chest Wall:    No tenderness or deformity    Heart:    Regular rate and rhythm, S1 and S2 normal, no murmur, rub   or gallop   Abdomen:     Soft, distended, bowel sounds active all four quadrants,         Extremities:   Extremities normal, atraumatic, no cyanosis or edema                       .    Data Review:  Labs in chart were reviewed.  WBC   Date Value Ref Range Status   2025 6.28 3.40 - 10.80 10*3/mm3 Final     Hemoglobin   Date Value Ref Range Status   2025 12.7 (L) 13.0 - 17.7 g/dL Final     Hematocrit   Date Value Ref Range Status   2025 38.7 37.5 - 51.0 % Final     Platelets   Date Value Ref Range Status   2025 107 (L) 140 - 450 10*3/mm3 Final     Sodium   Date Value Ref Range Status   2025 129 (L) 136 - 145 mmol/L Final     Potassium   Date Value Ref Range Status   2025 4.2 3.5 - 5.2 mmol/L Final      Comment:     Slight hemolysis detected by analyzer. Result may be falsely elevated.     Chloride   Date Value Ref Range Status   07/06/2025 95 (L) 98 - 107 mmol/L Final     CO2   Date Value Ref Range Status   07/06/2025 25.0 22.0 - 29.0 mmol/L Final     BUN   Date Value Ref Range Status   07/06/2025 13.0 8.0 - 23.0 mg/dL Final     Creatinine   Date Value Ref Range Status   07/06/2025 1.04 0.76 - 1.27 mg/dL Final     Glucose   Date Value Ref Range Status   07/06/2025 159 (H) 65 - 99 mg/dL Final     Calcium   Date Value Ref Range Status   07/06/2025 8.3 (L) 8.6 - 10.5 mg/dL Final     AST (SGOT)   Date Value Ref Range Status   07/06/2025 28 1 - 40 U/L Final     ALT (SGPT)   Date Value Ref Range Status   07/06/2025 7 1 - 41 U/L Final     Alkaline Phosphatase   Date Value Ref Range Status   07/06/2025 101 39 - 117 U/L Final                Imaging Results (All)       Procedure Component Value Units Date/Time    CT Abdomen Pelvis Without Contrast - Preliminary [[id number]] Collected: 07/06/25 1758     Updated: 07/06/25 1759    This result has not been signed. Information might be incomplete.      Narrative:      CT ABDOMEN AND PELVIS WITHOUT IV CONTRAST     HISTORY: Abdominal distention     TECHNIQUE: Radiation dose reduction techniques were utilized, including  automated exposure control and exposure modulation based on body size.   3 mm images were obtained through the abdomen and pelvis without IV  contrast.     COMPARISON: CT abdomen and pelvis dating back to 1/16/2025       Impression:      FINDINGS AND IMPRESSION:  Please note evaluation is suboptimal due to lack of intravenous  contrast. Small to moderate partially loculated appearing right pleural  effusion is present, as before. Questionable adenopathy within the  visualized aspect of the mid mediastinum better seen on recent CT chest  4/21/2025. Please refer to that dictation for further information.  Significant desiccation of the pericardium, as before.     No  free intraperitoneal air is seen. There is colonic diverticulosis.  The appendix is not seen.     Scattered hepatic masses are present, as before, not well evaluated  demonstrates contrast. The largest masslike agglomerate appears to  measure approximately 9.5 x 7.1 cm (previously 8.3 x 5.8 cm on 1/16/2025  when measured in similar fashion.     Gallbladder surgically absent. The pancreas, spleen and adrenal glands  have an unremarkable noncontrast CT appearance. Subcentimeter renal  lesions are too small to characterize. Larger hypodense lesions  demonstrate density less than 15 Hounsfield units likely benign per  Bosniak 2019 criteria. No hydronephrosis.     There is a moderate to large amount of ascites. Prominent perisplenic  and splenorenal varices are present. While the ascites may be related to  portosystemic shunting, a malignant etiology cannot be excluded given  patient history and findings in the liver. This can bladders  underdistended cannot be evaluated. Prostate is at least moderately  enlarged.     Asymmetric advanced degenerative changes within the right hip. No  suspicious lytic or blastic osseous lesions.       CT Head Without Contrast [[id number]] Collected: 07/06/25 1707     Updated: 07/06/25 1712    Narrative:      CT OF THE BRAIN WITHOUT CONTRAST 7/6/2025     HISTORY: Altered mental status. Confusion.     Axial images were obtained through the brain without intravenous  contrast.     There is mild to moderate diffuse atrophy. There is mild decreased  attenuation of the periventricular white matter bilaterally consistent  with mild small vessel white matter ischemic disease. There is no  evidence of acute infarction, hemorrhage, midline shift or mass effect.     No bony abnormalities are seen.       Impression:      1. No acute process identified.     Radiation dose reduction techniques were utilized, including automated  exposure control and exposure modulation based on body size.        This  report was finalized on 7/6/2025 5:08 PM by Dr. Roby Johnson M.D on Workstation: [id number]                 Assessment:  Active Hospital Problems    Diagnosis  POA    **Hepatic encephalopathy [K76.82]  Yes      Resolved Hospital Problems   No resolved problems to display.   Cholangiocarcinoma  Hypertension  Diabetes  Hyperlipidemia  ascites    Plan:  Ask oncology to see him  Xifaxan, lactulose  May need paracentesis  Trend labs  Monitor on telemetry  Dw patient, ed provider and family  Patient is full code and wife is nok    Ana Robert Malin MD  7/6/2025  20:44 EDT

## 2025-07-07 NOTE — PROGRESS NOTES
DAILY PROGRESS NOTE  [facility]    Patient Identification:  Name: Gay Vazquez  Age: 79 y.o.  Sex: male  :  1945  MRN: [id number]         Primary Care Physician: Gloria Post MD    Subjective:  Interval History: He complains about abdominal pain and bloating.    Objective:    Scheduled Meds:lactulose, 20 g, Oral, TID  melatonin, 2.5 mg, Oral, Nightly  Morphine, 15 mg, Oral, BID  OLANZapine, 5 mg, Oral, Nightly  pantoprazole, 40 mg, Oral, Q AM  pilocarpine, 5 mg, Oral, Q8H  rifAXIMin, 550 mg, Oral, Q12H  sennosides-docusate, 2 tablet, Oral, BID      Continuous Infusions:     Vital signs in last 24 hours:  Temp:  [97.7 °F (36.5 °C)-98.6 °F (37 °C)] 97.7 °F (36.5 °C)  Heart Rate:  [87-99] 91  Resp:  [16-18] 16  BP: ()/(62-85) 117/75    Intake/Output:  No intake or output data in the 24 hours ending 25 1115    Exam:  /75 (BP Location: Right arm, Patient Position: Lying)   Pulse 91   Temp 97.7 °F (36.5 °C) (Oral)   Resp 16   Wt 89 kg (196 lb 3.4 oz)   SpO2 93%   BMI 26.60 kg/m²     General Appearance:    Alert, cooperative, no distress   Head:    Normocephalic, without obvious abnormality, atraumatic   Eyes:       Throat:   Lips, tongue, gums normal   Neck:   Supple, symmetrical, trachea midline, no JVD   Lungs:     Clear to auscultation bilaterally, respirations unlabored   Chest Wall:    No tenderness or deformity    Heart:    Regular rate and rhythm, S1 and S2 normal, no murmur,no  rub or gallop   Abdomen:     Soft, distended with a large amount of ascites, bowel sounds active, no masses, no organomegaly    Extremities:   Extremities normal, atraumatic, no cyanosis or edema   Pulses:      Skin:   Skin is warm and dry,  no rashes or palpable lesions   Neurologic:   no focal deficits noted      Lab Results (last 72 hours)       Procedure Component Value Units Date/Time    Basic Metabolic Panel [[id number]]  (Abnormal) Collected: 25 0727    Specimen: Blood  Updated: 07/07/25 0818     Glucose 128 mg/dL      BUN 11.0 mg/dL      Creatinine 0.87 mg/dL      Sodium 136 mmol/L      Potassium 4.0 mmol/L      Chloride 101 mmol/L      CO2 27.7 mmol/L      Calcium 8.5 mg/dL      BUN/Creatinine Ratio 12.6     Anion Gap 7.3 mmol/L      eGFR 88.3 mL/min/1.73     Narrative:      GFR Categories in Chronic Kidney Disease (CKD)              GFR Category          GFR (mL/min/1.73)    Interpretation  G1                    90 or greater        Normal or high (1)  G2                    60-89                Mild decrease (1)  G3a                   45-59                Mild to moderate decrease  G3b                   30-44                Moderate to severe decrease  G4                    15-29                Severe decrease  G5                    14 or less           Kidney failure    (1)In the absence of evidence of kidney disease, neither GFR category G1 or G2 fulfill the criteria for CKD.    eGFR calculation 2021 CKD-EPI creatinine equation, which does not include race as a factor    Ammonia [[id number]]  (Abnormal) Collected: 07/07/25 0727    Specimen: Blood Updated: 07/07/25 0804     Ammonia 82 umol/L     CBC (No Diff) [[id number]]  (Abnormal) Collected: 07/07/25 0727    Specimen: Blood Updated: 07/07/25 0758     WBC 4.46 10*3/mm3      RBC 3.93 10*6/mm3      Hemoglobin 11.9 g/dL      Hematocrit 36.5 %      MCV 92.9 fL      MCH 30.3 pg      MCHC 32.6 g/dL      RDW 13.1 %      RDW-SD 44.7 fl      MPV 10.0 fL      Platelets 84 10*3/mm3     Urinalysis With Culture If Indicated - Urine, Clean Catch [[id number]]  (Abnormal) Collected: 07/07/25 0250    Specimen: Urine, Clean Catch Updated: 07/07/25 0336     Color, UA Dark Yellow     Appearance, UA Cloudy     pH, UA 5.5     Specific Gravity, UA >1.030     Glucose, UA Negative     Ketones, UA Trace     Bilirubin, UA Negative     Blood, UA Negative     Protein, UA 30 mg/dL (1+)     Leuk Esterase, UA Small (1+)     Nitrite, UA Negative      Urobilinogen, UA 1.0 E.U./dL    Narrative:      In absence of clinical symptoms, the presence of pyuria, bacteria, and/or nitrites on the urinalysis result does not correlate with infection.    Urinalysis, Microscopic Only - Urine, Clean Catch [[id number]]  (Abnormal) Collected: 07/07/25 0250    Specimen: Urine, Clean Catch Updated: 07/07/25 0327     RBC, UA 0-2 /HPF      WBC, UA 0-2 /HPF      Comment: Urine culture not indicated.        Bacteria, UA 1+ /HPF      Squamous Epithelial Cells, UA 7-12 /HPF      Hyaline Casts, UA 3-6 /LPF      Methodology Automated Microscopy    Protime-INR [[id number]]  (Abnormal) Collected: 07/06/25 1520    Specimen: Blood Updated: 07/06/25 1555     Protime 17.4 Seconds      INR 1.43    aPTT [[id number]]  (Abnormal) Collected: 07/06/25 1520    Specimen: Blood Updated: 07/06/25 1555     PTT 39.4 seconds     Comprehensive Metabolic Panel [[id number]]  (Abnormal) Collected: 07/06/25 1520    Specimen: Blood Updated: 07/06/25 1554     Glucose 159 mg/dL      BUN 13.0 mg/dL      Creatinine 1.04 mg/dL      Sodium 129 mmol/L      Potassium 4.2 mmol/L      Comment: Slight hemolysis detected by analyzer. Result may be falsely elevated.        Chloride 95 mmol/L      CO2 25.0 mmol/L      Calcium 8.3 mg/dL      Total Protein 7.3 g/dL      Albumin 2.5 g/dL      ALT (SGPT) 7 U/L      AST (SGOT) 28 U/L      Alkaline Phosphatase 101 U/L      Total Bilirubin 0.8 mg/dL      Globulin 4.8 gm/dL      A/G Ratio 0.5 g/dL      BUN/Creatinine Ratio 12.5     Anion Gap 9.0 mmol/L      eGFR 73.5 mL/min/1.73     Narrative:      GFR Categories in Chronic Kidney Disease (CKD)              GFR Category          GFR (mL/min/1.73)    Interpretation  G1                    90 or greater        Normal or high (1)  G2                    60-89                Mild decrease (1)  G3a                   45-59                Mild to moderate decrease  G3b                   30-44                Moderate to severe decrease  G4                     15-29                Severe decrease  G5                    14 or less           Kidney failure    (1)In the absence of evidence of kidney disease, neither GFR category G1 or G2 fulfill the criteria for CKD.    eGFR calculation 2021 CKD-EPI creatinine equation, which does not include race as a factor    Ammonia [[id number]]  (Abnormal) Collected: 07/06/25 1520    Specimen: Blood Updated: 07/06/25 1549     Ammonia 85 umol/L     CBC & Differential [[id number]]  (Abnormal) Collected: 07/06/25 1520    Specimen: Blood Updated: 07/06/25 1542    Narrative:      The following orders were created for panel order CBC & Differential.  Procedure                               Abnormality         Status                     ---------                               -----------         ------                     CBC Auto Differential[[id number]]        Abnormal            Final result                 Please view results for these tests on the individual orders.    CBC Auto Differential [[id number]]  (Abnormal) Collected: 07/06/25 1520    Specimen: Blood Updated: 07/06/25 1542     WBC 6.28 10*3/mm3      RBC 4.07 10*6/mm3      Hemoglobin 12.7 g/dL      Hematocrit 38.7 %      MCV 95.1 fL      MCH 31.2 pg      MCHC 32.8 g/dL      RDW 13.1 %      RDW-SD 46.1 fl      MPV 10.5 fL      Platelets 107 10*3/mm3      Neutrophil % 63.9 %      Lymphocyte % 23.1 %      Monocyte % 10.8 %      Eosinophil % 1.1 %      Basophil % 0.8 %      Immature Grans % 0.3 %      Neutrophils, Absolute 4.01 10*3/mm3      Lymphocytes, Absolute 1.45 10*3/mm3      Monocytes, Absolute 0.68 10*3/mm3      Eosinophils, Absolute 0.07 10*3/mm3      Basophils, Absolute 0.05 10*3/mm3      Immature Grans, Absolute 0.02 10*3/mm3           Data Review:  Results from last 7 days   Lab Units 07/07/25  0727 07/06/25  1520   SODIUM mmol/L 136 129*   POTASSIUM mmol/L 4.0 4.2   CHLORIDE mmol/L 101 95*   CO2 mmol/L 27.7 25.0   BUN mg/dL 11.0 13.0   CREATININE mg/dL 0.87 1.04  "  GLUCOSE mg/dL 128* 159*   CALCIUM mg/dL 8.5* 8.3*     Results from last 7 days   Lab Units 07/07/25  0727 07/06/25  1520   WBC 10*3/mm3 4.46 6.28   HEMOGLOBIN g/dL 11.9* 12.7*   HEMATOCRIT % 36.5* 38.7   PLATELETS 10*3/mm3 84* 107*             Lab Results   Lab Value Date/Time    TROPONINT 8 05/15/2023 1250    TROPONINT 7 05/15/2023 1043         Results from last 7 days   Lab Units 07/06/25  1520   ALK PHOS U/L 101   BILIRUBIN mg/dL 0.8   ALT (SGPT) U/L 7   AST (SGOT) U/L 28             No results found for: \"POCGLU\"  Results from last 7 days   Lab Units 07/06/25  1520   INR  1.43*       Past Medical History:   Diagnosis Date    Arthritis of neck     Cancer     Liver    Colon polyp     Diabetes mellitus     Erectile dysfunction 2/22    GERD (gastroesophageal reflux disease) 1/14    Hip arthrosis     Hyperlipidemia     Hypertension     Kidney stone     Liver cancer 07-    Liver disease     Low back pain     Postoperative wound infection     RA (rheumatoid arthritis)     Urinary tract infection     Visual impairment        Assessment:  Active Hospital Problems    Diagnosis  POA    **Hepatic encephalopathy [K76.82]  Yes    Cholangiocarcinoma [C22.1]  Yes    Diabetes mellitus without complication [E11.9]  Yes    Mixed hyperlipidemia [E78.2]  Yes    Primary hypertension [I10]  Yes    Rheumatoid arthritis [M06.9]  Yes      Resolved Hospital Problems   No resolved problems to display.       Plan:  Await oncology consult.  Will ask IR to do ultrasound-guided paracentesis.  Follow-up on labs and continue treatment for hepatic encephalopathy.    Ramon Jarrett MD  7/7/2025  11:15 EDT   "

## 2025-07-07 NOTE — SIGNIFICANT NOTE
07/07/25 1527   OTHER   Discipline physical therapist   Rehab Time/Intention   Session Not Performed patient unavailable for evaluation  (Leaving the floor for paracentesis. Will follow up tomorrow)   Recommendation   PT - Next Appointment 07/08/25

## 2025-07-07 NOTE — CONSULTS
[facility] GROUP INITIAL INPATIENT CONSULTATION NOTE    REASON FOR CONSULTATION:    Cholangiocarcinoma    HISTORY OF PRESENT ILLNESS:  Gay Vazquez is a 79 y.o. male who we are asked to see today in consultation for the above issue.    Patient has past medical history significant for hypertension, hyperlipidemia, diabetes mellitus type 2, rheumatoid arthritis. Patient did have previous pericarditis that required a pericardial window.  Treatment for rheumatoid arthritis with Simponi has remained on hold per rheumatology.    Patient is known to our practice, is followed in the outpatient setting by Dr. Tejada in regards to cholangiocarcinoma. The patient had incidental finding on CT scan 5/10/2024 of the right hepatic lobe mass 7 cm. The patient underwent CT-guided liver biopsy on 6/17/2024 which showed no evidence of malignancy. A repeat biopsy on 7/18/2024 showed adenocarcinoma consistent with cholangiocarcinoma. CA 19-9 was elevated at 133, AFP normal at 3.81. The patient was treated with cisplatin, gemcitabine, nivolumab x 2 cycles. He required dose reduction due to thrombocytopenia however even despite dose reduction had intractable nausea and vomiting resulting in dehydration and MALI. He did not receive cycle 2-day 8 chemotherapy due to side effects. Repeat scan on 9/6/2024 showed stable mass in the right liver 7.6 x 6 cm. Given his lack of response to systemic therapy and poor tolerance, patient was referred to Dr. Hopper in surgical oncology at [facility]. He was seen on 9/26/2024 by Dr. Hopper with recommendation to pursue liver directed therapy with chemoembolization. Surgery was not advised, would require full right hepatectomy with preoperative portal vein embolization that would preclude future ability to undergo liver directed therapy. He was also felt to be at significant risk with history of staph infections. Patient did undergo TACE to dominant liver lesion in October 2024.   Patient has been followed since that time with no subsequent interventions.  CT chest abdomen pelvis most recently on/21/25 showed increased size of the dominant liver lesion at 9 cm and the low density liver lesion 1.3 cm.  CA 19 manage 9 however decreased to 51.1.  Recommended to repeat CT scan at 3-month interval.    Patient with significant underlying cancer related pain receiving MS Contin 15 mg every 12 hours, oxycodone 10 mg every 4 hours as needed.  There was discussion on 5/12/2025 visit regarding potential tapering of MS Contin.    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    Patient does have depression and does take Zyprexa 5 mg nightly.    Patient admitted on 7/6/2025 from the emergency department with abdominal distention, generalized weakness, confusion, reduced urine output.  Labs on 7/6/2025 showed hyponatremia with sodium 129, ammonia 85, INR 1.43, PTT 39.4.  CT head showed no acute findings.  CT abdomen pelvis without IV contrast showed small to moderate partially loculated right pleural effusion unchanged, questionable lymphadenopathy mediastinum (better seen on prior CT chest), largest hepatic mass increased from 8.3 x 5.8 on 1/16/2025 up to 9.5 x 7.1 cm with cirrhotic appearing liver, moderate to large ascites, prominent perisplenic and splenorenal varices.  Patient receiving lactulose 20 g 3 times daily and Xifaxan 550 mg every 12 hours for for hepatic encephalopathy.  Repeat ammonia level on 7/7/2025 was 82.    Patient underwent ultrasound-guided paracentesis on 7/7/2025 with 3850 cc removed.  Cytology pending.    Patient currently is awake and alert.  His family reports however that his confusion has persisted.  He has had some visual hallucinations.  He is carrying on a normal conversation currently and is aware of his recent confusion.  He notes ongoing anorexia and reduced oral intake recently.  He denies any abdominal pain.        Past Medical  History:   Diagnosis Date    Arthritis of neck     Cancer     Liver    Colon polyp     Diabetes mellitus     Erectile dysfunction 2/22    GERD (gastroesophageal reflux disease) 1/14    Hip arthrosis     Hyperlipidemia     Hypertension     Kidney stone     Liver cancer 07-    Liver disease     Low back pain     Postoperative wound infection     RA (rheumatoid arthritis)     Urinary tract infection     Visual impairment        Past Surgical History:   Procedure Laterality Date    ABDOMINAL SURGERY      BACK SURGERY      CARDIAC CATHETERIZATION      CARDIAC SURGERY  1995    CHOLECYSTECTOMY      COLONOSCOPY  2017    [facility]    COLONOSCOPY N/A 06/04/2024    Procedure: COLONOSCOPY INTO CECUM WITH POLYPECTOMIES (COLD SNARE);  Surgeon: Angel Ness MD;  Location: [facility] ENDOSCOPY;  Service: General;  Laterality: N/A;  PRE: COLON MASS, ABNORMAL CT  POST: DIVERTICULOSIS, POLYPS, POOR PREP    ENDOSCOPY N/A 06/04/2024    Procedure: ESOPHAGOGASTRODUODENOSCOPY;  Surgeon: Angel Ness MD;  Location: [facility] ENDOSCOPY;  Service: General;  Laterality: N/A;  PRE: LIVER MASS  POST: NORMAL EGD    EYE SURGERY      JOINT REPLACEMENT      PERICARDIUM SURGERY      REPLACEMENT TOTAL KNEE BILATERAL      SPINE SURGERY      THORACOTOMY Bilateral 1997    TRIGGER POINT INJECTION      Back    VASECTOMY      VENOUS ACCESS DEVICE (PORT) INSERTION Right 07/30/2024    Procedure: INSERTION VENOUS ACCESS DEVICE;  Surgeon: Angel Ness MD;  Location: [facility] OR;  Service: General;  Laterality: Right;       SOCIAL HISTORY:   reports that he has been smoking cigarettes. He started smoking about 55 years ago. He has a 53.7 pack-year smoking history. He has been exposed to tobacco smoke. He has never used smokeless tobacco. He reports that he does not currently use alcohol. He reports that he does not use drugs.    FAMILY HISTORY:  family history includes Arthritis in his  father; Asthma in his brother; Diabetes in his brother and sister; Heart disease in his mother; Hypertension in his mother.    ALLERGIES:  Allergies   Allergen Reactions    Chlorhexidine Dermatitis     Please use alcohol to clean port site       MEDICATIONS:  As listed in the electronic medical record.    Review of Systems  A comprehensive review of systems was obtained with pertinent positive findings as noted in the interval history above.  All other systems negative.    Vitals:    07/07/25 0254 07/07/25 0324 07/07/25 0729 07/07/25 1319   BP: 99/66  117/75 114/67   BP Location: Right arm  Right arm Right arm   Patient Position: Lying  Lying Lying   Pulse: 87  91 95   Resp: 16  16 16   Temp: 97.7 °F (36.5 °C)  97.7 °F (36.5 °C) 98 °F (36.7 °C)   TempSrc: Oral  Oral Axillary   SpO2: 96%  93% 94%   Weight:  89 kg (196 lb 3.4 oz)         Physical Exam  Constitutional:       Appearance: He is well-developed.   Eyes:      Conjunctiva/sclera: Conjunctivae normal.   Neck:      Thyroid: No thyromegaly.   Cardiovascular:      Rate and Rhythm: Normal rate and regular rhythm.      Heart sounds: No murmur heard.     No friction rub. No gallop.   Pulmonary:      Effort: No respiratory distress.      Breath sounds: Normal breath sounds.   Abdominal:      General: Bowel sounds are normal. There is distension.      Palpations: Abdomen is soft.      Tenderness: There is no abdominal tenderness.      Comments: Soft, nontender, distended   Musculoskeletal:      Comments: Trace bilateral lower extremity edema   Lymphadenopathy:      Head:      Right side of head: No submandibular adenopathy.      Cervical: No cervical adenopathy.      Upper Body:      Right upper body: No supraclavicular adenopathy.      Left upper body: No supraclavicular adenopathy.   Skin:     General: Skin is warm and dry.      Findings: No rash.   Neurological:      Mental Status: He is alert and oriented to person, place, and time.      Cranial Nerves: No  cranial nerve deficit.      Motor: No abnormal muscle tone.      Deep Tendon Reflexes: Reflexes normal.   Psychiatric:         Behavior: Behavior normal.         DIAGNOSTIC DATA:    Results from last 7 days   Lab Units 07/07/25  0727 07/06/25  1520   WBC 10*3/mm3 4.46 6.28   HEMOGLOBIN g/dL 11.9* 12.7*   HEMATOCRIT % 36.5* 38.7   PLATELETS 10*3/mm3 84* 107*      Results from last 7 days   Lab Units 07/07/25  0727 07/06/25  1520   SODIUM mmol/L 136 129*   POTASSIUM mmol/L 4.0 4.2   CHLORIDE mmol/L 101 95*   CO2 mmol/L 27.7 25.0   BUN mg/dL 11.0 13.0   CREATININE mg/dL 0.87 1.04   CALCIUM mg/dL 8.5* 8.3*   BILIRUBIN mg/dL  --  0.8   ALK PHOS U/L  --  101   ALT (SGPT) U/L  --  7   AST (SGOT) U/L  --  28   GLUCOSE mg/dL 128* 159*            Assessment & Plan   ASSESSMENT:  This is a 79 y.o. male with:    *Cirrhosis with portal hypertension and hepatic encephalopathy  Patient admitted on 7/6/2025 from the emergency department with abdominal distention, generalized weakness, confusion, reduced urine output.    Labs on 7/6/2025 showed hyponatremia with sodium 129, ammonia 85, INR 1.43, PTT 39.4.    CT head showed no acute findings.    CT abdomen pelvis without IV contrast showed small to moderate partially loculated right pleural effusion unchanged, questionable lymphadenopathy mediastinum (better seen on prior CT chest), largest hepatic mass increased from 8.3 x 5.8 on 1/16/2025 up to 9.5 x 7.1 cm with cirrhotic appearing liver, moderate to large ascites, prominent perisplenic and splenorenal varices.    Patient receiving lactulose 20 g 3 times daily and Xifaxan 550 mg every 12 hours for for hepatic encephalopathy.    Repeat ammonia level on 7/7/2025 was 82.  Ultrasound-guided paracentesis on 7/7/2025 with 3850 cc removed, cytology pending    *Intrahepatic cholangiocarcinoma, multifocal:  7/5/24 MRI abd-heterogenous, large peripherally enhancing mass withinthe posterior aspect of the right hepatic dome which is grossly  unchanged to minimally increased in size since 5/10/2024. small peripheral enhancing and solid enhancing lesions are located within the surrounding right hepatic lobe and spread of malignancy is likely  7/18/24 CT liver biopsy-adenocarcinoma consistent with cholangiocarcinoma by IHC  CA 19-9 133  8/2/2024-initiated palliative treatment with cisplatin/Gemzar plus nivolumab; day 8 Gemzar given at 50% because of thrombocytopenia (60)  Cycle 2 of treatment was given with a dose reduction of gemcitabine 750 mg/m² and cisplatin same dose 25 mg/m² along with durvalumab.  Despite dose reduction he had intractable nausea vomiting acute kidney injury requiring IV fluids.  He did not receive day 8 cycle 2 of treatment because of side effects.  9/6/2024 CT abdomen liver protocol-stable 7.6 x 6 cm mass in the right lobe of the liver  Patient was seen in emergency department on 9/20/2024 with CT abdomen and pelvis that showed stable right liver mass 7.5 x 6 cm, loculated right pleural effusion with surrounding pleural thickening and calcification unchanged, no acute findings.  CT abdomen and pelvis on 10/10/2024 showed no change.  Patient was referred to Dr. Hopper at [facility], was seen on 9/26/2024.  Patient was not felt to be a good candidate for surgical intervention.  Recommendation to proceed with chemoembolization.   Patient did undergo TACE to dominant liver lesion in October 2024.    Patient has been followed since that time with no subsequent interventions.    CT chest abdomen pelvis most recently on 4/21/25 showed increased size of the dominant liver lesion at 9 cm and the low density liver lesion 1.3 cm.  CA 19 manage 9 however decreased to 51.1.  Recommended to repeat CT scan at 3-month interval.  Patient currently admitted with hepatic encephalopathy  CT abdomen pelvis without IV contrast on admission 7/6/2025 showed small to moderate partially loculated right pleural effusion unchanged,  questionable lymphadenopathy mediastinum (better seen on prior CT chest), largest hepatic mass increased from 8.3 x 5.8 on 1/16/2025 up to 9.5 x 7.1 cm with cirrhotic appearing liver, moderate to large ascites, prominent perisplenic and splenorenal varices.  Ultrasound-guided paracentesis on 7/7/2025 with 3850 cc removed, cytology pending  Status of the patient's disease remains unclear.  A noncontrasted abdominal CT scan was performed on 7/6/2025 and compared to prior study from January.  In relation to the measurements from 4/21/2025, unclear if there has been significant further change and we will need to repeat the CT with IV contrast.  We will also include CT chest.  Recheck CA 19-9.     *Cancer related pain  Patient with significant underlying cancer related pain receiving MS Contin 15 mg every 12 hours, oxycodone 10 mg every 4 hours as needed.    There was discussion at visit on 5/12/2025 regarding potential tapering off of MS Contin.  Patient denies any current pain.  Continue current regimen for now     *Nausea  Patient with ongoing intermittent mild nausea  Continue Zyprexa 5 mg nightly  Continue Zofran 4 mg IV/ODT every 6 hours as needed  Patient denies any recent nausea     *Thrombocytopenia secondary to chemotherapy  Patient with chronic thrombocytopenia related to prior chemotherapy as well as cirrhosis/portal hypertension and splenomegaly  Platelet count has been chronically in the 70-90,000 range platelet count currently at baseline  Platelet count currently remains at baseline  Platelet count today 84,000     *RA  significant rheumatoid arthritis history with joint deviation, history of iritis, previous pericarditis requiring pericardial window  Patient previously treated with Simponi, held per rheumatology prior to initiation of chemotherapy/immunotherapy  Fortunately with prior immunotherapy patient did not develop flare of rheumatoid arthritis     *History of pericarditis  Patient did have  previous pericarditis that required a pericardial window.     *Additional comorbidities-diabetes mellitus, hyperlipidemia, hypertension, tobacco abuse, BPH     *GERD  Protonix 40 mg daily     *Constipation  Sarika-Colace 2 tablets twice daily      *CODE STATUS   Patient is currently full code      PLAN:   Ascitic fluid cytology pending from 7/7/2025  Patient continuing on lactulose 20 g 3 times daily and Xifaxan 550 mg every 12 hours for for hepatic encephalopathy  Additional labs with CA 19-9  CT chest abdomen pelvis with contrast to reassess status of malignancy  Daily CBC, CMP, ammonia.  Recheck INR in a.m.      Discussed with patient and family at bedside        Eyad Grace MD

## 2025-07-08 ENCOUNTER — APPOINTMENT (OUTPATIENT)
Dept: CT IMAGING | Facility: HOSPITAL | Age: 80
End: 2025-07-08
Payer: MEDICARE

## 2025-07-08 LAB
ALBUMIN SERPL-MCNC: 2.1 G/DL (ref 3.5–5.2)
ALBUMIN/GLOB SERPL: 0.5 G/DL
ALP SERPL-CCNC: 85 U/L (ref 39–117)
ALT SERPL W P-5'-P-CCNC: 7 U/L (ref 1–41)
AMMONIA BLD-SCNC: 65 UMOL/L (ref 16–60)
ANION GAP SERPL CALCULATED.3IONS-SCNC: 9 MMOL/L (ref 5–15)
AST SERPL-CCNC: 23 U/L (ref 1–40)
BASOPHILS # BLD AUTO: 0.03 10*3/MM3 (ref 0–0.2)
BASOPHILS NFR BLD AUTO: 0.7 % (ref 0–1.5)
BILIRUB SERPL-MCNC: 0.9 MG/DL (ref 0–1.2)
BUN SERPL-MCNC: 9 MG/DL (ref 8–23)
BUN/CREAT SERPL: 11.4 (ref 7–25)
CALCIUM SPEC-SCNC: 8 MG/DL (ref 8.6–10.5)
CANCER AG19-9 SERPL-ACNC: 50 U/ML
CEA SERPL-MCNC: 5.8 NG/ML
CHLORIDE SERPL-SCNC: 99 MMOL/L (ref 98–107)
CO2 SERPL-SCNC: 27 MMOL/L (ref 22–29)
CREAT SERPL-MCNC: 0.79 MG/DL (ref 0.76–1.27)
DEPRECATED RDW RBC AUTO: 45.1 FL (ref 37–54)
EGFRCR SERPLBLD CKD-EPI 2021: 90.9 ML/MIN/1.73
EOSINOPHIL # BLD AUTO: 0.07 10*3/MM3 (ref 0–0.4)
EOSINOPHIL NFR BLD AUTO: 1.6 % (ref 0.3–6.2)
ERYTHROCYTE [DISTWIDTH] IN BLOOD BY AUTOMATED COUNT: 13.6 % (ref 12.3–15.4)
GLOBULIN UR ELPH-MCNC: 3.9 GM/DL
GLUCOSE SERPL-MCNC: 119 MG/DL (ref 65–99)
HCT VFR BLD AUTO: 33.9 % (ref 37.5–51)
HGB BLD-MCNC: 11.4 G/DL (ref 13–17.7)
IMM GRANULOCYTES # BLD AUTO: 0.01 10*3/MM3 (ref 0–0.05)
IMM GRANULOCYTES NFR BLD AUTO: 0.2 % (ref 0–0.5)
INR PPP: 1.53 (ref 0.9–1.1)
LYMPHOCYTES # BLD AUTO: 1.09 10*3/MM3 (ref 0.7–3.1)
LYMPHOCYTES NFR BLD AUTO: 25.6 % (ref 19.6–45.3)
MCH RBC QN AUTO: 30.6 PG (ref 26.6–33)
MCHC RBC AUTO-ENTMCNC: 33.6 G/DL (ref 31.5–35.7)
MCV RBC AUTO: 91.1 FL (ref 79–97)
MONOCYTES # BLD AUTO: 0.44 10*3/MM3 (ref 0.1–0.9)
MONOCYTES NFR BLD AUTO: 10.4 % (ref 5–12)
NEUTROPHILS NFR BLD AUTO: 2.61 10*3/MM3 (ref 1.7–7)
NEUTROPHILS NFR BLD AUTO: 61.5 % (ref 42.7–76)
NRBC BLD AUTO-RTO: 0 /100 WBC (ref 0–0.2)
PLATELET # BLD AUTO: 65 10*3/MM3 (ref 140–450)
PMV BLD AUTO: 10.2 FL (ref 6–12)
POTASSIUM SERPL-SCNC: 3.7 MMOL/L (ref 3.5–5.2)
PROT SERPL-MCNC: 6 G/DL (ref 6–8.5)
PROTHROMBIN TIME: 18.4 SECONDS (ref 11.7–14.2)
RBC # BLD AUTO: 3.72 10*6/MM3 (ref 4.14–5.8)
SODIUM SERPL-SCNC: 135 MMOL/L (ref 136–145)
WBC NRBC COR # BLD AUTO: 4.25 10*3/MM3 (ref 3.4–10.8)

## 2025-07-08 PROCEDURE — 97162 PT EVAL MOD COMPLEX 30 MIN: CPT | Performed by: PHYSICAL THERAPIST

## 2025-07-08 PROCEDURE — 25510000001 IOPAMIDOL 61 % SOLUTION: Performed by: HOSPITALIST

## 2025-07-08 PROCEDURE — 74177 CT ABD & PELVIS W/CONTRAST: CPT

## 2025-07-08 PROCEDURE — 99232 SBSQ HOSP IP/OBS MODERATE 35: CPT | Performed by: INTERNAL MEDICINE

## 2025-07-08 PROCEDURE — 86301 IMMUNOASSAY TUMOR CA 19-9: CPT | Performed by: INTERNAL MEDICINE

## 2025-07-08 PROCEDURE — 85025 COMPLETE CBC W/AUTO DIFF WBC: CPT | Performed by: HOSPITALIST

## 2025-07-08 PROCEDURE — 82140 ASSAY OF AMMONIA: CPT | Performed by: HOSPITALIST

## 2025-07-08 PROCEDURE — 80053 COMPREHEN METABOLIC PANEL: CPT | Performed by: HOSPITALIST

## 2025-07-08 PROCEDURE — 82378 CARCINOEMBRYONIC ANTIGEN: CPT | Performed by: HOSPITALIST

## 2025-07-08 PROCEDURE — 71260 CT THORAX DX C+: CPT

## 2025-07-08 PROCEDURE — 85610 PROTHROMBIN TIME: CPT | Performed by: INTERNAL MEDICINE

## 2025-07-08 RX ORDER — GABAPENTIN 300 MG/1
600 CAPSULE ORAL NIGHTLY
Status: DISCONTINUED | OUTPATIENT
Start: 2025-07-08 | End: 2025-07-15 | Stop reason: HOSPADM

## 2025-07-08 RX ORDER — DOXYCYCLINE 25 MG/5ML
50 POWDER, FOR SUSPENSION ORAL EVERY 12 HOURS SCHEDULED
Status: DISCONTINUED | OUTPATIENT
Start: 2025-07-08 | End: 2025-07-15 | Stop reason: HOSPADM

## 2025-07-08 RX ORDER — IOPAMIDOL 612 MG/ML
100 INJECTION, SOLUTION INTRAVASCULAR
Status: COMPLETED | OUTPATIENT
Start: 2025-07-08 | End: 2025-07-08

## 2025-07-08 RX ADMIN — SENNOSIDES AND DOCUSATE SODIUM 2 TABLET: 50; 8.6 TABLET ORAL at 09:23

## 2025-07-08 RX ADMIN — RIFAXIMIN 550 MG: 550 TABLET ORAL at 09:24

## 2025-07-08 RX ADMIN — IOPAMIDOL 85 ML: 612 INJECTION, SOLUTION INTRAVENOUS at 13:13

## 2025-07-08 RX ADMIN — OLANZAPINE 5 MG: 5 TABLET, FILM COATED ORAL at 21:18

## 2025-07-08 RX ADMIN — MORPHINE SULFATE 15 MG: 15 TABLET, FILM COATED, EXTENDED RELEASE ORAL at 21:19

## 2025-07-08 RX ADMIN — MORPHINE SULFATE 15 MG: 15 TABLET, FILM COATED, EXTENDED RELEASE ORAL at 09:24

## 2025-07-08 RX ADMIN — PILOCARPINE HYROCHLORIDE 5 MG: 5 TABLET, FILM COATED ORAL at 14:38

## 2025-07-08 RX ADMIN — DOXYCYCLINE 50 MG: 25 FOR SUSPENSION ORAL at 14:38

## 2025-07-08 RX ADMIN — PILOCARPINE HYROCHLORIDE 5 MG: 5 TABLET, FILM COATED ORAL at 21:19

## 2025-07-08 RX ADMIN — PILOCARPINE HYROCHLORIDE 5 MG: 5 TABLET, FILM COATED ORAL at 05:44

## 2025-07-08 RX ADMIN — RIFAXIMIN 550 MG: 550 TABLET ORAL at 21:18

## 2025-07-08 RX ADMIN — SENNOSIDES AND DOCUSATE SODIUM 2 TABLET: 50; 8.6 TABLET ORAL at 21:19

## 2025-07-08 RX ADMIN — Medication 2.5 MG: at 21:19

## 2025-07-08 RX ADMIN — LACTULOSE SOLUTION USP, 10 G/15 ML 20 G: 10 SOLUTION ORAL; RECTAL at 21:18

## 2025-07-08 RX ADMIN — PANTOPRAZOLE SODIUM 40 MG: 40 TABLET, DELAYED RELEASE ORAL at 05:43

## 2025-07-08 RX ADMIN — LACTULOSE SOLUTION USP, 10 G/15 ML 20 G: 10 SOLUTION ORAL; RECTAL at 09:23

## 2025-07-08 RX ADMIN — LACTULOSE SOLUTION USP, 10 G/15 ML 20 G: 10 SOLUTION ORAL; RECTAL at 18:47

## 2025-07-08 RX ADMIN — GABAPENTIN 600 MG: 300 CAPSULE ORAL at 21:18

## 2025-07-08 NOTE — PLAN OF CARE
Goal Outcome Evaluation:  Plan of Care Reviewed With: patient           Outcome Evaluation: Pt presented with abdominal distension, weakness and SOA. Pt was admitted with hepatic encephalopathy. Pt was in bed and pleasantly confused at times. Spouse and son were present and encouraging. Spouse reports pt ambulates short distances with Rwx at baseline. He is slow and unsteady and typically has supervision at home. Pt was agreeable to therapy. He required min A to transition to sitting EOB. He stood with min A and Rwx. He ambulated 25 ft with min A and Rwx. Gait is slow and unsteady. Pt fatigued wuickly and required cues for appropriate walker placement. Pt would benefit from PT to address his impairments while admitted. Spouse reports pt has had PT in the past without much noticeable improvement. They plans to return home when medically stable. Son lives next door and will assist as needed.    Anticipated Discharge Disposition (PT): home with assist

## 2025-07-08 NOTE — PROGRESS NOTES
DAILY PROGRESS NOTE  [facility]    Patient Identification:  Name: Gay Vazquez  Age: 79 y.o.  Sex: male  :  1945  MRN: [id number]         Primary Care Physician: Gloria Post MD    Subjective:  Interval History: He is feeling better after having large-volume paracentesis.    Objective:    Scheduled Meds:doxycycline, 50 mg, Oral, Q12H  gabapentin, 600 mg, Oral, Nightly  lactulose, 20 g, Oral, TID  Lotilaner, 1 drop, Both Eyes, BID  melatonin, 2.5 mg, Oral, Nightly  [START ON 2025] metFORMIN, 1,000 mg, Oral, Daily With Breakfast  Morphine, 15 mg, Oral, BID  OLANZapine, 5 mg, Oral, Nightly  pantoprazole, 40 mg, Oral, Q AM  pilocarpine, 5 mg, Oral, Q8H  rifAXIMin, 550 mg, Oral, Q12H  sennosides-docusate, 2 tablet, Oral, BID      Continuous Infusions:     Vital signs in last 24 hours:  Temp:  [97.5 °F (36.4 °C)-98.8 °F (37.1 °C)] 98.2 °F (36.8 °C)  Heart Rate:  [87-97] 88  Resp:  [14-16] 14  BP: ()/(60-77) 97/64    Intake/Output:    Intake/Output Summary (Last 24 hours) at 2025 1121  Last data filed at 2025 1657  Gross per 24 hour   Intake 120 ml   Output 3851 ml   Net -3731 ml       Exam:  BP 97/64 (BP Location: Right arm, Patient Position: Lying)   Pulse 88   Temp 98.2 °F (36.8 °C) (Oral)   Resp 14   Wt 88.3 kg (194 lb 10.7 oz)   SpO2 95%   BMI 26.40 kg/m²     General Appearance:    Alert, cooperative, no distress   Head:    Normocephalic, without obvious abnormality, atraumatic   Eyes:       Throat:   Lips, tongue, gums normal   Neck:   Supple, symmetrical, trachea midline, no JVD   Lungs:     Clear to auscultation bilaterally, respirations unlabored   Chest Wall:    No tenderness or deformity    Heart:    Regular rate and rhythm, S1 and S2 normal, no murmur,no  rub or gallop   Abdomen:     Soft, distended with a large amount of ascites, bowel sounds active, no masses, no organomegaly    Extremities:   Extremities normal, atraumatic, no cyanosis or edema   Pulses:       Skin:   Skin is warm and dry,  no rashes or palpable lesions   Neurologic:   no focal deficits noted      Lab Results (last 72 hours)       Procedure Component Value Units Date/Time    Basic Metabolic Panel [[id number]]  (Abnormal) Collected: 07/07/25 0727    Specimen: Blood Updated: 07/07/25 0818     Glucose 128 mg/dL      BUN 11.0 mg/dL      Creatinine 0.87 mg/dL      Sodium 136 mmol/L      Potassium 4.0 mmol/L      Chloride 101 mmol/L      CO2 27.7 mmol/L      Calcium 8.5 mg/dL      BUN/Creatinine Ratio 12.6     Anion Gap 7.3 mmol/L      eGFR 88.3 mL/min/1.73     Narrative:      GFR Categories in Chronic Kidney Disease (CKD)              GFR Category          GFR (mL/min/1.73)    Interpretation  G1                    90 or greater        Normal or high (1)  G2                    60-89                Mild decrease (1)  G3a                   45-59                Mild to moderate decrease  G3b                   30-44                Moderate to severe decrease  G4                    15-29                Severe decrease  G5                    14 or less           Kidney failure    (1)In the absence of evidence of kidney disease, neither GFR category G1 or G2 fulfill the criteria for CKD.    eGFR calculation 2021 CKD-EPI creatinine equation, which does not include race as a factor    Ammonia [[id number]]  (Abnormal) Collected: 07/07/25 0727    Specimen: Blood Updated: 07/07/25 0804     Ammonia 82 umol/L     CBC (No Diff) [[id number]]  (Abnormal) Collected: 07/07/25 0727    Specimen: Blood Updated: 07/07/25 0758     WBC 4.46 10*3/mm3      RBC 3.93 10*6/mm3      Hemoglobin 11.9 g/dL      Hematocrit 36.5 %      MCV 92.9 fL      MCH 30.3 pg      MCHC 32.6 g/dL      RDW 13.1 %      RDW-SD 44.7 fl      MPV 10.0 fL      Platelets 84 10*3/mm3     Urinalysis With Culture If Indicated - Urine, Clean Catch [[id number]]  (Abnormal) Collected: 07/07/25 0250    Specimen: Urine, Clean Catch Updated: 07/07/25 0336     Color, UA Dark  Yellow     Appearance, UA Cloudy     pH, UA 5.5     Specific Gravity, UA >1.030     Glucose, UA Negative     Ketones, UA Trace     Bilirubin, UA Negative     Blood, UA Negative     Protein, UA 30 mg/dL (1+)     Leuk Esterase, UA Small (1+)     Nitrite, UA Negative     Urobilinogen, UA 1.0 E.U./dL    Narrative:      In absence of clinical symptoms, the presence of pyuria, bacteria, and/or nitrites on the urinalysis result does not correlate with infection.    Urinalysis, Microscopic Only - Urine, Clean Catch [[id number]]  (Abnormal) Collected: 07/07/25 0250    Specimen: Urine, Clean Catch Updated: 07/07/25 0327     RBC, UA 0-2 /HPF      WBC, UA 0-2 /HPF      Comment: Urine culture not indicated.        Bacteria, UA 1+ /HPF      Squamous Epithelial Cells, UA 7-12 /HPF      Hyaline Casts, UA 3-6 /LPF      Methodology Automated Microscopy    Protime-INR [[id number]]  (Abnormal) Collected: 07/06/25 1520    Specimen: Blood Updated: 07/06/25 1555     Protime 17.4 Seconds      INR 1.43    aPTT [[id number]]  (Abnormal) Collected: 07/06/25 1520    Specimen: Blood Updated: 07/06/25 1555     PTT 39.4 seconds     Comprehensive Metabolic Panel [[id number]]  (Abnormal) Collected: 07/06/25 1520    Specimen: Blood Updated: 07/06/25 1554     Glucose 159 mg/dL      BUN 13.0 mg/dL      Creatinine 1.04 mg/dL      Sodium 129 mmol/L      Potassium 4.2 mmol/L      Comment: Slight hemolysis detected by analyzer. Result may be falsely elevated.        Chloride 95 mmol/L      CO2 25.0 mmol/L      Calcium 8.3 mg/dL      Total Protein 7.3 g/dL      Albumin 2.5 g/dL      ALT (SGPT) 7 U/L      AST (SGOT) 28 U/L      Alkaline Phosphatase 101 U/L      Total Bilirubin 0.8 mg/dL      Globulin 4.8 gm/dL      A/G Ratio 0.5 g/dL      BUN/Creatinine Ratio 12.5     Anion Gap 9.0 mmol/L      eGFR 73.5 mL/min/1.73     Narrative:      GFR Categories in Chronic Kidney Disease (CKD)              GFR Category          GFR (mL/min/1.73)    Interpretation  G1                     90 or greater        Normal or high (1)  G2                    60-89                Mild decrease (1)  G3a                   45-59                Mild to moderate decrease  G3b                   30-44                Moderate to severe decrease  G4                    15-29                Severe decrease  G5                    14 or less           Kidney failure    (1)In the absence of evidence of kidney disease, neither GFR category G1 or G2 fulfill the criteria for CKD.    eGFR calculation 2021 CKD-EPI creatinine equation, which does not include race as a factor    Ammonia [[id number]]  (Abnormal) Collected: 07/06/25 1520    Specimen: Blood Updated: 07/06/25 1549     Ammonia 85 umol/L     CBC & Differential [[id number]]  (Abnormal) Collected: 07/06/25 1520    Specimen: Blood Updated: 07/06/25 1542    Narrative:      The following orders were created for panel order CBC & Differential.  Procedure                               Abnormality         Status                     ---------                               -----------         ------                     CBC Auto Differential[[id number]]        Abnormal            Final result                 Please view results for these tests on the individual orders.    CBC Auto Differential [[id number]]  (Abnormal) Collected: 07/06/25 1520    Specimen: Blood Updated: 07/06/25 1542     WBC 6.28 10*3/mm3      RBC 4.07 10*6/mm3      Hemoglobin 12.7 g/dL      Hematocrit 38.7 %      MCV 95.1 fL      MCH 31.2 pg      MCHC 32.8 g/dL      RDW 13.1 %      RDW-SD 46.1 fl      MPV 10.5 fL      Platelets 107 10*3/mm3      Neutrophil % 63.9 %      Lymphocyte % 23.1 %      Monocyte % 10.8 %      Eosinophil % 1.1 %      Basophil % 0.8 %      Immature Grans % 0.3 %      Neutrophils, Absolute 4.01 10*3/mm3      Lymphocytes, Absolute 1.45 10*3/mm3      Monocytes, Absolute 0.68 10*3/mm3      Eosinophils, Absolute 0.07 10*3/mm3      Basophils, Absolute 0.05 10*3/mm3      Immature Grans,  "Absolute 0.02 10*3/mm3           Data Review:  Results from last 7 days   Lab Units 07/08/25  0630 07/07/25  0727 07/06/25  1520   SODIUM mmol/L 135* 136 129*   POTASSIUM mmol/L 3.7 4.0 4.2   CHLORIDE mmol/L 99 101 95*   CO2 mmol/L 27.0 27.7 25.0   BUN mg/dL 9.0 11.0 13.0   CREATININE mg/dL 0.79 0.87 1.04   GLUCOSE mg/dL 119* 128* 159*   CALCIUM mg/dL 8.0* 8.5* 8.3*     Results from last 7 days   Lab Units 07/08/25  0630 07/07/25  0727 07/06/25  1520   WBC 10*3/mm3 4.25 4.46 6.28   HEMOGLOBIN g/dL 11.4* 11.9* 12.7*   HEMATOCRIT % 33.9* 36.5* 38.7   PLATELETS 10*3/mm3 65* 84* 107*             Lab Results   Lab Value Date/Time    TROPONINT 8 05/15/2023 1250    TROPONINT 7 05/15/2023 1043         Results from last 7 days   Lab Units 07/08/25  0630 07/06/25  1520   ALK PHOS U/L 85 101   BILIRUBIN mg/dL 0.9 0.8   ALT (SGPT) U/L 7 7   AST (SGOT) U/L 23 28             No results found for: \"POCGLU\"  Results from last 7 days   Lab Units 07/08/25  0630 07/06/25  1520   INR  1.53* 1.43*       Past Medical History:   Diagnosis Date    Arthritis of neck     Cancer     Liver    Colon polyp     Diabetes mellitus     Erectile dysfunction 2/22    GERD (gastroesophageal reflux disease) 1/14    Hip arthrosis     Hyperlipidemia     Hypertension     Kidney stone     Liver cancer 07-    Liver disease     Low back pain     Postoperative wound infection     RA (rheumatoid arthritis)     Urinary tract infection     Visual impairment        Assessment:  Active Hospital Problems    Diagnosis  POA    **Hepatic encephalopathy [K76.82]  Yes    Cholangiocarcinoma [C22.1]  Yes    Diabetes mellitus without complication [E11.9]  Yes    Mixed hyperlipidemia [E78.2]  Yes    Primary hypertension [I10]  Yes    Rheumatoid arthritis [M06.9]  Yes      Resolved Hospital Problems   No resolved problems to display.       Plan:  Oncology consult noted.  Await for results on ascitic fluid.  Follow-up on labs and continue treatment " for hepatic encephalopathy.    Ramon Jarrett MD  7/8/2025  11:21 EDT

## 2025-07-08 NOTE — PROGRESS NOTES
[facility] INPATIENT PROGRESS NOTE    Length of Stay:  2 days    CHIEF COMPLAINT:  Cholangiocarcinoma, cirrhosis with portal hypertension and hepatic encephalopathy, cancer related pain, thrombocytopenia, rheumatoid arthritis    SUBJECTIVE:   Patient with no new complaints today.  He and his wife report improvement in confusion, not quite back to his baseline.  He has no new complaints today.    ROS:  Review of Systems   A comprehensive review of systems was obtained with pertinent positive findings as noted in the interval history above.  All other systems negative.    OBJECTIVE:  Vitals:    07/07/25 1658 07/07/25 2116 07/08/25 0240 07/08/25 0726   BP: 116/60 116/77 114/74    BP Location: Right arm Right arm Right arm    Patient Position: Lying Lying Lying    Pulse: 96 97 87    Resp: 16 14 14    Temp:  98.8 °F (37.1 °C) 97.5 °F (36.4 °C)    TempSrc:  Oral Oral    SpO2: 97% 99% 92%    Weight:    88.3 kg (194 lb 10.7 oz)         PHYSICAL EXAMINATION:  General: Alert and orient x 3 no distress  Chest/Lungs: Clear to auscultation bilaterally anteriorly  Heart: Regular rate and rhythm  Abdomen/GI: Distended, soft, nontender  Extremities: Trace bilateral lower extremity edema    DIAGNOSTIC DATA:  Results Review:     I reviewed the patient's new clinical results.    Results from last 7 days   Lab Units 07/08/25  0630 07/07/25  0727 07/06/25  1520   WBC 10*3/mm3 4.25 4.46 6.28   HEMOGLOBIN g/dL 11.4* 11.9* 12.7*   HEMATOCRIT % 33.9* 36.5* 38.7   PLATELETS 10*3/mm3 65* 84* 107*      Results from last 7 days   Lab Units 07/08/25  0630 07/07/25  0727 07/06/25  1520   SODIUM mmol/L 135* 136 129*   POTASSIUM mmol/L 3.7 4.0 4.2   CHLORIDE mmol/L 99 101 95*   CO2 mmol/L 27.0 27.7 25.0   BUN mg/dL 9.0 11.0 13.0   CREATININE mg/dL 0.79 0.87 1.04   CALCIUM mg/dL 8.0* 8.5* 8.3*   BILIRUBIN mg/dL 0.9  --  0.8   ALK PHOS U/L 85  --  101   ALT (SGPT) U/L 7  --  7   AST (SGOT) U/L 23  --  28   GLUCOSE mg/dL 119* 128* 159*       Lab Results   Component Value Date    NEUTROABS 2.61 07/08/2025     Results from last 7 days   Lab Units 07/08/25  0630 07/06/25  1520   INR  1.53* 1.43*   APTT seconds  --  39.4*               Assessment & Plan   ASSESSMENT/PLAN:  This is a 79 y.o. male with:     *Cirrhosis with portal hypertension and hepatic encephalopathy  Patient admitted on 7/6/2025 from the emergency department with abdominal distention, generalized weakness, confusion, reduced urine output.    Labs on 7/6/2025 showed hyponatremia with sodium 129, ammonia 85, INR 1.43, PTT 39.4.    CT head showed no acute findings.    CT abdomen pelvis without IV contrast showed small to moderate partially loculated right pleural effusion unchanged, questionable lymphadenopathy mediastinum (better seen on prior CT chest), largest hepatic mass increased from 8.3 x 5.8 on 1/16/2025 up to 9.5 x 7.1 cm with cirrhotic appearing liver, moderate to large ascites, prominent perisplenic and splenorenal varices.    Patient receiving lactulose 20 g 3 times daily and Xifaxan 550 mg every 12 hours for for hepatic encephalopathy.    Ultrasound-guided paracentesis on 7/7/2025 with 3850 cc removed, cytology pending  Ammonia level today improved at 65. INR remains slightly elevated at 1.53.  Confusion improved     *Intrahepatic cholangiocarcinoma, multifocal:  7/5/24 MRI abd-heterogenous, large peripherally enhancing mass withinthe posterior aspect of the right hepatic dome which is grossly unchanged to minimally increased in size since 5/10/2024. small peripheral enhancing and solid enhancing lesions are located within the surrounding right hepatic lobe and spread of malignancy is likely  7/18/24 CT liver biopsy-adenocarcinoma consistent with cholangiocarcinoma by IHC  CA 19-9 133  8/2/2024-initiated palliative treatment with cisplatin/Gemzar plus nivolumab; day 8 Gemzar given at 50% because of thrombocytopenia (60)  Cycle 2 of treatment was given with a dose reduction  of gemcitabine 750 mg/m² and cisplatin same dose 25 mg/m² along with durvalumab.  Despite dose reduction he had intractable nausea vomiting acute kidney injury requiring IV fluids.  He did not receive day 8 cycle 2 of treatment because of side effects.  9/6/2024 CT abdomen liver protocol-stable 7.6 x 6 cm mass in the right lobe of the liver  Patient was seen in emergency department on 9/20/2024 with CT abdomen and pelvis that showed stable right liver mass 7.5 x 6 cm, loculated right pleural effusion with surrounding pleural thickening and calcification unchanged, no acute findings.  CT abdomen and pelvis on 10/10/2024 showed no change.  Patient was referred to Dr. Hopper at [facility], was seen on 9/26/2024.  Patient was not felt to be a good candidate for surgical intervention.  Recommendation to proceed with chemoembolization.   Patient did undergo TACE to dominant liver lesion in October 2024.    Patient has been followed since that time with no subsequent interventions.    CT chest abdomen pelvis most recently on 4/21/25 showed increased size of the dominant liver lesion at 9 cm and the low density liver lesion 1.3 cm.  CA 19 manage 9 however decreased to 51.1.  Recommended to repeat CT scan at 3-month interval.  Patient currently admitted with hepatic encephalopathy  CT abdomen pelvis without IV contrast on admission 7/6/2025 showed small to moderate partially loculated right pleural effusion unchanged, questionable lymphadenopathy mediastinum (better seen on prior CT chest), largest hepatic mass increased from 8.3 x 5.8 on 1/16/2025 up to 9.5 x 7.1 cm with cirrhotic appearing liver, moderate to large ascites, prominent perisplenic and splenorenal varices.  Ultrasound-guided paracentesis on 7/7/2025 with 3850 cc removed, cytology pending  CA 19-9 on 7/8/2025 was stable at 50  CT chest, abdomen, and pelvis with contrast on 7/8/2025 with chronic small-moderate loculated right pleural effusion and  calcified pleural plaques stable.  No change in mildly enlarged mediastinal lymph nodes.  Increase in dominant right hepatic mass from 6.4 x 5.8 up to 6.8 x 6.2 cm.  Enlarging component anteriorly to this mass from 2.2 x 1.7 up to 2.6 x 2.2 cm.  Increase in medial nodule 1.6 x 1.2 up to 1.8 x 1.5 cm and increase an additional lesion from 1.3 x 1.0 up to 1.5 x 1.5 cm.  Increased lymphadenopathy at the estrellita hepatis from 2.0 x 1.2 up to 2.2 x 1.4 cm.  New free fluid in the abdomen and pelvis.  Main portal vein patent, right portal vein possibly with nonocclusive thrombus.  Stable splenomegaly, chronic pancreatitis changes.  I reviewed the results from CA 19-9, stable at 50 and results from the CT scans today with the patient and his wife.  CT scan did show evidence of mild progression in existing hepatic lesions.  It remains unclear why he is experiencing significant hepatic dysfunction as the volume of metastatic involvement in his liver has not changed significantly.  Unclear whether the evolving cirrhosis could be related to prior TACE however this was directed to a very focal area of the liver.  Treatment options for his disease are limited, additional local therapy options could be explored however must take into account his evolving cirrhotic picture.  There is question of a portal vein thrombus and as noted below we will obtain an abdominal Doppler study tomorrow to confirm this.  Would not expect a nonocclusive thrombus to produce this degree of hepatic dysfunction.  Consideration of anticoagulation is difficult given his coagulopathy, thrombocytopenia and risk of bleeding    *Small portal vein thrombus  CT 7/8/2025 with main portal vein patent, right portal vein possibly with nonocclusive thrombus  We will obtain portal hepatic duplex in a.m. to clarify.  Anticoagulation would be difficult in light of elevated INR from cirrhosis and chronic thrombocytopenia.    *Cancer related pain  Patient with significant  underlying cancer related pain receiving MS Contin 15 mg every 12 hours, oxycodone 10 mg every 4 hours as needed.    There was discussion at visit on 5/12/2025 regarding potential tapering off of MS Contin.  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 as well as cirrhosis/portal hypertension and splenomegaly  Platelet count has been chronically in the 70-90,000 range platelet count currently at baseline  Platelet count currently remains at baseline  Platelet count today has declined below baseline at 65,000     *RA  significant rheumatoid arthritis history with joint deviation, history of iritis, previous pericarditis requiring pericardial window  Patient previously treated with Simponi, held per rheumatology prior to initiation of chemotherapy/immunotherapy  Fortunately with prior immunotherapy patient did not develop flare of rheumatoid arthritis     *History of pericarditis  Patient did have previous pericarditis that required a pericardial window.     *Additional comorbidities-diabetes mellitus, hyperlipidemia, hypertension, tobacco abuse, BPH     *GERD  Protonix 40 mg daily     *Constipation  Sarika-Colace 2 tablets twice daily      *CODE STATUS   Patient is currently full code        PLAN:   Ascitic fluid cytology pending from 7/7/2025  Patient continuing on lactulose 20 g 3 times daily and Xifaxan 550 mg every 12 hours for for hepatic encephalopathy  Portal hepatic duplex in a.m. (n.p.o. after midnight)  Daily CBC, CMP, ammonia.      Discussed with patient and wife at bedside             Eyad Grace MD

## 2025-07-08 NOTE — SIGNIFICANT NOTE
07/08/25 1314   OTHER   Discipline physical therapist   Rehab Time/Intention   Session Not Performed patient unavailable for evaluation  (off the floor to CT. Will follow up as time allows.)

## 2025-07-08 NOTE — THERAPY EVALUATION
Patient Name: Gay Vazquez  : 1945    MRN: [id number]                              Today's Date: 2025       Admit Date: 2025    Visit Dx:     ICD-10-CM ICD-9-CM   1. Encephalopathy, hepatic  K76.82 572.2   2. Malignant ascites  R18.0 789.51   3. Hyponatremia  E87.1 276.1     Patient Active Problem List   Diagnosis    Mixed hyperlipidemia    Diabetes mellitus without complication    Rheumatoid arthritis    Overweight (BMI 25.0-29.9)    Primary hypertension    Nicotine dependence, cigarettes, uncomplicated    Aneurysm of ascending aorta without rupture    Liver mass    Cholangiocarcinoma    Encounter for long-term (current) use of other medications    Fitting and adjustment of vascular catheter    Thrombocytopenia    Cancer related pain    Chronic pleural effusion    Hepatic encephalopathy     Past Medical History:   Diagnosis Date    Arthritis of neck     Cancer     Liver    Colon polyp     Diabetes mellitus     Erectile dysfunction     GERD (gastroesophageal reflux disease)     Hip arthrosis     Hyperlipidemia     Hypertension     Kidney stone     Liver cancer 2024    Liver disease     Low back pain     Postoperative wound infection     RA (rheumatoid arthritis)     Urinary tract infection     Visual impairment      Past Surgical History:   Procedure Laterality Date    ABDOMINAL SURGERY      BACK SURGERY      CARDIAC CATHETERIZATION      CARDIAC SURGERY      CHOLECYSTECTOMY      COLONOSCOPY      [facility]    COLONOSCOPY N/A 2024    Procedure: COLONOSCOPY INTO CECUM WITH POLYPECTOMIES (COLD SNARE);  Surgeon: Angel Ness MD;  Location: [facility] ENDOSCOPY;  Service: General;  Laterality: N/A;  PRE: COLON MASS, ABNORMAL CT  POST: DIVERTICULOSIS, POLYPS, POOR PREP    ENDOSCOPY N/A 2024    Procedure: ESOPHAGOGASTRODUODENOSCOPY;  Surgeon: Angel Ness MD;  Location: [facility] ENDOSCOPY;  Service: General;  Laterality:  N/A;  PRE: LIVER MASS  POST: NORMAL EGD    EYE SURGERY      JOINT REPLACEMENT      PERICARDIUM SURGERY      REPLACEMENT TOTAL KNEE BILATERAL      SPINE SURGERY      THORACOTOMY Bilateral 1997    TRIGGER POINT INJECTION      Back    VASECTOMY      VENOUS ACCESS DEVICE (PORT) INSERTION Right 07/30/2024    Procedure: INSERTION VENOUS ACCESS DEVICE;  Surgeon: Angel Ness MD;  Location: [facility] MAIN OR;  Service: General;  Laterality: Right;      General Information       Row Name 07/08/25 1557          Physical Therapy Time and Intention    Document Type evaluation  -     Mode of Treatment individual therapy;physical therapy  -       Row Name 07/08/25 1552          General Information    Patient Profile Reviewed yes  -     Prior Level of Function --  walks short distances with Rwx and supervision at home  -     Existing Precautions/Restrictions fall  -     Barriers to Rehab previous functional deficit;medically complex  -       Row Name 07/08/25 1557          Living Environment    Current Living Arrangements home  -     People in Home spouse  -       Row Name 07/08/25 1557          Home Main Entrance    Number of Stairs, Main Entrance none  -       Row Name 07/08/25 1557          Cognition    Orientation Status (Cognition) oriented to;person;place  -       Row Name 07/08/25 1557          Safety Issues/Impairments Affecting Functional Mobility    Impairments Affecting Function (Mobility) endurance/activity tolerance;balance;strength  -               User Key  (r) = Recorded By, (t) = Taken By, (c) = Cosigned By      Initials Name Provider Type     Nara Raymundo, PT Physical Therapist                   Mobility       Row Name 07/08/25 1558          Bed Mobility    Bed Mobility supine-sit;sit-supine  -     Supine-Sit [place] (Bed Mobility) minimum assist (75% patient effort)  -     Sit-Supine [place] (Bed Mobility) standby assist;verbal cues  -     Assistive  Device (Bed Mobility) head of bed elevated;bed rails  -[facility] Name 07/08/25 1558          Sit-Stand Transfer    Sit-Stand [place] (Transfers) minimum assist (75% patient effort)  -     Assistive Device (Sit-Stand Transfers) walker, front-wheeled  -[facility] Name 07/08/25 1558          Gait/Stairs (Locomotion)    [place] Level (Gait) minimum assist (75% patient effort)  -     Assistive Device (Gait) walker, front-wheeled  -     Distance in Feet (Gait) 25  -     Deviations/Abnormal Patterns (Gait) antalgic;gait speed decreased;stride length decreased  -     Bilateral Gait Deviations forward flexed posture;heel strike decreased  -     Comment, (Gait/Stairs) cues to keep walker closer and stay within the walker when turning  -               User Key  (r) = Recorded By, (t) = Taken By, (c) = Cosigned By      Initials Name Provider Type     Naar Raymundo, PT Physical Therapist                   Obj/Interventions       [facility] Name 07/08/25 1559          Range of Motion Comprehensive    Comment, General Range of Motion BLE WFL  -[facility] Name 07/08/25 1559          Strength Comprehensive (MMT)    Comment, General Manual Muscle Testing (MMT) Assessment generalized weakness  -[facility] Name 07/08/25 1559          Motor Skills    Therapeutic Exercise --  BLE LAQx 10 reps  -[facility] Name 07/08/25 1559          Balance    Balance Assessment sitting static balance;sitting dynamic balance;standing static balance;standing dynamic balance  -     Static Sitting Balance standby assist  -     Dynamic Sitting Balance standby assist  -     Position, Sitting Balance sitting edge of bed  -     Static Standing Balance contact guard;minimal assist  -     Dynamic Standing Balance minimal assist  -     Position/Device Used, Standing Balance walker, rolling  -               User Key  (r) = Recorded By, (t) = Taken By, (c) = Cosigned By      Initials Name Provider Type    GAUTAM  Nara Raymundo, PT Physical Therapist                   Goals/Plan       Row Name 07/08/25 1606          Bed Mobility Goal 1 (PT)    Activity/Assistive Device (Bed Mobility Goal 1, PT) bed mobility activities, all  -KH     [place] Level/Cues Needed (Bed Mobility Goal 1, PT) contact guard required  -KH     Time Frame (Bed Mobility Goal 1, PT) 10 days  -       Row Name 07/08/25 1606          Transfer Goal 1 (PT)    Activity/Assistive Device (Transfer Goal 1, PT) sit-to-stand/stand-to-sit;bed-to-chair/chair-to-bed  -KH     [place] Level/Cues Needed (Transfer Goal 1, PT) contact guard required  -KH     Time Frame (Transfer Goal 1, PT) 10 days  -[facility] Name 07/08/25 1606          Gait Training Goal 1 (PT)    Activity/Assistive Device (Gait Training Goal 1, PT) gait (walking locomotion);walker, rolling  -KH     [place] Level (Gait Training Goal 1, PT) contact guard required  -KH     Distance (Gait Training Goal 1, PT) 50 ft  -KH     Time Frame (Gait Training Goal 1, PT) 10 days  -[facility] Name 07/08/25 1606          Patient Education Goal (PT)    Activity (Patient Education Goal, PT) HEP  -KH     [place]/Cues/Accuracy (Memory Goal 2, PT) demonstrates adequately  -KH     Time Frame (Patient Education Goal, PT) 10 days  -[facility] Name 07/08/25 1606          Therapy Assessment/Plan (PT)    Planned Therapy Interventions (PT) balance training;bed mobility training;gait training;home exercise program;patient/family education;strengthening;transfer training;ROM (range of motion)  -               User Key  (r) = Recorded By, (t) = Taken By, (c) = Cosigned By      Initials Name Provider Type    Nara Silverio, PT Physical Therapist                   Clinical Impression       Row Name 07/08/25 1600          Pain    Pretreatment Pain Rating 0/10 - no pain  -     Posttreatment Pain Rating 0/10 - no pain  -[facility] Name 07/08/25 1600          Plan of Care Review    Plan  of Care Reviewed With patient  -     Outcome Evaluation Pt presented with abdominal distension, weakness and SOA. Pt was admitted with hepatic encephalopathy. Pt was in bed and pleasantly confused at times. Spouse and son were present and encouraging. Spouse reports pt ambulates short distances with Rwx at baseline. He is slow and unsteady and typically has supervision at home. Pt was agreeable to therapy. He required min A to transition to sitting EOB. He stood with min A and Rwx. He ambulated 25 ft with min A and Rwx. Gait is slow and unsteady. Pt fatigued wuickly and required cues for appropriate walker placement. Pt would benefit from PT to address his impairments while admitted. Spouse reports pt has had PT in the past without much noticeable improvement. They plans to return home when medically stable. Son lives next door and will assist as needed.  -       Row Name 07/08/25 1600          Therapy Assessment/Plan (PT)    Patient/Family Therapy Goals Statement (PT) return home to [facility]  -     Rehab Potential (PT) fair  -     Criteria for Skilled Interventions Met (PT) yes  -     Therapy Frequency (PT) 5 times/wk  -       Row Name 07/08/25 1600          Positioning and Restraints    Pre-Treatment Position in bed  -     Post Treatment Position bed  -     In Bed fowlers;call light within reach;encouraged to call for assist;exit alarm on;side rails up x3;with family/caregiver  -               User Key  (r) = Recorded By, (t) = Taken By, (c) = Cosigned By      Initials Name Provider Type    Nara Silverio, PT Physical Therapist                   Outcome Measures       Row Name 07/08/25 1607 07/08/25 0800       How much help from another person do you currently need...    Turning from your back to your side while in flat bed without using bedrails? 4  - 4  -JW    Moving from lying on back to sitting on the side of a flat bed without bedrails? 3  - 4  -JW    Moving to and from a bed to  a chair (including a wheelchair)? 3  -KH 4  -JW    Standing up from a chair using your arms (e.g., wheelchair, bedside chair)? 3  -KH 3  -JW    Climbing 3-5 steps with a railing? 2  -KH 2  -JW    To walk in hospital room? 3  -KH 2  -JW    AM-PAC 6 Clicks Score (PT) 18  -KH 19  -JW    Highest Level of Mobility Goal Walk 10 Steps or More-6  -KH Walk 10 Steps or More-6  -JW      Row Name 07/08/25 5162          Functional Assessment    Outcome Measure Options AM-PAC 6 Clicks Basic Mobility (PT)  -               User Key  (r) = Recorded By, (t) = Taken By, (c) = Cosigned By      Initials Name Provider Type    Nara Silverio, PT Physical Therapist    Pinky Botello, RN Registered Nurse                                 Physical Therapy Education       Title: PT OT SLP Therapies (Not Started)       Topic: Physical Therapy (Not Started)       Point: Mobility training (Not Started)       Learner Progress:  Not documented in this visit.              Point: Home exercise program (Not Started)       Learner Progress:  Not documented in this visit.              Point: Body mechanics (Not Started)       Learner Progress:  Not documented in this visit.              Point: Precautions (Not Started)       Learner Progress:  Not documented in this visit.                                  PT Recommendation and Plan  Planned Therapy Interventions (PT): balance training, bed mobility training, gait training, home exercise program, patient/family education, strengthening, transfer training, ROM (range of motion)  Outcome Evaluation: Pt presented with abdominal distension, weakness and SOA. Pt was admitted with hepatic encephalopathy. Pt was in bed and pleasantly confused at times. Spouse and son were present and encouraging. Spouse reports pt ambulates short distances with Rwx at baseline. He is slow and unsteady and typically has supervision at home. Pt was agreeable to therapy. He required min A to transition to sitting  EOB. He stood with min A and Rwx. He ambulated 25 ft with min A and Rwx. Gait is slow and unsteady. Pt fatigued wuickly and required cues for appropriate walker placement. Pt would benefit from PT to address his impairments while admitted. Spouse reports pt has had PT in the past without much noticeable improvement. They plans to return home when medically stable. Son lives next door and will assist as needed.     Time Calculation:         PT Charges       Row Name 07/08/25 1608             Time Calculation    Start Time 1515  -KH      Stop Time 1535  -KH      Time Calculation (min) 20 min  -KH      PT Received On 07/08/25  -      PT - Next Appointment 07/09/25  -      PT Goal Re-Cert Due Date 07/18/25  -                User Key  (r) = Recorded By, (t) = Taken By, (c) = Cosigned By      Initials Name Provider Type    Nara Silverio, PT Physical Therapist                  Therapy Charges for Today       Code Description Service Date Service Provider Modifiers Qty    65228074389 HC PT EVAL MOD COMPLEXITY 3 7/8/2025 Nara Raymundo, PT GP 1            PT G-Codes  Outcome Measure Options: AM-PAC 6 Clicks Basic Mobility (PT)  AM-PAC 6 Clicks Score (PT): 18  PT Discharge Summary  Anticipated Discharge Disposition (PT): home with assist    Nara Raymundo, PT  7/8/2025

## 2025-07-09 ENCOUNTER — APPOINTMENT (OUTPATIENT)
Dept: CARDIOLOGY | Facility: HOSPITAL | Age: 80
End: 2025-07-09
Payer: MEDICARE

## 2025-07-09 LAB
ALBUMIN SERPL-MCNC: 2.4 G/DL (ref 3.5–5.2)
ALBUMIN/GLOB SERPL: 0.5 G/DL
ALP SERPL-CCNC: 101 U/L (ref 39–117)
ALT SERPL W P-5'-P-CCNC: 7 U/L (ref 1–41)
AMMONIA BLD-SCNC: 59 UMOL/L (ref 16–60)
ANION GAP SERPL CALCULATED.3IONS-SCNC: 11 MMOL/L (ref 5–15)
AST SERPL-CCNC: 26 U/L (ref 1–40)
BASOPHILS # BLD AUTO: 0.04 10*3/MM3 (ref 0–0.2)
BASOPHILS NFR BLD AUTO: 0.6 % (ref 0–1.5)
BH CV VAS HEPATOPORTAL HEPATIC V MID DIRECTION: NORMAL
BH CV VAS HEPATOPORTAL HEPATIC V MID FLOW: NORMAL
BH CV VAS HEPATOPORTAL IVC CONFLUENCE SPONT: NORMAL
BH CV VAS HEPATOPORTAL IVC FLOW: NORMAL
BH CV VAS HEPATOPORTAL IVC SPONT: NORMAL
BH CV VAS HEPATOPORTAL PORTAL V EXTRAHEPATIC SPONT: NORMAL
BH CV VAS HEPATOPORTAL PORTAL V EXTRAHEPATIC THROMBUS: NORMAL
BH CV VAS HEPATOPORTAL PORTAL V LT INTRA THROMBUS: NORMAL
BH CV VAS HEPATOPORTAL PORTAL V MAIN INTRA FLOW: NORMAL
BH CV VAS HEPATOPORTAL PORTAL V MAIN INTRA THROMBUS: NORMAL
BH CV VAS HEPATOPORTAL PORTAL V PSV: 10.9 CM/S
BH CV VAS HEPATOPORTAL PORTAL V RT INTRA THROMBUS: NORMAL
BH CV VAS HEPATOPORTAL SPLENIC SPONT: NORMAL
BH CV VAS HEPATOPORTAL SPLENIC V PSV: 15.9 CM/S
BH CV VAS SMA SPLENIC EDV: 24.2 CM/S
BH CV VAS SMA SPLENIC PSV: 69.2 CM/S
BILIRUB SERPL-MCNC: 0.8 MG/DL (ref 0–1.2)
BUN SERPL-MCNC: 9 MG/DL (ref 8–23)
BUN/CREAT SERPL: 11.3 (ref 7–25)
CALCIUM SPEC-SCNC: 8.3 MG/DL (ref 8.6–10.5)
CHLORIDE SERPL-SCNC: 97 MMOL/L (ref 98–107)
CO2 SERPL-SCNC: 26 MMOL/L (ref 22–29)
CREAT SERPL-MCNC: 0.8 MG/DL (ref 0.76–1.27)
DEPRECATED RDW RBC AUTO: 43.2 FL (ref 37–54)
EGFRCR SERPLBLD CKD-EPI 2021: 90.6 ML/MIN/1.73
EOSINOPHIL # BLD AUTO: 0.09 10*3/MM3 (ref 0–0.4)
EOSINOPHIL NFR BLD AUTO: 1.4 % (ref 0.3–6.2)
ERYTHROCYTE [DISTWIDTH] IN BLOOD BY AUTOMATED COUNT: 12.9 % (ref 12.3–15.4)
GLOBULIN UR ELPH-MCNC: 4.4 GM/DL
GLUCOSE BLDC GLUCOMTR-MCNC: 125 MG/DL (ref 70–130)
GLUCOSE SERPL-MCNC: 133 MG/DL (ref 65–99)
HCT VFR BLD AUTO: 36.6 % (ref 37.5–51)
HGB BLD-MCNC: 12.4 G/DL (ref 13–17.7)
IMM GRANULOCYTES # BLD AUTO: 0.02 10*3/MM3 (ref 0–0.05)
IMM GRANULOCYTES NFR BLD AUTO: 0.3 % (ref 0–0.5)
LYMPHOCYTES # BLD AUTO: 1.23 10*3/MM3 (ref 0.7–3.1)
LYMPHOCYTES NFR BLD AUTO: 19.5 % (ref 19.6–45.3)
MCH RBC QN AUTO: 31.1 PG (ref 26.6–33)
MCHC RBC AUTO-ENTMCNC: 33.9 G/DL (ref 31.5–35.7)
MCV RBC AUTO: 91.7 FL (ref 79–97)
MONOCYTES # BLD AUTO: 0.63 10*3/MM3 (ref 0.1–0.9)
MONOCYTES NFR BLD AUTO: 10 % (ref 5–12)
NEUTROPHILS NFR BLD AUTO: 4.31 10*3/MM3 (ref 1.7–7)
NEUTROPHILS NFR BLD AUTO: 68.2 % (ref 42.7–76)
PLATELET # BLD AUTO: 82 10*3/MM3 (ref 140–450)
PMV BLD AUTO: 10.5 FL (ref 6–12)
POTASSIUM SERPL-SCNC: 3.6 MMOL/L (ref 3.5–5.2)
PROT SERPL-MCNC: 6.8 G/DL (ref 6–8.5)
RBC # BLD AUTO: 3.99 10*6/MM3 (ref 4.14–5.8)
SODIUM SERPL-SCNC: 134 MMOL/L (ref 136–145)
WBC NRBC COR # BLD AUTO: 6.32 10*3/MM3 (ref 3.4–10.8)

## 2025-07-09 PROCEDURE — 97110 THERAPEUTIC EXERCISES: CPT

## 2025-07-09 PROCEDURE — 99232 SBSQ HOSP IP/OBS MODERATE 35: CPT | Performed by: INTERNAL MEDICINE

## 2025-07-09 PROCEDURE — 82140 ASSAY OF AMMONIA: CPT | Performed by: HOSPITALIST

## 2025-07-09 PROCEDURE — 93975 VASCULAR STUDY: CPT

## 2025-07-09 PROCEDURE — 93975 VASCULAR STUDY: CPT | Performed by: SURGERY

## 2025-07-09 PROCEDURE — 82948 REAGENT STRIP/BLOOD GLUCOSE: CPT

## 2025-07-09 PROCEDURE — 85025 COMPLETE CBC W/AUTO DIFF WBC: CPT | Performed by: HOSPITALIST

## 2025-07-09 PROCEDURE — 25010000002 ENOXAPARIN PER 10 MG: Performed by: INTERNAL MEDICINE

## 2025-07-09 PROCEDURE — 80053 COMPREHEN METABOLIC PANEL: CPT | Performed by: HOSPITALIST

## 2025-07-09 RX ORDER — SPIRONOLACTONE 25 MG/1
50 TABLET ORAL DAILY
Status: DISCONTINUED | OUTPATIENT
Start: 2025-07-09 | End: 2025-07-15 | Stop reason: HOSPADM

## 2025-07-09 RX ORDER — ENOXAPARIN SODIUM 100 MG/ML
40 INJECTION SUBCUTANEOUS EVERY 24 HOURS
Status: DISCONTINUED | OUTPATIENT
Start: 2025-07-09 | End: 2025-07-15 | Stop reason: HOSPADM

## 2025-07-09 RX ORDER — FUROSEMIDE 20 MG/1
20 TABLET ORAL DAILY
Status: DISCONTINUED | OUTPATIENT
Start: 2025-07-09 | End: 2025-07-15 | Stop reason: HOSPADM

## 2025-07-09 RX ORDER — MIDODRINE HYDROCHLORIDE 5 MG/1
5 TABLET ORAL
Status: DISCONTINUED | OUTPATIENT
Start: 2025-07-09 | End: 2025-07-15 | Stop reason: HOSPADM

## 2025-07-09 RX ADMIN — SPIRONOLACTONE 50 MG: 25 TABLET ORAL at 15:36

## 2025-07-09 RX ADMIN — Medication 2.5 MG: at 20:22

## 2025-07-09 RX ADMIN — PILOCARPINE HYROCHLORIDE 5 MG: 5 TABLET, FILM COATED ORAL at 21:55

## 2025-07-09 RX ADMIN — DOXYCYCLINE 50 MG: 25 FOR SUSPENSION ORAL at 20:23

## 2025-07-09 RX ADMIN — FUROSEMIDE 20 MG: 20 TABLET ORAL at 15:36

## 2025-07-09 RX ADMIN — MORPHINE SULFATE 15 MG: 15 TABLET, FILM COATED, EXTENDED RELEASE ORAL at 10:18

## 2025-07-09 RX ADMIN — LACTULOSE SOLUTION USP, 10 G/15 ML 20 G: 10 SOLUTION ORAL; RECTAL at 18:14

## 2025-07-09 RX ADMIN — LACTULOSE SOLUTION USP, 10 G/15 ML 20 G: 10 SOLUTION ORAL; RECTAL at 10:17

## 2025-07-09 RX ADMIN — SENNOSIDES AND DOCUSATE SODIUM 2 TABLET: 50; 8.6 TABLET ORAL at 10:18

## 2025-07-09 RX ADMIN — LACTULOSE SOLUTION USP, 10 G/15 ML 20 G: 10 SOLUTION ORAL; RECTAL at 20:22

## 2025-07-09 RX ADMIN — DOXYCYCLINE 50 MG: 25 FOR SUSPENSION ORAL at 00:04

## 2025-07-09 RX ADMIN — DOXYCYCLINE 50 MG: 25 FOR SUSPENSION ORAL at 10:19

## 2025-07-09 RX ADMIN — METFORMIN HYDROCHLORIDE 1000 MG: 1000 TABLET ORAL at 10:19

## 2025-07-09 RX ADMIN — SENNOSIDES AND DOCUSATE SODIUM 2 TABLET: 50; 8.6 TABLET ORAL at 20:22

## 2025-07-09 RX ADMIN — RIFAXIMIN 550 MG: 550 TABLET ORAL at 10:19

## 2025-07-09 RX ADMIN — OLANZAPINE 5 MG: 5 TABLET, FILM COATED ORAL at 20:22

## 2025-07-09 RX ADMIN — RIFAXIMIN 550 MG: 550 TABLET ORAL at 20:23

## 2025-07-09 RX ADMIN — ENOXAPARIN SODIUM 40 MG: 100 INJECTION SUBCUTANEOUS at 20:22

## 2025-07-09 RX ADMIN — MIDODRINE HYDROCHLORIDE 5 MG: 5 TABLET ORAL at 15:37

## 2025-07-09 RX ADMIN — MORPHINE SULFATE 15 MG: 15 TABLET, FILM COATED, EXTENDED RELEASE ORAL at 20:22

## 2025-07-09 RX ADMIN — GABAPENTIN 600 MG: 300 CAPSULE ORAL at 20:22

## 2025-07-09 NOTE — PROGRESS NOTES
Name: Gay Vazquez ADMIT: 2025   : 1945  PCP: Gloria Post MD    MRN: [id number] LOS: 3 days   AGE/SEX: 79 y.o. male  ROOM: [facility]     Subjective   Subjective     No events overnight, but he didn't sleep well. No new complaints. His wife is at bedside       Objective   Objective   Vital Signs  Temp:  [97.7 °F (36.5 °C)-98.4 °F (36.9 °C)] 98.4 °F (36.9 °C)  Heart Rate:  [93-98] 95  Resp:  [14-16] 16  BP: (101-107)/(70-71) 101/70  SpO2:  [94 %-96 %] 94 %  on   ;   Device (Oxygen Therapy): room air  Body mass index is 26.19 kg/m².  Physical Exam  Constitutional:       General: He is not in acute distress.     Appearance: He is not toxic-appearing.   Cardiovascular:      Rate and Rhythm: Normal rate and regular rhythm.      Heart sounds: Normal heart sounds.   Pulmonary:      Effort: Pulmonary effort is normal.      Breath sounds: Normal breath sounds.   Abdominal:      General: Bowel sounds are normal. There is distension.      Palpations: Abdomen is soft.      Tenderness: There is no abdominal tenderness. There is no guarding or rebound.   Musculoskeletal:         General: No tenderness.      Right lower leg: No edema.      Left lower leg: No edema.   Neurological:      Mental Status: He is alert.   Psychiatric:         Mood and Affect: Mood normal.         Behavior: Behavior normal.         Results Review     I reviewed the patient's new clinical results.  Results from last 7 days   Lab Units 25  0531 25  0630 25  0725  1520   WBC 10*3/mm3 6.32 4.25 4.46 6.28   HEMOGLOBIN g/dL 12.4* 11.4* 11.9* 12.7*   PLATELETS 10*3/mm3 82* 65* 84* 107*     Results from last 7 days   Lab Units 25  0531 25  0630 25  0725  1520   SODIUM mmol/L 134* 135* 136 129*   POTASSIUM mmol/L 3.6 3.7 4.0 4.2   CHLORIDE mmol/L 97* 99 101 95*   CO2 mmol/L 26.0 27.0 27.7 25.0   BUN mg/dL 9.0 9.0 11.0 13.0   CREATININE mg/dL 0.80 0.79 0.87 1.04   GLUCOSE mg/dL 133* 119* 128*  159*   Estimated Creatinine Clearance: 94.3 mL/min (by C-G formula based on SCr of 0.8 mg/dL).  Results from last 7 days   Lab Units 07/09/25  0531 07/08/25  0630 07/06/25  1520   ALBUMIN g/dL 2.4* 2.1* 2.5*   BILIRUBIN mg/dL 0.8 0.9 0.8   ALK PHOS U/L 101 85 101   AST (SGOT) U/L 26 23 28   ALT (SGPT) U/L 7 7 7     Results from last 7 days   Lab Units 07/09/25  0531 07/08/25  0630 07/07/25  0727 07/06/25  1520   CALCIUM mg/dL 8.3* 8.0* 8.5* 8.3*   ALBUMIN g/dL 2.4* 2.1*  --  2.5*       COVID19   Date Value Ref Range Status   10/13/2024 Not Detected Not Detected - Ref. Range Final     Glucose   Date/Time Value Ref Range Status   07/09/2025 0555 125 70 - 130 mg/dL Final       Duplex Portal Hepatic Complete CAR    Challenging study due to ascites and bowel gas.  What appears to be a   large mass in the right lobe of the liver compressing the main hepatic   vein.  Unable to visualize the left or right hepatic vein.  Main portal   vein has low/sluggish flow with what appears to be thrombus in the left   and right portal veins.  CT Chest With Contrast Diagnostic, CT Abdomen Pelvis With Contrast  CT CHEST, ABDOMEN, AND PELVIS WITH IV CONTRAST     HISTORY: 79-year-old male with cirrhosis and multifocal pericarditis  history. Cholangiocarcinoma.     TECHNIQUE: Radiation dose reduction techniques were utilized, including  automated exposure control and exposure modulation based on body size.   3 mm images were obtained through the chest, abdomen, and pelvis after  the administration of IV contrast. Compared with noncontrasted CT  abdomen 07/06/2025 and contrast-enhanced CTs chest/abdomen/pelvis  04/21/2025.     FINDINGS:  CHEST:  1. There is extensive breathing artifact and the lung markings are very  crowded. Chronic small-moderate loculated right pleural effusion with  pleural calcifications/plaques and rounded atelectasis at the right  lower lobe appear stable. Difficult to evaluate the thickened bronchi at  the lower  lobes given the underexpanded lungs. No definite suspicious  lung nodules are seen.     2. No significant change in the multiple mildly enlarged mediastinal  nodes, stable 2.2 x 1.3 cm precarinal node and the 2.1 x 1.4 cm right  hilar node is stable. No new lymphadenopathy is seen. Right Mediport is  in the SVC.     ABDOMEN/PELVIS:  1. There has been significant progression of the dominant right hepatic  lobe mass and the multiple intrahepatic metastases.  - The dominant mass measures 6.8 x 6.2 cm, previously 6.4 x 5.8 cm.  There is an enlarging component along the anterior margin, image 32.  - 2.6 x 2.2 cm lesion posterior to the dominant mass measured 2.2 x 1.7  cm.  - Medially is a 1.8 x 1.5 cm nodule, which previously measured 1.6 x 1.2  cm, and inferiorly is a 1.5 x 1.5 cm lesion, which previously measured  1.3 x 1.0 cm.  - The lymphadenopathy at the estrellita hepatis has progressed as well.  Largest node measures 2.2 x 1.4 cm, previously 2.0 x 1.2 cm.  - There is a significantly larger volume of perihepatic fluid and there  is new moderate volume of free fluid throughout the abdomen and pelvis.  -The main portal vein is patent, but the right portal vein may have  developed some nonocclusive thrombus, image 43.     2. Splenomegaly, chronic pancreatitis changes, multiple bilateral renal  cysts are stable. Adrenals are unremarkable. Significant prostate  enlargement and heterogeneity is stable.     3. There is a mild small and large bowel ileus. There is no bowel  obstruction. There is no evidence for diverticulitis or an infectious  colitis, and there is no appendicitis.           This report was finalized on 7/9/2025 8:53 AM by Dr. Nerissa Landa M.D on  Workstation: [id number]       Scheduled Medications  doxycycline, 50 mg, Oral, Q12H  gabapentin, 600 mg, Oral, Nightly  lactulose, 20 g, Oral, TID  Lotilaner, 1 drop, Both Eyes, BID  melatonin, 2.5 mg, Oral, Nightly  metFORMIN, 1,000 mg, Oral, Daily With  Breakfast  Morphine, 15 mg, Oral, BID  OLANZapine, 5 mg, Oral, Nightly  pantoprazole, 40 mg, Oral, Q AM  pilocarpine, 5 mg, Oral, Q8H  rifAXIMin, 550 mg, Oral, Q12H  sennosides-docusate, 2 tablet, Oral, BID    Infusions   Diet  Diet: Cardiac; Healthy Heart (2-3 Na+); Fluid Consistency: Thin (IDDSI 0)       Assessment/Plan     Active Hospital Problems    Diagnosis  POA    **Hepatic encephalopathy [K76.82]  Yes    Cholangiocarcinoma [C22.1]  Yes    Diabetes mellitus without complication [E11.9]  Yes    Mixed hyperlipidemia [E78.2]  Yes    Primary hypertension [I10]  Yes    Rheumatoid arthritis [M06.9]  Yes      Resolved Hospital Problems   No resolved problems to display.       79 y.o. male admitted with Hepatic encephalopathy.    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 Encephalopathy appears to be largely improved. He feels like he's starting to re-accumulate ascites fluid, so I will start low dose lasix and spironolactone as well as midodrine given his low normal blood pressure. Continue lactulose and rifaximin. Follow cytology  Portal vein thrombosis-defer the decision regarding anticoagulation to oncology.  Thrombocytopenia-due to cirrhosis/splenomegaly   Intrahepatic cholangiocarcinoma-evidence of mild progression on CT this admission  Cancer related pain-MS Contin, oxycodone  Type 2 diabetes-glucose at goal on metformin  Hyperlipidemia-on pravastatin at home  GERD-ppi  RA with history of pericarditis requiring a pericardial window-previously on simponi, but this has been on hold  SCDs for DVT prophylaxis.  Full code.  Discussed with patient and spouse.  Anticipate discharge home with  family timing yet to be determined.      Lonnie Jennings MD  [facility]  07/09/25  13:53 EDT

## 2025-07-09 NOTE — PLAN OF CARE
Goal Outcome Evaluation:  Plan of Care Reviewed With: patient, spouse           Outcome Evaluation: Pt cont to present with weakness and impaired functional mobility with unsteady gait, only walked a short distance today due to MD visit and lunch arriving, PT will cont to follow, plans to return home at discharge with assist of spouse, son lives next door

## 2025-07-09 NOTE — CASE MANAGEMENT/SOCIAL WORK
Continued Stay Note  [facility]     Patient Name: Gay Vazquez  MRN: [id number]  Today's Date: 7/9/2025    Admit Date: 7/6/2025    Plan: Home with wife   Discharge Plan       Row Name 07/09/25 1316       Plan    Plan Home with wife    Patient/Family in Agreement with Plan yes    Plan Comments CCP reviewed chart, pt moving well with therapy, plan for portal hepatic duplex this date, await cytology pending. Plan remains home with wife, no needs. CCP will follow - Elen BOSTON                   Discharge Codes    No documentation.                 Expected Discharge Date and Time       Expected Discharge Date Expected Discharge Time    Jul 10, 2025               Elen Jeffers RN

## 2025-07-09 NOTE — PROGRESS NOTES
[facility] INPATIENT PROGRESS NOTE    Length of Stay:  3 days    CHIEF COMPLAINT:  Cholangiocarcinoma, cirrhosis with portal hypertension and hepatic encephalopathy, cancer related pain, thrombocytopenia, rheumatoid arthritis    SUBJECTIVE:   Patient lying in bed, somewhat lethargic although awake and alert and communicative.  Patient's wife notes confusion potentially slightly worse today.  He does note increasing abdominal distention    ROS:  Review of Systems   Comprehensive review of systems was obtained with pertinent positive findings as noted history above.  All other systems negative.    OBJECTIVE:  Vitals:    07/08/25 1330 07/08/25 1931 07/08/25 2358 07/09/25 0556   BP: 91/72 105/70 107/71    BP Location: Right arm Right arm Right arm    Patient Position: Lying Lying Lying    Pulse: 97 93 98    Resp: 14 14 16    Temp: 98.6 °F (37 °C) 97.7 °F (36.5 °C) 98.1 °F (36.7 °C)    TempSrc: Oral Oral Oral    SpO2: 90% 96% 95%    Weight:    87.6 kg (193 lb 2 oz)         PHYSICAL EXAMINATION:  General: Alert and orient x 3 no distress  Chest/Lungs: Clear to auscultation bilaterally anteriorly  Heart: Regular rate and rhythm  Abdomen/GI: Soft, nontender, increasing abdominal distention with ascites  Extremities: Trace bilateral lower extremity edema        DIAGNOSTIC DATA:  Results Review:     I reviewed the patient's new clinical results.    Results from last 7 days   Lab Units 07/09/25  0531 07/08/25  0630 07/07/25  0727   WBC 10*3/mm3 6.32 4.25 4.46   HEMOGLOBIN g/dL 12.4* 11.4* 11.9*   HEMATOCRIT % 36.6* 33.9* 36.5*   PLATELETS 10*3/mm3 82* 65* 84*      Results from last 7 days   Lab Units 07/09/25  0531 07/08/25  0630 07/07/25  0727 07/06/25  1520   SODIUM mmol/L 134* 135* 136 129*   POTASSIUM mmol/L 3.6 3.7 4.0 4.2   CHLORIDE mmol/L 97* 99 101 95*   CO2 mmol/L 26.0 27.0 27.7 25.0   BUN mg/dL 9.0 9.0 11.0 13.0   CREATININE mg/dL 0.80 0.79 0.87 1.04   CALCIUM mg/dL 8.3* 8.0* 8.5* 8.3*   BILIRUBIN mg/dL  0.8 0.9  --  0.8   ALK PHOS U/L 101 85  --  101   ALT (SGPT) U/L 7 7  --  7   AST (SGOT) U/L 26 23  --  28   GLUCOSE mg/dL 133* 119* 128* 159*      Lab Results   Component Value Date    NEUTROABS 4.31 07/09/2025     Results from last 7 days   Lab Units 07/08/25  0630 07/06/25  1520   INR  1.53* 1.43*   APTT seconds  --  39.4*               Assessment & Plan   ASSESSMENT/PLAN:  This is a 79 y.o. male with:     *Cirrhosis with portal hypertension, hepatic encephalopathy, ascites  Patient admitted on 7/6/2025 from the emergency department with abdominal distention, generalized weakness, confusion, reduced urine output.    Labs on 7/6/2025 showed hyponatremia with sodium 129, ammonia 85, INR 1.43, PTT 39.4.    CT head showed no acute findings.    CT abdomen pelvis without IV contrast showed small to moderate partially loculated right pleural effusion unchanged, questionable lymphadenopathy mediastinum (better seen on prior CT chest), largest hepatic mass increased from 8.3 x 5.8 on 1/16/2025 up to 9.5 x 7.1 cm with cirrhotic appearing liver, moderate to large ascites, prominent perisplenic and splenorenal varices.    Patient receiving lactulose 20 g 3 times daily and Xifaxan 550 mg every 12 hours for for hepatic encephalopathy.    Ultrasound-guided paracentesis on 7/7/2025 with 3850 cc removed, cytology pending  Ammonia level today normalized at 59.  Patient reaccumulating ascites, started on Lasix 20 mg daily, spironolactone 50 mg daily.  If increases further consider repeat paracentesis tomorrow     *Intrahepatic cholangiocarcinoma, multifocal:  7/5/24 MRI abd-heterogenous, large peripherally enhancing mass withinthe posterior aspect of the right hepatic dome which is grossly unchanged to minimally increased in size since 5/10/2024. small peripheral enhancing and solid enhancing lesions are located within the surrounding right hepatic lobe and spread of malignancy is likely  7/18/24 CT liver biopsy-adenocarcinoma  consistent with cholangiocarcinoma by IHC  CA 19-9 133  8/2/2024-initiated palliative treatment with cisplatin/Gemzar plus nivolumab; day 8 Gemzar given at 50% because of thrombocytopenia (60)  Cycle 2 of treatment was given with a dose reduction of gemcitabine 750 mg/m² and cisplatin same dose 25 mg/m² along with durvalumab.  Despite dose reduction he had intractable nausea vomiting acute kidney injury requiring IV fluids.  He did not receive day 8 cycle 2 of treatment because of side effects.  9/6/2024 CT abdomen liver protocol-stable 7.6 x 6 cm mass in the right lobe of the liver  Patient was seen in emergency department on 9/20/2024 with CT abdomen and pelvis that showed stable right liver mass 7.5 x 6 cm, loculated right pleural effusion with surrounding pleural thickening and calcification unchanged, no acute findings.  CT abdomen and pelvis on 10/10/2024 showed no change.  Patient was referred to Dr. Hopper at [facility], was seen on 9/26/2024.  Patient was not felt to be a good candidate for surgical intervention.  Recommendation to proceed with chemoembolization.   Patient did undergo TACE to dominant liver lesion in October 2024.    Patient has been followed since that time with no subsequent interventions.    CT chest abdomen pelvis most recently on 4/21/25 showed increased size of the dominant liver lesion at 9 cm and the low density liver lesion 1.3 cm.  CA 19 manage 9 however decreased to 51.1.  Recommended to repeat CT scan at 3-month interval.  Patient currently admitted with hepatic encephalopathy  CT abdomen pelvis without IV contrast on admission 7/6/2025 showed small to moderate partially loculated right pleural effusion unchanged, questionable lymphadenopathy mediastinum (better seen on prior CT chest), largest hepatic mass increased from 8.3 x 5.8 on 1/16/2025 up to 9.5 x 7.1 cm with cirrhotic appearing liver, moderate to large ascites, prominent perisplenic and splenorenal  varices.  Ultrasound-guided paracentesis on 7/7/2025 with 3850 cc removed, cytology pending  CA 19-9 on 7/8/2025 was stable at 50  CT chest, abdomen, and pelvis with contrast on 7/8/2025 with chronic small-moderate loculated right pleural effusion and calcified pleural plaques stable.  No change in mildly enlarged mediastinal lymph nodes.  Increase in dominant right hepatic mass from 6.4 x 5.8 up to 6.8 x 6.2 cm.  Enlarging component anteriorly to this mass from 2.2 x 1.7 up to 2.6 x 2.2 cm.  Increase in medial nodule 1.6 x 1.2 up to 1.8 x 1.5 cm and increase an additional lesion from 1.3 x 1.0 up to 1.5 x 1.5 cm.  Increased lymphadenopathy at the estrellita hepatis from 2.0 x 1.2 up to 2.2 x 1.4 cm.  New free fluid in the abdomen and pelvis.  Main portal vein patent, right portal vein possibly with nonocclusive thrombus.  Stable splenomegaly, chronic pancreatitis changes.  CA 19-9, stable at 50.  CT scan on 7/8/2025 did show evidence of mild progression in existing hepatic lesions.  Degree of liver involvement from malignancy has not increased significantly and volume of liver parenchyma involved has not changed enough to explain evolving cirrhosis.  Unclear whether the evolving cirrhosis could be related to prior TACE however this was directed to a very focal area of the liver.  Doppler today does show compromised flow from the mass lesion in the main hepatic vein as well as evidence of thrombus in the left and right portal veins which may be contributing to the patient's hepatic dysfunction.  Treatment options for his disease are limited, additional local therapy options could be explored however must take into account his evolving cirrhotic picture.  We will obtain MRI abdomen to further assess relationship between the large hepatic mass and vasculature and can then discuss whether any additional local therapy options to this lesion would be possible.    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  Main portal vein was sluggish/slow flow with thrombus in the left and right portal veins.  Anticoagulation difficult in light of elevated INR from cirrhosis and chronic thrombocytopenia with increased bleeding risk.  Nevertheless, thrombosis may be contributing to the patient's hepatic dysfunction.  Will begin low-dose Lovenox 40 mg daily for now.  Will request MRI liver to assess relation between large hepatic mass and vascular flow in regards to potential local treatment    *Cancer related pain  Patient with significant underlying cancer related pain receiving MS Contin 15 mg every 12 hours, oxycodone 10 mg every 4 hours as needed.    There was discussion at visit on 5/12/2025 regarding potential tapering off of MS Contin.  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 as well as cirrhosis/portal hypertension and splenomegaly  Platelet count has been chronically in the 70-90,000 range platelet count currently at baseline  Platelet count currently remains at baseline  Platelet count today back up to baseline range at 82,000     *RA  significant rheumatoid arthritis history with joint deviation, history of iritis, previous pericarditis requiring pericardial window  Patient previously treated with Simponi, held per rheumatology prior to initiation of chemotherapy/immunotherapy  Fortunately with prior immunotherapy patient did not develop flare of rheumatoid arthritis     *History of  pericarditis  Patient did have previous pericarditis that required a pericardial window.     *Additional comorbidities-diabetes mellitus, hyperlipidemia, hypertension, tobacco abuse, BPH     *GERD  Protonix 40 mg daily     *Constipation  Sarika-Colace 2 tablets twice daily      *CODE STATUS   Patient is currently full code        PLAN:   Begin Lovenox 40 mg daily initially for portal vein thrombus  MRI abdomen to further assess hepatic lesions in relation to vasculature with subsequent consideration of local treatment options  Ascitic fluid cytology pending from 7/7/2025  Patient continuing on lactulose 20 g 3 times daily and Xifaxan 550 mg every 12 hours for for hepatic encephalopathy  Patient initiating Lasix 20 mg daily, spironolactone 50 mg daily  If ascites increasing tomorrow consider repeat paracentesis  Daily CBC, CMP      Discussed with patient and wife at bedside             Eyad Grace MD

## 2025-07-09 NOTE — THERAPY TREATMENT NOTE
Patient Name: Gay Vazquez  : 1945    MRN: [id number]                              Today's Date: 2025       Admit Date: 2025    Visit Dx:     ICD-10-CM ICD-9-CM   1. Encephalopathy, hepatic  K76.82 572.2   2. Malignant ascites  R18.0 789.51   3. Hyponatremia  E87.1 276.1     Patient Active Problem List   Diagnosis    Mixed hyperlipidemia    Diabetes mellitus without complication    Rheumatoid arthritis    Overweight (BMI 25.0-29.9)    Primary hypertension    Nicotine dependence, cigarettes, uncomplicated    Aneurysm of ascending aorta without rupture    Liver mass    Cholangiocarcinoma    Encounter for long-term (current) use of other medications    Fitting and adjustment of vascular catheter    Thrombocytopenia    Cancer related pain    Chronic pleural effusion    Hepatic encephalopathy     Past Medical History:   Diagnosis Date    Arthritis of neck     Cancer     Liver    Colon polyp     Diabetes mellitus     Erectile dysfunction     GERD (gastroesophageal reflux disease)     Hip arthrosis     Hyperlipidemia     Hypertension     Kidney stone     Liver cancer 2024    Liver disease     Low back pain     Postoperative wound infection     RA (rheumatoid arthritis)     Urinary tract infection     Visual impairment      Past Surgical History:   Procedure Laterality Date    ABDOMINAL SURGERY      BACK SURGERY      CARDIAC CATHETERIZATION      CARDIAC SURGERY      CHOLECYSTECTOMY      COLONOSCOPY      [facility]    COLONOSCOPY N/A 2024    Procedure: COLONOSCOPY INTO CECUM WITH POLYPECTOMIES (COLD SNARE);  Surgeon: Angel Ness MD;  Location: [facility] ENDOSCOPY;  Service: General;  Laterality: N/A;  PRE: COLON MASS, ABNORMAL CT  POST: DIVERTICULOSIS, POLYPS, POOR PREP    ENDOSCOPY N/A 2024    Procedure: ESOPHAGOGASTRODUODENOSCOPY;  Surgeon: Angel Ness MD;  Location: [facility] ENDOSCOPY;  Service: General;  Laterality:  N/A;  PRE: LIVER MASS  POST: NORMAL EGD    EYE SURGERY      JOINT REPLACEMENT      PERICARDIUM SURGERY      REPLACEMENT TOTAL KNEE BILATERAL      SPINE SURGERY      THORACOTOMY Bilateral 1997    TRIGGER POINT INJECTION      Back    VASECTOMY      VENOUS ACCESS DEVICE (PORT) INSERTION Right 07/30/2024    Procedure: INSERTION VENOUS ACCESS DEVICE;  Surgeon: Angel Ness MD;  Location: [facility] MAIN OR;  Service: General;  Laterality: Right;      General Information       Row Name 07/09/25 1251          Physical Therapy Time and Intention    Document Type therapy note (daily note)  -PC     Mode of Treatment physical therapy  -PC       Row Name 07/09/25 1251          General Information    Existing Precautions/Restrictions fall  -PC       Row Name 07/09/25 1251          Cognition    Orientation Status (Cognition) oriented to;person;place  -PC       Row Name 07/09/25 1251          Safety Issues/Impairments Affecting Functional Mobility    Impairments Affecting Function (Mobility) endurance/activity tolerance;balance;strength  -PC               User Key  (r) = Recorded By, (t) = Taken By, (c) = Cosigned By      Initials Name Provider Type    PC Missy Alarcon, PT Physical Therapist                   Mobility       Row Name 07/09/25 1252          Bed Mobility    Supine-Sit [place] (Bed Mobility) minimum assist (75% patient effort)  -PC       Row Name 07/09/25 1252          Sit-Stand Transfer    Sit-Stand [place] (Transfers) minimum assist (75% patient effort)  -PC     Assistive Device (Sit-Stand Transfers) walker, front-wheeled  -PC       Row Name 07/09/25 1252          Gait/Stairs (Locomotion)    [place] Level (Gait) minimum assist (75% patient effort)  -PC     Assistive Device (Gait) walker, front-wheeled  -PC     Distance in Feet (Gait) 5  -PC     Deviations/Abnormal Patterns (Gait) antalgic;gait speed decreased;stride length decreased  -PC     Bilateral Gait Deviations forward flexed  posture;heel strike decreased  -PC     Comment, (Gait/Stairs) per wife pt was more confused today, pt assisted short distance to chair, with planned attempt to take a second walk, MD came in room and after discussion with MD pt preferred to get comfortable in chair and eat his lunch  -PC               User Key  (r) = Recorded By, (t) = Taken By, (c) = Cosigned By      Initials Name Provider Type    PC Missy Alarcon, PT Physical Therapist                   Obj/Interventions       Row Name 07/09/25 1253          Motor Skills    Therapeutic Exercise --  B AP, LAQ 10 reps  -PC               User Key  (r) = Recorded By, (t) = Taken By, (c) = Cosigned By      Initials Name Provider Type    PC Missy Alarcon, PT Physical Therapist                   Goals/Plan    No documentation.                  Clinical Impression       Row Name 07/09/25 1254          Pain    Pain Side/Orientation generalized  -     Pre/Posttreatment Pain Comment pt reports generalized pain and just feeling poorly  -       Row Name 07/09/25 1254          Plan of Care Review    Plan of Care Reviewed With patient;spouse  -PC     Outcome Evaluation Pt cont to present with weakness and impaired functional mobility with unsteady gait, only walked a short distance today due to MD visit and lunch arriving, PT will cont to follow, plans to return home at discharge with assist of spouse, son lives next door  -       Row Name 07/09/25 1254          Positioning and Restraints    Pre-Treatment Position in bed  -PC     Post Treatment Position chair  -PC     In Chair sitting;call light within reach;encouraged to call for assist;exit alarm on;with family/caregiver  -PC               User Key  (r) = Recorded By, (t) = Taken By, (c) = Cosigned By      Initials Name Provider Type    PC Missy Alarcon, PT Physical Therapist                   Outcome Measures       Row Name 07/09/25 1257          How much help from another person do you currently need...     Turning from your back to your side while in flat bed without using bedrails? 4  -PC     Moving to and from a bed to a chair (including a wheelchair)? 3  -PC     Standing up from a chair using your arms (e.g., wheelchair, bedside chair)? 3  -PC     Climbing 3-5 steps with a railing? 2  -PC     To walk in hospital room? 3  -PC               User Key  (r) = Recorded By, (t) = Taken By, (c) = Cosigned By      Initials Name Provider Type    PC Missy Alarcon, PT Physical Therapist                                 Physical Therapy Education       Title: PT OT SLP Therapies (Done)       Topic: Physical Therapy (Done)       Point: Mobility training (Done)       Learning Progress Summary            Patient Acceptance, E,D, DU by PC at 7/9/2025 1257                      Point: Home exercise program (Done)       Learning Progress Summary            Patient Acceptance, E,D, DU by PC at 7/9/2025 1257                      Point: Body mechanics (Done)       Learning Progress Summary            Patient Acceptance, E,D, DU by PC at 7/9/2025 1257                      Point: Precautions (Done)       Learning Progress Summary            Patient Acceptance, E,D, DU by PC at 7/9/2025 1257                                      User Key       Initials Effective Dates Name Provider Type Discipline    PC 06/16/21 -  Missy Alarcon PT Physical Therapist PT                  PT Recommendation and Plan     Outcome Evaluation: Pt cont to present with weakness and impaired functional mobility with unsteady gait, only walked a short distance today due to MD visit and lunch arriving, PT will cont to follow, plans to return home at discharge with assist of spouse, son lives next door     Time Calculation:         PT Charges       Row Name 07/09/25 1251             Time Calculation    Start Time 1135  -PC      Stop Time 1154  -PC      Time Calculation (min) 19 min  -PC      PT Received On 07/09/25  -PC      PT - Next Appointment 07/10/25  -PC                 User Key  (r) = Recorded By, (t) = Taken By, (c) = Cosigned By      Initials Name Provider Type    PC Missy Alarcon, PT Physical Therapist                  Therapy Charges for Today       Code Description Service Date Service Provider Modifiers Qty    [id number]  PT THER PROC EA 15 MIN 7/9/2025 Missy Alarcon PT GP 1            PT G-Codes  Outcome Measure Options: AM-PAC 6 Clicks Basic Mobility (PT)  AM-PAC 6 Clicks Score (PT): 18       Missy Alarcon PT  7/9/2025

## 2025-07-09 NOTE — PLAN OF CARE
Goal Outcome Evaluation:      Abdomen rounded with hypoactive bowel sounds. Remains NPO for hepatic duplex. Very forgetful, repetitive questions. Ambulates to bathroom using walker and assistance of one to void. Bed alarm in place safety maintained

## 2025-07-10 ENCOUNTER — APPOINTMENT (OUTPATIENT)
Dept: MRI IMAGING | Facility: HOSPITAL | Age: 80
End: 2025-07-10
Payer: MEDICARE

## 2025-07-10 LAB
ALBUMIN SERPL-MCNC: 2.4 G/DL (ref 3.5–5.2)
ALBUMIN/GLOB SERPL: 0.5 G/DL
ALP SERPL-CCNC: 102 U/L (ref 39–117)
ALT SERPL W P-5'-P-CCNC: 9 U/L (ref 1–41)
AMMONIA BLD-SCNC: 50 UMOL/L (ref 16–60)
ANION GAP SERPL CALCULATED.3IONS-SCNC: 10.5 MMOL/L (ref 5–15)
AST SERPL-CCNC: 28 U/L (ref 1–40)
BILIRUB SERPL-MCNC: 1 MG/DL (ref 0–1.2)
BUN SERPL-MCNC: 9 MG/DL (ref 8–23)
BUN/CREAT SERPL: 9.6 (ref 7–25)
CALCIUM SPEC-SCNC: 8.4 MG/DL (ref 8.6–10.5)
CHLORIDE SERPL-SCNC: 98 MMOL/L (ref 98–107)
CO2 SERPL-SCNC: 26.5 MMOL/L (ref 22–29)
CREAT SERPL-MCNC: 0.94 MG/DL (ref 0.76–1.27)
DEPRECATED RDW RBC AUTO: 47.2 FL (ref 37–54)
EGFRCR SERPLBLD CKD-EPI 2021: 83 ML/MIN/1.73
ERYTHROCYTE [DISTWIDTH] IN BLOOD BY AUTOMATED COUNT: 13.9 % (ref 12.3–15.4)
GLOBULIN UR ELPH-MCNC: 4.4 GM/DL
GLUCOSE SERPL-MCNC: 130 MG/DL (ref 65–99)
HCT VFR BLD AUTO: 37.4 % (ref 37.5–51)
HGB BLD-MCNC: 12.5 G/DL (ref 13–17.7)
MCH RBC QN AUTO: 30.8 PG (ref 26.6–33)
MCHC RBC AUTO-ENTMCNC: 33.4 G/DL (ref 31.5–35.7)
MCV RBC AUTO: 92.1 FL (ref 79–97)
PLATELET # BLD AUTO: 80 10*3/MM3 (ref 140–450)
PMV BLD AUTO: 10.6 FL (ref 6–12)
POTASSIUM SERPL-SCNC: 3.8 MMOL/L (ref 3.5–5.2)
PROT SERPL-MCNC: 6.8 G/DL (ref 6–8.5)
QT INTERVAL: 429 MS
QTC INTERVAL: 536 MS
RBC # BLD AUTO: 4.06 10*6/MM3 (ref 4.14–5.8)
SODIUM SERPL-SCNC: 135 MMOL/L (ref 136–145)
TROPONIN T SERPL HS-MCNC: <6 NG/L
WBC NRBC COR # BLD AUTO: 7.16 10*3/MM3 (ref 3.4–10.8)

## 2025-07-10 PROCEDURE — 25010000002 ENOXAPARIN PER 10 MG: Performed by: INTERNAL MEDICINE

## 2025-07-10 PROCEDURE — 25510000002 GADOBENATE DIMEGLUMINE 529 MG/ML SOLUTION: Performed by: STUDENT IN AN ORGANIZED HEALTH CARE EDUCATION/TRAINING PROGRAM

## 2025-07-10 PROCEDURE — 80053 COMPREHEN METABOLIC PANEL: CPT | Performed by: STUDENT IN AN ORGANIZED HEALTH CARE EDUCATION/TRAINING PROGRAM

## 2025-07-10 PROCEDURE — 97530 THERAPEUTIC ACTIVITIES: CPT | Performed by: PHYSICAL THERAPIST

## 2025-07-10 PROCEDURE — 93005 ELECTROCARDIOGRAM TRACING: CPT | Performed by: STUDENT IN AN ORGANIZED HEALTH CARE EDUCATION/TRAINING PROGRAM

## 2025-07-10 PROCEDURE — 85027 COMPLETE CBC AUTOMATED: CPT | Performed by: STUDENT IN AN ORGANIZED HEALTH CARE EDUCATION/TRAINING PROGRAM

## 2025-07-10 PROCEDURE — 93010 ELECTROCARDIOGRAM REPORT: CPT | Performed by: INTERNAL MEDICINE

## 2025-07-10 PROCEDURE — 84484 ASSAY OF TROPONIN QUANT: CPT | Performed by: STUDENT IN AN ORGANIZED HEALTH CARE EDUCATION/TRAINING PROGRAM

## 2025-07-10 PROCEDURE — 99233 SBSQ HOSP IP/OBS HIGH 50: CPT | Performed by: INTERNAL MEDICINE

## 2025-07-10 PROCEDURE — 74183 MRI ABD W/O CNTR FLWD CNTR: CPT

## 2025-07-10 PROCEDURE — 82140 ASSAY OF AMMONIA: CPT | Performed by: INTERNAL MEDICINE

## 2025-07-10 PROCEDURE — A9577 INJ MULTIHANCE: HCPCS | Performed by: STUDENT IN AN ORGANIZED HEALTH CARE EDUCATION/TRAINING PROGRAM

## 2025-07-10 RX ORDER — LORAZEPAM 0.5 MG/1
0.5 TABLET ORAL ONCE AS NEEDED
Status: COMPLETED | OUTPATIENT
Start: 2025-07-10 | End: 2025-07-10

## 2025-07-10 RX ORDER — LORAZEPAM 1 MG/1
1 TABLET ORAL ONCE AS NEEDED
Status: COMPLETED | OUTPATIENT
Start: 2025-07-10 | End: 2025-07-10

## 2025-07-10 RX ADMIN — DOXYCYCLINE 50 MG: 25 FOR SUSPENSION ORAL at 21:00

## 2025-07-10 RX ADMIN — METFORMIN HYDROCHLORIDE 1000 MG: 1000 TABLET ORAL at 09:01

## 2025-07-10 RX ADMIN — Medication 2.5 MG: at 21:02

## 2025-07-10 RX ADMIN — LACTULOSE SOLUTION USP, 10 G/15 ML 20 G: 10 SOLUTION ORAL; RECTAL at 08:57

## 2025-07-10 RX ADMIN — MORPHINE SULFATE 15 MG: 15 TABLET, FILM COATED, EXTENDED RELEASE ORAL at 09:01

## 2025-07-10 RX ADMIN — DOXYCYCLINE 50 MG: 25 FOR SUSPENSION ORAL at 09:01

## 2025-07-10 RX ADMIN — RIFAXIMIN 550 MG: 550 TABLET ORAL at 21:02

## 2025-07-10 RX ADMIN — MIDODRINE HYDROCHLORIDE 5 MG: 5 TABLET ORAL at 18:05

## 2025-07-10 RX ADMIN — LORAZEPAM 0.5 MG: 0.5 TABLET ORAL at 15:38

## 2025-07-10 RX ADMIN — PILOCARPINE HYROCHLORIDE 5 MG: 5 TABLET, FILM COATED ORAL at 21:08

## 2025-07-10 RX ADMIN — GADOBENATE DIMEGLUMINE 20 ML: 529 INJECTION, SOLUTION INTRAVENOUS at 17:10

## 2025-07-10 RX ADMIN — RIFAXIMIN 550 MG: 550 TABLET ORAL at 09:01

## 2025-07-10 RX ADMIN — ENOXAPARIN SODIUM 40 MG: 100 INJECTION SUBCUTANEOUS at 18:05

## 2025-07-10 RX ADMIN — LORAZEPAM 1 MG: 1 TABLET ORAL at 01:28

## 2025-07-10 RX ADMIN — SPIRONOLACTONE 50 MG: 25 TABLET ORAL at 09:01

## 2025-07-10 RX ADMIN — MIDODRINE HYDROCHLORIDE 5 MG: 5 TABLET ORAL at 12:35

## 2025-07-10 RX ADMIN — PANTOPRAZOLE SODIUM 40 MG: 40 TABLET, DELAYED RELEASE ORAL at 05:52

## 2025-07-10 RX ADMIN — MIDODRINE HYDROCHLORIDE 5 MG: 5 TABLET ORAL at 06:48

## 2025-07-10 RX ADMIN — PILOCARPINE HYROCHLORIDE 5 MG: 5 TABLET, FILM COATED ORAL at 15:27

## 2025-07-10 RX ADMIN — FUROSEMIDE 20 MG: 20 TABLET ORAL at 09:01

## 2025-07-10 RX ADMIN — PILOCARPINE HYROCHLORIDE 5 MG: 5 TABLET, FILM COATED ORAL at 05:52

## 2025-07-10 RX ADMIN — GABAPENTIN 600 MG: 300 CAPSULE ORAL at 21:00

## 2025-07-10 RX ADMIN — OLANZAPINE 5 MG: 5 TABLET, FILM COATED ORAL at 21:02

## 2025-07-10 NOTE — PROGRESS NOTES
Name: Gay Vazquez ADMIT: 2025   : 1945  PCP: Gloria Post MD    MRN: [id number] LOS: 4 days   AGE/SEX: 79 y.o. male  ROOM: [facility]     Subjective   Subjective     He wasn't able to hold still for his MRI yesterday. Sleeping this morning. Looks like he got a dose of ativan last night. His wife is at bedside       Objective   Objective   Vital Signs  Temp:  [97.9 °F (36.6 °C)-98.6 °F (37 °C)] 97.9 °F (36.6 °C)  Heart Rate:  [] 108  Resp:  [16-17] 17  BP: ()/(75-95) 110/95  SpO2:  [94 %-96 %] 94 %  on   ;   Device (Oxygen Therapy): room air  Body mass index is 25.71 kg/m².  Physical Exam  Constitutional:       General: He is not in acute distress.     Appearance: He is not toxic-appearing.   Cardiovascular:      Rate and Rhythm: Normal rate and regular rhythm.      Heart sounds: Normal heart sounds.   Pulmonary:      Effort: Pulmonary effort is normal.      Breath sounds: Normal breath sounds.   Abdominal:      General: Bowel sounds are normal. There is distension.      Palpations: Abdomen is soft.      Tenderness: There is no abdominal tenderness. There is no guarding or rebound.   Musculoskeletal:         General: No tenderness.      Right lower leg: No edema.      Left lower leg: No edema.   Psychiatric:         Mood and Affect: Mood normal.         Behavior: Behavior normal.         Results Review     I reviewed the patient's new clinical results.  Results from last 7 days   Lab Units 07/10/25  0619 07/09/25  0531 07/08/25  0630 07/07/25  0727   WBC 10*3/mm3 7.16 6.32 4.25 4.46   HEMOGLOBIN g/dL 12.5* 12.4* 11.4* 11.9*   PLATELETS 10*3/mm3 80* 82* 65* 84*     Results from last 7 days   Lab Units 07/10/25  0619 07/09/25  0531 07/08/25  0630 07/07/25  0727   SODIUM mmol/L 135* 134* 135* 136   POTASSIUM mmol/L 3.8 3.6 3.7 4.0   CHLORIDE mmol/L 98 97* 99 101   CO2 mmol/L 26.5 26.0 27.0 27.7   BUN mg/dL 9.0 9.0 9.0 11.0   CREATININE mg/dL 0.94 0.80 0.79 0.87   GLUCOSE mg/dL 130* 133*  119* 128*   Estimated Creatinine Clearance: 78.8 mL/min (by C-G formula based on SCr of 0.94 mg/dL).  Results from last 7 days   Lab Units 07/10/25  0619 07/09/25  0531 07/08/25  0630 07/06/25  1520   ALBUMIN g/dL 2.4* 2.4* 2.1* 2.5*   BILIRUBIN mg/dL 1.0 0.8 0.9 0.8   ALK PHOS U/L 102 101 85 101   AST (SGOT) U/L 28 26 23 28   ALT (SGPT) U/L 9 7 7 7     Results from last 7 days   Lab Units 07/10/25  0619 07/09/25  0531 07/08/25  0630 07/07/25  0727 07/06/25  1520   CALCIUM mg/dL 8.4* 8.3* 8.0* 8.5* 8.3*   ALBUMIN g/dL 2.4* 2.4* 2.1*  --  2.5*       COVID19   Date Value Ref Range Status   10/13/2024 Not Detected Not Detected - Ref. Range Final     Glucose   Date/Time Value Ref Range Status   07/09/2025 0555 125 70 - 130 mg/dL Final       Duplex Portal Hepatic Complete CAR    Challenging study due to ascites and bowel gas.  What appears to be a   large mass in the right lobe of the liver compressing the main hepatic   vein.  Unable to visualize the left or right hepatic vein.  Main portal   vein has low/sluggish flow with what appears to be thrombus in the left   and right portal veins.  CT Chest With Contrast Diagnostic, CT Abdomen Pelvis With Contrast  CT CHEST, ABDOMEN, AND PELVIS WITH IV CONTRAST     HISTORY: 79-year-old male with cirrhosis and multifocal pericarditis  history. Cholangiocarcinoma.     TECHNIQUE: Radiation dose reduction techniques were utilized, including  automated exposure control and exposure modulation based on body size.   3 mm images were obtained through the chest, abdomen, and pelvis after  the administration of IV contrast. Compared with noncontrasted CT  abdomen 07/06/2025 and contrast-enhanced CTs chest/abdomen/pelvis  04/21/2025.     FINDINGS:  CHEST:  1. There is extensive breathing artifact and the lung markings are very  crowded. Chronic small-moderate loculated right pleural effusion with  pleural calcifications/plaques and rounded atelectasis at the right  lower lobe appear  stable. Difficult to evaluate the thickened bronchi at  the lower lobes given the underexpanded lungs. No definite suspicious  lung nodules are seen.     2. No significant change in the multiple mildly enlarged mediastinal  nodes, stable 2.2 x 1.3 cm precarinal node and the 2.1 x 1.4 cm right  hilar node is stable. No new lymphadenopathy is seen. Right Mediport is  in the SVC.     ABDOMEN/PELVIS:  1. There has been significant progression of the dominant right hepatic  lobe mass and the multiple intrahepatic metastases.  - The dominant mass measures 6.8 x 6.2 cm, previously 6.4 x 5.8 cm.  There is an enlarging component along the anterior margin, image 32.  - 2.6 x 2.2 cm lesion posterior to the dominant mass measured 2.2 x 1.7  cm.  - Medially is a 1.8 x 1.5 cm nodule, which previously measured 1.6 x 1.2  cm, and inferiorly is a 1.5 x 1.5 cm lesion, which previously measured  1.3 x 1.0 cm.  - The lymphadenopathy at the estrellita hepatis has progressed as well.  Largest node measures 2.2 x 1.4 cm, previously 2.0 x 1.2 cm.  - There is a significantly larger volume of perihepatic fluid and there  is new moderate volume of free fluid throughout the abdomen and pelvis.  -The main portal vein is patent, but the right portal vein may have  developed some nonocclusive thrombus, image 43.     2. Splenomegaly, chronic pancreatitis changes, multiple bilateral renal  cysts are stable. Adrenals are unremarkable. Significant prostate  enlargement and heterogeneity is stable.     3. There is a mild small and large bowel ileus. There is no bowel  obstruction. There is no evidence for diverticulitis or an infectious  colitis, and there is no appendicitis.           This report was finalized on 7/9/2025 8:53 AM by Dr. Nerissa Landa M.D on  Workstation: [id number]       Scheduled Medications  doxycycline, 50 mg, Oral, Q12H  enoxaparin sodium, 40 mg, Subcutaneous, Q24H  furosemide, 20 mg, Oral, Daily  gabapentin, 600 mg, Oral,  Nightly  gadobenate dimeglumine, 20 mL, Intravenous, Once in imaging  lactulose, 20 g, Oral, TID  Lotilaner, 1 drop, Both Eyes, BID  melatonin, 2.5 mg, Oral, Nightly  metFORMIN, 1,000 mg, Oral, Daily With Breakfast  midodrine, 5 mg, Oral, TID AC  Morphine, 15 mg, Oral, BID  OLANZapine, 5 mg, Oral, Nightly  pantoprazole, 40 mg, Oral, Q AM  pilocarpine, 5 mg, Oral, Q8H  rifAXIMin, 550 mg, Oral, Q12H  sennosides-docusate, 2 tablet, Oral, BID  spironolactone, 50 mg, Oral, Daily    Infusions   Diet  NPO Diet NPO Type: Sips with Meds       Assessment/Plan     Active Hospital Problems    Diagnosis  POA    **Hepatic encephalopathy [K76.82]  Yes    Cholangiocarcinoma [C22.1]  Yes    Diabetes mellitus without complication [E11.9]  Yes    Mixed hyperlipidemia [E78.2]  Yes    Primary hypertension [I10]  Yes    Rheumatoid arthritis [M06.9]  Yes      Resolved Hospital Problems   No resolved problems to display.       79 y.o. male admitted with Hepatic encephalopathy.    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 Encephalopathy is waxing and waning. Started on lasix/spironolactone as well as midodrine this admission. Continue lactulose titrated to 3-4 soft bowel movements/day and rifaximin for encephalopathy  Portal vein thrombosis-on dvt prophylaxis dose of lovenox per hematology   Thrombocytopenia-due to cirrhosis/splenomegaly   Intrahepatic cholangiocarcinoma-evidence of mild progression on CT this admission. MRI of the abdomen is pending per oncology  Cancer related pain-MS Contin, oxycodone  Type 2 diabetes-glucose at goal on metformin  Hyperlipidemia-on pravastatin at home  GERD-ppi  RA with history  of pericarditis requiring a pericardial window-previously on simponi, but this has been on hold  Lovenox 40 mg SC daily for DVT prophylaxis.  Full code.  Discussed with spouse and nursing staff.  Anticipate discharge home with family timing yet to be determined.      Lonnie Jennings MD  [facility]  07/10/25  11:17 EDT

## 2025-07-10 NOTE — THERAPY TREATMENT NOTE
Patient Name: Gay Vazquez  : 1945    MRN: [id number]                              Today's Date: 7/10/2025       Admit Date: 2025    Visit Dx:     ICD-10-CM ICD-9-CM   1. Encephalopathy, hepatic  K76.82 572.2   2. Malignant ascites  R18.0 789.51   3. Hyponatremia  E87.1 276.1     Patient Active Problem List   Diagnosis    Mixed hyperlipidemia    Diabetes mellitus without complication    Rheumatoid arthritis    Overweight (BMI 25.0-29.9)    Primary hypertension    Nicotine dependence, cigarettes, uncomplicated    Aneurysm of ascending aorta without rupture    Liver mass    Cholangiocarcinoma    Encounter for long-term (current) use of other medications    Fitting and adjustment of vascular catheter    Thrombocytopenia    Cancer related pain    Chronic pleural effusion    Hepatic encephalopathy     Past Medical History:   Diagnosis Date    Arthritis of neck     Cancer     Liver    Colon polyp     Diabetes mellitus     Erectile dysfunction     GERD (gastroesophageal reflux disease)     Hip arthrosis     Hyperlipidemia     Hypertension     Kidney stone     Liver cancer 2024    Liver disease     Low back pain     Postoperative wound infection     RA (rheumatoid arthritis)     Urinary tract infection     Visual impairment      Past Surgical History:   Procedure Laterality Date    ABDOMINAL SURGERY      BACK SURGERY      CARDIAC CATHETERIZATION      CARDIAC SURGERY      CHOLECYSTECTOMY      COLONOSCOPY      [facility]    COLONOSCOPY N/A 2024    Procedure: COLONOSCOPY INTO CECUM WITH POLYPECTOMIES (COLD SNARE);  Surgeon: Angel Ness MD;  Location: [facility] ENDOSCOPY;  Service: General;  Laterality: N/A;  PRE: COLON MASS, ABNORMAL CT  POST: DIVERTICULOSIS, POLYPS, POOR PREP    ENDOSCOPY N/A 2024    Procedure: ESOPHAGOGASTRODUODENOSCOPY;  Surgeon: Angel Ness MD;  Location: [facility] ENDOSCOPY;  Service: General;  Laterality:  N/A;  PRE: LIVER MASS  POST: NORMAL EGD    EYE SURGERY      JOINT REPLACEMENT      PERICARDIUM SURGERY      REPLACEMENT TOTAL KNEE BILATERAL      SPINE SURGERY      THORACOTOMY Bilateral 1997    TRIGGER POINT INJECTION      Back    VASECTOMY      VENOUS ACCESS DEVICE (PORT) INSERTION Right 07/30/2024    Procedure: INSERTION VENOUS ACCESS DEVICE;  Surgeon: Angel Ness MD;  Location: [facility] MAIN OR;  Service: General;  Laterality: Right;      General Information       Row Name 07/10/25 1035          Physical Therapy Time and Intention    Document Type therapy note (daily note)  -     Mode of Treatment physical therapy  -               User Key  (r) = Recorded By, (t) = Taken By, (c) = Cosigned By      Initials Name Provider Type     Nara Raymundo, PT Physical Therapist                   Mobility       Row Name 07/10/25 1035          Bed Mobility    Supine-Sit [place] (Bed Mobility) minimum assist (75% patient effort)  -     Sit-Supine [place] (Bed Mobility) standby assist;verbal cues  -     Assistive Device (Bed Mobility) head of bed elevated;bed rails  -       Row Name 07/10/25 1035          Sit-Stand Transfer    Sit-Stand [place] (Transfers) moderate assist (50% patient effort);2 person assist;minimum assist (75% patient effort)  -     Assistive Device (Sit-Stand Transfers) walker, front-wheeled  -       Row Name 07/10/25 1035          Gait/Stairs (Locomotion)    [place] Level (Gait) moderate assist (50% patient effort);maximum assist (25% patient effort);2 person assist  -     Assistive Device (Gait) walker, front-wheeled  -     Distance in Feet (Gait) --  3 ft forward/ backward, seated rest then sidesteps to HOB  -     Deviations/Abnormal Patterns (Gait) antalgic;gait speed decreased;stride length decreased  -     Bilateral Gait Deviations forward flexed posture;heel strike decreased  -     Left Sided Gait Deviations knee buckling, left side  -      Comment, (Gait/Stairs) ambulation distance limited by knee buckling, pt lethargy  -               User Key  (r) = Recorded By, (t) = Taken By, (c) = Cosigned By      Initials Name Provider Type    Nara Silverio PT Physical Therapist                   Obj/Interventions       [facility] Name 07/10/25 1037          Motor Skills    Therapeutic Exercise --  BLE AP, LAQx 10 reps. attempted seated marches but pt falling alseep sittingEOB  -[facility] Name 07/10/25 1037          Balance    Balance Assessment sitting static balance;sitting dynamic balance  -     Static Sitting Balance minimal assist  -     Dynamic Sitting Balance minimal assist  -     Position, Sitting Balance sitting edge of bed  -     Static Standing Balance minimal assist;2-person assist  -     Dynamic Standing Balance minimal assist;moderate assist;2-person assist  -     Position/Device Used, Standing Balance walker, rolling  -               User Key  (r) = Recorded By, (t) = Taken By, (c) = Cosigned By      Initials Name Provider Type    Nara Silverio PT Physical Therapist                   Goals/Plan    No documentation.                  Clinical Impression       [facility] Name 07/10/25 1038          Pain    Pre/Posttreatment Pain Comment complains of neck and back pain  -KH       Row Name 07/10/25 1038          Plan of Care Review    Plan of Care Reviewed With patient  -     Outcome Evaluation Pt was in bed and agreeable to therapy, but lethargic at times. Pt transitioned to sitting EOb and completed BLE exercises. He stood with min-mod A x2. Pt was very forward flexed upon standing and slow to initiate steps. He took a few slow steps forward then L knee buckled and pt required additional assistance to step back towards the bed. Aftera seated rest break, pt was able to stand and take a few sidesteps towards the HOB. Pt fatigued quickly and complained of neck and back pain while standing. Pt was more confused today  and had difficulty staying awake by the end of session. Will continue to progress as tolerated. recommend SNF placement  -KH       Row Name 07/10/25 1038          Positioning and Restraints    Pre-Treatment Position in bed  -KH     Post Treatment Position bed  -KH     In Bed fowlers;call light within reach;encouraged to call for assist;exit alarm on;side rails up x3  -KH               User Key  (r) = Recorded By, (t) = Taken By, (c) = Cosigned By      Initials Name Provider Type    Nara Silverio, PT Physical Therapist                   Outcome Measures       Row Name 07/10/25 1038 07/10/25 0800       How much help from another person do you currently need...    Turning from your back to your side while in flat bed without using bedrails? 3  -KH 3  -KW    Moving from lying on back to sitting on the side of a flat bed without bedrails? 3  -KH 3  -KW    Moving to and from a bed to a chair (including a wheelchair)? 2  -KH 2  -KW    Standing up from a chair using your arms (e.g., wheelchair, bedside chair)? 2  -KH 2  -KW    Climbing 3-5 steps with a railing? 1  -KH 1  -KW    To walk in hospital room? 2  -KH 2  -KW    AM-PAC 6 Clicks Score (PT) 13  -KH 13  -KW    Highest Level of Mobility Goal Move to Chair/Commode-4  -KH Move to Chair/Commode-4  -KW      Row Name 07/10/25 1038          Functional Assessment    Outcome Measure Options AM-PAC 6 Clicks Basic Mobility (PT)  -KH               User Key  (r) = Recorded By, (t) = Taken By, (c) = Cosigned By      Initials Name Provider Type    Nara Silverio, PT Physical Therapist    Nuvia Navarro, RN Registered Nurse                                 Physical Therapy Education       Title: PT OT SLP Therapies (Done)       Topic: Physical Therapy (Done)       Point: Mobility training (Done)       Learning Progress Summary            Patient Acceptance, E,D, DU by PC at 7/9/2025 1257                      Point: Home exercise program (Done)       Learning  Progress Summary            Patient Acceptance, E,D, DU by  at 7/9/2025 1257                      Point: Body mechanics (Done)       Learning Progress Summary            Patient Acceptance, E,D, DU by  at 7/9/2025 1257                      Point: Precautions (Done)       Learning Progress Summary            Patient Acceptance, E,D, DU by  at 7/9/2025 1257                                      User Key       Initials Effective Dates Name Provider Type Discipline     06/16/21 -  Missy Alarcon PT Physical Therapist PT                  PT Recommendation and Plan  Planned Therapy Interventions (PT): balance training, bed mobility training, gait training, home exercise program, patient/family education, strengthening, transfer training, ROM (range of motion)  Outcome Evaluation: Pt was in bed and agreeable to therapy, but lethargic at times. Pt transitioned to sitting EOb and completed BLE exercises. He stood with min-mod A x2. Pt was very forward flexed upon standing and slow to initiate steps. He took a few slow steps forward then L knee buckled and pt required additional assistance to step back towards the bed. Aftera seated rest break, pt was able to stand and take a few sidesteps towards the HOB. Pt fatigued quickly and complained of neck and back pain while standing. Pt was more confused today and had difficulty staying awake by the end of session. Will continue to progress as tolerated. recommend SNF placement     Time Calculation:         PT Charges       Row Name 07/10/25 1039             Time Calculation    Start Time 0911  -KH      Stop Time 0926  -KH      Time Calculation (min) 15 min  -KH      PT Received On 07/10/25  -KH      PT - Next Appointment 07/11/25  -KH         Time Calculation- PT    Total Timed Code Minutes- PT 15 minute(s)  -KH         Timed Charges    47572 - PT Therapeutic Activity Minutes 15  -KH         Total Minutes    Timed Charges Total Minutes 15  -KH       Total Minutes 15  -KH                 User Key  (r) = Recorded By, (t) = Taken By, (c) = Cosigned By      Initials Name Provider Type    Nara Silverio, PT Physical Therapist                  Therapy Charges for Today       Code Description Service Date Service Provider Modifiers Qty    [id number]  PT THERAPEUTIC ACT EA 15 MIN 7/10/2025 Nara Raymundo, PT GP 1            PT G-Codes  Outcome Measure Options: AM-PAC 6 Clicks Basic Mobility (PT)  AM-PAC 6 Clicks Score (PT): 13  PT Discharge Summary  Anticipated Discharge Disposition (PT): home with assist    Nara Raymundo, PT  7/10/2025

## 2025-07-10 NOTE — PLAN OF CARE
Goal Outcome Evaluation:  Plan of Care Reviewed With: patient           Outcome Evaluation: Pt was in bed and agreeable to therapy, but lethargic at times. Pt transitioned to sitting EOb and completed BLE exercises. He stood with min-mod A x2. Pt was very forward flexed upon standing and slow to initiate steps. He took a few slow steps forward then L knee buckled and pt required additional assistance to step back towards the bed. Aftera seated rest break, pt was able to stand and take a few sidesteps towards the HOB. Pt fatigued quickly and complained of neck and back pain while standing. Pt was more confused today and had difficulty staying awake by the end of session. Will continue to progress as tolerated. recommend SNF placement    Anticipated Discharge Disposition (PT): home with assist

## 2025-07-10 NOTE — PROGRESS NOTES
[facility] GROUP INPATIENT PROGRESS NOTE    Length of Stay:  4 days    CHIEF COMPLAINT:  Cholangiocarcinoma, cirrhosis with portal hypertension and hepatic encephalopathy, cancer related pain, thrombocytopenia, rheumatoid arthritis    SUBJECTIVE:   Patient is extremely somnolent and lethargic today, arousable only for a few seconds to answer yes or no questions.  Patient's wife is at the bedside.    ROS:  Review of Systems   Unable to obtain    OBJECTIVE:  Vitals:    07/09/25 1330 07/09/25 1913 07/09/25 2325 07/10/25 0614   BP: 122/81 90/75 115/81    BP Location: Right arm Right arm Right arm    Patient Position: Lying Lying Lying    Pulse: 88 101 104    Resp: 16 16 16    Temp: 98.4 °F (36.9 °C) 98.4 °F (36.9 °C) 98.6 °F (37 °C)    TempSrc: Oral Oral Oral    SpO2: 95% 94% 96%    Weight:    86 kg (189 lb 9.5 oz)         PHYSICAL EXAMINATION:  General: Somnolent, lethargic, minimally responsive.  Chest/Lungs: Clear to auscultation bilaterally anteriorly  Heart: Regular rate and rhythm  Abdomen/GI: Soft, nontender, increasing abdominal distention with ascites  Extremities: Trace bilateral lower extremity edema        DIAGNOSTIC DATA:  Results Review:     I reviewed the patient's new clinical results.    Results from last 7 days   Lab Units 07/10/25  0619 07/09/25  0531 07/08/25  0630   WBC 10*3/mm3 7.16 6.32 4.25   HEMOGLOBIN g/dL 12.5* 12.4* 11.4*   HEMATOCRIT % 37.4* 36.6* 33.9*   PLATELETS 10*3/mm3 80* 82* 65*      Results from last 7 days   Lab Units 07/10/25  0619 07/09/25  0531 07/08/25  0630   SODIUM mmol/L 135* 134* 135*   POTASSIUM mmol/L 3.8 3.6 3.7   CHLORIDE mmol/L 98 97* 99   CO2 mmol/L 26.5 26.0 27.0   BUN mg/dL 9.0 9.0 9.0   CREATININE mg/dL 0.94 0.80 0.79   CALCIUM mg/dL 8.4* 8.3* 8.0*   BILIRUBIN mg/dL 1.0 0.8 0.9   ALK PHOS U/L 102 101 85   ALT (SGPT) U/L 9 7 7   AST (SGOT) U/L 28 26 23   GLUCOSE mg/dL 130* 133* 119*      Lab Results   Component Value Date    NEUTROABS 4.31 07/09/2025      Results from last 7 days   Lab Units 07/08/25  0630 07/06/25  1520   INR  1.53* 1.43*   APTT seconds  --  39.4*               Assessment & Plan   ASSESSMENT/PLAN:  This is a 79 y.o. male with:     *Cirrhosis with portal hypertension, hepatic encephalopathy, ascites  Patient admitted on 7/6/2025 from the emergency department with abdominal distention, generalized weakness, confusion, reduced urine output.    Labs on 7/6/2025 showed hyponatremia with sodium 129, ammonia 85, INR 1.43, PTT 39.4.    CT head showed no acute findings.    CT abdomen pelvis without IV contrast showed small to moderate partially loculated right pleural effusion unchanged, questionable lymphadenopathy mediastinum (better seen on prior CT chest), largest hepatic mass increased from 8.3 x 5.8 on 1/16/2025 up to 9.5 x 7.1 cm with cirrhotic appearing liver, moderate to large ascites, prominent perisplenic and splenorenal varices.    Patient receiving lactulose 20 g 3 times daily and Xifaxan 550 mg every 12 hours for for hepatic encephalopathy.    Ultrasound-guided paracentesis on 7/7/2025 with 3850 cc removed, cytology pending  Ammonia level normalized on 7/9/2025 at 59.    Patient reaccumulating ascites, on 7/9/2025 initiated  Lasix 20 mg daily, spironolactone 50 mg daily.   Patient today with significant change in mental status.  He is very lethargic, somnolent, minimally arousable to answer yes or no questions.  We will send off repeat ammonia level today.     *Intrahepatic cholangiocarcinoma, multifocal:  7/5/24 MRI abd-heterogenous, large peripherally enhancing mass withinthe posterior aspect of the right hepatic dome which is grossly unchanged to minimally increased in size since 5/10/2024. small peripheral enhancing and solid enhancing lesions are located within the surrounding right hepatic lobe and spread of malignancy is likely  7/18/24 CT liver biopsy-adenocarcinoma consistent with cholangiocarcinoma by IHC  CA 19-9  133  8/2/2024-initiated palliative treatment with cisplatin/Gemzar plus nivolumab; day 8 Gemzar given at 50% because of thrombocytopenia (60)  Cycle 2 of treatment was given with a dose reduction of gemcitabine 750 mg/m² and cisplatin same dose 25 mg/m² along with durvalumab.  Despite dose reduction he had intractable nausea vomiting acute kidney injury requiring IV fluids.  He did not receive day 8 cycle 2 of treatment because of side effects.  9/6/2024 CT abdomen liver protocol-stable 7.6 x 6 cm mass in the right lobe of the liver  Patient was seen in emergency department on 9/20/2024 with CT abdomen and pelvis that showed stable right liver mass 7.5 x 6 cm, loculated right pleural effusion with surrounding pleural thickening and calcification unchanged, no acute findings.  CT abdomen and pelvis on 10/10/2024 showed no change.  Patient was referred to Dr. Hopper at [facility], was seen on 9/26/2024.  Patient was not felt to be a good candidate for surgical intervention.  Recommendation to proceed with chemoembolization.   Patient did undergo TACE to dominant liver lesion in October 2024.    Patient has been followed since that time with no subsequent interventions.    CT chest abdomen pelvis most recently on 4/21/25 showed increased size of the dominant liver lesion at 9 cm and the low density liver lesion 1.3 cm.  CA 19 manage 9 however decreased to 51.1.  Recommended to repeat CT scan at 3-month interval.  Patient currently admitted with hepatic encephalopathy  CT abdomen pelvis without IV contrast on admission 7/6/2025 showed small to moderate partially loculated right pleural effusion unchanged, questionable lymphadenopathy mediastinum (better seen on prior CT chest), largest hepatic mass increased from 8.3 x 5.8 on 1/16/2025 up to 9.5 x 7.1 cm with cirrhotic appearing liver, moderate to large ascites, prominent perisplenic and splenorenal varices.  Ultrasound-guided paracentesis on 7/7/2025  with 3850 cc removed, cytology pending  CA 19-9 on 7/8/2025 was stable at 50  CT chest, abdomen, and pelvis with contrast on 7/8/2025 with chronic small-moderate loculated right pleural effusion and calcified pleural plaques stable.  No change in mildly enlarged mediastinal lymph nodes.  Increase in dominant right hepatic mass from 6.4 x 5.8 up to 6.8 x 6.2 cm.  Enlarging component anteriorly to this mass from 2.2 x 1.7 up to 2.6 x 2.2 cm.  Increase in medial nodule 1.6 x 1.2 up to 1.8 x 1.5 cm and increase an additional lesion from 1.3 x 1.0 up to 1.5 x 1.5 cm.  Increased lymphadenopathy at the estrellita hepatis from 2.0 x 1.2 up to 2.2 x 1.4 cm.  New free fluid in the abdomen and pelvis.  Main portal vein patent, right portal vein possibly with nonocclusive thrombus.  Stable splenomegaly, chronic pancreatitis changes.  Portal hepatic Doppler on 7/9/2025 with challenging study, mass in the right lobe of the liver compressing the main hepatic vein, unable to visualize the left or right hepatic veins.  Main portal vein was sluggish/slow flow with thrombus in the left and right portal veins.  Abdominal MRI pending  CA 19-9, stable at 50.  CT scan on 7/8/2025 did show evidence of mild progression in existing hepatic lesions.  Degree of liver involvement from malignancy has not increased significantly and volume of liver parenchyma involved has not changed enough to explain evolving cirrhosis.  Unclear whether the evolving cirrhosis could be related to prior TACE however this was directed to a very focal area of the liver.  Portal Doppler on 7/9/2025 did show compromised flow from the mass lesion in the main hepatic vein as well as evidence of thrombus in the left and right portal veins which may be contributing to the patient's hepatic dysfunction.  Treatment options for his disease are limited, additional local therapy options could be explored however must take into account his evolving cirrhotic picture.  Await MRI  abdomen result to further assess relationship between the large hepatic mass and vasculature and can then discuss whether any additional local therapy options to this lesion would be possible.  The patient today however has had a significant clinical change, is very somnolent and lethargic, minimally arousable.  We are rechecking an ammonia level.  I had a lengthy discussion with the patient's wife at the bedside today.  We discussed his multiple comorbidities including his underlying malignancy, progressive hepatic dysfunction.  She does note that he would want to be DNR/DNI and we will make this change.  We discussed potential pursuit of a palliative/supportive care approach overall.  She was interested to discuss this further with palliative care and we will place a consult.  She would like for her son to be present at that meeting.  We will continue to follow his clinical course and make ongoing decisions regarding his direction of care accordingly.    *Portal vein thrombus  CT 7/8/2025 with main portal vein patent, right portal vein possibly with nonocclusive thrombus  Portal hepatic Doppler on 7/9/2025 with challenging study, mass in the right lobe of the liver compressing the main hepatic vein, unable to visualize the left or right hepatic veins.  Main portal vein was sluggish/slow flow with thrombus in the left and right portal veins.  MRI abdomen pending  Anticoagulation difficult in light of elevated INR from cirrhosis and chronic thrombocytopenia with increased bleeding risk.  Nevertheless, thrombosis may be contributing to the patient's hepatic dysfunction.    On 7/9/2025 initiated low-dose Lovenox 40 mg daily.      *Cancer related pain  Patient with significant underlying cancer related pain receiving MS Contin 15 mg every 12 hours, oxycodone 10 mg every 4 hours as needed.    There was discussion at visit on 5/12/2025 regarding potential tapering off of MS Contin.  Unclear whether MS Contin may be  accumulating in the setting of hepatic dysfunction and we will stop the MS Contin for now.     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 as well as cirrhosis/portal hypertension and splenomegaly  Platelet count has been chronically in the 70-90,000 range platelet count currently at baseline  Platelet count currently remains at baseline  Platelet count today stable in prior baseline range at 80,000     *RA  significant rheumatoid arthritis history with joint deviation, history of iritis, previous pericarditis requiring pericardial window  Patient previously treated with Simponi, held per rheumatology prior to initiation of chemotherapy/immunotherapy  Fortunately with prior immunotherapy patient did not develop flare of rheumatoid arthritis     *History of pericarditis  Patient did have previous pericarditis that required a pericardial window.     *Additional comorbidities-diabetes mellitus, hyperlipidemia, hypertension, tobacco abuse, BPH     *GERD  Protonix 40 mg daily     *Constipation  Sarika-Colace 2 tablets twice daily      *CODE STATUS   Discussed with patient and his wife today as noted above and he is DNR/DNI which is appropriate given his underlying malignancy and comorbidities with declining status.        PLAN:   Discontinue MS Contin due to increasing somnolence and lethargy  Recheck ammonia level today  Consult palliative care  Patient is DNR/DNI  Continue Lovenox 40 mg daily initially for portal vein thrombus  MRI abdomen pending to further assess hepatic lesions in relation to vasculature with subsequent consideration of local treatment options  Ascitic fluid cytology pending from 7/7/2025  Patient continuing on lactulose 20 g 3 times daily and Xifaxan 550 mg every 12 hours for for hepatic  encephalopathy  Patient initiating Lasix 20 mg daily, spironolactone 50 mg daily  Consider potential need for repeat paracentesis  Daily CBC, CMP      Discussed with patient and with wife at bedside           Eyad Grace MD

## 2025-07-11 ENCOUNTER — APPOINTMENT (OUTPATIENT)
Dept: ULTRASOUND IMAGING | Facility: HOSPITAL | Age: 80
End: 2025-07-11
Payer: MEDICARE

## 2025-07-11 LAB
ALBUMIN SERPL-MCNC: 2.4 G/DL (ref 3.5–5.2)
ALBUMIN/GLOB SERPL: 0.6 G/DL
ALP SERPL-CCNC: 94 U/L (ref 39–117)
ALT SERPL W P-5'-P-CCNC: 6 U/L (ref 1–41)
ANION GAP SERPL CALCULATED.3IONS-SCNC: 7.3 MMOL/L (ref 5–15)
AST SERPL-CCNC: 24 U/L (ref 1–40)
BILIRUB SERPL-MCNC: 0.8 MG/DL (ref 0–1.2)
BUN SERPL-MCNC: 10 MG/DL (ref 8–23)
BUN/CREAT SERPL: 12.8 (ref 7–25)
CALCIUM SPEC-SCNC: 8.4 MG/DL (ref 8.6–10.5)
CHLORIDE SERPL-SCNC: 101 MMOL/L (ref 98–107)
CO2 SERPL-SCNC: 27.7 MMOL/L (ref 22–29)
CREAT SERPL-MCNC: 0.78 MG/DL (ref 0.76–1.27)
CYTO UR: NORMAL
DEPRECATED RDW RBC AUTO: 44.6 FL (ref 37–54)
EGFRCR SERPLBLD CKD-EPI 2021: 91.3 ML/MIN/1.73
ERYTHROCYTE [DISTWIDTH] IN BLOOD BY AUTOMATED COUNT: 13.1 % (ref 12.3–15.4)
GLOBULIN UR ELPH-MCNC: 4.1 GM/DL
GLUCOSE SERPL-MCNC: 100 MG/DL (ref 65–99)
HCT VFR BLD AUTO: 37.2 % (ref 37.5–51)
HGB BLD-MCNC: 12.1 G/DL (ref 13–17.7)
LAB AP CASE REPORT: NORMAL
MAGNESIUM SERPL-MCNC: 1.7 MG/DL (ref 1.6–2.4)
MCH RBC QN AUTO: 30.3 PG (ref 26.6–33)
MCHC RBC AUTO-ENTMCNC: 32.5 G/DL (ref 31.5–35.7)
MCV RBC AUTO: 93 FL (ref 79–97)
PATH REPORT.FINAL DX SPEC: NORMAL
PATH REPORT.GROSS SPEC: NORMAL
PHOSPHATE SERPL-MCNC: 3.1 MG/DL (ref 2.5–4.5)
PLATELET # BLD AUTO: 65 10*3/MM3 (ref 140–450)
PMV BLD AUTO: 9.8 FL (ref 6–12)
POTASSIUM SERPL-SCNC: 3.6 MMOL/L (ref 3.5–5.2)
PROT SERPL-MCNC: 6.5 G/DL (ref 6–8.5)
RBC # BLD AUTO: 4 10*6/MM3 (ref 4.14–5.8)
SODIUM SERPL-SCNC: 136 MMOL/L (ref 136–145)
WBC NRBC COR # BLD AUTO: 4.48 10*3/MM3 (ref 3.4–10.8)

## 2025-07-11 PROCEDURE — 0W9G3ZZ DRAINAGE OF PERITONEAL CAVITY, PERCUTANEOUS APPROACH: ICD-10-PCS | Performed by: RADIOLOGY

## 2025-07-11 PROCEDURE — 25010000002 LIDOCAINE 1 % SOLUTION: Performed by: RADIOLOGY

## 2025-07-11 PROCEDURE — 25010000002 ONDANSETRON PER 1 MG: Performed by: INTERNAL MEDICINE

## 2025-07-11 PROCEDURE — 99232 SBSQ HOSP IP/OBS MODERATE 35: CPT | Performed by: STUDENT IN AN ORGANIZED HEALTH CARE EDUCATION/TRAINING PROGRAM

## 2025-07-11 PROCEDURE — 80053 COMPREHEN METABOLIC PANEL: CPT | Performed by: STUDENT IN AN ORGANIZED HEALTH CARE EDUCATION/TRAINING PROGRAM

## 2025-07-11 PROCEDURE — 83735 ASSAY OF MAGNESIUM: CPT | Performed by: STUDENT IN AN ORGANIZED HEALTH CARE EDUCATION/TRAINING PROGRAM

## 2025-07-11 PROCEDURE — 25010000002 ENOXAPARIN PER 10 MG: Performed by: INTERNAL MEDICINE

## 2025-07-11 PROCEDURE — 97530 THERAPEUTIC ACTIVITIES: CPT | Performed by: PHYSICAL THERAPIST

## 2025-07-11 PROCEDURE — 76942 ECHO GUIDE FOR BIOPSY: CPT

## 2025-07-11 PROCEDURE — 84100 ASSAY OF PHOSPHORUS: CPT | Performed by: STUDENT IN AN ORGANIZED HEALTH CARE EDUCATION/TRAINING PROGRAM

## 2025-07-11 PROCEDURE — 85027 COMPLETE CBC AUTOMATED: CPT | Performed by: STUDENT IN AN ORGANIZED HEALTH CARE EDUCATION/TRAINING PROGRAM

## 2025-07-11 RX ORDER — OXYCODONE HYDROCHLORIDE 10 MG/1
10 TABLET ORAL 2 TIMES DAILY
Refills: 0 | Status: DISCONTINUED | OUTPATIENT
Start: 2025-07-11 | End: 2025-07-15 | Stop reason: HOSPADM

## 2025-07-11 RX ORDER — LIDOCAINE HYDROCHLORIDE 10 MG/ML
10 INJECTION, SOLUTION INFILTRATION; PERINEURAL ONCE
Status: COMPLETED | OUTPATIENT
Start: 2025-07-11 | End: 2025-07-11

## 2025-07-11 RX ADMIN — ONDANSETRON 4 MG: 2 INJECTION, SOLUTION INTRAMUSCULAR; INTRAVENOUS at 17:37

## 2025-07-11 RX ADMIN — LACTULOSE SOLUTION USP, 10 G/15 ML 20 G: 10 SOLUTION ORAL; RECTAL at 21:21

## 2025-07-11 RX ADMIN — ENOXAPARIN SODIUM 40 MG: 100 INJECTION SUBCUTANEOUS at 17:09

## 2025-07-11 RX ADMIN — DOXYCYCLINE 50 MG: 25 FOR SUSPENSION ORAL at 21:21

## 2025-07-11 RX ADMIN — DOXYCYCLINE 50 MG: 25 FOR SUSPENSION ORAL at 09:57

## 2025-07-11 RX ADMIN — PILOCARPINE HYROCHLORIDE 5 MG: 5 TABLET, FILM COATED ORAL at 13:45

## 2025-07-11 RX ADMIN — MIDODRINE HYDROCHLORIDE 5 MG: 5 TABLET ORAL at 06:35

## 2025-07-11 RX ADMIN — ONDANSETRON 4 MG: 2 INJECTION, SOLUTION INTRAMUSCULAR; INTRAVENOUS at 23:22

## 2025-07-11 RX ADMIN — RIFAXIMIN 550 MG: 550 TABLET ORAL at 09:48

## 2025-07-11 RX ADMIN — SPIRONOLACTONE 50 MG: 25 TABLET ORAL at 09:48

## 2025-07-11 RX ADMIN — PANTOPRAZOLE SODIUM 40 MG: 40 TABLET, DELAYED RELEASE ORAL at 05:50

## 2025-07-11 RX ADMIN — SENNOSIDES AND DOCUSATE SODIUM 2 TABLET: 50; 8.6 TABLET ORAL at 21:21

## 2025-07-11 RX ADMIN — OLANZAPINE 5 MG: 5 TABLET, FILM COATED ORAL at 21:21

## 2025-07-11 RX ADMIN — MIDODRINE HYDROCHLORIDE 5 MG: 5 TABLET ORAL at 17:09

## 2025-07-11 RX ADMIN — GABAPENTIN 600 MG: 300 CAPSULE ORAL at 21:21

## 2025-07-11 RX ADMIN — Medication 2.5 MG: at 21:21

## 2025-07-11 RX ADMIN — LACTULOSE SOLUTION USP, 10 G/15 ML 20 G: 10 SOLUTION ORAL; RECTAL at 09:49

## 2025-07-11 RX ADMIN — MIDODRINE HYDROCHLORIDE 5 MG: 5 TABLET ORAL at 12:05

## 2025-07-11 RX ADMIN — PILOCARPINE HYROCHLORIDE 5 MG: 5 TABLET, FILM COATED ORAL at 05:50

## 2025-07-11 RX ADMIN — PILOCARPINE HYROCHLORIDE 5 MG: 5 TABLET, FILM COATED ORAL at 21:21

## 2025-07-11 RX ADMIN — OXYCODONE HYDROCHLORIDE 10 MG: 10 TABLET ORAL at 13:44

## 2025-07-11 RX ADMIN — METFORMIN HYDROCHLORIDE 1000 MG: 1000 TABLET ORAL at 09:48

## 2025-07-11 RX ADMIN — FUROSEMIDE 20 MG: 20 TABLET ORAL at 09:48

## 2025-07-11 RX ADMIN — LIDOCAINE HYDROCHLORIDE 8 ML: 10 INJECTION, SOLUTION INFILTRATION; PERINEURAL at 15:35

## 2025-07-11 RX ADMIN — OXYCODONE HYDROCHLORIDE 10 MG: 10 TABLET ORAL at 23:18

## 2025-07-11 RX ADMIN — LACTULOSE SOLUTION USP, 10 G/15 ML 20 G: 10 SOLUTION ORAL; RECTAL at 17:09

## 2025-07-11 NOTE — CONSULTS
[facility] Visit Report    Gay Vazquez  [id number]  7/11/2025    [facility] consult received and records reviewed with [facility] medical officer Dr Patrica Aguilar. Patient is medically eligible for services at discharge--not HSB eligible at this time.     Met with patients' family at bedside including spouse Paula and son Marcus, patient was off the unit getting paracentesis during this visit. Patient did not participate in discussion. Detailed explanation of services provided to family including routine home services. Medications and symptom management needs as well as DME needs were discussed.  Family wishes are for patient to discharge home after he has a Pleurx drain placed as planned on Monday, with [facility] services to follow. [facility] booklet and contact information was provided and family was encouraged to call with any needs.    [facility] will continue to follow up for discharge plan and meet with family prior to discharge to arrange DME and medication needs prior to d/c. Please call with any updates or change in care needs.    Thank you for allowing [facility] to participate with this patient's and family care needs.    Helene Draper RN   [facility] Admissions Coordinator  [phone]

## 2025-07-11 NOTE — PROGRESS NOTES
[facility] INPATIENT PROGRESS NOTE    Length of Stay:  5 days    CHIEF COMPLAINT:  Cholangiocarcinoma, cirrhosis with portal hypertension and hepatic encephalopathy, cancer related pain, thrombocytopenia, rheumatoid arthritis    SUBJECTIVE:   Patient was visited by his family at the time of my visit.  He was sitting upright in bedside chair.  He was interactive however, was quite confused.    ROS:  Review of Systems   Unable to obtain    OBJECTIVE:  Vitals:    07/10/25 1920 07/10/25 2302 07/11/25 0607 07/11/25 0720   BP: 106/74 106/69  118/75   BP Location: Right arm Right arm  Right arm   Patient Position: Lying Lying  Lying   Pulse: 97 97  78   Resp: 18 18  18   Temp: 98.4 °F (36.9 °C) 98.2 °F (36.8 °C)  98 °F (36.7 °C)   TempSrc: Oral Oral  Oral   SpO2: 94% 95%  96%   Weight:   85.7 kg (188 lb 15 oz)          PHYSICAL EXAMINATION:  General: Chronically ill-appearing male.  Interactive, however confused.  Oriented to self and place.  Chest/Lungs: Clear to auscultation bilaterally anteriorly  Heart: Regular rate and rhythm  Abdomen/GI: Soft, nontender, increasing abdominal distention with ascites  Extremities: Trace bilateral lower extremity edema    I have reexamined the patient and the results are consistent with the previously documented exam. Chrissy Venegas MD         DIAGNOSTIC DATA:  Results Review:     I reviewed the patient's new clinical results.    Results from last 7 days   Lab Units 07/11/25  0651 07/10/25  0619 07/09/25  0531   WBC 10*3/mm3 4.48 7.16 6.32   HEMOGLOBIN g/dL 12.1* 12.5* 12.4*   HEMATOCRIT % 37.2* 37.4* 36.6*   PLATELETS 10*3/mm3 65* 80* 82*      Results from last 7 days   Lab Units 07/11/25  0651 07/10/25  0619 07/09/25  0531   SODIUM mmol/L 136 135* 134*   POTASSIUM mmol/L 3.6 3.8 3.6   CHLORIDE mmol/L 101 98 97*   CO2 mmol/L 27.7 26.5 26.0   BUN mg/dL 10.0 9.0 9.0   CREATININE mg/dL 0.78 0.94 0.80   CALCIUM mg/dL 8.4* 8.4* 8.3*   BILIRUBIN mg/dL 0.8 1.0 0.8   ALK PHOS  U/L 94 102 101   ALT (SGPT) U/L 6 9 7   AST (SGOT) U/L 24 28 26   GLUCOSE mg/dL 100* 130* 133*      Lab Results   Component Value Date    NEUTROABS 4.31 07/09/2025     Results from last 7 days   Lab Units 07/08/25  0630 07/06/25  1520   INR  1.53* 1.43*   APTT seconds  --  39.4*     Results from last 7 days   Lab Units 07/11/25  0651   MAGNESIUM mg/dL 1.7           Assessment & Plan   ASSESSMENT/PLAN:  This is a 79 y.o. male with:     *Cirrhosis with portal hypertension, hepatic encephalopathy, ascites  Patient admitted on 7/6/2025 from the emergency department with abdominal distention, generalized weakness, confusion, reduced urine output.    Labs on 7/6/2025 showed hyponatremia with sodium 129, ammonia 85, INR 1.43, PTT 39.4.    CT head showed no acute findings.    CT abdomen pelvis without IV contrast showed small to moderate partially loculated right pleural effusion unchanged, questionable lymphadenopathy mediastinum (better seen on prior CT chest), largest hepatic mass increased from 8.3 x 5.8 on 1/16/2025 up to 9.5 x 7.1 cm with cirrhotic appearing liver, moderate to large ascites, prominent perisplenic and splenorenal varices.    Patient receiving lactulose 20 g 3 times daily and Xifaxan 550 mg every 12 hours for for hepatic encephalopathy.    Ultrasound-guided paracentesis on 7/7/2025 with 3850 cc removed, cytology pending  Ammonia level normalized on 7/9/2025 at 59.    Patient reaccumulating ascites, on 7/9/2025 initiated  Lasix 20 mg daily, spironolactone 50 mg daily.   Patient today with significant change in mental status.  He is very lethargic, somnolent, minimally arousable to answer yes or no questions.  We will send off repeat ammonia level today.  7/11/2025: Patient was interactive today, however remains confused.  He is oriented to self and place.  He is meeting with palliative care today     *Intrahepatic cholangiocarcinoma, multifocal:  7/5/24 MRI abd-heterogenous, large peripherally enhancing  mass withinthe posterior aspect of the right hepatic dome which is grossly unchanged to minimally increased in size since 5/10/2024. small peripheral enhancing and solid enhancing lesions are located within the surrounding right hepatic lobe and spread of malignancy is likely  7/18/24 CT liver biopsy-adenocarcinoma consistent with cholangiocarcinoma by IHC  CA 19-9 133  8/2/2024-initiated palliative treatment with cisplatin/Gemzar plus nivolumab; day 8 Gemzar given at 50% because of thrombocytopenia (60)  Cycle 2 of treatment was given with a dose reduction of gemcitabine 750 mg/m² and cisplatin same dose 25 mg/m² along with durvalumab.  Despite dose reduction he had intractable nausea vomiting acute kidney injury requiring IV fluids.  He did not receive day 8 cycle 2 of treatment because of side effects.  9/6/2024 CT abdomen liver protocol-stable 7.6 x 6 cm mass in the right lobe of the liver  Patient was seen in emergency department on 9/20/2024 with CT abdomen and pelvis that showed stable right liver mass 7.5 x 6 cm, loculated right pleural effusion with surrounding pleural thickening and calcification unchanged, no acute findings.  CT abdomen and pelvis on 10/10/2024 showed no change.  Patient was referred to Dr. Hopper at [facility], was seen on 9/26/2024.  Patient was not felt to be a good candidate for surgical intervention.  Recommendation to proceed with chemoembolization.   Patient did undergo TACE to dominant liver lesion in October 2024.    Patient has been followed since that time with no subsequent interventions.    CT chest abdomen pelvis most recently on 4/21/25 showed increased size of the dominant liver lesion at 9 cm and the low density liver lesion 1.3 cm.  CA 19 manage 9 however decreased to 51.1.  Recommended to repeat CT scan at 3-month interval.  Patient currently admitted with hepatic encephalopathy  CT abdomen pelvis without IV contrast on admission 7/6/2025 showed small to  moderate partially loculated right pleural effusion unchanged, questionable lymphadenopathy mediastinum (better seen on prior CT chest), largest hepatic mass increased from 8.3 x 5.8 on 1/16/2025 up to 9.5 x 7.1 cm with cirrhotic appearing liver, moderate to large ascites, prominent perisplenic and splenorenal varices.  Ultrasound-guided paracentesis on 7/7/2025 with 3850 cc removed, cytology pending  CA 19-9 on 7/8/2025 was stable at 50  CT chest, abdomen, and pelvis with contrast on 7/8/2025 with chronic small-moderate loculated right pleural effusion and calcified pleural plaques stable.  No change in mildly enlarged mediastinal lymph nodes.  Increase in dominant right hepatic mass from 6.4 x 5.8 up to 6.8 x 6.2 cm.  Enlarging component anteriorly to this mass from 2.2 x 1.7 up to 2.6 x 2.2 cm.  Increase in medial nodule 1.6 x 1.2 up to 1.8 x 1.5 cm and increase an additional lesion from 1.3 x 1.0 up to 1.5 x 1.5 cm.  Increased lymphadenopathy at the estrellita hepatis from 2.0 x 1.2 up to 2.2 x 1.4 cm.  New free fluid in the abdomen and pelvis.  Main portal vein patent, right portal vein possibly with nonocclusive thrombus.  Stable splenomegaly, chronic pancreatitis changes.  Portal hepatic Doppler on 7/9/2025 with challenging study, mass in the right lobe of the liver compressing the main hepatic vein, unable to visualize the left or right hepatic veins.  Main portal vein was sluggish/slow flow with thrombus in the left and right portal veins.  Abdominal MRI pending  CA 19-9, stable at 50.  CT scan on 7/8/2025 did show evidence of mild progression in existing hepatic lesions.  Degree of liver involvement from malignancy has not increased significantly and volume of liver parenchyma involved has not changed enough to explain evolving cirrhosis.  Unclear whether the evolving cirrhosis could be related to prior TACE however this was directed to a very focal area of the liver.  Portal Doppler on 7/9/2025 did show  compromised flow from the mass lesion in the main hepatic vein as well as evidence of thrombus in the left and right portal veins which may be contributing to the patient's hepatic dysfunction.  Treatment options for his disease are limited, additional local therapy options could be explored however must take into account his evolving cirrhotic picture.  Await MRI abdomen result to further assess relationship between the large hepatic mass and vasculature and can then discuss whether any additional local therapy options to this lesion would be possible.  The patient today however has had a significant clinical change, is very somnolent and lethargic, minimally arousable.  We are rechecking an ammonia level.  I had a lengthy discussion with the patient's wife at the bedside today.  We discussed his multiple comorbidities including his underlying malignancy, progressive hepatic dysfunction.  She does note that he would want to be DNR/DNI and we will make this change.  We discussed potential pursuit of a palliative/supportive care approach overall.  She was interested to discuss this further with palliative care and we will place a consult.  She would like for her son to be present at that meeting.  We will continue to follow his clinical course and make ongoing decisions regarding his direction of care accordingly.  7/11/2025: Reviewed results of MRI abdomen with without contrast-Superior aspect of dominant 6.8 x 6.2 cm right hepatic lobe mass extends to middle hepatic vein which may be partially thrombosed.  Mass does not extend to hepatic venous confluence or IVC.  Right hepatic vein extends to malignancy as well.  No definite tumor thrombus.  No involvement of hepatic portal venous confluence or IVC.  Nonocclusive thrombus within adjacent right portal vein.    *Portal vein thrombus  CT 7/8/2025 with main portal vein patent, right portal vein possibly with nonocclusive thrombus  Portal hepatic Doppler on 7/9/2025  with challenging study, mass in the right lobe of the liver compressing the main hepatic vein, unable to visualize the left or right hepatic veins.  Main portal vein was sluggish/slow flow with thrombus in the left and right portal veins.  MRI abdomen pending  Anticoagulation difficult in light of elevated INR from cirrhosis and chronic thrombocytopenia with increased bleeding risk.  Nevertheless, thrombosis may be contributing to the patient's hepatic dysfunction.    On 7/9/2025 initiated low-dose Lovenox 40 mg daily.       *Cancer related pain  Patient with significant underlying cancer related pain receiving MS Contin 15 mg every 12 hours, oxycodone 10 mg every 4 hours as needed.    There was discussion at visit on 5/12/2025 regarding potential tapering off of MS Contin.  Unclear whether MS Contin may be accumulating in the setting of hepatic dysfunction and we will stop the MS Contin for now.     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 as well as cirrhosis/portal hypertension and splenomegaly  Platelet count has been chronically in the 70-90,000 range platelet count currently at baseline  Platelet count currently remains at baseline  Platelet count today stable in prior baseline range at 80,000  7/11/2025: Platelets 65,000 today     *RA  significant rheumatoid arthritis history with joint deviation, history of iritis, previous pericarditis requiring pericardial window  Patient previously treated with Simponi, held per rheumatology prior to initiation of chemotherapy/immunotherapy  Fortunately with prior immunotherapy patient did not develop flare of rheumatoid arthritis     *History of pericarditis  Patient did have previous pericarditis that required a pericardial window.     *Additional  comorbidities-diabetes mellitus, hyperlipidemia, hypertension, tobacco abuse, BPH     *GERD  Protonix 40 mg daily     *Constipation  Sarika-Colace 2 tablets twice daily      *CODE STATUS   DNR/DNI-Dr. Grace discussed that with patient's family    PLAN:   He is going to be evaluated with palliative care today  Continue Lovenox 40 mg daily initially for portal vein thrombus  MRI abdomen reviewed  Ascitic fluid cytology  from 7/7/2025-negative for malignancy  Patient continuing on lactulose 20 g 3 times daily and Xifaxan 550 mg every 12 hours for for hepatic encephalopathy  Continue Lasix 20 mg daily, spironolactone 50 mg daily  Will consider potential need for repeat paracentesis  Daily CBC, CMP.  Will follow along    Discussed with patient and with family at bedside  Patient new to me.  All issues new to me         Chrissy Venegas MD

## 2025-07-11 NOTE — PLAN OF CARE
Goal Outcome Evaluation:  Plan of Care Reviewed With: (P) patient        Progress: (P) improving  Outcome Evaluation: (P) Pt was sleeping upon arrival. easily woken up and eager to work with PT. pt was able sit EOB CGA. STS min A. pt was able to ambulate 30 ft. showed no signs of LOB. pt exhibited increased endurance and mobility from previous treatment session. PT will cont to follow and encourage SNF.

## 2025-07-11 NOTE — THERAPY TREATMENT NOTE
Patient Name: Gay Vazquez  : 1945    MRN: [id number]                              Today's Date: 2025       Admit Date: 2025    Visit Dx:     ICD-10-CM ICD-9-CM   1. Encephalopathy, hepatic  K76.82 572.2   2. Malignant ascites  R18.0 789.51   3. Hyponatremia  E87.1 276.1     Patient Active Problem List   Diagnosis    Mixed hyperlipidemia    Diabetes mellitus without complication    Rheumatoid arthritis    Overweight (BMI 25.0-29.9)    Primary hypertension    Nicotine dependence, cigarettes, uncomplicated    Aneurysm of ascending aorta without rupture    Liver mass    Cholangiocarcinoma    Encounter for long-term (current) use of other medications    Fitting and adjustment of vascular catheter    Thrombocytopenia    Cancer related pain    Chronic pleural effusion    Hepatic encephalopathy     Past Medical History:   Diagnosis Date    Arthritis of neck     Cancer     Liver    Colon polyp     Diabetes mellitus     Erectile dysfunction     GERD (gastroesophageal reflux disease)     Hip arthrosis     Hyperlipidemia     Hypertension     Kidney stone     Liver cancer 2024    Liver disease     Low back pain     Postoperative wound infection     RA (rheumatoid arthritis)     Urinary tract infection     Visual impairment      Past Surgical History:   Procedure Laterality Date    ABDOMINAL SURGERY      BACK SURGERY      CARDIAC CATHETERIZATION      CARDIAC SURGERY      CHOLECYSTECTOMY      COLONOSCOPY      [facility]    COLONOSCOPY N/A 2024    Procedure: COLONOSCOPY INTO CECUM WITH POLYPECTOMIES (COLD SNARE);  Surgeon: Angel Ness MD;  Location: [facility] ENDOSCOPY;  Service: General;  Laterality: N/A;  PRE: COLON MASS, ABNORMAL CT  POST: DIVERTICULOSIS, POLYPS, POOR PREP    ENDOSCOPY N/A 2024    Procedure: ESOPHAGOGASTRODUODENOSCOPY;  Surgeon: Angel Ness MD;  Location: [facility] ENDOSCOPY;  Service: General;  Laterality:  N/A;  PRE: LIVER MASS  POST: NORMAL EGD    EYE SURGERY      JOINT REPLACEMENT      PERICARDIUM SURGERY      REPLACEMENT TOTAL KNEE BILATERAL      SPINE SURGERY      THORACOTOMY Bilateral 1997    TRIGGER POINT INJECTION      Back    VASECTOMY      VENOUS ACCESS DEVICE (PORT) INSERTION Right 07/30/2024    Procedure: INSERTION VENOUS ACCESS DEVICE;  Surgeon: Angel Ness MD;  Location: [facility] MAIN OR;  Service: General;  Laterality: Right;      General Information       Row Name 07/11/25 0951          Physical Therapy Time and Intention    Document Type therapy note (daily note) (P)   -AG     Mode of Treatment physical therapy (P)   -AG       Row Name 07/11/25 0951          General Information    Existing Precautions/Restrictions fall (P)   -AG       Row Name 07/11/25 0951          Cognition    Orientation Status (Cognition) oriented to;person;place;situation (P)   -AG       Row Name 07/11/25 0951          Safety Issues/Impairments Affecting Functional Mobility    Safety Issues Affecting Function (Mobility) at risk behavior observed;impulsivity;positioning of assistive device (P)   -AG     Impairments Affecting Function (Mobility) endurance/activity tolerance;balance;strength (P)   -AG               User Key  (r) = Recorded By, (t) = Taken By, (c) = Cosigned By      Initials Name Provider Type    AG Kentonwfrances, Vanessa, PT Student PT Student                   Mobility       Row Name 07/11/25 0951          Bed Mobility    Bed Mobility supine-sit (P)   -AG     Supine-Sit Howell (Bed Mobility) contact guard (P)   -AG     Assistive Device (Bed Mobility) head of bed elevated;bed rails (P)   -AG       Row Name 07/11/25 0951          Sit-Stand Transfer    Sit-Stand Howell (Transfers) minimum assist (75% patient effort) (P)   -AG     Assistive Device (Sit-Stand Transfers) walker, front-wheeled (P)   -AG       Row Name 07/11/25 0951          Gait/Stairs (Locomotion)    Howell Level (Gait) contact guard  (P)   -AG     Assistive Device (Gait) walker, front-wheeled (P)   -AG     Patient was able to Ambulate yes (P)   -AG     Distance in Feet (Gait) 30 (P)   -AG     Deviations/Abnormal Patterns (Gait) antalgic;gait speed decreased;stride length decreased (P)   -AG     Bilateral Gait Deviations forward flexed posture;heel strike decreased (P)   -AG     Comment, (Gait/Stairs) pt was able to ambulate 30 ft to the door and back to the chair. no overt LOB. (P)   -AG               User Key  (r) = Recorded By, (t) = Taken By, (c) = Cosigned By      Initials Name Provider Type    AG Moe Vanessa, PT Student PT Student                   Obj/Interventions       Row Name 07/11/25 0953          Motor Skills    Therapeutic Exercise other (see comments) (P)   seated ankle pumps, knee kicks, marches  -AG       Row Name 07/11/25 0953          Balance    Balance Assessment sitting static balance;standing static balance;standing dynamic balance;sitting dynamic balance (P)   -AG     Static Sitting Balance contact guard (P)   -AG     Dynamic Sitting Balance contact guard (P)   -AG     Position, Sitting Balance sitting edge of bed (P)   -AG     Static Standing Balance contact guard (P)   -AG     Dynamic Standing Balance contact guard (P)   -AG     Position/Device Used, Standing Balance walker, front-wheeled (P)   -AG     Balance Interventions sitting;standing;sit to stand;supported;static;dynamic (P)   -AG               User Key  (r) = Recorded By, (t) = Taken By, (c) = Cosigned By      Initials Name Provider Type    AG Vanessa Rosa, PT Student PT Student                   Goals/Plan    No documentation.                  Clinical Impression       Row Name 07/11/25 0953          Pain    Pretreatment Pain Rating 0/10 - no pain (P)   -AG     Posttreatment Pain Rating 0/10 - no pain (P)   -AG       Row Name 07/11/25 0953          Plan of Care Review    Plan of Care Reviewed With patient (P)   -AG     Progress improving (P)   -AG      Outcome Evaluation Pt was sleeping upon arrival. easily woken up and eager to work with PT. pt was able sit EOB CGA. STS min A. pt was able to ambulate 30 ft. showed no signs of LOB. pt exhibited increased endurance and mobility from previous treatment session. PT will cont to follow and encourage SNF. (P)   -AG       Row Name 07/11/25 0953          Positioning and Restraints    In Chair reclined;call light within reach;encouraged to call for assist;exit alarm on (P)   -AG               User Key  (r) = Recorded By, (t) = Taken By, (c) = Cosigned By      Initials Name Provider Type    AG Moe Vanessa, PT Student PT Student                   Outcome Measures       Row Name 07/11/25 0958          How much help from another person do you currently need...    Turning from your back to your side while in flat bed without using bedrails? 3 (P)   -AG     Moving from lying on back to sitting on the side of a flat bed without bedrails? 3 (P)   -AG     Moving to and from a bed to a chair (including a wheelchair)? 2 (P)   -AG     Standing up from a chair using your arms (e.g., wheelchair, bedside chair)? 2 (P)   -AG     Climbing 3-5 steps with a railing? 2 (P)   -AG     To walk in hospital room? 3 (P)   -AG     AM-PAC 6 Clicks Score (PT) 15 (P)   -AG     Highest Level of Mobility Goal Move to Chair/Commode-4 (P)   -AG               User Key  (r) = Recorded By, (t) = Taken By, (c) = Cosigned By      Initials Name Provider Type    AG Vanessa Rosa, PT Student PT Student                                 Physical Therapy Education       Title: PT OT SLP Therapies (Done)       Topic: Physical Therapy (Done)       Point: Mobility training (Done)       Learning Progress Summary            Patient Acceptance, E, SHAY,DU by AG at 7/11/2025 0958    Acceptance, DIONISIO JEWELL, DU by  at 7/9/2025 1257                      Point: Home exercise program (Done)       Learning Progress Summary            Patient Acceptance, E, SHAY,DU by  at 7/11/2025  0958    Acceptance, E,D, DU by  at 7/9/2025 1257                      Point: Body mechanics (Done)       Learning Progress Summary            Patient Acceptance, E, VU,DU by  at 7/11/2025 0958    Acceptance, E,D, DU by  at 7/9/2025 1257                      Point: Precautions (Done)       Learning Progress Summary            Patient Acceptance, E, VU,DU by AG at 7/11/2025 0958    Acceptance, E,D, DU by  at 7/9/2025 1257                                      User Key       Initials Effective Dates Name Provider Type Discipline    PC 06/16/21 -  Missy Alarcon, PT Physical Therapist PT     06/03/25 -  Vanessa Rosa, PT Student PT Student PT                  PT Recommendation and Plan     Progress: (P) improving  Outcome Evaluation: (P) Pt was sleeping upon arrival. easily woken up and eager to work with PT. pt was able sit EOB CGA. STS min A. pt was able to ambulate 30 ft. showed no signs of LOB. pt exhibited increased endurance and mobility from previous treatment session. PT will cont to follow and encourage SNF.     Time Calculation:         PT Charges       Row Name 07/11/25 0959             Time Calculation    Start Time 0904 (P)   -AG      Stop Time 0919 (P)   -AG      Time Calculation (min) 15 min (P)   -AG      PT Received On 07/11/25 (P)   -AG      PT - Next Appointment 07/14/25 (P)   -AG         Timed Charges    56989 - PT Therapeutic Activity Minutes 15 (P)   -AG         Total Minutes    Timed Charges Total Minutes 15 (P)   -AG       Total Minutes 15 (P)   -AG                User Key  (r) = Recorded By, (t) = Taken By, (c) = Cosigned By      Initials Name Provider Type    AG Moe, Vanessa, PT Student PT Student                      PT G-Codes  Outcome Measure Options: AM-PAC 6 Clicks Basic Mobility (PT)  AM-PAC 6 Clicks Score (PT): (P) 15       Vanessa Moe, PT Student  7/11/2025

## 2025-07-11 NOTE — PROGRESS NOTES
Name: Gay Vazquez ADMIT: 2025   : 1945  PCP: Gloria Post MD    MRN: [id number] LOS: 5 days   AGE/SEX: 79 y.o. male  ROOM: [facility]     Subjective   Subjective     Much more alert today. No complaints. His abdomen is somewhat distended, but he doesn't feel like it's bothering him at all currently       Objective   Objective   Vital Signs  Temp:  [98 °F (36.7 °C)-98.4 °F (36.9 °C)] 98 °F (36.7 °C)  Heart Rate:  [78-97] 78  Resp:  [18] 18  BP: (106-118)/(69-75) 118/75  SpO2:  [94 %-96 %] 96 %  on   ;   Device (Oxygen Therapy): room air  Body mass index is 25.62 kg/m².  Physical Exam  Constitutional:       General: He is not in acute distress.     Appearance: He is not toxic-appearing.   Cardiovascular:      Rate and Rhythm: Normal rate and regular rhythm.      Heart sounds: Normal heart sounds.   Pulmonary:      Effort: Pulmonary effort is normal.      Breath sounds: Normal breath sounds.   Abdominal:      General: Bowel sounds are normal. There is distension.      Palpations: Abdomen is soft.      Tenderness: There is no abdominal tenderness. There is no guarding or rebound.   Musculoskeletal:         General: No tenderness.      Right lower leg: No edema.      Left lower leg: No edema.   Neurological:      Mental Status: He is alert.   Psychiatric:         Mood and Affect: Mood normal.         Behavior: Behavior normal.         Results Review     I reviewed the patient's new clinical results.  Results from last 7 days   Lab Units 25  0651 07/10/25  0619 07/09/25  0531 25  0630   WBC 10*3/mm3 4.48 7.16 6.32 4.25   HEMOGLOBIN g/dL 12.1* 12.5* 12.4* 11.4*   PLATELETS 10*3/mm3 65* 80* 82* 65*     Results from last 7 days   Lab Units 25  0651 07/10/25  0619 25  0531 25  0630   SODIUM mmol/L 136 135* 134* 135*   POTASSIUM mmol/L 3.6 3.8 3.6 3.7   CHLORIDE mmol/L 101 98 97* 99   CO2 mmol/L 27.7 26.5 26.0 27.0   BUN mg/dL 10.0 9.0 9.0 9.0   CREATININE mg/dL 0.78 0.94 0.80  0.79   GLUCOSE mg/dL 100* 130* 133* 119*   Estimated Creatinine Clearance: 94.6 mL/min (by C-G formula based on SCr of 0.78 mg/dL).  Results from last 7 days   Lab Units 07/11/25  0651 07/10/25  0619 07/09/25  0531 07/08/25  0630   ALBUMIN g/dL 2.4* 2.4* 2.4* 2.1*   BILIRUBIN mg/dL 0.8 1.0 0.8 0.9   ALK PHOS U/L 94 102 101 85   AST (SGOT) U/L 24 28 26 23   ALT (SGPT) U/L 6 9 7 7     Results from last 7 days   Lab Units 07/11/25  0651 07/10/25  0619 07/09/25  0531 07/08/25  0630   CALCIUM mg/dL 8.4* 8.4* 8.3* 8.0*   ALBUMIN g/dL 2.4* 2.4* 2.4* 2.1*   MAGNESIUM mg/dL 1.7  --   --   --    PHOSPHORUS mg/dL 3.1  --   --   --        COVID19   Date Value Ref Range Status   10/13/2024 Not Detected Not Detected - Ref. Range Final     Glucose   Date/Time Value Ref Range Status   07/09/2025 0555 125 70 - 130 mg/dL Final       MRI Abdomen With & Without Contrast  MRI ABDOMEN W WO CONTRAST-     HISTORY: 79-year-old male with progressive cholangiocarcinoma. Liver  vascular ultrasound showed thrombus within the right and left portal  veins and large liver mass compressing the main hepatic vein.     TECHNIQUE: Routine liver MRI was performed without and with MultiHance  IV contrast. Compared with CT abdomen 07/08/2025.     FINDINGS: There is very extensive breathing artifact. The technologist  noted best possible images. Patient is confused and was unable to hold  still.  In the future if needed, multiphase CT of the abdomen for evaluation of  the portal and hepatic veins is recommended as opposed to MRI which  requires significantly longer breath-hold for image acquisition of each  and every separate series.     -The superior aspect of the dominant 6.8 x 6.2 cm right hepatic lobe  mass extends to the middle hepatic vein which may be partially  thrombosed as seen on series 1103 image 32. The mass does not extend to  the hepatic venous confluence or the IVC. The right hepatic vein extends  to the malignancy as well, but there is no  definite tumor thrombus.  There is no involvement of the hepatic portal venous confluence or IVC.     -Nonocclusive thrombus within an adjacent right portal vein is seen on  image 44.        This report was finalized on 7/11/2025 9:23 AM by Dr. Nerissa Landa M.D on  Workstation: [id number]       Scheduled Medications  doxycycline, 50 mg, Oral, Q12H  enoxaparin sodium, 40 mg, Subcutaneous, Q24H  furosemide, 20 mg, Oral, Daily  gabapentin, 600 mg, Oral, Nightly  lactulose, 20 g, Oral, TID  Lotilaner, 1 drop, Both Eyes, BID  melatonin, 2.5 mg, Oral, Nightly  metFORMIN, 1,000 mg, Oral, Daily With Breakfast  midodrine, 5 mg, Oral, TID AC  OLANZapine, 5 mg, Oral, Nightly  oxyCODONE, 10 mg, Oral, BID  pantoprazole, 40 mg, Oral, Q AM  pilocarpine, 5 mg, Oral, Q8H  sennosides-docusate, 2 tablet, Oral, BID  spironolactone, 50 mg, Oral, Daily    Infusions   Diet  Diet: Cardiac; Healthy Heart (2-3 Na+); Fluid Consistency: Thin (IDDSI 0)       Assessment/Plan     Active Hospital Problems    Diagnosis  POA    **Hepatic encephalopathy [K76.82]  Yes    Cholangiocarcinoma [C22.1]  Yes    Diabetes mellitus without complication [E11.9]  Yes    Mixed hyperlipidemia [E78.2]  Yes    Primary hypertension [I10]  Yes    Rheumatoid arthritis [M06.9]  Yes      Resolved Hospital Problems   No resolved problems to display.       79 y.o. male admitted with Hepatic encephalopathy.    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 Encephalopathy is waxing and waning. Started on lasix/spironolactone as well as midodrine this admission. Currently on lactulose titrated to 3-4 soft bowel movements/day. He was on rifaximin but this  was stopped by palliative care as the patient didn't think it was helping.   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 Down a bit to 65k  Intrahepatic cholangiocarcinoma-evidence of mild progression on imaging this admission.  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 Aguilar and he is interested in home hospice and so a referral has been placed. The patient is apparently requesting an additional paracentesis and I'll go ahead and order this.  Lovenox 40 mg SC daily for DVT prophylaxis.  Full code.  Discussed with patient, spouse, and nursing staff.  Anticipate discharge home with family when cleared by consultants.      Lonnie Jennings MD  [facility]  07/11/25  13:51 EDT

## 2025-07-11 NOTE — CONSULTS
[facility]   Inpatient Palliative Care Consultation  Patient Name: Gay Vazquez   Patient : 1945   Date: 2025   Reason for Consultation: assistance with clarification of goals of care  Inpatient Palliative Care Team Consult  Consult performed by: Monserrat Aguilar DO  Consult ordered by: Eyad Grace Jr., MD            Chief Complaint: acute abdominal distention and tenderness, decreased urination and increasing confusion     Information obtained from: patient, spouse/SO, relative(s), and past medical records    Summary of Palliative Illness and Palliative Assessment: Mr. Gay Vazquez is a 79-year-old male with a primary palliative diagnosis of intrahepatic cholangiocarcinoma with new mild progression complicated by cancer related pain, cirrhosis complicated by hepatic encephalopathy, portal vein thrombosis and thrombocytopenia with fluctuation, and rheumatoid arthritis with unrelated type 2 diabetes hypertension hyperlipidemia and GERD.  During this hospital stay patient has undergone paracentesis which was negative for SBP.  Unfortunately his encephalopathy is waxing and waning even with Lasix and spironolactone and midodrine.  His lactulose is being titrated and he was also started on rifaximin.  Oncology is also following the patient.  Patient was previously on MS Contin and there was concern that this could be accumulating in the setting of his hepatic dysfunction. He was therefore put on Oxy 10mg q4hr prn. His last opioid dose was at 0901 yesterday, and I am concerned for opioid withdrawal.  Oncologist had a discussion regarding patient's decline in the hospital and his wife stated that he would not want resuscitation or intubation, therefore palliative was consulted to discuss further goals of care.    Wife is in agreement of starting scheduled oxycodone to prevent opioid withdrawal as patient has not received any PRNs and has not had any MS Contin since 9 AM yesterday.      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      Symptom Assessment: Abdominal pain and discomfort, confusion, restlessness      Discussion of Patient/Family Treatment Preferences and Goals of Care: There was a voluntary discussion of advance planning and goals of care discussion. The following were present for the visit: patient and wife, son Marcus, and nohemy in-laws.     Summary of Discussion:  Introduced palliative care as an extra layer of support for patients with serious illnesses and their families.  Asked patient what he understood about his health issues and he mainly focused on his rheumatoid arthritis and osteoarthritis and pain.  He did briefly mention that he had a nodule in his liver but was unable to clarify what this might mean or indicate for his health.  His wife further shared that the oncology team had said that there were very limited options and had referred to palliative.  We discussed then that they likely had meant hospice care which is a specific type of palliative care.counseled the patient/family about hospice care and its philosophy focusing on comfort, safety and family support.  I discussed where hospice can be provided. Discussed transitioning to a comfort only plan of care here in the hospital while working toward a hospice disposition.  I discussed what a comfort only plan of care entails.  Included in this discussion was discontinuation of antibiotics, d/c iv fluids and d/c lab draws/vital sign monitoring.  Discussed use of opioids such as oxycodone to help manage symptoms of abdominal pain and shortness of breath.  Discussed that some of the comfort medications can cause sedation, but that we are now less concerned about the possibility of increased sedation while seeking the goal of comfort.  Family inquired about liberalizing his salt in his diet as he has not been eating very much per nursing about 50% and per wife he only took 3  bites of his donut she brought in from home.  Discussed that given his ascites that increasing his salt intake was likely to worsen his edema and may shorten his time left.  We discussed that it depended on what was most important to the patient and the family and we could support them with a higher salt intake diet if they chose. i outlined the components of the hospice insurance benefit, including care team, medications utilized for comfort, and durable medical equipment. I reviewed the general levels of service a hospice can provide, and the settings in which hospice can be provided- namely home, nursing home, and residential settings.Discussed eating for pleasure vs eating for sustenance. Pt/family is in agreement with same.  Marcus inquired if a paracentesis was considered life-prolonging and we shared that it was actually viewed as symptom management and something that hospice would continue to support.  However as the patient and family are desiring to return home we discussed the potential placement of an indwelling catheter.  Family was interested in this and we discussed that we could work with IR to have one placed prior to discharge.  Wife who is the surrogate decision maker requested EMS DNR and MOST form completed.  These were completed and scanned into the chart and the originals are to be returned to the family upon patient's discharge.        Review of Systems: Review of Systems -Limited secondary to hepatic encephalopathy      Past Medical and Surgical History:    Past Medical History:   Diagnosis Date    Arthritis of neck     Cancer     Liver    Colon polyp     Diabetes mellitus     Erectile dysfunction 2/22    GERD (gastroesophageal reflux disease) 1/14    Hip arthrosis     Hyperlipidemia     Hypertension     Kidney stone     Liver cancer 07-    Liver disease     Low back pain     Postoperative wound infection     RA (rheumatoid arthritis)     Urinary tract infection      Visual impairment       Past Surgical History:   Procedure Laterality Date    ABDOMINAL SURGERY      BACK SURGERY      CARDIAC CATHETERIZATION      CARDIAC SURGERY  1995    CHOLECYSTECTOMY      COLONOSCOPY  2017    [facility]    COLONOSCOPY N/A 06/04/2024    Procedure: COLONOSCOPY INTO CECUM WITH POLYPECTOMIES (COLD SNARE);  Surgeon: Angel Ness MD;  Location:  [facility];  Service: General;  Laterality: N/A;  PRE: COLON MASS, ABNORMAL CT  POST: DIVERTICULOSIS, POLYPS, POOR PREP    ENDOSCOPY N/A 06/04/2024    Procedure: ESOPHAGOGASTRODUODENOSCOPY;  Surgeon: Angel Ness MD;  Location:  [facility];  Service: General;  Laterality: N/A;  PRE: LIVER MASS  POST: NORMAL EGD    EYE SURGERY      JOINT REPLACEMENT      PERICARDIUM SURGERY      REPLACEMENT TOTAL KNEE BILATERAL      SPINE SURGERY      THORACOTOMY Bilateral 1997    TRIGGER POINT INJECTION      Back    VASECTOMY      VENOUS ACCESS DEVICE (PORT) INSERTION Right 07/30/2024    Procedure: INSERTION VENOUS ACCESS DEVICE;  Surgeon: Angel Ness MD;  Location: [facility] MAIN OR;  Service: General;  Laterality: Right;          Palliative Care Psychosocial and Spiritual Screening    Living situation with wife, Self-identifed support system: Family and his son lives next-door, and Employment / education: Retired EMS visor    No spiritual concerns identified      Physical Assessment:   /75 (BP Location: Right arm, Patient Position: Lying)   Pulse 78   Temp 98 °F (36.7 °C) (Oral)   Resp 18   Wt 85.7 kg (188 lb 15 oz)   SpO2 96%   BMI 25.62 kg/m²    Palliative Performance Scale: Performance 40% based on the following measures: Ambulation: Mainly in bed, Activity and Evidence of Disease: Unable to do any work, extensive evidence of disease, Self-Care: Mainly assistance required,  Intake: Normal or reduced, LOC: Full, drowsy or confusion  Physical Exam   Elderly appearing and chronically ill male who is seated  in the bedside chair, he requires 2 person assist to rise from chair and 3 to assist him with his walker use in order to stand and pivot, and shuffle 3 or 4 steps to the bed.   NCAT, anicteric sclera, eyes without discharge, no nasal congestion, mucous membranes are dry and clear  Heart is regular rate and rhythm with radial pulses 2+  Lungs are clear to auscultation  Abdomen is soft and quite distended with ascites, bowel sounds are present  There is trace edema to the bilateral lower extremities  Skin is warm and dry there is no jaundice  Patient is awake and interactive and is able to answer some questions but is unable to fully comprehend the gravity of medical decision making at this time it is therefore nondecisional  Patient is very confused and keeps talking about going to the grocery store to buy groceries with his wife and that he needs to get up he is restless and muttering to himself while the goals of care conversation are ongoing      Medications:    Current Facility-Administered Medications:     sennosides-docusate (PERICOLACE) 8.6-50 MG per tablet 2 tablet, 2 tablet, Oral, BID PRN **AND** polyethylene glycol (MIRALAX) packet 17 g, 17 g, Oral, Daily PRN **AND** bisacodyl (DULCOLAX) EC tablet 5 mg, 5 mg, Oral, Daily PRN **AND** bisacodyl (DULCOLAX) suppository 10 mg, 10 mg, Rectal, Daily PRN, Ana Malin MD    doxycycline (VIBRAMYCIN) oral suspension 50 mg, 50 mg, Oral, Q12H, Ramon Jarrett MD, 50 mg at 07/10/25 2100    enoxaparin sodium (LOVENOX) syringe 40 mg, 40 mg, Subcutaneous, Q24H, Eyad Grace Jr., MD, 40 mg at 07/10/25 1805    furosemide (LASIX) tablet 20 mg, 20 mg, Oral, Daily, Lonnie Jennings MD, 20 mg at 07/10/25 0901    gabapentin (NEURONTIN) capsule 600 mg, 600 mg, Oral, Nightly, Ramon Jarrett MD, 600 mg at 07/10/25 2100    lactulose (CHRONULAC) 10 GM/15ML solution 20 g, 20 g, Oral, TID, Lonnie Jennings MD, 20 g at 07/10/25 0857    Lotilaner 0.25 % solution 1 drop, 1 drop, Both  Eyes, BID, Rosa Elena Valentin, APRN, 1 drop at 07/10/25 2101    melatonin tablet 2.5 mg, 2.5 mg, Oral, Nightly, Ana Malin MD, 2.5 mg at 07/10/25 2102    metFORMIN (GLUCOPHAGE) tablet 1,000 mg, 1,000 mg, Oral, Daily With Breakfast, Ramon Jarrett MD, 1,000 mg at 07/10/25 0901    midodrine (PROAMATINE) tablet 5 mg, 5 mg, Oral, TID AC, Lonnie Jennings MD, 5 mg at 07/11/25 0635    OLANZapine (zyPREXA) tablet 5 mg, 5 mg, Oral, Nightly, Ana Malin MD, 5 mg at 07/10/25 2102    ondansetron ODT (ZOFRAN-ODT) disintegrating tablet 4 mg, 4 mg, Oral, Q6H PRN **OR** ondansetron (ZOFRAN) injection 4 mg, 4 mg, Intravenous, Q6H PRN, Ana Malin MD    oxyCODONE (ROXICODONE) immediate release tablet 10 mg, 10 mg, Oral, Q4H PRN, Ana Malin MD    pantoprazole (PROTONIX) EC tablet 40 mg, 40 mg, Oral, Q AM, Ana Malin MD, 40 mg at 07/11/25 0550    pilocarpine (SALAGEN) tablet 5 mg, 5 mg, Oral, Q8H, Ana Malin MD, 5 mg at 07/11/25 0550    riFAXIMin (XIFAXAN) tablet 550 mg, 550 mg, Oral, Q12H, Ana Malin MD, 550 mg at 07/10/25 2102    sennosides-docusate (PERICOLACE) 8.6-50 MG per tablet 2 tablet, 2 tablet, Oral, BID, Ana Malin MD, 2 tablet at 07/09/25 2022    [COMPLETED] Insert Peripheral IV, , , Once **AND** sodium chloride 0.9 % flush 10 mL, 10 mL, Intravenous, PRN, Michael Sherwood MD    spironolactone (ALDACTONE) tablet 50 mg, 50 mg, Oral, Daily, Lonnie Jennings MD, 50 mg at 07/10/25 0901    Facility-Administered Medications Ordered in Other Encounters:     heparin injection 500 Units, 500 Units, Intravenous, PRN, Anthony Tejada MD, 500 Units at 09/13/24 1037    sodium chloride 0.9 % flush 10 mL, 10 mL, Intravenous, PRN, Anthony Tejada MD, 10 mL at 09/13/24 1037     Allergies:   Allergies   Allergen Reactions    Chlorhexidine Dermatitis     Please use alcohol to clean port site          Data (labs/images reviewed):   WBC   Date Value  Ref Range Status   07/11/2025 4.48 3.40 - 10.80 10*3/mm3 Final     RBC   Date Value Ref Range Status   07/11/2025 4.00 (L) 4.14 - 5.80 10*6/mm3 Final     Hemoglobin   Date Value Ref Range Status   07/11/2025 12.1 (L) 13.0 - 17.7 g/dL Final     Hematocrit   Date Value Ref Range Status   07/11/2025 37.2 (L) 37.5 - 51.0 % Final     MCV   Date Value Ref Range Status   07/11/2025 93.0 79.0 - 97.0 fL Final     MCH   Date Value Ref Range Status   07/11/2025 30.3 26.6 - 33.0 pg Final     MCHC   Date Value Ref Range Status   07/11/2025 32.5 31.5 - 35.7 g/dL Final     RDW   Date Value Ref Range Status   07/11/2025 13.1 12.3 - 15.4 % Final     RDW-SD   Date Value Ref Range Status   07/11/2025 44.6 37.0 - 54.0 fl Final     MPV   Date Value Ref Range Status   07/11/2025 9.8 6.0 - 12.0 fL Final     Platelets   Date Value Ref Range Status   07/11/2025 65 (L) 140 - 450 10*3/mm3 Final     Neutrophil %   Date Value Ref Range Status   07/09/2025 68.2 42.7 - 76.0 % Final     Lymphocyte %   Date Value Ref Range Status   07/09/2025 19.5 (L) 19.6 - 45.3 % Final     Monocyte %   Date Value Ref Range Status   07/09/2025 10.0 5.0 - 12.0 % Final     Eosinophil %   Date Value Ref Range Status   07/09/2025 1.4 0.3 - 6.2 % Final     Basophil %   Date Value Ref Range Status   07/09/2025 0.6 0.0 - 1.5 % Final     Immature Grans %   Date Value Ref Range Status   07/09/2025 0.3 0.0 - 0.5 % Final     Neutrophils, Absolute   Date Value Ref Range Status   07/09/2025 4.31 1.70 - 7.00 10*3/mm3 Final     Lymphocytes, Absolute   Date Value Ref Range Status   07/09/2025 1.23 0.70 - 3.10 10*3/mm3 Final     Monocytes, Absolute   Date Value Ref Range Status   07/09/2025 0.63 0.10 - 0.90 10*3/mm3 Final     Eosinophils, Absolute   Date Value Ref Range Status   07/09/2025 0.09 0.00 - 0.40 10*3/mm3 Final     Basophils, Absolute   Date Value Ref Range Status   07/09/2025 0.04 0.00 - 0.20 10*3/mm3 Final     Immature Grans, Absolute   Date Value Ref Range Status    07/09/2025 0.02 0.00 - 0.05 10*3/mm3 Final        Lab Results   Component Value Date    GLUCOSE 100 (H) 07/11/2025    BUN 10.0 07/11/2025    CREATININE 0.78 07/11/2025     07/11/2025    K 3.6 07/11/2025     07/11/2025    CALCIUM 8.4 (L) 07/11/2025    PROTEINTOT 6.5 07/11/2025    ALBUMIN 2.4 (L) 07/11/2025    ALT 6 07/11/2025    AST 24 07/11/2025    ALKPHOS 94 07/11/2025    BILITOT 0.8 07/11/2025    GLOB 4.1 07/11/2025    AGRATIO 0.6 07/11/2025    BCR 12.8 07/11/2025    ANIONGAP 7.3 07/11/2025    EGFR 91.3 07/11/2025        MRI Abdomen With & Without Contrast  MRI ABDOMEN W WO CONTRAST-     HISTORY: 79-year-old male with progressive cholangiocarcinoma. Liver  vascular ultrasound showed thrombus within the right and left portal  veins and large liver mass compressing the main hepatic vein.     TECHNIQUE: Routine liver MRI was performed without and with MultiHance  IV contrast. Compared with CT abdomen 07/08/2025.     FINDINGS:  There is very extensive breathing artifact. The technologist noted best  possible images. Patient is confused and was unable to hold still.  In the future if needed, multiphase CT of the abdomen for evaluation of  the portal and hepatic veins is recommended as opposed to MRI which  requires significantly longer breath-hold for image acquisition of each  and every separate series.     The superior aspect of the dominant 6.8 x 6.2 cm right hepatic lobe mass  extends to the middle hepatic vein which may be partially thrombosed as  seen on series 1103 image 32. The mass does not extend to the hepatic  venous confluence or the IVC. The right hepatic vein extends to the  malignancy as well, but there is no definite tumor thrombus. There is no  involvement of the hepatic portal venous confluence or IVC.     Nonocclusive thrombus within an adjacent right portal vein is seen on  image 44.           Ammonia level has trended down from 85-50 as of last night  Platelets this morning were 65K,  chronic anemia appears stable around 12ish admission  Significant hypoalbuminemia at 2.4, calcium corrects to normal, bilirubin is within normal limits at 0.8 and normal ALT and AST and alk phos, he appears to have good renal function with BUN of 10 and creatinine of 0.78  mRI of abdomen obtained yesterday        Palliative Care Assessment and Recommendations: Gay Vazquez is a 79 y.o. male with a primary palliative care diagnosis of progressive intrahepatic cholangiocarcinoma complicated by cirrhosis, thrombocytopenia, hepatic encephalopathy, cancer related pain, and portal vein thrombosis. Palliative care was consulted for assistance in clarifying goals of care.       Gay Vazquez is a 79 y.o. male with a primary palliative care diagnosis of progressive intrahepatic cholangiocarcinoma complicated by cirrhosis, thrombocytopenia, hepatic encephalopathy, cancer related pain, and portal vein thrombosis.  Prognosis is less than 6 months should these diseases run their natural course and he is therefore eligible for hospice care and goals do align.       Prognosis and Palliative Performance Scale:  Performance 40% based on the following measures: Ambulation: Mainly in bed, Activity and Evidence of Disease: Unable to do any work, extensive evidence of disease, Self-Care: Mainly assistance required,  Intake: Normal or reduced, LOC: Full, drowsy or confusion  Disease State: Deteriorating despite treatments  Symptom Management:   Pain: currently on oxy 10mg q 4 prn - however pt previously receiving 30 OME daily - will therefore schedule oxy 10mg q 12 hr and keep the prn order to prevent withdrawal.   Shortness of Breath: Opioids as above  Constipation: Titrate lactulose to have 3-4 soft bowel movements daily  Other Palliative Symptoms: Ascites; primary team is ordering another paracentesis.  I called and spoke with the radiology department who reports that they are unable to place a tunneled catheter to day and the earliest  that they could do that would be Tuesday or the patient could return as an outpatient.  I shared that the family reports that it is very difficult to get the patient in and out of the house and that they would likely prefer to have this procedure done while he is in inpatient.   Goals of Care Treatment Preferences   Palliative Care Decision Making Capacity: unreliable  Healthcare Surrogate: Per legal succession -wife Paula Vazquez  What is Most important to patient/family at this time: Quality of life and comfort  Code Status DNR  Other Considerations regarding life sustaining treatments:   Advance Directives Present or Completed: EMS DNR and MOST form  Other Recommendations: Continue with most current medications with the exception of rifaximin as family does not feel that this has helped his encephalopathy at all and in fact feels that he is much more confused now  Follow up: Hospice will continue to follow along as patient does not currently qualify for inpatient admission and the patient desires to be at home  Discussed plan with: MD, patient, and family      I spent a total of 95 minutes today caring for patient including reviewing records, speaking with patient/family and collaborating with care team.     I spent from 1040 to 1130 in advance care planning discussing the above goals of care (total time 50 minutes)      Thank you for consulting palliative care. If you need to reach the provider on call for the palliative care team please call [phone]      Monserrat Aguilar DO  7/11/2025 08:49 EDT

## 2025-07-12 LAB
ALBUMIN SERPL-MCNC: 2.4 G/DL (ref 3.5–5.2)
ALBUMIN/GLOB SERPL: 0.6 G/DL
ALP SERPL-CCNC: 94 U/L (ref 39–117)
ALT SERPL W P-5'-P-CCNC: 7 U/L (ref 1–41)
ANION GAP SERPL CALCULATED.3IONS-SCNC: 13.1 MMOL/L (ref 5–15)
AST SERPL-CCNC: 29 U/L (ref 1–40)
BACTERIA FLD CULT: NORMAL
BACTERIA SPEC ANAEROBE CULT: NORMAL
BILIRUB SERPL-MCNC: 0.8 MG/DL (ref 0–1.2)
BUN SERPL-MCNC: 13 MG/DL (ref 8–23)
BUN/CREAT SERPL: 12.6 (ref 7–25)
CALCIUM SPEC-SCNC: 8.1 MG/DL (ref 8.6–10.5)
CHLORIDE SERPL-SCNC: 99 MMOL/L (ref 98–107)
CO2 SERPL-SCNC: 21.9 MMOL/L (ref 22–29)
CREAT SERPL-MCNC: 1.03 MG/DL (ref 0.76–1.27)
DEPRECATED RDW RBC AUTO: 45.1 FL (ref 37–54)
EGFRCR SERPLBLD CKD-EPI 2021: 74.4 ML/MIN/1.73
ERYTHROCYTE [DISTWIDTH] IN BLOOD BY AUTOMATED COUNT: 13.4 % (ref 12.3–15.4)
GLOBULIN UR ELPH-MCNC: 4.2 GM/DL
GLUCOSE SERPL-MCNC: 147 MG/DL (ref 65–99)
GRAM STN SPEC: NORMAL
HCT VFR BLD AUTO: 38.6 % (ref 37.5–51)
HGB BLD-MCNC: 12.9 G/DL (ref 13–17.7)
MCH RBC QN AUTO: 30.8 PG (ref 26.6–33)
MCHC RBC AUTO-ENTMCNC: 33.4 G/DL (ref 31.5–35.7)
MCV RBC AUTO: 92.1 FL (ref 79–97)
PLATELET # BLD AUTO: 88 10*3/MM3 (ref 140–450)
PMV BLD AUTO: 10.3 FL (ref 6–12)
POTASSIUM SERPL-SCNC: 3.6 MMOL/L (ref 3.5–5.2)
PROT SERPL-MCNC: 6.6 G/DL (ref 6–8.5)
RBC # BLD AUTO: 4.19 10*6/MM3 (ref 4.14–5.8)
SODIUM SERPL-SCNC: 134 MMOL/L (ref 136–145)
WBC NRBC COR # BLD AUTO: 7.68 10*3/MM3 (ref 3.4–10.8)

## 2025-07-12 PROCEDURE — 80053 COMPREHEN METABOLIC PANEL: CPT | Performed by: STUDENT IN AN ORGANIZED HEALTH CARE EDUCATION/TRAINING PROGRAM

## 2025-07-12 PROCEDURE — 99232 SBSQ HOSP IP/OBS MODERATE 35: CPT | Performed by: STUDENT IN AN ORGANIZED HEALTH CARE EDUCATION/TRAINING PROGRAM

## 2025-07-12 PROCEDURE — 25010000002 ENOXAPARIN PER 10 MG: Performed by: INTERNAL MEDICINE

## 2025-07-12 PROCEDURE — 85027 COMPLETE CBC AUTOMATED: CPT | Performed by: STUDENT IN AN ORGANIZED HEALTH CARE EDUCATION/TRAINING PROGRAM

## 2025-07-12 RX ADMIN — SPIRONOLACTONE 50 MG: 25 TABLET ORAL at 08:33

## 2025-07-12 RX ADMIN — OXYCODONE HYDROCHLORIDE 10 MG: 10 TABLET ORAL at 21:58

## 2025-07-12 RX ADMIN — METFORMIN HYDROCHLORIDE 1000 MG: 1000 TABLET ORAL at 08:33

## 2025-07-12 RX ADMIN — FUROSEMIDE 20 MG: 20 TABLET ORAL at 08:33

## 2025-07-12 RX ADMIN — OLANZAPINE 5 MG: 5 TABLET, FILM COATED ORAL at 21:57

## 2025-07-12 RX ADMIN — OXYCODONE HYDROCHLORIDE 10 MG: 10 TABLET ORAL at 04:35

## 2025-07-12 RX ADMIN — PILOCARPINE HYROCHLORIDE 5 MG: 5 TABLET, FILM COATED ORAL at 04:37

## 2025-07-12 RX ADMIN — Medication 2.5 MG: at 21:57

## 2025-07-12 RX ADMIN — DOXYCYCLINE 50 MG: 25 FOR SUSPENSION ORAL at 08:33

## 2025-07-12 RX ADMIN — SENNOSIDES AND DOCUSATE SODIUM 2 TABLET: 50; 8.6 TABLET ORAL at 08:34

## 2025-07-12 RX ADMIN — GABAPENTIN 600 MG: 300 CAPSULE ORAL at 21:57

## 2025-07-12 RX ADMIN — ENOXAPARIN SODIUM 40 MG: 100 INJECTION SUBCUTANEOUS at 18:04

## 2025-07-12 RX ADMIN — OXYCODONE HYDROCHLORIDE 10 MG: 10 TABLET ORAL at 08:33

## 2025-07-12 RX ADMIN — PILOCARPINE HYROCHLORIDE 5 MG: 5 TABLET, FILM COATED ORAL at 15:19

## 2025-07-12 RX ADMIN — MIDODRINE HYDROCHLORIDE 5 MG: 5 TABLET ORAL at 11:36

## 2025-07-12 RX ADMIN — LACTULOSE SOLUTION USP, 10 G/15 ML 20 G: 10 SOLUTION ORAL; RECTAL at 15:19

## 2025-07-12 RX ADMIN — SENNOSIDES AND DOCUSATE SODIUM 2 TABLET: 50; 8.6 TABLET ORAL at 21:57

## 2025-07-12 RX ADMIN — LACTULOSE SOLUTION USP, 10 G/15 ML 20 G: 10 SOLUTION ORAL; RECTAL at 08:32

## 2025-07-12 RX ADMIN — LACTULOSE SOLUTION USP, 10 G/15 ML 20 G: 10 SOLUTION ORAL; RECTAL at 21:57

## 2025-07-12 RX ADMIN — MIDODRINE HYDROCHLORIDE 5 MG: 5 TABLET ORAL at 08:33

## 2025-07-12 RX ADMIN — DOXYCYCLINE 50 MG: 25 FOR SUSPENSION ORAL at 21:57

## 2025-07-12 RX ADMIN — PILOCARPINE HYROCHLORIDE 5 MG: 5 TABLET, FILM COATED ORAL at 21:59

## 2025-07-12 RX ADMIN — PANTOPRAZOLE SODIUM 40 MG: 40 TABLET, DELAYED RELEASE ORAL at 04:36

## 2025-07-12 RX ADMIN — MIDODRINE HYDROCHLORIDE 5 MG: 5 TABLET ORAL at 18:04

## 2025-07-12 NOTE — PROGRESS NOTES
[facility] INPATIENT PROGRESS NOTE    Length of Stay:  6 days    CHIEF COMPLAINT:  Cholangiocarcinoma, cirrhosis with portal hypertension and hepatic encephalopathy, cancer related pain, thrombocytopenia, rheumatoid arthritis    SUBJECTIVE:   Patient was accompanied by his family at the time of my visit.  He remains confused.  Family has decided to pursue hospice after placement of Pleurx catheter, likely discharge on Monday    ROS:  Review of Systems   Unable to obtain    OBJECTIVE:  Vitals:    07/11/25 1933 07/11/25 2325 07/12/25 0659 07/12/25 0740   BP: 103/73 103/81  101/78   BP Location: Left arm Left arm  Right arm   Patient Position: Lying Sitting  Lying   Pulse: 98 107  93   Resp: 16 16  16   Temp: 98.4 °F (36.9 °C) 99 °F (37.2 °C)  97.3 °F (36.3 °C)   TempSrc: Oral Oral  Oral   SpO2: 97% 97%  96%   Weight:   68.6 kg (151 lb 4.8 oz)          PHYSICAL EXAMINATION:  General: Chronically ill-appearing male.  Interactive, however remains confused  Chest/Lungs: Clear to auscultation bilaterally anteriorly  Heart: Regular rate and rhythm  Abdomen/GI: Soft, nontender, increasing abdominal distention with ascites  Extremities: Trace bilateral lower extremity edema    I have reexamined the patient and the results are consistent with the previously documented exam. Chrissy Venegas MD         DIAGNOSTIC DATA:  Results Review:     I reviewed the patient's new clinical results.    Results from last 7 days   Lab Units 07/12/25  0645 07/11/25  0651 07/10/25  0619   WBC 10*3/mm3 7.68 4.48 7.16   HEMOGLOBIN g/dL 12.9* 12.1* 12.5*   HEMATOCRIT % 38.6 37.2* 37.4*   PLATELETS 10*3/mm3 88* 65* 80*      Results from last 7 days   Lab Units 07/12/25  0645 07/11/25  0651 07/10/25  0619   SODIUM mmol/L 134* 136 135*   POTASSIUM mmol/L 3.6 3.6 3.8   CHLORIDE mmol/L 99 101 98   CO2 mmol/L 21.9* 27.7 26.5   BUN mg/dL 13.0 10.0 9.0   CREATININE mg/dL 1.03 0.78 0.94   CALCIUM mg/dL 8.1* 8.4* 8.4*   BILIRUBIN mg/dL 0.8 0.8  1.0   ALK PHOS U/L 94 94 102   ALT (SGPT) U/L 7 6 9   AST (SGOT) U/L 29 24 28   GLUCOSE mg/dL 147* 100* 130*      Lab Results   Component Value Date    NEUTROABS 4.31 07/09/2025     Results from last 7 days   Lab Units 07/08/25  0630 07/06/25  1520   INR  1.53* 1.43*   APTT seconds  --  39.4*     Results from last 7 days   Lab Units 07/11/25  0651   MAGNESIUM mg/dL 1.7           Assessment & Plan   ASSESSMENT/PLAN:  This is a 79 y.o. male with:     *Cirrhosis with portal hypertension, hepatic encephalopathy, ascites  Patient admitted on 7/6/2025 from the emergency department with abdominal distention, generalized weakness, confusion, reduced urine output.    Labs on 7/6/2025 showed hyponatremia with sodium 129, ammonia 85, INR 1.43, PTT 39.4.    CT head showed no acute findings.    CT abdomen pelvis without IV contrast showed small to moderate partially loculated right pleural effusion unchanged, questionable lymphadenopathy mediastinum (better seen on prior CT chest), largest hepatic mass increased from 8.3 x 5.8 on 1/16/2025 up to 9.5 x 7.1 cm with cirrhotic appearing liver, moderate to large ascites, prominent perisplenic and splenorenal varices.    Patient receiving lactulose 20 g 3 times daily and Xifaxan 550 mg every 12 hours for for hepatic encephalopathy.    Ultrasound-guided paracentesis on 7/7/2025 with 3850 cc removed, cytology pending  Ammonia level normalized on 7/9/2025 at 59.    Patient reaccumulating ascites, on 7/9/2025 initiated  Lasix 20 mg daily, spironolactone 50 mg daily.   Patient today with significant change in mental status.  He is very lethargic, somnolent, minimally arousable to answer yes or no questions.  We will send off repeat ammonia level today.  7/11/2025: Patient was interactive today, however remains confused.  He is oriented to self and place.  He is meeting with palliative care today     *Intrahepatic cholangiocarcinoma, multifocal:  7/5/24 MRI abd-heterogenous, large  peripherally enhancing mass withinthe posterior aspect of the right hepatic dome which is grossly unchanged to minimally increased in size since 5/10/2024. small peripheral enhancing and solid enhancing lesions are located within the surrounding right hepatic lobe and spread of malignancy is likely  7/18/24 CT liver biopsy-adenocarcinoma consistent with cholangiocarcinoma by IHC  CA 19-9 133  8/2/2024-initiated palliative treatment with cisplatin/Gemzar plus nivolumab; day 8 Gemzar given at 50% because of thrombocytopenia (60)  Cycle 2 of treatment was given with a dose reduction of gemcitabine 750 mg/m² and cisplatin same dose 25 mg/m² along with durvalumab.  Despite dose reduction he had intractable nausea vomiting acute kidney injury requiring IV fluids.  He did not receive day 8 cycle 2 of treatment because of side effects.  9/6/2024 CT abdomen liver protocol-stable 7.6 x 6 cm mass in the right lobe of the liver  Patient was seen in emergency department on 9/20/2024 with CT abdomen and pelvis that showed stable right liver mass 7.5 x 6 cm, loculated right pleural effusion with surrounding pleural thickening and calcification unchanged, no acute findings.  CT abdomen and pelvis on 10/10/2024 showed no change.  Patient was referred to Dr. Hopper at [facility], was seen on 9/26/2024.  Patient was not felt to be a good candidate for surgical intervention.  Recommendation to proceed with chemoembolization.   Patient did undergo TACE to dominant liver lesion in October 2024.    Patient has been followed since that time with no subsequent interventions.    CT chest abdomen pelvis most recently on 4/21/25 showed increased size of the dominant liver lesion at 9 cm and the low density liver lesion 1.3 cm.  CA 19 manage 9 however decreased to 51.1.  Recommended to repeat CT scan at 3-month interval.  Patient currently admitted with hepatic encephalopathy  CT abdomen pelvis without IV contrast on admission  7/6/2025 showed small to moderate partially loculated right pleural effusion unchanged, questionable lymphadenopathy mediastinum (better seen on prior CT chest), largest hepatic mass increased from 8.3 x 5.8 on 1/16/2025 up to 9.5 x 7.1 cm with cirrhotic appearing liver, moderate to large ascites, prominent perisplenic and splenorenal varices.  Ultrasound-guided paracentesis on 7/7/2025 with 3850 cc removed, cytology pending  CA 19-9 on 7/8/2025 was stable at 50  CT chest, abdomen, and pelvis with contrast on 7/8/2025 with chronic small-moderate loculated right pleural effusion and calcified pleural plaques stable.  No change in mildly enlarged mediastinal lymph nodes.  Increase in dominant right hepatic mass from 6.4 x 5.8 up to 6.8 x 6.2 cm.  Enlarging component anteriorly to this mass from 2.2 x 1.7 up to 2.6 x 2.2 cm.  Increase in medial nodule 1.6 x 1.2 up to 1.8 x 1.5 cm and increase an additional lesion from 1.3 x 1.0 up to 1.5 x 1.5 cm.  Increased lymphadenopathy at the estrellita hepatis from 2.0 x 1.2 up to 2.2 x 1.4 cm.  New free fluid in the abdomen and pelvis.  Main portal vein patent, right portal vein possibly with nonocclusive thrombus.  Stable splenomegaly, chronic pancreatitis changes.  Portal hepatic Doppler on 7/9/2025 with challenging study, mass in the right lobe of the liver compressing the main hepatic vein, unable to visualize the left or right hepatic veins.  Main portal vein was sluggish/slow flow with thrombus in the left and right portal veins.  Abdominal MRI pending  CA 19-9, stable at 50.  CT scan on 7/8/2025 did show evidence of mild progression in existing hepatic lesions.  Degree of liver involvement from malignancy has not increased significantly and volume of liver parenchyma involved has not changed enough to explain evolving cirrhosis.  Unclear whether the evolving cirrhosis could be related to prior TACE however this was directed to a very focal area of the liver.  Portal Doppler on  7/9/2025 did show compromised flow from the mass lesion in the main hepatic vein as well as evidence of thrombus in the left and right portal veins which may be contributing to the patient's hepatic dysfunction.  Treatment options for his disease are limited, additional local therapy options could be explored however must take into account his evolving cirrhotic picture.  Await MRI abdomen result to further assess relationship between the large hepatic mass and vasculature and can then discuss whether any additional local therapy options to this lesion would be possible.  The patient today however has had a significant clinical change, is very somnolent and lethargic, minimally arousable.  We are rechecking an ammonia level.  I had a lengthy discussion with the patient's wife at the bedside today.  We discussed his multiple comorbidities including his underlying malignancy, progressive hepatic dysfunction.  She does note that he would want to be DNR/DNI and we will make this change.  We discussed potential pursuit of a palliative/supportive care approach overall.  She was interested to discuss this further with palliative care and we will place a consult.  She would like for her son to be present at that meeting.  We will continue to follow his clinical course and make ongoing decisions regarding his direction of care accordingly.  7/11/2025: Reviewed results of MRI abdomen with without contrast-Superior aspect of dominant 6.8 x 6.2 cm right hepatic lobe mass extends to middle hepatic vein which may be partially thrombosed.  Mass does not extend to hepatic venous confluence or IVC.  Right hepatic vein extends to malignancy as well.  No definite tumor thrombus.  No involvement of hepatic portal venous confluence or IVC.  Nonocclusive thrombus within adjacent right portal vein.    *Portal vein thrombus  CT 7/8/2025 with main portal vein patent, right portal vein possibly with nonocclusive thrombus  Portal hepatic  Doppler on 7/9/2025 with challenging study, mass in the right lobe of the liver compressing the main hepatic vein, unable to visualize the left or right hepatic veins.  Main portal vein was sluggish/slow flow with thrombus in the left and right portal veins.  MRI abdomen pending  Anticoagulation difficult in light of elevated INR from cirrhosis and chronic thrombocytopenia with increased bleeding risk.  Nevertheless, thrombosis may be contributing to the patient's hepatic dysfunction.    On 7/9/2025 initiated low-dose Lovenox 40 mg daily.       *Cancer related pain  Patient with significant underlying cancer related pain receiving MS Contin 15 mg every 12 hours, oxycodone 10 mg every 4 hours as needed.    There was discussion at visit on 5/12/2025 regarding potential tapering off of MS Contin.  Unclear whether MS Contin may be accumulating in the setting of hepatic dysfunction and we will stop the MS Contin for now.     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 as well as cirrhosis/portal hypertension and splenomegaly  Platelet count has been chronically in the 70-90,000 range platelet count currently at baseline  Platelet count currently remains at baseline  Platelet count today stable in prior baseline range at 80,000  7/11/2025: Platelets 65,000 today  7/12/2025 platelets 88,000     *RA  significant rheumatoid arthritis history with joint deviation, history of iritis, previous pericarditis requiring pericardial window  Patient previously treated with Simponi, held per rheumatology prior to initiation of chemotherapy/immunotherapy  Fortunately with prior immunotherapy patient did not develop flare of rheumatoid arthritis     *History of pericarditis  Patient did have previous pericarditis that required a  pericardial window.     *Additional comorbidities-diabetes mellitus, hyperlipidemia, hypertension, tobacco abuse, BPH     *GERD  Protonix 40 mg daily     *Constipation  Sarika-Colace 2 tablets twice daily      *CODE STATUS   DNR/DNI-Dr. Grace discussed that with patient's family    PLAN:   Patient and family met with palliative and [facility]-plan to be discharged home on Monday after Pleurx drain placement.  Continue Lovenox 40 mg daily initially for portal vein thrombus  Ascitic fluid cytology  from 7/7/2025-negative for malignancy  Nothing else to add from oncology standpoint.  We will sign off         Chrissy Venegas MD

## 2025-07-12 NOTE — PROGRESS NOTES
Name: Gay Vazquez ADMIT: 2025   : 1945  PCP: Gloria Post MD    MRN: [id number] LOS: 6 days   AGE/SEX: 79 y.o. male  ROOM: [facility]     Subjective   Subjective     No events overnight. 2.1L were removed yesterday with paracentesis. Pleurx catheter placement is planned for Monday. His family is at bedside.       Objective   Objective   Vital Signs  Temp:  [97.3 °F (36.3 °C)-99 °F (37.2 °C)] 97.3 °F (36.3 °C)  Heart Rate:  [] 93  Resp:  [16-17] 16  BP: (101-122)/(65-81) 101/78  SpO2:  [94 %-97 %] 96 %  on   ;   Device (Oxygen Therapy): room air  Body mass index is 20.52 kg/m².  Physical Exam  Constitutional:       General: He is not in acute distress.     Appearance: He is not toxic-appearing.   Cardiovascular:      Rate and Rhythm: Normal rate and regular rhythm.      Heart sounds: Normal heart sounds.   Pulmonary:      Effort: Pulmonary effort is normal.      Breath sounds: Normal breath sounds.   Abdominal:      General: Bowel sounds are normal. There is no distension.      Palpations: Abdomen is soft.      Tenderness: There is no abdominal tenderness. There is no guarding or rebound.   Musculoskeletal:         General: No tenderness.      Right lower leg: No edema.      Left lower leg: No edema.   Neurological:      General: No focal deficit present.      Mental Status: He is alert. He is disoriented.   Psychiatric:         Mood and Affect: Mood normal.         Behavior: Behavior normal.         Results Review     I reviewed the patient's new clinical results.  Results from last 7 days   Lab Units 25  0645 25  0651 07/10/25  0619 25  0531   WBC 10*3/mm3 7.68 4.48 7.16 6.32   HEMOGLOBIN g/dL 12.9* 12.1* 12.5* 12.4*   PLATELETS 10*3/mm3 88* 65* 80* 82*     Results from last 7 days   Lab Units 25  0645 25  0651 07/10/25  0619 25  0531   SODIUM mmol/L 134* 136 135* 134*   POTASSIUM mmol/L 3.6 3.6 3.8 3.6   CHLORIDE mmol/L 99 101 98 97*   CO2 mmol/L 21.9*  "27.7 26.5 26.0   BUN mg/dL 13.0 10.0 9.0 9.0   CREATININE mg/dL 1.03 0.78 0.94 0.80   GLUCOSE mg/dL 147* 100* 130* 133*   Estimated Creatinine Clearance: 57.4 mL/min (by C-G formula based on SCr of 1.03 mg/dL).  Results from last 7 days   Lab Units 07/12/25  0645 07/11/25  0651 07/10/25  0619 07/09/25  0531   ALBUMIN g/dL 2.4* 2.4* 2.4* 2.4*   BILIRUBIN mg/dL 0.8 0.8 1.0 0.8   ALK PHOS U/L 94 94 102 101   AST (SGOT) U/L 29 24 28 26   ALT (SGPT) U/L 7 6 9 7     Results from last 7 days   Lab Units 07/12/25 0645 07/11/25 0651 07/10/25  0619 07/09/25  0531   CALCIUM mg/dL 8.1* 8.4* 8.4* 8.3*   ALBUMIN g/dL 2.4* 2.4* 2.4* 2.4*   MAGNESIUM mg/dL  --  1.7  --   --    PHOSPHORUS mg/dL  --  3.1  --   --        COVID19   Date Value Ref Range Status   10/13/2024 Not Detected Not Detected - Ref. Range Final     No results found for: \"HGBA1C\", \"POCGLU\"      US Paracentesis  Narrative: ULTRASOUND GUIDED PARACENTESIS     HISTORY: Ascites     The risks and benefits of the procedure were discussed, and informed  consent was obtained. Ultrasound of the abdomen was performed. The right  upper quadrant was prepped and draped in the usual sterile fashion with  2% Chlorhexidine. 1% lidocaine was administered for local anesthesia.         Next, under ultrasound guidance, a 5 Maltese paracentesis catheter was  inserted into the right upper quadrant. 2.1 L of uli-colored ascites  was removed.        Patient tolerated the procedure well without immediate complications.     Impression: Technically successful ultrasound guided paracentesis     This report was finalized on 7/11/2025 4:17 PM by Dr. Hosea Barton M.D on Workstation: [id number]     MRI Abdomen With & Without Contrast  MRI ABDOMEN W WO CONTRAST-     HISTORY: 79-year-old male with progressive cholangiocarcinoma. Liver  vascular ultrasound showed thrombus within the right and left portal  veins and large liver mass compressing the main hepatic vein.     TECHNIQUE: Routine " liver MRI was performed without and with MultiHance  IV contrast. Compared with CT abdomen 07/08/2025.     FINDINGS: There is very extensive breathing artifact. The technologist  noted best possible images. Patient is confused and was unable to hold  still.  In the future if needed, multiphase CT of the abdomen for evaluation of  the portal and hepatic veins is recommended as opposed to MRI which  requires significantly longer breath-hold for image acquisition of each  and every separate series.     -The superior aspect of the dominant 6.8 x 6.2 cm right hepatic lobe  mass extends to the middle hepatic vein which may be partially  thrombosed as seen on series 1103 image 32. The mass does not extend to  the hepatic venous confluence or the IVC. The right hepatic vein extends  to the malignancy as well, but there is no definite tumor thrombus.  There is no involvement of the hepatic portal venous confluence or IVC.     -Nonocclusive thrombus within an adjacent right portal vein is seen on  image 44.        This report was finalized on 7/11/2025 9:23 AM by Dr. Nerissa Landa M.D on  Workstation: [id number]       Scheduled Medications  doxycycline, 50 mg, Oral, Q12H  enoxaparin sodium, 40 mg, Subcutaneous, Q24H  furosemide, 20 mg, Oral, Daily  gabapentin, 600 mg, Oral, Nightly  lactulose, 20 g, Oral, TID  Lotilaner, 1 drop, Both Eyes, BID  melatonin, 2.5 mg, Oral, Nightly  metFORMIN, 1,000 mg, Oral, Daily With Breakfast  midodrine, 5 mg, Oral, TID AC  OLANZapine, 5 mg, Oral, Nightly  oxyCODONE, 10 mg, Oral, BID  pantoprazole, 40 mg, Oral, Q AM  pilocarpine, 5 mg, Oral, Q8H  sennosides-docusate, 2 tablet, Oral, BID  spironolactone, 50 mg, Oral, Daily    Infusions   Diet  Diet: Cardiac; Healthy Heart (2-3 Na+); Fluid Consistency: Thin (IDDSI 0)       Assessment/Plan     Active Hospital Problems    Diagnosis  POA    **Hepatic encephalopathy [K76.82]  Yes    Cholangiocarcinoma [C22.1]  Yes    Diabetes mellitus without  complication [E11.9]  Yes    Mixed hyperlipidemia [E78.2]  Yes    Primary hypertension [I10]  Yes    Rheumatoid arthritis [M06.9]  Yes      Resolved Hospital Problems   No resolved problems to display.       79 y.o. male admitted with Hepatic encephalopathy.    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 Encephalopathy is waxing and waning. Started on lasix/spironolactone as well as midodrine this admission. Currently on lactulose titrated to 3-4 soft bowel movements/day. He was on rifaximin but this was stopped by palliative care as the patient didn't think it was helping. Pleurx catheter placement into the peritoneum is planned for Monday so that his ascites fluid can be drained at home with hospice.  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   Intrahepatic cholangiocarcinoma-evidence of mild progression on imaging this admission.  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 As mentioned above, an abdominal drain will be placed on Monday so that his ascites can be more easily drained at home. Shortly thereafter, he will discharge home with hospice.  Lovenox 40 mg SC daily for DVT prophylaxis.  Full code.  Discussed with patient, spouse, and family.  Anticipate discharge home with hospice after placement of his abdominal drain      Lonnie Jennings MD  [facility]  07/12/25  13:47 EDT

## 2025-07-12 NOTE — PLAN OF CARE
Problem: Adult Inpatient Plan of Care  Goal: Plan of Care Review  Outcome: Progressing  Flowsheets (Taken 7/12/2025 0458)  Outcome Evaluation: Forgetful, reorientation needs, bed alarm, pain medication given per MAR and request, incontinent, call light within reach, asking to go home.  Goal: Patient-Specific Goal (Individualized)  Outcome: Progressing  Goal: Absence of Hospital-Acquired Illness or Injury  Outcome: Progressing  Intervention: Identify and Manage Fall Risk  Recent Flowsheet Documentation  Taken 7/12/2025 0400 by Rhea Elizondo RN  Safety Promotion/Fall Prevention:   safety round/check completed   room organization consistent   assistive device/personal items within reach   clutter free environment maintained  Taken 7/12/2025 0200 by Rhea Elizondo RN  Safety Promotion/Fall Prevention:   safety round/check completed   room organization consistent   assistive device/personal items within reach   clutter free environment maintained  Taken 7/12/2025 0000 by Rhea Elizondo RN  Safety Promotion/Fall Prevention:   safety round/check completed   toileting scheduled   assistive device/personal items within reach   clutter free environment maintained  Taken 7/11/2025 2200 by Rhea Elizondo RN  Safety Promotion/Fall Prevention:   safety round/check completed   room organization consistent   assistive device/personal items within reach   clutter free environment maintained  Taken 7/11/2025 2000 by Rhea Elizondo RN  Safety Promotion/Fall Prevention:   room organization consistent   safety round/check completed   fall prevention program maintained   clutter free environment maintained   assistive device/personal items within reach  Intervention: Prevent Skin Injury  Recent Flowsheet Documentation  Taken 7/12/2025 0400 by Rhea Elizondo RN  Body Position:   weight shifting   turned  Taken 7/12/2025 0200 by Rhea Elizondo RN  Body Position:   position changed independently   weight shifting   tilted  Taken  7/12/2025 0000 by Rhea Elizondo RN  Body Position:   position changed independently   weight shifting  Taken 7/11/2025 2200 by Rhea Elizondo RN  Body Position:   weight shifting   tilted  Taken 7/11/2025 2000 by Rhea Elizondo RN  Body Position:   weight shifting   position changed independently   tilted  Skin Protection:   incontinence pads utilized   transparent dressing maintained  Intervention: Prevent and Manage VTE (Venous Thromboembolism) Risk  Recent Flowsheet Documentation  Taken 7/11/2025 2000 by Rhea Elizondo RN  VTE Prevention/Management: (MAR) --  Intervention: Prevent Infection  Recent Flowsheet Documentation  Taken 7/12/2025 0400 by Rhea Elizondo RN  Infection Prevention:   single patient room provided   rest/sleep promoted   environmental surveillance performed  Taken 7/12/2025 0200 by Rhea Elizondo RN  Infection Prevention:   single patient room provided   rest/sleep promoted   environmental surveillance performed   hand hygiene promoted  Taken 7/12/2025 0000 by Rhea Elizondo RN  Infection Prevention:   single patient room provided   rest/sleep promoted   environmental surveillance performed  Taken 7/11/2025 2200 by Rhea Elizondo RN  Infection Prevention:   single patient room provided   rest/sleep promoted   environmental surveillance performed  Taken 7/11/2025 2000 by Rhea Elizondo RN  Infection Prevention:   single patient room provided   rest/sleep promoted   environmental surveillance performed  Goal: Optimal Comfort and Wellbeing  Outcome: Progressing  Goal: Readiness for Transition of Care  Outcome: Progressing   Goal Outcome Evaluation:              Outcome Evaluation: Forgetful, reorientation needs, bed alarm, pain medication given per MAR and request, incontinent, call light within reach, asking to go home.

## 2025-07-13 PROCEDURE — 25010000002 ENOXAPARIN PER 10 MG: Performed by: INTERNAL MEDICINE

## 2025-07-13 RX ADMIN — OLANZAPINE 5 MG: 5 TABLET, FILM COATED ORAL at 21:07

## 2025-07-13 RX ADMIN — MIDODRINE HYDROCHLORIDE 5 MG: 5 TABLET ORAL at 05:42

## 2025-07-13 RX ADMIN — OXYCODONE HYDROCHLORIDE 10 MG: 10 TABLET ORAL at 21:08

## 2025-07-13 RX ADMIN — PANTOPRAZOLE SODIUM 40 MG: 40 TABLET, DELAYED RELEASE ORAL at 05:42

## 2025-07-13 RX ADMIN — Medication 2.5 MG: at 21:07

## 2025-07-13 RX ADMIN — GABAPENTIN 600 MG: 300 CAPSULE ORAL at 21:08

## 2025-07-13 RX ADMIN — LACTULOSE SOLUTION USP, 10 G/15 ML 20 G: 10 SOLUTION ORAL; RECTAL at 21:07

## 2025-07-13 RX ADMIN — PILOCARPINE HYROCHLORIDE 5 MG: 5 TABLET, FILM COATED ORAL at 21:08

## 2025-07-13 RX ADMIN — SENNOSIDES AND DOCUSATE SODIUM 2 TABLET: 50; 8.6 TABLET ORAL at 08:09

## 2025-07-13 RX ADMIN — DOXYCYCLINE 50 MG: 25 FOR SUSPENSION ORAL at 08:09

## 2025-07-13 RX ADMIN — MIDODRINE HYDROCHLORIDE 5 MG: 5 TABLET ORAL at 18:01

## 2025-07-13 RX ADMIN — DOXYCYCLINE 50 MG: 25 FOR SUSPENSION ORAL at 21:07

## 2025-07-13 RX ADMIN — SPIRONOLACTONE 50 MG: 25 TABLET ORAL at 08:09

## 2025-07-13 RX ADMIN — PILOCARPINE HYROCHLORIDE 5 MG: 5 TABLET, FILM COATED ORAL at 05:42

## 2025-07-13 RX ADMIN — METFORMIN HYDROCHLORIDE 1000 MG: 1000 TABLET ORAL at 08:09

## 2025-07-13 RX ADMIN — LACTULOSE SOLUTION USP, 10 G/15 ML 20 G: 10 SOLUTION ORAL; RECTAL at 15:00

## 2025-07-13 RX ADMIN — MIDODRINE HYDROCHLORIDE 5 MG: 5 TABLET ORAL at 11:32

## 2025-07-13 RX ADMIN — LACTULOSE SOLUTION USP, 10 G/15 ML 20 G: 10 SOLUTION ORAL; RECTAL at 08:09

## 2025-07-13 RX ADMIN — SENNOSIDES AND DOCUSATE SODIUM 2 TABLET: 50; 8.6 TABLET ORAL at 21:07

## 2025-07-13 RX ADMIN — ENOXAPARIN SODIUM 40 MG: 100 INJECTION SUBCUTANEOUS at 18:01

## 2025-07-13 RX ADMIN — PILOCARPINE HYROCHLORIDE 5 MG: 5 TABLET, FILM COATED ORAL at 14:58

## 2025-07-13 RX ADMIN — OXYCODONE HYDROCHLORIDE 10 MG: 10 TABLET ORAL at 08:09

## 2025-07-13 NOTE — PROGRESS NOTES
Name: Gay Vazquez ADMIT: 2025   : 1945  PCP: Gloria Psot MD    MRN: [id number] LOS: 7 days   AGE/SEX: 79 y.o. male  ROOM: [facility]     Subjective   Subjective     No events overnight. No complaints today. No family at bedside.       Objective   Objective   Vital Signs  Temp:  [97.5 °F (36.4 °C)-98.2 °F (36.8 °C)] 98.1 °F (36.7 °C)  Heart Rate:  [84-94] 92  Resp:  [14-16] 16  BP: ()/(49-76) 82/49  SpO2:  [95 %] 95 %  on   ;   Device (Oxygen Therapy): room air  Body mass index is 20.52 kg/m².  Physical Exam  Constitutional:       General: He is not in acute distress.     Appearance: He is not toxic-appearing.   Cardiovascular:      Rate and Rhythm: Normal rate and regular rhythm.      Heart sounds: Normal heart sounds.   Pulmonary:      Effort: Pulmonary effort is normal.      Breath sounds: Normal breath sounds.   Abdominal:      General: Bowel sounds are normal. There is no distension.      Palpations: Abdomen is soft.      Tenderness: There is no abdominal tenderness. There is no guarding or rebound.   Musculoskeletal:         General: No tenderness.      Right lower leg: No edema.      Left lower leg: No edema.   Neurological:      General: No focal deficit present.      Mental Status: He is alert. He is disoriented.   Psychiatric:         Mood and Affect: Mood normal.         Behavior: Behavior normal.         Results Review     I reviewed the patient's new clinical results.  Results from last 7 days   Lab Units 25  0645 2551 07/10/25  0619 07/09/25  0531   WBC 10*3/mm3 7.68 4.48 7.16 6.32   HEMOGLOBIN g/dL 12.9* 12.1* 12.5* 12.4*   PLATELETS 10*3/mm3 88* 65* 80* 82*     Results from last 7 days   Lab Units 25  0645 25  0651 07/10/25  0619 25  0531   SODIUM mmol/L 134* 136 135* 134*   POTASSIUM mmol/L 3.6 3.6 3.8 3.6   CHLORIDE mmol/L 99 101 98 97*   CO2 mmol/L 21.9* 27.7 26.5 26.0   BUN mg/dL 13.0 10.0 9.0 9.0   CREATININE mg/dL 1.03 0.78 0.94 0.80  "  GLUCOSE mg/dL 147* 100* 130* 133*   Estimated Creatinine Clearance: 57.4 mL/min (by C-G formula based on SCr of 1.03 mg/dL).  Results from last 7 days   Lab Units 07/12/25  0645 07/11/25  0651 07/10/25  0619 07/09/25  0531   ALBUMIN g/dL 2.4* 2.4* 2.4* 2.4*   BILIRUBIN mg/dL 0.8 0.8 1.0 0.8   ALK PHOS U/L 94 94 102 101   AST (SGOT) U/L 29 24 28 26   ALT (SGPT) U/L 7 6 9 7     Results from last 7 days   Lab Units 07/12/25  0645 07/11/25  0651 07/10/25  0619 07/09/25  0531   CALCIUM mg/dL 8.1* 8.4* 8.4* 8.3*   ALBUMIN g/dL 2.4* 2.4* 2.4* 2.4*   MAGNESIUM mg/dL  --  1.7  --   --    PHOSPHORUS mg/dL  --  3.1  --   --        COVID19   Date Value Ref Range Status   10/13/2024 Not Detected Not Detected - Ref. Range Final     No results found for: \"HGBA1C\", \"POCGLU\"      US Paracentesis  Narrative: ULTRASOUND GUIDED PARACENTESIS     HISTORY: Ascites     The risks and benefits of the procedure were discussed, and informed  consent was obtained. Ultrasound of the abdomen was performed. The right  upper quadrant was prepped and draped in the usual sterile fashion with  2% Chlorhexidine. 1% lidocaine was administered for local anesthesia.         Next, under ultrasound guidance, a 5 [place] paracentesis catheter was  inserted into the right upper quadrant. 2.1 L of uli-colored ascites  was removed.        Patient tolerated the procedure well without immediate complications.     Impression: Technically successful ultrasound guided paracentesis     This report was finalized on 7/11/2025 4:17 PM by Dr. Hosea Barton M.D on Workstation: [id number]     MRI Abdomen With & Without Contrast  MRI ABDOMEN W WO CONTRAST-     HISTORY: 79-year-old male with progressive cholangiocarcinoma. Liver  vascular ultrasound showed thrombus within the right and left portal  veins and large liver mass compressing the main hepatic vein.     TECHNIQUE: Routine liver MRI was performed without and with MultiHance  IV contrast. Compared with CT " abdomen 07/08/2025.     FINDINGS: There is very extensive breathing artifact. The technologist  noted best possible images. Patient is confused and was unable to hold  still.  In the future if needed, multiphase CT of the abdomen for evaluation of  the portal and hepatic veins is recommended as opposed to MRI which  requires significantly longer breath-hold for image acquisition of each  and every separate series.     -The superior aspect of the dominant 6.8 x 6.2 cm right hepatic lobe  mass extends to the middle hepatic vein which may be partially  thrombosed as seen on series 1103 image 32. The mass does not extend to  the hepatic venous confluence or the IVC. The right hepatic vein extends  to the malignancy as well, but there is no definite tumor thrombus.  There is no involvement of the hepatic portal venous confluence or IVC.     -Nonocclusive thrombus within an adjacent right portal vein is seen on  image 44.        This report was finalized on 7/11/2025 9:23 AM by Dr. Nerissa Landa M.D on  Workstation: [id number]       Scheduled Medications  doxycycline, 50 mg, Oral, Q12H  enoxaparin sodium, 40 mg, Subcutaneous, Q24H  furosemide, 20 mg, Oral, Daily  gabapentin, 600 mg, Oral, Nightly  lactulose, 20 g, Oral, TID  Lotilaner, 1 drop, Both Eyes, BID  melatonin, 2.5 mg, Oral, Nightly  metFORMIN, 1,000 mg, Oral, Daily With Breakfast  midodrine, 5 mg, Oral, TID AC  OLANZapine, 5 mg, Oral, Nightly  oxyCODONE, 10 mg, Oral, BID  pantoprazole, 40 mg, Oral, Q AM  pilocarpine, 5 mg, Oral, Q8H  sennosides-docusate, 2 tablet, Oral, BID  spironolactone, 50 mg, Oral, Daily    Infusions   Diet  Diet: Cardiac; Healthy Heart (2-3 Na+); Fluid Consistency: Thin (IDDSI 0)       Assessment/Plan     Active Hospital Problems    Diagnosis  POA    **Hepatic encephalopathy [K76.82]  Yes    Cholangiocarcinoma [C22.1]  Yes    Diabetes mellitus without complication [E11.9]  Yes    Mixed hyperlipidemia [E78.2]  Yes    Primary hypertension  [I10]  Yes    Rheumatoid arthritis [M06.9]  Yes      Resolved Hospital Problems   No resolved problems to display.       79 y.o. male admitted with Hepatic encephalopathy.    79 year old man with intrahepatic cholangiocarcinoma, cirrhosis with hepatic encephalopathy, rheumatoid arthritis, type 2 diabetes, hypertension, hyperlipidemia, GERD who presents with abdominal distension, weakness, and confusion and is found to have hepatic encephalopathy     Cirrhosis with hepatic encephalopathy and ascites-s/p paracentesis x2 which was negative for SBP. Encephalopathy is waxing and waning. Started on lasix/spironolactone as well as midodrine this admission. Currently on lactulose titrated to 3-4 soft bowel movements/day. He was on rifaximin but this was stopped by palliative care as the patient didn't think it was helping. Pleurx catheter placement into the peritoneum is planned for Monday so that his ascites fluid can be drained at home with hospice.  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   Intrahepatic cholangiocarcinoma-evidence of mild progression on imaging this admission.  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 As mentioned above, an abdominal drain will be placed on Monday so that his ascites can be more easily drained at home. Shortly thereafter, he will discharge home with hospice.  Lovenox 40 mg SC daily for DVT prophylaxis.  Full code.  Discussed with patient.  Anticipate discharge home with hospice after placement of his abdominal drain      Lonnie Jennings MD  [facility]  07/13/25  13:54 EDT

## 2025-07-13 NOTE — PLAN OF CARE
Problem: Adult Inpatient Plan of Care  Goal: Plan of Care Review  Outcome: Progressing  Flowsheets (Taken 7/13/2025 0521)  Outcome Evaluation: Sleeping most of night - states feels tired / worn out. Whole meds, thin liquids, bed alarm, call light in reach.  Goal: Patient-Specific Goal (Individualized)  Outcome: Progressing  Goal: Absence of Hospital-Acquired Illness or Injury  Outcome: Progressing  Intervention: Identify and Manage Fall Risk  Recent Flowsheet Documentation  Taken 7/13/2025 0400 by Rhea Elizondo RN  Safety Promotion/Fall Prevention:   safety round/check completed   room organization consistent   clutter free environment maintained   assistive device/personal items within reach  Taken 7/13/2025 0000 by Rhea Elizondo RN  Safety Promotion/Fall Prevention:   safety round/check completed   room organization consistent   assistive device/personal items within reach   clutter free environment maintained  Taken 7/12/2025 2200 by Rhea Elizondo RN  Safety Promotion/Fall Prevention:   safety round/check completed   room organization consistent   assistive device/personal items within reach   clutter free environment maintained  Taken 7/12/2025 2000 by Rhea Elizondo RN  Safety Promotion/Fall Prevention:   safety round/check completed   toileting scheduled   room organization consistent   assistive device/personal items within reach   clutter free environment maintained  Intervention: Prevent Skin Injury  Recent Flowsheet Documentation  Taken 7/13/2025 0400 by Rhea Elizondo RN  Body Position:   position changed independently   side-lying  Taken 7/13/2025 0200 by Rhea Elizondo RN  Body Position:   position changed independently   side-lying  Taken 7/13/2025 0000 by Rhea Elizondo RN  Body Position:   position changed independently   weight shifting   side-lying  Skin Protection: incontinence pads utilized  Taken 7/12/2025 2200 by Rhea Elizondo RN  Body Position: weight shifting  Taken 7/12/2025  2000 by Rhea Elizondo RN  Body Position:   position changed independently   weight shifting   side-lying  Skin Protection:   incontinence pads utilized   transparent dressing maintained  Intervention: Prevent Infection  Recent Flowsheet Documentation  Taken 7/13/2025 0400 by Rhea Elizondo RN  Infection Prevention:   single patient room provided   rest/sleep promoted  Taken 7/13/2025 0200 by Rhea Elizondo RN  Infection Prevention:   single patient room provided   rest/sleep promoted   environmental surveillance performed  Taken 7/13/2025 0000 by Rhea Elizondo RN  Infection Prevention:   single patient room provided   rest/sleep promoted   environmental surveillance performed  Taken 7/12/2025 2200 by Rhea Elizondo RN  Infection Prevention:   single patient room provided   rest/sleep promoted   environmental surveillance performed  Taken 7/12/2025 2000 by Rhea Elizondo RN  Infection Prevention:   single patient room provided   rest/sleep promoted   environmental surveillance performed  Goal: Optimal Comfort and Wellbeing  Outcome: Progressing  Goal: Readiness for Transition of Care  Outcome: Progressing   Goal Outcome Evaluation:              Outcome Evaluation: Sleeping most of night - states feels tired / worn out. Whole meds, thin liquids, bed alarm, call light in reach.

## 2025-07-14 ENCOUNTER — APPOINTMENT (OUTPATIENT)
Dept: INTERVENTIONAL RADIOLOGY/VASCULAR | Facility: HOSPITAL | Age: 80
End: 2025-07-14
Payer: MEDICARE

## 2025-07-14 LAB
INR PPP: 1.1 (ref 0.8–1.2)
PROTHROMBIN TIME: 11.5 SECONDS (ref 12.8–15.2)

## 2025-07-14 PROCEDURE — C1894 INTRO/SHEATH, NON-LASER: HCPCS

## 2025-07-14 PROCEDURE — 25010000002 ENOXAPARIN PER 10 MG: Performed by: INTERNAL MEDICINE

## 2025-07-14 PROCEDURE — 25010000002 CEFAZOLIN PER 500 MG: Performed by: RADIOLOGY

## 2025-07-14 PROCEDURE — 49418 INSERT TUN IP CATH PERC: CPT

## 2025-07-14 PROCEDURE — 25010000002 MIDAZOLAM PER 1 MG: Performed by: RADIOLOGY

## 2025-07-14 PROCEDURE — 99153 MOD SED SAME PHYS/QHP EA: CPT

## 2025-07-14 PROCEDURE — 85610 PROTHROMBIN TIME: CPT | Performed by: RADIOLOGY

## 2025-07-14 PROCEDURE — 99152 MOD SED SAME PHYS/QHP 5/>YRS: CPT

## 2025-07-14 PROCEDURE — 25010000002 FENTANYL CITRATE (PF) 50 MCG/ML SOLUTION: Performed by: RADIOLOGY

## 2025-07-14 PROCEDURE — 0WHG33Z INSERTION OF INFUSION DEVICE INTO PERITONEAL CAVITY, PERCUTANEOUS APPROACH: ICD-10-PCS | Performed by: RADIOLOGY

## 2025-07-14 PROCEDURE — C1769 GUIDE WIRE: HCPCS

## 2025-07-14 PROCEDURE — 25010000002 LIDOCAINE PF 1% 1 % SOLUTION: Performed by: RADIOLOGY

## 2025-07-14 PROCEDURE — C1729 CATH, DRAINAGE: HCPCS

## 2025-07-14 PROCEDURE — 25010000002 ONDANSETRON PER 1 MG: Performed by: INTERNAL MEDICINE

## 2025-07-14 RX ORDER — LIDOCAINE HYDROCHLORIDE 10 MG/ML
INJECTION, SOLUTION EPIDURAL; INFILTRATION; INTRACAUDAL; PERINEURAL AS NEEDED
Status: COMPLETED | OUTPATIENT
Start: 2025-07-14 | End: 2025-07-14

## 2025-07-14 RX ORDER — MIDAZOLAM HYDROCHLORIDE 1 MG/ML
INJECTION, SOLUTION INTRAMUSCULAR; INTRAVENOUS AS NEEDED
Status: COMPLETED | OUTPATIENT
Start: 2025-07-14 | End: 2025-07-14

## 2025-07-14 RX ORDER — FENTANYL CITRATE 50 UG/ML
INJECTION, SOLUTION INTRAMUSCULAR; INTRAVENOUS AS NEEDED
Status: COMPLETED | OUTPATIENT
Start: 2025-07-14 | End: 2025-07-14

## 2025-07-14 RX ADMIN — LACTULOSE SOLUTION USP, 10 G/15 ML 20 G: 10 SOLUTION ORAL; RECTAL at 08:12

## 2025-07-14 RX ADMIN — DOXYCYCLINE 50 MG: 25 FOR SUSPENSION ORAL at 08:12

## 2025-07-14 RX ADMIN — SENNOSIDES AND DOCUSATE SODIUM 2 TABLET: 50; 8.6 TABLET ORAL at 21:42

## 2025-07-14 RX ADMIN — SODIUM CHLORIDE 2000 MG: 900 INJECTION INTRAVENOUS at 15:45

## 2025-07-14 RX ADMIN — Medication 2.5 MG: at 22:04

## 2025-07-14 RX ADMIN — SENNOSIDES AND DOCUSATE SODIUM 2 TABLET: 50; 8.6 TABLET ORAL at 08:12

## 2025-07-14 RX ADMIN — METFORMIN HYDROCHLORIDE 1000 MG: 1000 TABLET ORAL at 08:12

## 2025-07-14 RX ADMIN — ENOXAPARIN SODIUM 40 MG: 100 INJECTION SUBCUTANEOUS at 22:06

## 2025-07-14 RX ADMIN — FENTANYL CITRATE 25 MCG: 50 INJECTION, SOLUTION INTRAMUSCULAR; INTRAVENOUS at 16:03

## 2025-07-14 RX ADMIN — LIDOCAINE HYDROCHLORIDE 15 ML: 10 INJECTION, SOLUTION EPIDURAL; INFILTRATION; INTRACAUDAL; PERINEURAL at 16:06

## 2025-07-14 RX ADMIN — FENTANYL CITRATE 25 MCG: 50 INJECTION, SOLUTION INTRAMUSCULAR; INTRAVENOUS at 16:14

## 2025-07-14 RX ADMIN — FUROSEMIDE 20 MG: 20 TABLET ORAL at 08:12

## 2025-07-14 RX ADMIN — MIDAZOLAM 0.5 MG: 1 INJECTION INTRAMUSCULAR; INTRAVENOUS at 15:51

## 2025-07-14 RX ADMIN — PILOCARPINE HYROCHLORIDE 5 MG: 5 TABLET, FILM COATED ORAL at 22:06

## 2025-07-14 RX ADMIN — ONDANSETRON 4 MG: 2 INJECTION, SOLUTION INTRAMUSCULAR; INTRAVENOUS at 18:18

## 2025-07-14 RX ADMIN — PILOCARPINE HYROCHLORIDE 5 MG: 5 TABLET, FILM COATED ORAL at 06:03

## 2025-07-14 RX ADMIN — SPIRONOLACTONE 50 MG: 25 TABLET ORAL at 08:11

## 2025-07-14 RX ADMIN — Medication 10 ML: at 22:11

## 2025-07-14 RX ADMIN — DOXYCYCLINE 50 MG: 25 FOR SUSPENSION ORAL at 22:05

## 2025-07-14 RX ADMIN — OXYCODONE HYDROCHLORIDE 10 MG: 10 TABLET ORAL at 08:11

## 2025-07-14 RX ADMIN — PANTOPRAZOLE SODIUM 40 MG: 40 TABLET, DELAYED RELEASE ORAL at 06:03

## 2025-07-14 RX ADMIN — LACTULOSE SOLUTION USP, 10 G/15 ML 20 G: 10 SOLUTION ORAL; RECTAL at 21:41

## 2025-07-14 RX ADMIN — GABAPENTIN 600 MG: 300 CAPSULE ORAL at 21:42

## 2025-07-14 RX ADMIN — OXYCODONE HYDROCHLORIDE 10 MG: 10 TABLET ORAL at 22:04

## 2025-07-14 RX ADMIN — MIDODRINE HYDROCHLORIDE 5 MG: 5 TABLET ORAL at 08:11

## 2025-07-14 RX ADMIN — MIDODRINE HYDROCHLORIDE 5 MG: 5 TABLET ORAL at 22:04

## 2025-07-14 RX ADMIN — OLANZAPINE 5 MG: 5 TABLET, FILM COATED ORAL at 22:03

## 2025-07-14 NOTE — CASE MANAGEMENT/SOCIAL WORK
Continued Stay Note  [facility]     Patient Name: Gay Vazquez  MRN: [id number]  Today's Date: 7/14/2025    Admit Date: 7/6/2025    Plan: Home with spouse and Hospice services   Discharge Plan       Row Name 07/14/25 1439       Plan    Plan Home with spouse and Hospice services    Plan Comments CCP met with patient's spouse at bedside. Patient off floor. Patient's wife confirms plan is to return home with Hospice services. RN spoke with Hospice and they will be calling spouse to set up a time to meet with them. Patient's wife plans to transport patient at d/c. CCP will follow and assist as needed. Leah ROE                   Discharge Codes    No documentation.                 Expected Discharge Date and Time       Expected Discharge Date Expected Discharge Time    Jul 15, 2025               BHUPINDER Stone

## 2025-07-14 NOTE — SIGNIFICANT NOTE
07/14/25 1353   OTHER   Discipline physical therapist   Rehab Time/Intention   Session Not Performed patient/family declined treatment  (pt reports that he was up in a chair all morning and is now resting in bed, has a procedure scheduled for this afternoon, per chart pt may be going home with hospice today or tomorrow, PT will follow up for any PT needs)   Recommendation   PT - Next Appointment 07/15/25

## 2025-07-14 NOTE — PROGRESS NOTES
Name: Gay Vazquez ADMIT: 2025   : 1945  PCP: Gloria Post MD    MRN: [id number] LOS: 8 days   AGE/SEX: 79 y.o. male  ROOM: [facility]     Subjective   Subjective   Feeling fine today but still tired. Tolerating po but appetite poor. No issues overnight per staff.       Objective   Objective   Vital Signs  Temp:  [97.5 °F (36.4 °C)-98.8 °F (37.1 °C)] 98.4 °F (36.9 °C)  Heart Rate:  [] 95  Resp:  [14-16] 16  BP: ()/(62-74) 100/74  SpO2:  [96 %-97 %] 97 %  on   ;   Device (Oxygen Therapy): room air  Body mass index is 20.18 kg/m².  Physical Exam  Vitals and nursing note reviewed. Exam conducted with a chaperone present (Wife).   Constitutional:       General: He is not in acute distress.     Appearance: He is ill-appearing (chronically). He is not toxic-appearing or diaphoretic.   HENT:      Head: Normocephalic.      Nose: Nose normal.      Mouth/Throat:      Mouth: Mucous membranes are moist.      Pharynx: Oropharynx is clear.   Eyes:      General: No scleral icterus.        Right eye: No discharge.         Left eye: No discharge.      Conjunctiva/sclera: Conjunctivae normal.   Cardiovascular:      Rate and Rhythm: Normal rate and regular rhythm.      Pulses: Normal pulses.   Pulmonary:      Effort: Pulmonary effort is normal. No respiratory distress.      Breath sounds: Normal breath sounds. No wheezing or rales.   Abdominal:      General: Bowel sounds are normal. There is distension.      Palpations: Abdomen is soft.      Tenderness: There is no abdominal tenderness.   Musculoskeletal:         General: Swelling (trace in BLEs) present.      Cervical back: Neck supple.   Skin:     General: Skin is warm and dry.      Capillary Refill: Capillary refill takes less than 2 seconds.      Coloration: Skin is not jaundiced.   Neurological:      Mental Status: He is alert. Mental status is at baseline.   Psychiatric:         Mood and Affect: Mood normal.       Results Review     I reviewed the  "patient's new clinical results.  Results from last 7 days   Lab Units 07/12/25  0645 07/11/25  0651 07/10/25  0619 07/09/25  0531   WBC 10*3/mm3 7.68 4.48 7.16 6.32   HEMOGLOBIN g/dL 12.9* 12.1* 12.5* 12.4*   PLATELETS 10*3/mm3 88* 65* 80* 82*     Results from last 7 days   Lab Units 07/12/25  0645 07/11/25  0651 07/10/25  0619 07/09/25  0531   SODIUM mmol/L 134* 136 135* 134*   POTASSIUM mmol/L 3.6 3.6 3.8 3.6   CHLORIDE mmol/L 99 101 98 97*   CO2 mmol/L 21.9* 27.7 26.5 26.0   BUN mg/dL 13.0 10.0 9.0 9.0   CREATININE mg/dL 1.03 0.78 0.94 0.80   GLUCOSE mg/dL 147* 100* 130* 133*   EGFR mL/min/1.73 74.4 91.3 83.0 90.6     Results from last 7 days   Lab Units 07/12/25  0645 07/11/25  0651 07/10/25  0619 07/09/25  0531   ALBUMIN g/dL 2.4* 2.4* 2.4* 2.4*   BILIRUBIN mg/dL 0.8 0.8 1.0 0.8   ALK PHOS U/L 94 94 102 101   AST (SGOT) U/L 29 24 28 26   ALT (SGPT) U/L 7 6 9 7     Results from last 7 days   Lab Units 07/12/25  0645 07/11/25  0651 07/10/25  0619 07/09/25  0531   CALCIUM mg/dL 8.1* 8.4* 8.4* 8.3*   ALBUMIN g/dL 2.4* 2.4* 2.4* 2.4*   MAGNESIUM mg/dL  --  1.7  --   --    PHOSPHORUS mg/dL  --  3.1  --   --        No results found for: \"HGBA1C\", \"POCGLU\"    No radiology results for the last day    I have personally reviewed all medications:  Scheduled Medications  doxycycline, 50 mg, Oral, Q12H  enoxaparin sodium, 40 mg, Subcutaneous, Q24H  furosemide, 20 mg, Oral, Daily  gabapentin, 600 mg, Oral, Nightly  lactulose, 20 g, Oral, TID  Lotilaner, 1 drop, Both Eyes, BID  melatonin, 2.5 mg, Oral, Nightly  metFORMIN, 1,000 mg, Oral, Daily With Breakfast  midodrine, 5 mg, Oral, TID AC  OLANZapine, 5 mg, Oral, Nightly  oxyCODONE, 10 mg, Oral, BID  pantoprazole, 40 mg, Oral, Q AM  pilocarpine, 5 mg, Oral, Q8H  sennosides-docusate, 2 tablet, Oral, BID  spironolactone, 50 mg, Oral, Daily    Infusions   Diet  Diet: Cardiac; Healthy Heart (2-3 Na+); Fluid Consistency: Thin (IDDSI 0)    I have personally reviewed:  [x]  " Laboratory   [x]  Microbiology   []  Radiology   [x]  EKG/Telemetry  [x]  Cardiology/Vascular   []  Pathology    [x]  Records       Assessment/Plan     Active Hospital Problems    Diagnosis  POA    **Hepatic encephalopathy [K76.82]  Yes    Cholangiocarcinoma [C22.1]  Yes    Diabetes mellitus without complication [E11.9]  Yes    Mixed hyperlipidemia [E78.2]  Yes    Primary hypertension [I10]  Yes    Rheumatoid arthritis [M06.9]  Yes      Resolved Hospital Problems   No resolved problems to display.       79 year old gentleman with intrahepatic cholangiocarcinoma, cirrhosis with hepatic encephalopathy, rheumatoid arthritis, type 2 DM, hypertension, hyperlipidemia, and GERD, who presented with abdominal distension, weakness, and confusion, and was admitted with hepatic encephalopathy.      Cirrhosis with hepatic encephalopathy and ascites-s/p paracentesis x2 which were negative for SBP. Encephalopathy is waxing and waning here. Started on Lasix/spironolactone as well as midodrine this admission. Currently on lactulose titrated to 3-4 soft bowel movements/day. He was on rifaximin but this was stopped by Palliative Care as the patient didn't think it was helping. PleurX catheter placement into the peritoneum is planned for this afternoon so that his ascites fluid can be drained at home with Hospice.  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 Stable.  Intra-hepatic cholangioCA-evidence of mild progression on imaging this admission.  Cancer-related pain-OxyIR scheduled and PRN  Type 2 DM-glucose at goal on metformin  Hyperlipidemia-on pravastatin at home--given goals of care and his liver disease will not continue.  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 As mentioned above, an  abdominal drain will be placed today so that his ascites can be more easily drained at home. Shortly thereafter, he will discharge home with hospice--hopefully tomorrow.      Lovenox 40 mg SC daily for DVT prophylaxis.  Limited code (no CPR, no intubation).  Discussed with patient, spouse, nursing staff, CCP, and care team on multidisciplinary rounds. D/w Dr. Venegas.  Anticipate discharge home with family and Hospice tomorrow.  Expected Discharge Date: 7/15/2025; Expected Discharge Time:       Eyad Burnett MD  [facility]  07/14/25  12:10 EDT

## 2025-07-14 NOTE — PROGRESS NOTES
Oncology brief note     I was contacted by Dr. Burnett regarding clarification on need for anticoagulation for his portal vein thrombosis.  He was initiated on Lovenox 40 mg daily on 7/9/2025 as it was felt that thrombosis may be contributing to the patient's hepatic dysfunction. he is being discharged home with hospice today.  Given his chronic thrombocytopenia with elevated bleeding risks, and  poor prognosis, would recommend DISCONTINUATION of anticoagulation

## 2025-07-14 NOTE — H&P (VIEW-ONLY)
Name: Gay Vazquez ADMIT: 2025   : 1945  PCP: Gloria Post MD    MRN: [id number] LOS: 8 days   AGE/SEX: 79 y.o. male  ROOM: [facility]     Subjective   Subjective   Feeling fine today but still tired. Tolerating po but appetite poor. No issues overnight per staff.       Objective   Objective   Vital Signs  Temp:  [97.5 °F (36.4 °C)-98.8 °F (37.1 °C)] 98.4 °F (36.9 °C)  Heart Rate:  [] 95  Resp:  [14-16] 16  BP: ()/(62-74) 100/74  SpO2:  [96 %-97 %] 97 %  on   ;   Device (Oxygen Therapy): room air  Body mass index is 20.18 kg/m².  Physical Exam  Vitals and nursing note reviewed. Exam conducted with a chaperone present (Wife).   Constitutional:       General: He is not in acute distress.     Appearance: He is ill-appearing (chronically). He is not toxic-appearing or diaphoretic.   HENT:      Head: Normocephalic.      Nose: Nose normal.      Mouth/Throat:      Mouth: Mucous membranes are moist.      Pharynx: Oropharynx is clear.   Eyes:      General: No scleral icterus.        Right eye: No discharge.         Left eye: No discharge.      Conjunctiva/sclera: Conjunctivae normal.   Cardiovascular:      Rate and Rhythm: Normal rate and regular rhythm.      Pulses: Normal pulses.   Pulmonary:      Effort: Pulmonary effort is normal. No respiratory distress.      Breath sounds: Normal breath sounds. No wheezing or rales.   Abdominal:      General: Bowel sounds are normal. There is distension.      Palpations: Abdomen is soft.      Tenderness: There is no abdominal tenderness.   Musculoskeletal:         General: Swelling (trace in BLEs) present.      Cervical back: Neck supple.   Skin:     General: Skin is warm and dry.      Capillary Refill: Capillary refill takes less than 2 seconds.      Coloration: Skin is not jaundiced.   Neurological:      Mental Status: He is alert. Mental status is at baseline.   Psychiatric:         Mood and Affect: Mood normal.       Results Review     I reviewed the  "patient's new clinical results.  Results from last 7 days   Lab Units 07/12/25  0645 07/11/25  0651 07/10/25  0619 07/09/25  0531   WBC 10*3/mm3 7.68 4.48 7.16 6.32   HEMOGLOBIN g/dL 12.9* 12.1* 12.5* 12.4*   PLATELETS 10*3/mm3 88* 65* 80* 82*     Results from last 7 days   Lab Units 07/12/25  0645 07/11/25  0651 07/10/25  0619 07/09/25  0531   SODIUM mmol/L 134* 136 135* 134*   POTASSIUM mmol/L 3.6 3.6 3.8 3.6   CHLORIDE mmol/L 99 101 98 97*   CO2 mmol/L 21.9* 27.7 26.5 26.0   BUN mg/dL 13.0 10.0 9.0 9.0   CREATININE mg/dL 1.03 0.78 0.94 0.80   GLUCOSE mg/dL 147* 100* 130* 133*   EGFR mL/min/1.73 74.4 91.3 83.0 90.6     Results from last 7 days   Lab Units 07/12/25  0645 07/11/25  0651 07/10/25  0619 07/09/25  0531   ALBUMIN g/dL 2.4* 2.4* 2.4* 2.4*   BILIRUBIN mg/dL 0.8 0.8 1.0 0.8   ALK PHOS U/L 94 94 102 101   AST (SGOT) U/L 29 24 28 26   ALT (SGPT) U/L 7 6 9 7     Results from last 7 days   Lab Units 07/12/25  0645 07/11/25  0651 07/10/25  0619 07/09/25  0531   CALCIUM mg/dL 8.1* 8.4* 8.4* 8.3*   ALBUMIN g/dL 2.4* 2.4* 2.4* 2.4*   MAGNESIUM mg/dL  --  1.7  --   --    PHOSPHORUS mg/dL  --  3.1  --   --        No results found for: \"HGBA1C\", \"POCGLU\"    No radiology results for the last day    I have personally reviewed all medications:  Scheduled Medications  doxycycline, 50 mg, Oral, Q12H  enoxaparin sodium, 40 mg, Subcutaneous, Q24H  furosemide, 20 mg, Oral, Daily  gabapentin, 600 mg, Oral, Nightly  lactulose, 20 g, Oral, TID  Lotilaner, 1 drop, Both Eyes, BID  melatonin, 2.5 mg, Oral, Nightly  metFORMIN, 1,000 mg, Oral, Daily With Breakfast  midodrine, 5 mg, Oral, TID AC  OLANZapine, 5 mg, Oral, Nightly  oxyCODONE, 10 mg, Oral, BID  pantoprazole, 40 mg, Oral, Q AM  pilocarpine, 5 mg, Oral, Q8H  sennosides-docusate, 2 tablet, Oral, BID  spironolactone, 50 mg, Oral, Daily    Infusions   Diet  Diet: Cardiac; Healthy Heart (2-3 Na+); Fluid Consistency: Thin (IDDSI 0)    I have personally reviewed:  [x]  " Laboratory   [x]  Microbiology   []  Radiology   [x]  EKG/Telemetry  [x]  Cardiology/Vascular   []  Pathology    [x]  Records       Assessment/Plan     Active Hospital Problems    Diagnosis  POA    **Hepatic encephalopathy [K76.82]  Yes    Cholangiocarcinoma [C22.1]  Yes    Diabetes mellitus without complication [E11.9]  Yes    Mixed hyperlipidemia [E78.2]  Yes    Primary hypertension [I10]  Yes    Rheumatoid arthritis [M06.9]  Yes      Resolved Hospital Problems   No resolved problems to display.       79 year old gentleman with intrahepatic cholangiocarcinoma, cirrhosis with hepatic encephalopathy, rheumatoid arthritis, type 2 DM, hypertension, hyperlipidemia, and GERD, who presented with abdominal distension, weakness, and confusion, and was admitted with hepatic encephalopathy.      Cirrhosis with hepatic encephalopathy and ascites-s/p paracentesis x2 which were negative for SBP. Encephalopathy is waxing and waning here. Started on Lasix/spironolactone as well as midodrine this admission. Currently on lactulose titrated to 3-4 soft bowel movements/day. He was on rifaximin but this was stopped by Palliative Care as the patient didn't think it was helping. PleurX catheter placement into the peritoneum is planned for this afternoon so that his ascites fluid can be drained at home with Hospice.  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 Stable.  Intra-hepatic cholangioCA-evidence of mild progression on imaging this admission.  Cancer-related pain-OxyIR scheduled and PRN  Type 2 DM-glucose at goal on metformin  Hyperlipidemia-on pravastatin at home--given goals of care and his liver disease will not continue.  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 As mentioned above, an  abdominal drain will be placed today so that his ascites can be more easily drained at home. Shortly thereafter, he will discharge home with hospice--hopefully tomorrow.      Lovenox 40 mg SC daily for DVT prophylaxis.  Limited code (no CPR, no intubation).  Discussed with patient, spouse, nursing staff, CCP, and care team on multidisciplinary rounds. D/w Dr. Venegas.  Anticipate discharge home with family and Hospice tomorrow.  Expected Discharge Date: 7/15/2025; Expected Discharge Time:       Eyad Burnett MD  [facility]  07/14/25  12:10 EDT

## 2025-07-14 NOTE — POST-PROCEDURE NOTE
POST PROCEDURE NOTE     Procedure: drainage     Pre-Procedure Diagnosis: refract.ascites     Post-procedure Diagnosis: same     Findings: successf. tunneled peritoneal drain plcmt     Complications: none     Blood loss: min     Specimen Removed: 1.1 l ascites     Disposition:   Return to floor

## 2025-07-15 VITALS
HEIGHT: 72 IN | OXYGEN SATURATION: 97 % | DIASTOLIC BLOOD PRESSURE: 74 MMHG | HEART RATE: 87 BPM | BODY MASS INDEX: 20.04 KG/M2 | SYSTOLIC BLOOD PRESSURE: 111 MMHG | TEMPERATURE: 97.7 F | RESPIRATION RATE: 16 BRPM | WEIGHT: 147.93 LBS

## 2025-07-15 LAB
ALBUMIN SERPL-MCNC: 2.2 G/DL (ref 3.5–5.2)
ALBUMIN/GLOB SERPL: 0.5 G/DL
ALP SERPL-CCNC: 88 U/L (ref 39–117)
ALT SERPL W P-5'-P-CCNC: 6 U/L (ref 1–41)
ANION GAP SERPL CALCULATED.3IONS-SCNC: 10 MMOL/L (ref 5–15)
AST SERPL-CCNC: 35 U/L (ref 1–40)
BASOPHILS # BLD AUTO: 0.02 10*3/MM3 (ref 0–0.2)
BASOPHILS NFR BLD AUTO: 0.4 % (ref 0–1.5)
BILIRUB SERPL-MCNC: 0.8 MG/DL (ref 0–1.2)
BUN SERPL-MCNC: 20 MG/DL (ref 8–23)
BUN/CREAT SERPL: 18.2 (ref 7–25)
CALCIUM SPEC-SCNC: 7.9 MG/DL (ref 8.6–10.5)
CHLORIDE SERPL-SCNC: 99 MMOL/L (ref 98–107)
CO2 SERPL-SCNC: 25 MMOL/L (ref 22–29)
CREAT SERPL-MCNC: 1.1 MG/DL (ref 0.76–1.27)
DEPRECATED RDW RBC AUTO: 46.3 FL (ref 37–54)
EGFRCR SERPLBLD CKD-EPI 2021: 68.7 ML/MIN/1.73
EOSINOPHIL # BLD AUTO: 0.05 10*3/MM3 (ref 0–0.4)
EOSINOPHIL NFR BLD AUTO: 1 % (ref 0.3–6.2)
ERYTHROCYTE [DISTWIDTH] IN BLOOD BY AUTOMATED COUNT: 13.8 % (ref 12.3–15.4)
GLOBULIN UR ELPH-MCNC: 4.2 GM/DL
GLUCOSE SERPL-MCNC: 111 MG/DL (ref 65–99)
HCT VFR BLD AUTO: 38.1 % (ref 37.5–51)
HGB BLD-MCNC: 12.6 G/DL (ref 13–17.7)
IMM GRANULOCYTES # BLD AUTO: 0.02 10*3/MM3 (ref 0–0.05)
IMM GRANULOCYTES NFR BLD AUTO: 0.4 % (ref 0–0.5)
LYMPHOCYTES # BLD AUTO: 1.3 10*3/MM3 (ref 0.7–3.1)
LYMPHOCYTES NFR BLD AUTO: 25.1 % (ref 19.6–45.3)
MAGNESIUM SERPL-MCNC: 1.6 MG/DL (ref 1.6–2.4)
MCH RBC QN AUTO: 30.7 PG (ref 26.6–33)
MCHC RBC AUTO-ENTMCNC: 33.1 G/DL (ref 31.5–35.7)
MCV RBC AUTO: 92.9 FL (ref 79–97)
MONOCYTES # BLD AUTO: 0.58 10*3/MM3 (ref 0.1–0.9)
MONOCYTES NFR BLD AUTO: 11.2 % (ref 5–12)
NEUTROPHILS NFR BLD AUTO: 3.2 10*3/MM3 (ref 1.7–7)
NEUTROPHILS NFR BLD AUTO: 61.9 % (ref 42.7–76)
NRBC BLD AUTO-RTO: 0 /100 WBC (ref 0–0.2)
PHOSPHATE SERPL-MCNC: 3.1 MG/DL (ref 2.5–4.5)
PLATELET # BLD AUTO: 70 10*3/MM3 (ref 140–450)
PMV BLD AUTO: 9.7 FL (ref 6–12)
POTASSIUM SERPL-SCNC: 3.6 MMOL/L (ref 3.5–5.2)
PROT SERPL-MCNC: 6.4 G/DL (ref 6–8.5)
RBC # BLD AUTO: 4.1 10*6/MM3 (ref 4.14–5.8)
SODIUM SERPL-SCNC: 134 MMOL/L (ref 136–145)
WBC NRBC COR # BLD AUTO: 5.17 10*3/MM3 (ref 3.4–10.8)

## 2025-07-15 PROCEDURE — 84100 ASSAY OF PHOSPHORUS: CPT | Performed by: HOSPITALIST

## 2025-07-15 PROCEDURE — 80053 COMPREHEN METABOLIC PANEL: CPT | Performed by: HOSPITALIST

## 2025-07-15 PROCEDURE — 85025 COMPLETE CBC W/AUTO DIFF WBC: CPT | Performed by: HOSPITALIST

## 2025-07-15 PROCEDURE — 83735 ASSAY OF MAGNESIUM: CPT | Performed by: HOSPITALIST

## 2025-07-15 RX ORDER — OXYCODONE HYDROCHLORIDE 10 MG/1
15 TABLET ORAL EVERY 4 HOURS PRN
Start: 2025-07-15

## 2025-07-15 RX ORDER — OXYCODONE HYDROCHLORIDE 10 MG/1
15 TABLET ORAL 2 TIMES DAILY
Start: 2025-07-15 | End: 2025-07-19

## 2025-07-15 RX ORDER — DOXYCYCLINE 25 MG/5ML
50 POWDER, FOR SUSPENSION ORAL EVERY 12 HOURS SCHEDULED
Start: 2025-07-15 | End: 2025-08-06

## 2025-07-15 RX ORDER — LACTULOSE 10 G/15ML
20 SOLUTION ORAL 3 TIMES DAILY
Qty: 2838 ML | Refills: 0 | Status: SHIPPED | OUTPATIENT
Start: 2025-07-15

## 2025-07-15 RX ORDER — SPIRONOLACTONE 50 MG/1
50 TABLET, FILM COATED ORAL DAILY
Qty: 30 TABLET | Refills: 0 | Status: SHIPPED | OUTPATIENT
Start: 2025-07-16

## 2025-07-15 RX ORDER — MIDODRINE HYDROCHLORIDE 5 MG/1
5 TABLET ORAL
Qty: 90 TABLET | Refills: 0 | Status: SHIPPED | OUTPATIENT
Start: 2025-07-15

## 2025-07-15 RX ORDER — FUROSEMIDE 20 MG/1
20 TABLET ORAL DAILY
Qty: 30 TABLET | Refills: 0 | Status: SHIPPED | OUTPATIENT
Start: 2025-07-16

## 2025-07-15 RX ADMIN — DOXYCYCLINE 50 MG: 25 FOR SUSPENSION ORAL at 09:08

## 2025-07-15 RX ADMIN — SPIRONOLACTONE 50 MG: 25 TABLET ORAL at 09:07

## 2025-07-15 RX ADMIN — PILOCARPINE HYROCHLORIDE 5 MG: 5 TABLET, FILM COATED ORAL at 06:56

## 2025-07-15 RX ADMIN — LACTULOSE SOLUTION USP, 10 G/15 ML 20 G: 10 SOLUTION ORAL; RECTAL at 16:31

## 2025-07-15 RX ADMIN — MIDODRINE HYDROCHLORIDE 5 MG: 5 TABLET ORAL at 06:56

## 2025-07-15 RX ADMIN — OXYCODONE HYDROCHLORIDE 10 MG: 10 TABLET ORAL at 09:07

## 2025-07-15 RX ADMIN — PILOCARPINE HYROCHLORIDE 5 MG: 5 TABLET, FILM COATED ORAL at 12:54

## 2025-07-15 RX ADMIN — MIDODRINE HYDROCHLORIDE 5 MG: 5 TABLET ORAL at 16:31

## 2025-07-15 RX ADMIN — MIDODRINE HYDROCHLORIDE 5 MG: 5 TABLET ORAL at 12:54

## 2025-07-15 RX ADMIN — FUROSEMIDE 20 MG: 20 TABLET ORAL at 09:07

## 2025-07-15 RX ADMIN — OXYCODONE HYDROCHLORIDE 10 MG: 10 TABLET ORAL at 12:54

## 2025-07-15 RX ADMIN — PANTOPRAZOLE SODIUM 40 MG: 40 TABLET, DELAYED RELEASE ORAL at 06:36

## 2025-07-15 RX ADMIN — METFORMIN HYDROCHLORIDE 1000 MG: 1000 TABLET ORAL at 09:07

## 2025-07-15 NOTE — CASE MANAGEMENT/SOCIAL WORK
Continued Stay Note  [facility]     Patient Name: Gay Vazquez  MRN: [id number]  Today's Date: 7/15/2025    Admit Date: 7/6/2025    Plan: Home with Spouse and Hospice services will need EMS.   Discharge Plan       Row Name 07/15/25 1126       Plan    Plan Home with Spouse and Hospice services will need EMS.    Plan Comments Spoke with Hospice RN/Rita who confirmed plan is home with [facility] and patient would need stretcher. EMS from completed and placed on chart. CCP to follow. Hipolito GRIGGS RN                   Discharge Codes    No documentation.                 Expected Discharge Date and Time       Expected Discharge Date Expected Discharge Time    Jul 15, 2025               Hipolito Siddiqi RN

## 2025-07-15 NOTE — CONSULTS
Home with hospice today. [facility] will do oxy 15mg IR BID and Q4 hrs prn at home. DME ordered for delivery. Pt will need stretcher transport. Pt has EMS DNR and MOST form at bedside. [facility] to follow up at home. If any questions pleaes let us know.    Thank you for the referral.    Rita Craven RN  [facility]  [phone]

## 2025-07-15 NOTE — DISCHARGE SUMMARY
Patient Name: Gay Vazquez  : 1945  MRN: [id number]    Date of Admission: 2025  Date of Discharge:  7/15/2025  Primary Care Physician: Gloria Post MD      Chief Complaint:   Altered Mental Status and Abdominal Pain      Discharge Diagnoses     Active Hospital Problems    Diagnosis  POA    **Hepatic encephalopathy [K76.82]  Yes    Cholangiocarcinoma [C22.1]  Yes    Diabetes mellitus without complication [E11.9]  Yes    Mixed hyperlipidemia [E78.2]  Yes    Primary hypertension [I10]  Yes    Rheumatoid arthritis [M06.9]  Yes      Resolved Hospital Problems   No resolved problems to display.        Hospital Course     Very pleasant 79 year old gentleman with intrahepatic cholangiocarcinoma, cirrhosis with hepatic encephalopathy, rheumatoid arthritis, type 2 DM, hypertension, hyperlipidemia, and GERD, who presented with abdominal distension, weakness, and confusion, and was admitted with hepatic encephalopathy. Please see below for details of admission by problem:      Cirrhosis with hepatic encephalopathy and ascites-s/p paracentesis x2 which were negative for SBP. Encephalopathy waxing and waning here. Started on Lasix/spironolactone as well as midodrine this admission. Currently on lactulose titrated to 3-4 soft bowel movements/day. He was on rifaximin but this was stopped by Palliative Care as the patient didn't think it was helping. PleurX catheter placed in peritoneum on  so that his ascites fluid can be drained at home with Hospice.  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 Stable.  Intra-hepatic cholangioCA-evidence of mild progression on imaging this admission. Pt/family have no plans to f/u with Dr. Hopper as they have opted to pursue hospice care.  Cancer-related pain-continue OxyIR scheduled and PRN--dose increased to 15mg for both. Hospice to prescribe.  Type 2 DM-glucose at goal  on metformin  Hyperlipidemia-on pravastatin at home--given goals of care and his liver disease will not continue.  GERD-PPI continued  RA with history of pericarditis requiring a pericardial window-previously on Simponi, but this has been on hold  Eye infection NOS-continued eye gtts and PO Doxy as he was taking at home prior to admission. He will continue these at dc (wife has Doxy at home).  Goals of care-current plan is home with Hospice.         Lovenox 40 mg SC daily for DVT prophylaxis while here.  Limited code (no CPR, no intubation) confirmed.  Discussed with patient, spouse, nursing staff, CCP, and care team on multidisciplinary rounds.  Anticipate discharge home with family and Hospice this afternoon.      Day of Discharge     Subjective:  Feeling fine today but still tired. Tolerating po but appetite poor. Moving bowels. No issues overnight per staff. Eager to go home.    Physical Exam:  Temp:  [97.7 °F (36.5 °C)-98.4 °F (36.9 °C)] 97.7 °F (36.5 °C)  Heart Rate:  [] 87  Resp:  [13-20] 16  BP: ()/(55-84) 111/74  Body mass index is 20.06 kg/m².  Physical Exam  Vitals and nursing note reviewed. Exam conducted with a chaperone present (Wife).   Constitutional:       General: He is not in acute distress.     Appearance: He is ill-appearing (chronically). He is not toxic-appearing or diaphoretic.   Cardiovascular:      Rate and Rhythm: Normal rate and regular rhythm.      Pulses: Normal pulses.   Pulmonary:      Effort: Pulmonary effort is normal. No respiratory distress.      Breath sounds: Normal breath sounds. No wheezing or rales.   Abdominal:      General: Bowel sounds are normal. There is distension.      Palpations: Abdomen is soft.      Tenderness: There is no abdominal tenderness.      Comment:  Peritoneal drain in place right abdomen.  Musculoskeletal:         General: Swelling (trace in BLEs) present.      Cervical back: Neck supple.   Skin:     General: Skin is warm and dry.       Capillary Refill: Capillary refill takes less than 2 seconds.      Coloration: Skin is not jaundiced.   Neurological:      Mental Status: He is alert. Mental status is at baseline.   Psychiatric:         Mood and Affect: Mood normal.         Consultants     Consult Orders (all) (From admission, onward)       Start     Ordered    07/11/25 1201  Inpatient Hospice Consult  Once        Specialty:  Hospice and Palliative Medicine  Provider:  (Not yet assigned)    07/11/25 1200    07/10/25 1524  Inpatient Palliative Care Team Consult  Once        Provider:  (Not yet assigned)    07/10/25 1523    07/07/25 1035  Hematology & Oncology Inpatient Consult  Once        Specialty:  Hematology and Oncology  Provider:  Anthony Tejada MD    07/07/25 1034    07/06/25 1848  Inpatient Case Management  Consult  Once        Provider:  (Not yet assigned)    07/06/25 1847    07/06/25 1727  [facility] (on-call MD unless specified) Details  Once        Specialty:  Hospitalist  Provider:  (Not yet assigned)    07/06/25 1726                  Procedures     * Surgery not found *    Imaging Results (All)       Procedure Component Value Units Date/Time    IR Insert Tunnneled Intraperitoneal Cat - In process [[id number]] Resulted: 07/14/25 1656     Updated: 07/14/25 1657    This result has not been signed. Information might be incomplete.      US Paracentesis [[id number]] Collected: 07/11/25 1617    Specimen: Body Fluid Updated: 07/11/25 1621    Narrative:      ULTRASOUND GUIDED PARACENTESIS     HISTORY: Ascites     The risks and benefits of the procedure were discussed, and informed  consent was obtained. Ultrasound of the abdomen was performed. The right  upper quadrant was prepped and draped in the usual sterile fashion with  2% Chlorhexidine. 1% lidocaine was administered for local anesthesia.         Next, under ultrasound guidance, a 5 French paracentesis catheter was  inserted into the right upper quadrant. 2.1 L of uli-colored  ascites  was removed.        Patient tolerated the procedure well without immediate complications.       Impression:      Technically successful ultrasound guided paracentesis     This report was finalized on 7/11/2025 4:17 PM by Dr. Hosea Barton M.D on Workstation: [id number]       MRI Abdomen With & Without Contrast [[id number]] Collected: 07/10/25 2010     Updated: 07/11/25 0927    Narrative:      MRI ABDOMEN W WO CONTRAST-     HISTORY: 79-year-old male with progressive cholangiocarcinoma. Liver  vascular ultrasound showed thrombus within the right and left portal  veins and large liver mass compressing the main hepatic vein.     TECHNIQUE: Routine liver MRI was performed without and with MultiHance  IV contrast. Compared with CT abdomen 07/08/2025.     FINDINGS: There is very extensive breathing artifact. The technologist  noted best possible images. Patient is confused and was unable to hold  still.  In the future if needed, multiphase CT of the abdomen for evaluation of  the portal and hepatic veins is recommended as opposed to MRI which  requires significantly longer breath-hold for image acquisition of each  and every separate series.     -The superior aspect of the dominant 6.8 x 6.2 cm right hepatic lobe  mass extends to the middle hepatic vein which may be partially  thrombosed as seen on series 1103 image 32. The mass does not extend to  the hepatic venous confluence or the IVC. The right hepatic vein extends  to the malignancy as well, but there is no definite tumor thrombus.  There is no involvement of the hepatic portal venous confluence or IVC.     -Nonocclusive thrombus within an adjacent right portal vein is seen on  image 44.        This report was finalized on 7/11/2025 9:23 AM by Dr. Nerissa Landa M.D on  Workstation: [id number]       CT Chest With Contrast Diagnostic [[id number]] Collected: 07/08/25 1532     Updated: 07/09/25 0856    Narrative:      CT CHEST, ABDOMEN, AND PELVIS WITH IV  CONTRAST     HISTORY: 79-year-old male with cirrhosis and multifocal pericarditis  history. Cholangiocarcinoma.     TECHNIQUE: Radiation dose reduction techniques were utilized, including  automated exposure control and exposure modulation based on body size.   3 mm images were obtained through the chest, abdomen, and pelvis after  the administration of IV contrast. Compared with noncontrasted CT  abdomen 07/06/2025 and contrast-enhanced CTs chest/abdomen/pelvis  04/21/2025.     FINDINGS:  CHEST:  1. There is extensive breathing artifact and the lung markings are very  crowded. Chronic small-moderate loculated right pleural effusion with  pleural calcifications/plaques and rounded atelectasis at the right  lower lobe appear stable. Difficult to evaluate the thickened bronchi at  the lower lobes given the underexpanded lungs. No definite suspicious  lung nodules are seen.     2. No significant change in the multiple mildly enlarged mediastinal  nodes, stable 2.2 x 1.3 cm precarinal node and the 2.1 x 1.4 cm right  hilar node is stable. No new lymphadenopathy is seen. Right Mediport is  in the SVC.     ABDOMEN/PELVIS:  1. There has been significant progression of the dominant right hepatic  lobe mass and the multiple intrahepatic metastases.  - The dominant mass measures 6.8 x 6.2 cm, previously 6.4 x 5.8 cm.  There is an enlarging component along the anterior margin, image 32.  - 2.6 x 2.2 cm lesion posterior to the dominant mass measured 2.2 x 1.7  cm.  - Medially is a 1.8 x 1.5 cm nodule, which previously measured 1.6 x 1.2  cm, and inferiorly is a 1.5 x 1.5 cm lesion, which previously measured  1.3 x 1.0 cm.  - The lymphadenopathy at the estrellita hepatis has progressed as well.  Largest node measures 2.2 x 1.4 cm, previously 2.0 x 1.2 cm.  - There is a significantly larger volume of perihepatic fluid and there  is new moderate volume of free fluid throughout the abdomen and pelvis.  -The main portal vein is patent,  but the right portal vein may have  developed some nonocclusive thrombus, image 43.     2. Splenomegaly, chronic pancreatitis changes, multiple bilateral renal  cysts are stable. Adrenals are unremarkable. Significant prostate  enlargement and heterogeneity is stable.     3. There is a mild small and large bowel ileus. There is no bowel  obstruction. There is no evidence for diverticulitis or an infectious  colitis, and there is no appendicitis.           This report was finalized on 7/9/2025 8:53 AM by Dr. Nerissa Landa M.D on  Workstation: [id number]       CT Abdomen Pelvis With Contrast [[id number]] Collected: 07/08/25 1532     Updated: 07/09/25 0856    Narrative:      CT CHEST, ABDOMEN, AND PELVIS WITH IV CONTRAST     HISTORY: 79-year-old male with cirrhosis and multifocal pericarditis  history. Cholangiocarcinoma.     TECHNIQUE: Radiation dose reduction techniques were utilized, including  automated exposure control and exposure modulation based on body size.   3 mm images were obtained through the chest, abdomen, and pelvis after  the administration of IV contrast. Compared with noncontrasted CT  abdomen 07/06/2025 and contrast-enhanced CTs chest/abdomen/pelvis  04/21/2025.     FINDINGS:  CHEST:  1. There is extensive breathing artifact and the lung markings are very  crowded. Chronic small-moderate loculated right pleural effusion with  pleural calcifications/plaques and rounded atelectasis at the right  lower lobe appear stable. Difficult to evaluate the thickened bronchi at  the lower lobes given the underexpanded lungs. No definite suspicious  lung nodules are seen.     2. No significant change in the multiple mildly enlarged mediastinal  nodes, stable 2.2 x 1.3 cm precarinal node and the 2.1 x 1.4 cm right  hilar node is stable. No new lymphadenopathy is seen. Right Mediport is  in the SVC.     ABDOMEN/PELVIS:  1. There has been significant progression of the dominant right hepatic  lobe mass and the  multiple intrahepatic metastases.  - The dominant mass measures 6.8 x 6.2 cm, previously 6.4 x 5.8 cm.  There is an enlarging component along the anterior margin, image 32.  - 2.6 x 2.2 cm lesion posterior to the dominant mass measured 2.2 x 1.7  cm.  - Medially is a 1.8 x 1.5 cm nodule, which previously measured 1.6 x 1.2  cm, and inferiorly is a 1.5 x 1.5 cm lesion, which previously measured  1.3 x 1.0 cm.  - The lymphadenopathy at the estrellita hepatis has progressed as well.  Largest node measures 2.2 x 1.4 cm, previously 2.0 x 1.2 cm.  - There is a significantly larger volume of perihepatic fluid and there  is new moderate volume of free fluid throughout the abdomen and pelvis.  -The main portal vein is patent, but the right portal vein may have  developed some nonocclusive thrombus, image 43.     2. Splenomegaly, chronic pancreatitis changes, multiple bilateral renal  cysts are stable. Adrenals are unremarkable. Significant prostate  enlargement and heterogeneity is stable.     3. There is a mild small and large bowel ileus. There is no bowel  obstruction. There is no evidence for diverticulitis or an infectious  colitis, and there is no appendicitis.           This report was finalized on 7/9/2025 8:53 AM by Dr. Nerissa Landa M.D on  Workstation: [id number]        Paracentesis [[id number]] Collected: 07/07/25 1754    Specimen: Body Fluid Updated: 07/07/25 1757    Narrative:      IMAGE GUIDED PARACENTESIS     HISTORY:  Ascites and history of cholangiocarcinoma and cirrhosis;  K76.82-Hepatic encephalopathy; R18.0-Malignant ascites;  E87.1-Upzy-zsbbpsbivl and hyponatremia .     TECHNIQUE: Informed consent was obtained and documented. The patient was  placed in a supine position. After preparatory ultrasound scan and image  recording, the right upper quadrant was prepped and draped in the usual  aseptic manner. 1% lidocaine was infiltrated at the designated puncture  site. A 5 Malay paracentesis catheter was then  inserted into the  ascites using trocar technique . A total of 3850 mL fluid were drained  with specimen sent to lab. The catheter was then removed. Manual  pressure was held. Sterile dressing was applied. There were no immediate  complications. All elements of maximal sterile technique were followed.          Impression:      Successful ultrasound-guided paracentesis.        This report was finalized on 7/7/2025 5:54 PM by Dr. Yobany Das M.D  on Workstation: [id number]       CT Abdomen Pelvis Without Contrast [[id number]] Collected: 07/06/25 1758     Updated: 07/06/25 0313    Narrative:      CT ABDOMEN AND PELVIS WITHOUT IV CONTRAST     HISTORY: Abdominal distention     TECHNIQUE: Radiation dose reduction techniques were utilized, including  automated exposure control and exposure modulation based on body size.   3 mm images were obtained through the abdomen and pelvis without IV  contrast.     COMPARISON: CT abdomen and pelvis dating back to 1/16/2025       Impression:      FINDINGS AND IMPRESSION:  Please note evaluation is suboptimal due to lack of intravenous  contrast. Small to moderate partially loculated appearing right pleural  effusion is present, as before. Questionable adenopathy within the  visualized aspect of the mid mediastinum better seen on recent CT chest  4/21/2025. Please refer to that dictation for further information.  Significant desiccation of the pericardium, as before.     No free intraperitoneal air is seen. There is colonic diverticulosis.  The appendix is not seen.     Scattered hepatic masses are present, as before, not well evaluated  demonstrates contrast. The largest masslike conglomerate appears to  measure approximately 9.5 x 7.1 cm (previously 8.3 x 5.8 cm on 1/16/2025  when measured in similar fashion) keeping with patient's known history  of cholangiocarcinoma.     Gallbladder surgically absent. The pancreas, spleen and adrenal glands  have an unremarkable noncontrast CT  appearance. Subcentimeter renal  lesions are too small to characterize. Larger hypodense lesions  demonstrate density less than 15 Hounsfield units likely benign per  Bosniak 2019 criteria. No hydronephrosis.     The liver appears cirrhotic. There is a moderate to large amount of  ascites. Prominent perisplenic and splenorenal varices are present.  While the ascites may be related to portosystemic shunting, a malignant  etiology cannot be excluded given patient history and findings in the  liver. Bladder is underdistended and cannot be evaluated. Prostate is at  least moderately enlarged.     Asymmetric advanced degenerative changes within the right hip. No  suspicious lytic or blastic osseous lesions.     This report was finalized on 7/6/2025 10:51 PM by Dr. Norm Medina M.D  on Workstation: [id number]       CT Head Without Contrast [[id number]] Collected: 07/06/25 1707     Updated: 07/06/25 1712    Narrative:      CT OF THE BRAIN WITHOUT CONTRAST 7/6/2025     HISTORY: Altered mental status. Confusion.     Axial images were obtained through the brain without intravenous  contrast.     There is mild to moderate diffuse atrophy. There is mild decreased  attenuation of the periventricular white matter bilaterally consistent  with mild small vessel white matter ischemic disease. There is no  evidence of acute infarction, hemorrhage, midline shift or mass effect.     No bony abnormalities are seen.       Impression:      1. No acute process identified.     Radiation dose reduction techniques were utilized, including automated  exposure control and exposure modulation based on body size.        This report was finalized on 7/6/2025 5:08 PM by Dr. Roby Johnson M.D on Workstation: [id number]             Results for orders placed during the hospital encounter of 07/06/25    Duplex Portal Hepatic Complete CAR 07/09/2025 10:21 AM    Interpretation Summary    Challenging study due to ascites and bowel gas.  What  "appears to be a large mass in the right lobe of the liver compressing the main hepatic vein.  Unable to visualize the left or right hepatic vein.  Main portal vein has low/sluggish flow with what appears to be thrombus in the left and right portal veins.      Pertinent Labs     Results from last 7 days   Lab Units 07/15/25  0623 07/12/25  0645 07/11/25  0651 07/10/25  0619   WBC 10*3/mm3 5.17 7.68 4.48 7.16   HEMOGLOBIN g/dL 12.6* 12.9* 12.1* 12.5*   PLATELETS 10*3/mm3 70* 88* 65* 80*     Results from last 7 days   Lab Units 07/15/25  0623 07/12/25  0645 07/11/25  0651 07/10/25  0619   SODIUM mmol/L 134* 134* 136 135*   POTASSIUM mmol/L 3.6 3.6 3.6 3.8   CHLORIDE mmol/L 99 99 101 98   CO2 mmol/L 25.0 21.9* 27.7 26.5   BUN mg/dL 20.0 13.0 10.0 9.0   CREATININE mg/dL 1.10 1.03 0.78 0.94   GLUCOSE mg/dL 111* 147* 100* 130*   EGFR mL/min/1.73 68.7 74.4 91.3 83.0     Results from last 7 days   Lab Units 07/15/25  0623 07/12/25  0645 07/11/25 0651 07/10/25  0619   ALBUMIN g/dL 2.2* 2.4* 2.4* 2.4*   BILIRUBIN mg/dL 0.8 0.8 0.8 1.0   ALK PHOS U/L 88 94 94 102   AST (SGOT) U/L 35 29 24 28   ALT (SGPT) U/L 6 7 6 9     Results from last 7 days   Lab Units 07/15/25  0623 07/12/25  0645 07/11/25  0651 07/10/25  0619   CALCIUM mg/dL 7.9* 8.1* 8.4* 8.4*   ALBUMIN g/dL 2.2* 2.4* 2.4* 2.4*   MAGNESIUM mg/dL 1.6  --  1.7  --    PHOSPHORUS mg/dL 3.1  --  3.1  --      Results from last 7 days   Lab Units 07/10/25  1900 07/09/25  0531   AMMONIA umol/L 50 59     Results from last 7 days   Lab Units 07/10/25  0619   HSTROP T ng/L <6           Invalid input(s): \"LDLCALC\"          Test Results Pending at Discharge     Pending Results       None              Discharge Details        Discharge Medications        New Medications        Instructions Start Date   doxycycline 25 MG/5ML reconstituted suspension oral suspension  Commonly known as: VIBRAMYCIN   50 mg, Oral, Every 12 Hours Scheduled      furosemide 20 MG tablet  Commonly known as: " LASIX   20 mg, Oral, Daily   Start Date: July 16, 2025     lactulose 10 GM/15ML solution  Commonly known as: CHRONULAC   20 g, Oral, 3 Times Daily      midodrine 5 MG tablet  Commonly known as: PROAMATINE   5 mg, Oral, 3 Times Daily Before Meals      spironolactone 50 MG tablet  Commonly known as: ALDACTONE   50 mg, Oral, Daily   Start Date: July 16, 2025            Changes to Medications        Instructions Start Date   oxyCODONE 10 MG tablet  Commonly known as: ROXICODONE  What changed: how much to take   15 mg, Oral, Every 4 Hours PRN      oxyCODONE 10 MG tablet  Commonly known as: ROXICODONE  What changed: You were already taking a medication with the same name, and this prescription was added. Make sure you understand how and when to take each.   15 mg, Oral, 2 Times Daily             Continue These Medications        Instructions Start Date   gabapentin 300 MG capsule  Commonly known as: NEURONTIN   TAKE 2 CAPSULES BY MOUTH ONCE DAILY AT NIGHT AT BEDTIME      Lotilaner 0.25 % solution   1 drop, Ophthalmic, 2 Times Daily, Put 1 drop in each eye BID      metFORMIN  MG 24 hr tablet  Commonly known as: GLUCOPHAGE-XR   1,000 mg, Oral, Daily      naloxone 4 MG/0.1ML nasal spray  Commonly known as: NARCAN   Call 911. Don't prime. Jackson in 1 nostril for overdose. Repeat in 2-3 minutes in other nostril if no or minimal breathing/responsiveness.      OLANZapine 5 MG tablet  Commonly known as: zyPREXA   5 mg, Oral, Nightly      pantoprazole 40 MG EC tablet  Commonly known as: PROTONIX   40 mg, Oral, Daily      pilocarpine 5 MG tablet  Commonly known as: SALAGEN   1 tablet, 3 times daily      sennosides-docusate 8.6-50 MG per tablet  Commonly known as: PERICOLACE   2 tablets, Oral, 2 Times Daily             Stop These Medications      MAGnesium-Oxide 400 (240 Mg) MG tablet  Generic drug: Magnesium Oxide -Mg Supplement     Morphine 15 MG 12 hr tablet  Commonly known as: MS CONTIN     pravastatin 80 MG  tablet  Commonly known as: PRAVACHOL              Allergies   Allergen Reactions    Chlorhexidine Dermatitis     Please use alcohol to clean port site       Discharge Disposition:  Hospice/Home      Discharge Diet:  Diet Order   Procedures    Diet: Cardiac; Healthy Heart (2-3 Na+); Fluid Consistency: Thin (IDDSI 0)       Discharge Activity:   As tolerated    CODE STATUS:    Code Status and Medical Interventions: No CPR (Do Not Attempt to Resuscitate); Limited Support; No intubation (DNI)   Ordered at: 07/10/25 1523     Code Status (Patient has no pulse and is not breathing):    No CPR (Do Not Attempt to Resuscitate)     Medical Interventions (Patient has pulse or is breathing):    Limited Support     Medical Intervention Limits:    No intubation (DNI)       Future Appointments   Date Time Provider Department Center   8/11/2025  8:30 AM INFUSION ACCESS CHAIR- [facility]  [facility]   11/24/2025  1:00 PM Gloria Post MD [facility]     Additional Instructions for the Follow-ups that You Need to Schedule       Discharge Follow-up with Specialty: Hospice; 2 Days   As directed      Specialty: Hospice   Follow Up: 2 Days               Follow-up Information       Gloria Post MD .    Specialty: Internal Medicine  Contact information:  [id number] [address]  [phone]                             Additional Instructions for the Follow-ups that You Need to Schedule       Discharge Follow-up with Specialty: Hospice; 2 Days   As directed      Specialty: Hospice   Follow Up: 2 Days            Time Spent on Discharge:  Greater than 30 minutes      Eyad Burnett MD  [facility]  07/15/25  13:38 EDT

## 2025-07-16 NOTE — CASE MANAGEMENT/SOCIAL WORK
Case Management Discharge Note      Final Note: Pt discharged home with [facility].   EVETTE Munoz RN         Selected Continued Care - Discharged on 7/15/2025 Admission date: 7/6/2025 - Discharge disposition: Hospice/Home      Destination    No services have been selected for the patient.                Durable Medical Equipment    No services have been selected for the patient.                Dialysis/Infusion    No services have been selected for the patient.                Home Medical Care       Service Provider Services Address Phone Fax Patient Preferred    [facility] [address] [phone] --              Therapy    No services have been selected for the patient.                Community Resources    No services have been selected for the patient.                Community & DME    No services have been selected for the patient.                    Selected Continued Care - Episodes Includes continued care and service providers with selected services from the active episodes listed below      Chronic Care Management Episode start date: 7/8/2025 (Paused)   There are no active outsourced providers for this episode.                 Transportation Services  Transportation: Ambulance  Ambulance: [facility]    Final Discharge Disposition Code: 50 - home with hospice

## 2025-07-16 NOTE — DISCHARGE PLACEMENT REQUEST
"Becka Vazquez (79 y.o. Male)       Date of Birth   1945    Social Security Number       Address   [address] [place] [address]    Home Phone   [phone]    MRN   [id number]       Jehovah's witness   [facility]    Marital Status                               Admission Date   7/6/2025    Admission Type   Emergency    Admitting Provider   Ana Malin MD    Attending Provider       Department, Room/Bed   [address], [address]       Discharge Date   7/15/2025    Discharge Disposition   Hospice/Home    Discharge Destination                                 Attending Provider: (none)   Allergies: Chlorhexidine    Isolation: None   Infection: None   Code Status: Prior    Ht: 182.9 cm (72\")   Wt: 67.1 kg (147 lb 14.9 oz)    Admission Cmt: None   Principal Problem: Hepatic encephalopathy [K76.82]                   Active Insurance as of 7/6/2025       Primary Coverage       Payor Plan Insurance Group Employer/Plan Group    MEDICARE MEDICARE A & B        Payor Plan Address Payor Plan Phone Number Payor Plan Fax Number Effective Dates    [address] [phone]  4/1/2005 - None Entered    [facility] [id number]         Subscriber Name Subscriber Birth Date Member ID       BECKA VAZQUEZ 1945 [id number]               Secondary Coverage       Payor Plan Insurance Group Employer/Plan Group    [employer]        Payor Plan Address Payor Plan Phone Number Payor Plan Fax Number Effective Dates    [address]   1/1/2025 - None Entered    [facility] [phone]         Subscriber Name Subscriber Birth Date Member ID       BECKA VAZQUEZ 1945 [id number]               Tertiary Coverage       Payor Plan Insurance Group Employer/Plan Group    MISC COMMERCIAL MISC COMMERCIAL        Coverage Address Coverage Phone Number Coverage Fax Number Effective Dates    [address] [phone]  1/1/2025 - 7/10/2025    [facility] [phone]         Subscriber " Name Subscriber Birth Date Member ID       BECKA PULLIAM 1945 [id number]                     Emergency Contacts        (Rel.) Home Phone Work Phone Mobile Phone    TCUKER PULLIAM (Spouse) -- -- [phone]    SHASHAGENESISD (Son) -- -- [phone]

## (undated) DEVICE — KT ORCA ORCAPOD DISP STRL

## (undated) DEVICE — TUBING, SUCTION, 1/4" X 10', STRAIGHT: Brand: MEDLINE

## (undated) DEVICE — FRCP BX RADJAW4 NDL 2.8 240CM LG OG BX40

## (undated) DEVICE — CVR PROB 96IN LF STRL

## (undated) DEVICE — ADHS SKIN SURG TISS VISC PREMIERPRO EXOFIN HI/VISC FAST/DRY

## (undated) DEVICE — COVER,C-ARM,41X74: Brand: MEDLINE

## (undated) DEVICE — SNAR POLYP CAPTIVATOR RND STFF 2.4 240CM 10MM 1P/U

## (undated) DEVICE — THE STERILE LIGHT HANDLE COVER IS USED WITH STERIS SURGICAL LIGHTING AND VISUALIZATION SYSTEMS.

## (undated) DEVICE — JELLY,LUBE,STERILE,FLIP TOP,TUBE,4-OZ: Brand: MEDLINE

## (undated) DEVICE — THE SINGLE USE ETRAP – POLYP TRAP IS USED FOR SUCTION RETRIEVAL OF ENDOSCOPICALLY REMOVED POLYPS.: Brand: ETRAP

## (undated) DEVICE — LN SMPL CO2 SHTRM SD STREAM W/M LUER

## (undated) DEVICE — GLV SURG PREMIERPRO ORTHO LTX PF SZ7 BRN

## (undated) DEVICE — INTENDED FOR TISSUE SEPARATION, AND OTHER PROCEDURES THAT REQUIRE A SHARP SURGICAL BLADE TO PUNCTURE OR CUT.: Brand: BARD-PARKER ® CARBON RIB-BACK BLADES

## (undated) DEVICE — GLV SURG SENSICARE PI PF LF 7 GRN STRL

## (undated) DEVICE — NDL HYPO PRECISIONGLIDE/REG 18G 11/2 PNK

## (undated) DEVICE — NDL HYPO PRECISIONGLIDE REG 25G 1 1/2

## (undated) DEVICE — LOU MINOR PROCEDURE: Brand: MEDLINE INDUSTRIES, INC.

## (undated) DEVICE — PATIENT RETURN ELECTRODE, SINGLE-USE, CONTACT QUALITY MONITORING, ADULT, WITH 9FT CORD, FOR PATIENTS WEIGING OVER 33LBS. (15KG): Brand: MEGADYNE

## (undated) DEVICE — ADAPT CLN BIOGUARD AIR/H2O DISP

## (undated) DEVICE — STRAP POSTN KN/BDY FM 5X72IN DISP

## (undated) DEVICE — GLV SURG SENSICARE PI MIC PF SZ7 LF STRL

## (undated) DEVICE — BLCK/BITE BLOX W/DENTL/RIM W/STRAP 54F

## (undated) DEVICE — SUT PDS 2/0 SH 27IN Z317H

## (undated) DEVICE — SENSR O2 OXIMAX FNGR A/ 18IN NONSTR

## (undated) DEVICE — TRAP FLD MEGADYNE

## (undated) DEVICE — SYR LL W/SCALE/MARK 3ML STRL

## (undated) DEVICE — ANTIBACTERIAL UNDYED BRAIDED (POLYGLACTIN 910), SYNTHETIC ABSORBABLE SUTURE: Brand: COATED VICRYL

## (undated) DEVICE — CANN O2 ETCO2 FITS ALL CONN CO2 SMPL A/ 7IN DISP LF

## (undated) DEVICE — APPL CHLORAPREP HI/LITE 26ML ORNG

## (undated) DEVICE — SUT MNCRYL PLS ANTIB UD 4/0 PS2 18IN